# Patient Record
Sex: FEMALE | Race: WHITE | NOT HISPANIC OR LATINO | Employment: FULL TIME | URBAN - METROPOLITAN AREA
[De-identification: names, ages, dates, MRNs, and addresses within clinical notes are randomized per-mention and may not be internally consistent; named-entity substitution may affect disease eponyms.]

---

## 2017-01-05 DIAGNOSIS — G43.719 INTRACTABLE CHRONIC MIGRAINE WITHOUT AURA: Primary | ICD-10-CM

## 2017-01-13 ENCOUNTER — OFFICE VISIT (OUTPATIENT)
Dept: PHYSICAL MEDICINE AND REHAB | Facility: CLINIC | Age: 47
End: 2017-01-13

## 2017-01-13 VITALS
HEIGHT: 71 IN | HEART RATE: 93 BPM | DIASTOLIC BLOOD PRESSURE: 62 MMHG | BODY MASS INDEX: 20.64 KG/M2 | SYSTOLIC BLOOD PRESSURE: 94 MMHG | WEIGHT: 147.4 LBS

## 2017-01-13 DIAGNOSIS — G43.719 INTRACTABLE CHRONIC MIGRAINE WITHOUT AURA AND WITHOUT STATUS MIGRAINOSUS: Primary | ICD-10-CM

## 2017-01-13 ASSESSMENT — PAIN SCALES - GENERAL: PAINLEVEL: NO PAIN (0)

## 2017-01-13 NOTE — Clinical Note
"1/13/2017     RE: Jessica Manriquez  [address]  [facility] [address]     Dear Colleague,    Thank you for referring your patient, Jessica Manriquez, to the [facility] PHYSICAL MEDICINE AND REHABILITATION at [facility]. Please see a copy of my visit note below.    BOTULINUM TOXIN PROCEDURE - HEADACHE - NOTE    Chief Complaint   Patient presents with     RECHECK     [facility] RETURN MIGRAINE BOTOX       BP 94/62 mmHg  Pulse 93  Ht 1.803 m (5' 11\")  Wt 66.86 kg (147 lb 6.4 oz)  BMI 20.57 kg/m2  LMP 12/12/2016       Current outpatient prescriptions:      botulinum toxin type A (BOTOX) 100 UNITS injection, Inject 200 Units into the muscle every 3 months, Disp: 200 Units, Rfl: 4     SUMAtriptan (IMITREX) 50 MG tablet, Take 1 tablet (50 mg) by mouth at onset of headache for migraine May repeat dose in 2 hours.  Do not exceed 200 mg in 24 hours, Disp: 18 tablet, Rfl: 1     SUMAtriptan (IMITREX) 50 MG tablet, Take 1 tablet (50 mg) by mouth at onset of headache for migraine May repeat dose in 2 hours.  Do not exceed 200 mg in 24 hours (Needs follow-up appointment for this medication), Disp: 9 tablet, Rfl: 1     zolpidem (AMBIEN) 5 MG tablet, Take 1 tablet (5 mg) by mouth nightly as needed for sleep, Disp: 90 tablet, Rfl: 1     diazepam (VALIUM) 10 MG tablet, Place 1 tab vaginally for muscle spasms, Disp: 30 tablet, Rfl: 3     fluconazole (DIFLUCAN) 150 MG tablet, Take 1 po as needed for yeast infection, Disp: 4 tablet, Rfl: 0     OnabotulinumtoxinA (BOTOX IJ), Inject 175 Units as directed Lot # /C3 with Expiration Date:  March 2019   NDC #: Botox 100u ([address]-57), Disp: , Rfl:      ibuprofen (ADVIL,MOTRIN) 600 MG tablet, Take 1 tablet (600 mg) by mouth every 6 hours as needed for moderate pain, Disp: 60 tablet, Rfl: 0     ALPRAZolam (XANAX) 0.5 MG tablet, Take 1 tablet (0.5 mg) by mouth every 6 hours as needed for anxiety, Disp: 40 tablet, Rfl: 0     OnabotulinumtoxinA (BOTOX " IJ), Inject 175 Units into the muscle Lot # /C3  Expiration Date: OCT 2018, Disp: , Rfl:      OnabotulinumtoxinA (BOTOX IJ), Inject 175 Units as directed Lot #  C3 with Expiration Date:  08/2018., Disp: , Rfl:      amitriptyline (ELAVIL) 10 MG tablet, Take 1 tablet (10 mg) by mouth At Bedtime Take up to 5 tabs at bedtime prn insomnia, Disp: 150 tablet, Rfl: 3     OnabotulinumtoxinA (BOTOX IJ), , Disp: , Rfl:      traMADol (ULTRAM) 50 MG tablet, Take 1-2 tablets ( mg) by mouth every 6 hours as needed for pain, Disp: 40 tablet, Rfl: 2     penciclovir (DENAVIR) 1 % cream, Apply topically every 2 hours (while awake), Disp: 1.5 g, Rfl: 3     hydrOXYzine (ATARAX) 25 MG tablet, Take 1-2 tablets (25-50 mg) by mouth every 6 hours as needed for itching, Disp: 60 tablet, Rfl: 1     ondansetron (ZOFRAN ODT) 4 MG disintegrating tablet, Take 1-2 tablets (4-8 mg) by mouth every 8 hours as needed for nausea, Disp: 20 tablet, Rfl: 3     SUMAtriptan (IMITREX) 20 MG/ACT nasal spray, Spray 1 spray (20 mg) in nostril as needed for migraine May repeat dose in 2 hours.  Do not exceed 40 mg in 24 hours, Disp: 1 Inhaler, Rfl: 1     aspirin-acetaminophen-caffeine (EXCEDRIN MIGRAINE) 250-250-65 MG per tablet, Take 1 tablet by mouth every 6 hours as needed for pain., Disp: 30 tablet, Rfl: 0     OVER-THE-COUNTER, vitamin D 1000 units daily, Disp: , Rfl:      VITAMIN C 500 MG OR TABS, 1 tablet daily, Disp: , Rfl:      ADVIL 200 MG OR CAPS, None Entered, Disp: , Rfl:      MULTI-VITAMIN OR TABS, 1 DAILY, Disp: , Rfl:      ACIDOPHILUS OR, None Entered, Disp: , Rfl:      Allergies   Allergen Reactions     Darvocet [Acetaminophen] Nausea and Vomiting     Erythromycin GI Disturbance     Flexeril [Cyclobenzaprine Hcl]      Lightheadedness, dizzy      PHYSICAL EXAM:    She currently does not have a headache, and only had 1 so far this month.     HPI:    Patient denies new medical diagnoses, illnesses, hospitalizations, emergency room  visits, and injuries since the previous injection with botulinum neurotoxin.    We reviewed the recommended safety guidelines for  Botox from any vaccine injection, such as the seasonal flu vaccine, by a minimum of 10-14 days with Jessica Manriquez. She acknowledged understanding.    RESPONSE TO PREVIOUS TREATMENT:  Change in headache pattern following last series of injections with 145 units of  Botox on 2016. (The neck area was adjusted last time, which seemed to work well).    No side effects noted.    1.  Headache frequency during this injection cycle:   In November she had 7 headache days. She had 6 in December, and so far in January only had one headache. This is compared to her baseline headache frequency of 16-25 headache days per month.     2.  Headache duration during this injection cycle:  Headache duration ranged from 3-4 hours with medication (most of them), a few lasted 12 hours to a day. Patient reports 2 episodes of multiple day headaches during this injection cycle. This is also an improvement from the past cycle.    3.  Headache intensity during this injection cycle:    A.  5/10  =  Typical pain level.  B.  8/10  =  Worst pain level.  C.  2/10  =  Lowest pain level.  These are improvements over her past injection cycle.    4.  Change in headache medication usage during this injection cycle:  (For Example:  Able to decrease use of oral pain medications.) She is taking a lot less Imitrex and using Excedrine Migraine for most of them.    5.  ER Visits During This Injection Cycle:  none    6.  Functional Performance:  Change in ADL's, social interaction, days lost from work, etc. Patient reports being able to more fully participate in social and family activities and responsibilities as headache symptoms have improved    BOTULINUM NEUROTOXIN INJECTION PROCEDURES:      VERIFICATION OF PATIENT IDENTIFICATION AND PROCEDURE     Initials   Patient Name pag   Patient  pag   Procedure  Verified by: [email or web address]     Prior to the start of the procedure and with procedural staff participation, I verbally confirmed the patient s identity using two indicators, relevant allergies, that the procedure was appropriate and matched the consent or emergent situation, and that the correct equipment/implants were available. Immediately prior to starting the procedure I conducted the Time Out with the procedural staff and re-confirmed the patient s name, procedure, and site/side. (The Joint Commission universal protocol was followed.)  Yes    Sedation (Moderate or Deep): None    Above assessments performed by:  Poonam Shoemaker RN Care Coordinator    Beau Jacobson MD    INDICATIONS FOR PROCEDURES:  Jessica Manriquez is a 45 year old patient with chronic migraine headaches associated with oromandibular components.     Her baseline symptoms have been recalcitrant to oral medications and conservative therapy.  She is here today for reinjection with Botox.    GOAL OF PROCEDURE:  The goal of this procedure is to decrease pain  and enhance functional independence.    TOTAL DOSE ADMINISTERED:  Dose Administered:  145 units  Botox (Botulinum Toxin Type A)       2:1 Dilution     Diluent Used:  Preservative Free Normal Saline  Total Volume of Diluent Used:  2.9 ml  Lot # /C3 with Expiration Date:  08/2019  NDC #: Botox 100u ([address]-11)    Medication guide was offered to patient and was declined.    CONSENT:  The risks, benefits, and treatment options were discussed with Jessica Manriquez and she agreed to proceed.    Written consent was obtained by [facility].     EQUIPMENT USED:  Needle-25mm stimulating/recording  Needle-30 gauge  EMG/NCS Machine    SKIN PREPARATION:  Skin preparation was performed using an alcohol wipe.    GUIDANCE DESCRIPTION:  Electro-myographic guidance was necessary throughout the Oromandibular portion to accurately identify all areas of spastic muscles while avoiding injection of non-spastic muscles,  neighboring nerves and nearby vascular structures.     AREA/MUSCLE INJECTED:      1 & 2. SHOULDER GIRDLE & NECK MUSCLES: 25 units Botox = Total Dose, 2:1 Dilution   Right Cervical Paraspinal (superior cervical) - 10 units of Botox at 2 site/s  Left Cervical Paraspinal (superior cervical) - 10 units of Botox at 2 site/s                 Left levator (at neck) - 5 units of Botox at 1 site      3. HEAD, JAW & SCALP MUSCLES: 120 units Botox = Total Dose, 2:1 Dilution  Left masseter - 5 units of Botox at 1 site    Right Occipitalis - 15 units of Botox at 3 site/s.  Left Occipitalis - 15 units of Botox at 3 site/s.    Right Frontalis - 10 units of Botox at 2 site/s  Left Frontalis - 10 units of Botox at 2 site/s     Right Temporalis - 15 units of Botox at 3 site/s.  Left Temporalis - 30 units of Botox at 6 site/s.    Right  - 5 units of Botox at 1 site/s.  Left  - 5 units of Botox at 1 site/s.    Procerus - 10 units of Botox at 1 site/s.    RESPONSE TO PROCEDURE:  Jessica Manriquez tolerated the procedure well and there were no immediate complications.   She was allowed to recover for an appropriate period of time and was discharged home in stable condition.    FOLLOW UP:  Jessica Manriquez was asked to follow up by phone in 7-14 days with Poonam Shoemaker RN, Care Coordinator, to report her response to this series of injections.  Based on the patient's previous response to this therapy, Jessica Manriquez was rescheduled for the next series of injections in 11 weeks.    PLAN (Medication Changes, Therapy Orders, Work or Disability Issues, etc.): Patient will continue to monitor response to today's injections.    Again, thank you for allowing me to participate in the care of your patient.      Sincerely,  Beau Jacobson MD

## 2017-01-13 NOTE — NURSING NOTE
Chief Complaint   Patient presents with     RECHECK     [facility] RETURN MIGRAINE BOTOX     Rhiannon Bullard MA

## 2017-01-13 NOTE — PROGRESS NOTES
"BOTULINUM TOXIN PROCEDURE - HEADACHE - NOTE    Chief Complaint   Patient presents with     RECHECK     [facility] RETURN MIGRAINE BOTOX       BP 94/62 mmHg  Pulse 93  Ht 1.803 m (5' 11\")  Wt 66.86 kg (147 lb 6.4 oz)  BMI 20.57 kg/m2  LMP 12/12/2016       Current outpatient prescriptions:      botulinum toxin type A (BOTOX) 100 UNITS injection, Inject 200 Units into the muscle every 3 months, Disp: 200 Units, Rfl: 4     SUMAtriptan (IMITREX) 50 MG tablet, Take 1 tablet (50 mg) by mouth at onset of headache for migraine May repeat dose in 2 hours.  Do not exceed 200 mg in 24 hours, Disp: 18 tablet, Rfl: 1     SUMAtriptan (IMITREX) 50 MG tablet, Take 1 tablet (50 mg) by mouth at onset of headache for migraine May repeat dose in 2 hours.  Do not exceed 200 mg in 24 hours (Needs follow-up appointment for this medication), Disp: 9 tablet, Rfl: 1     zolpidem (AMBIEN) 5 MG tablet, Take 1 tablet (5 mg) by mouth nightly as needed for sleep, Disp: 90 tablet, Rfl: 1     diazepam (VALIUM) 10 MG tablet, Place 1 tab vaginally for muscle spasms, Disp: 30 tablet, Rfl: 3     fluconazole (DIFLUCAN) 150 MG tablet, Take 1 po as needed for yeast infection, Disp: 4 tablet, Rfl: 0     OnabotulinumtoxinA (BOTOX IJ), Inject 175 Units as directed Lot # /C3 with Expiration Date:  March 2019   NDC #: Botox 100u ([address]-43), Disp: , Rfl:      ibuprofen (ADVIL,MOTRIN) 600 MG tablet, Take 1 tablet (600 mg) by mouth every 6 hours as needed for moderate pain, Disp: 60 tablet, Rfl: 0     ALPRAZolam (XANAX) 0.5 MG tablet, Take 1 tablet (0.5 mg) by mouth every 6 hours as needed for anxiety, Disp: 40 tablet, Rfl: 0     OnabotulinumtoxinA (BOTOX IJ), Inject 175 Units into the muscle Lot # /C3  Expiration Date: OCT 2018, Disp: , Rfl:      OnabotulinumtoxinA (BOTOX IJ), Inject 175 Units as directed Lot #  C3 with Expiration Date:  08/2018., Disp: , Rfl:      amitriptyline (ELAVIL) 10 MG tablet, Take 1 tablet (10 mg) by mouth At Bedtime " Take up to 5 tabs at bedtime prn insomnia, Disp: 150 tablet, Rfl: 3     OnabotulinumtoxinA (BOTOX IJ), , Disp: , Rfl:      traMADol (ULTRAM) 50 MG tablet, Take 1-2 tablets ( mg) by mouth every 6 hours as needed for pain, Disp: 40 tablet, Rfl: 2     penciclovir (DENAVIR) 1 % cream, Apply topically every 2 hours (while awake), Disp: 1.5 g, Rfl: 3     hydrOXYzine (ATARAX) 25 MG tablet, Take 1-2 tablets (25-50 mg) by mouth every 6 hours as needed for itching, Disp: 60 tablet, Rfl: 1     ondansetron (ZOFRAN ODT) 4 MG disintegrating tablet, Take 1-2 tablets (4-8 mg) by mouth every 8 hours as needed for nausea, Disp: 20 tablet, Rfl: 3     SUMAtriptan (IMITREX) 20 MG/ACT nasal spray, Spray 1 spray (20 mg) in nostril as needed for migraine May repeat dose in 2 hours.  Do not exceed 40 mg in 24 hours, Disp: 1 Inhaler, Rfl: 1     aspirin-acetaminophen-caffeine (EXCEDRIN MIGRAINE) 250-250-65 MG per tablet, Take 1 tablet by mouth every 6 hours as needed for pain., Disp: 30 tablet, Rfl: 0     OVER-THE-COUNTER, vitamin D 1000 units daily, Disp: , Rfl:      VITAMIN C 500 MG OR TABS, 1 tablet daily, Disp: , Rfl:      ADVIL 200 MG OR CAPS, None Entered, Disp: , Rfl:      MULTI-VITAMIN OR TABS, 1 DAILY, Disp: , Rfl:      ACIDOPHILUS OR, None Entered, Disp: , Rfl:      Allergies   Allergen Reactions     Darvocet [Acetaminophen] Nausea and Vomiting     Erythromycin GI Disturbance     Flexeril [Cyclobenzaprine Hcl]      Lightheadedness, dizzy          PHYSICAL EXAM:    She currently does not have a headache, and only had 1 so far this month.     HPI:    Patient denies new medical diagnoses, illnesses, hospitalizations, emergency room visits, and injuries since the previous injection with botulinum neurotoxin.    We reviewed the recommended safety guidelines for  Botox from any vaccine injection, such as the seasonal flu vaccine, by a minimum of 10-14 days with Jessica Manriquez. She acknowledged understanding.    RESPONSE  TO PREVIOUS TREATMENT:  Change in headache pattern following last series of injections with 145 units of  Botox on 2016. (The neck area was adjusted last time, which seemed to work well).    No side effects noted.    1.  Headache frequency during this injection cycle:   In November she had 7 headache days. She had 6 in December, and so far in January only had one headache. This is compared to her baseline headache frequency of 16-25 headache days per month.     2.  Headache duration during this injection cycle:  Headache duration ranged from 3-4 hours with medication (most of them), a few lasted 12 hours to a day. Patient reports 2 episodes of multiple day headaches during this injection cycle. This is also an improvement from the past cycle.    3.  Headache intensity during this injection cycle:    A.  5/10  =  Typical pain level.  B.  8/10  =  Worst pain level.  C.  2/10  =  Lowest pain level.  These are improvements over her past injection cycle.    4.  Change in headache medication usage during this injection cycle:  (For Example:  Able to decrease use of oral pain medications.) She is taking a lot less Imitrex and using Excedrine Migraine for most of them.    5.  ER Visits During This Injection Cycle:  none    6.  Functional Performance:  Change in ADL's, social interaction, days lost from work, etc. Patient reports being able to more fully participate in social and family activities and responsibilities as headache symptoms have improved    BOTULINUM NEUROTOXIN INJECTION PROCEDURES:      VERIFICATION OF PATIENT IDENTIFICATION AND PROCEDURE     Initials   Patient Name pag   Patient  pag   Procedure Verified by: pag     Prior to the start of the procedure and with procedural staff participation, I verbally confirmed the patient s identity using two indicators, relevant allergies, that the procedure was appropriate and matched the consent or emergent situation, and that the correct equipment/implants  were available. Immediately prior to starting the procedure I conducted the Time Out with the procedural staff and re-confirmed the patient s name, procedure, and site/side. (The Joint Commission universal protocol was followed.)  Yes    Sedation (Moderate or Deep): None    Above assessments performed by:  Poonam Shoemaker RN Care Coordinator    Beau Jacobson MD      INDICATIONS FOR PROCEDURES:  Jessica Manriquez is a 45 year old patient with chronic migraine headaches associated with oromandibular components.     Her baseline symptoms have been recalcitrant to oral medications and conservative therapy.  She is here today for reinjection with Botox.    GOAL OF PROCEDURE:  The goal of this procedure is to decrease pain  and enhance functional independence.    TOTAL DOSE ADMINISTERED:  Dose Administered:  145 units  Botox (Botulinum Toxin Type A)       2:1 Dilution     Diluent Used:  Preservative Free Normal Saline  Total Volume of Diluent Used:  2.9 ml  Lot # /C3 with Expiration Date:  08/2019  NDC #: Botox 100u ([address]-02)    Medication guide was offered to patient and was declined.    CONSENT:  The risks, benefits, and treatment options were discussed with Jessica Manriquez and she agreed to proceed.    Written consent was obtained by [facility].     EQUIPMENT USED:  Needle-25mm stimulating/recording  Needle-30 gauge  EMG/NCS Machine    SKIN PREPARATION:  Skin preparation was performed using an alcohol wipe.    GUIDANCE DESCRIPTION:  Electro-myographic guidance was necessary throughout the Oromandibular portion to accurately identify all areas of spastic muscles while avoiding injection of non-spastic muscles, neighboring nerves and nearby vascular structures.     AREA/MUSCLE INJECTED:      1 & 2. SHOULDER GIRDLE & NECK MUSCLES: 25 units Botox = Total Dose, 2:1 Dilution   Right Cervical Paraspinal (superior cervical) - 10 units of Botox at 2 site/s  Left Cervical Paraspinal (superior cervical) - 10 units of Botox at  2 site/s                 Left levator (at neck) - 5 units of Botox at 1 site      3. HEAD, JAW & SCALP MUSCLES: 120 units Botox = Total Dose, 2:1 Dilution  Left masseter - 5 units of Botox at 1 site    Right Occipitalis - 15 units of Botox at 3 site/s.  Left Occipitalis - 15 units of Botox at 3 site/s.    Right Frontalis - 10 units of Botox at 2 site/s  Left Frontalis - 10 units of Botox at 2 site/s     Right Temporalis - 15 units of Botox at 3 site/s.  Left Temporalis - 30 units of Botox at 6 site/s.    Right  - 5 units of Botox at 1 site/s.  Left  - 5 units of Botox at 1 site/s.    Procerus - 10 units of Botox at 1 site/s.    RESPONSE TO PROCEDURE:  Jessica Manriquez tolerated the procedure well and there were no immediate complications.   She was allowed to recover for an appropriate period of time and was discharged home in stable condition.    FOLLOW UP:  Jessica Manriquez was asked to follow up by phone in 7-14 days with Poonam Shoemaker RN, Care Coordinator, to report her response to this series of injections.  Based on the patient's previous response to this therapy, Jessica Manriquez was rescheduled for the next series of injections in 11 weeks.    PLAN (Medication Changes, Therapy Orders, Work or Disability Issues, etc.): Patient will continue to monitor response to today's injections.

## 2017-03-02 ENCOUNTER — OFFICE VISIT (OUTPATIENT)
Dept: FAMILY MEDICINE | Facility: CLINIC | Age: 47
End: 2017-03-02
Payer: COMMERCIAL

## 2017-03-02 VITALS
BODY MASS INDEX: 19.72 KG/M2 | TEMPERATURE: 98.3 F | SYSTOLIC BLOOD PRESSURE: 97 MMHG | WEIGHT: 145.6 LBS | HEIGHT: 72 IN | HEART RATE: 86 BPM | DIASTOLIC BLOOD PRESSURE: 64 MMHG

## 2017-03-02 DIAGNOSIS — H10.31 ACUTE BACTERIAL CONJUNCTIVITIS OF RIGHT EYE: Primary | ICD-10-CM

## 2017-03-02 PROCEDURE — 99213 OFFICE O/P EST LOW 20 MIN: CPT | Performed by: FAMILY MEDICINE

## 2017-03-02 RX ORDER — TOBRAMYCIN 3 MG/ML
1 SOLUTION/ DROPS OPHTHALMIC EVERY 4 HOURS
Qty: 1 BOTTLE | Refills: 0 | Status: SHIPPED | OUTPATIENT
Start: 2017-03-02 | End: 2017-03-09

## 2017-03-02 NOTE — MR AVS SNAPSHOT
After Visit Summary   3/2/2017    Jessica Manriquez    MRN: [id number]           Patient Information     Date Of Birth          1970        Visit Information        Provider Department      3/2/2017 11:20 AM Lou Cerna MD [facility]        Today's Diagnoses     Acute bacterial conjunctivitis of right eye    -  1      Care Instructions          Thank you for choosing [facility]  You may be receiving a survey in the mail from [facility] regarding your visit today.  Please take a few minutes to complete and return the survey to let us know how we are doing.      Our Clinic hours are:  Mondays    7:20 am - 7 pm  Tues -  Fri  7:20 am - 5 pm    Clinic Phone: [phone]    The clinic lab opens at 7:30 am Mon - Fri and appointments are required.    [facility]  Ph. [phone]  Monday-Thursday 8 am - 7pm  Tues/Wed/Fri 8 am - 5:30 pm               Follow-ups after your visit        Your next 10 appointments already scheduled     Mar 31, 2017  9:30 AM CDT   (Arrive by 9:15 AM)   RETURN MIGRAINE BOTOX with Beau Jacobson MD   [facility] Physical Medicine and Rehabilitation ([facility])    [address] [place] [address] [phone]              Who to contact     If you have questions or need follow up information about today's clinic visit or your schedule please contact [facility] directly at [phone].  Normal or non-critical lab and imaging results will be communicated to you by MyChart, letter or phone within 4 business days after the clinic has received the results. If you do not hear from us within 7 days, please contact the clinic through MyChart or phone. If you have a critical or abnormal lab result, we will notify you by phone as soon as possible.  Submit refill requests through [employer] or call your pharmacy and they will forward the refill request to  "us. Please allow 3 business days for your refill to be completed.          Additional Information About Your Visit        Row[employer] Information     The [employer] gives you secure access to your electronic health record. If you see a primary care provider, you can also send messages to your care team and make appointments. If you have questions, please call your primary care clinic.  If you do not have a primary care provider, please call [phone] and they will assist you.        Care EveryWhere ID     This is your Care EveryWhere ID. This could be used by other organizations to access your [place] medical records  [id number]        Your Vitals Were     Pulse Temperature Height Last Period Breastfeeding? BMI (Body Mass Index)    86 98.3  F (36.8  C) (Tympanic) 5' 11.5\" (1.816 m) 02/25/2017 No 20.02 kg/m2       Blood Pressure from Last 3 Encounters:   03/02/17 97/64   01/13/17 94/62   12/15/16 99/65    Weight from Last 3 Encounters:   03/02/17 145 lb 9.6 oz (66 kg)   01/13/17 147 lb 6.4 oz (66.9 kg)   12/15/16 149 lb 6.4 oz (67.8 kg)              Today, you had the following     No orders found for display         Today's Medication Changes          These changes are accurate as of: 3/2/17  2:05 PM.  If you have any questions, ask your nurse or doctor.               Start taking these medicines.        Dose/Directions    tobramycin 0.3 % ophthalmic solution   Commonly known as:  TOBREX   Used for:  Acute bacterial conjunctivitis of right eye   Started by:  Lou Cerna MD        Dose:  1 drop   Place 1 drop into the right eye every 4 hours for 7 days   Quantity:  1 Bottle   Refills:  0            Where to get your medicines      These medications were sent to [facility] [place], [place] - [address]  [address] [facility] [address]     Phone:  [phone]     tobramycin 0.3 % ophthalmic solution                Primary Care Provider Office Phone # Fax # "    Su Loya [phone]       [facility] [address]  [facility] [address]        Thank you!     Thank you for choosing [facility]  for your care. Our goal is always to provide you with excellent care. Hearing back from our patients is one way we can continue to improve our services. Please take a few minutes to complete the written survey that you may receive in the mail after your visit with us. Thank you!             Your Updated Medication List - Protect others around you: Learn how to safely use, store and throw away your medicines at www.disposemymeds.org.          This list is accurate as of: 3/2/17  2:05 PM.  Always use your most recent med list.                   Brand Name Dispense Instructions for use    ACIDOPHILUS PO      None Entered       * ADVIL 200 MG capsule   Generic drug:  ibuprofen      None Entered       * ibuprofen 600 MG tablet    ADVIL/MOTRIN    60 tablet    Take 1 tablet (600 mg) by mouth every 6 hours as needed for moderate pain       ALPRAZolam 0.5 MG tablet    XANAX    40 tablet    Take 1 tablet (0.5 mg) by mouth every 6 hours as needed for anxiety       amitriptyline 10 MG tablet    ELAVIL    150 tablet    Take 1 tablet (10 mg) by mouth At Bedtime Take up to 5 tabs at bedtime prn insomnia       ascorbic acid 500 MG tablet    VITAMIN C     1 tablet daily       aspirin-acetaminophen-caffeine 250-250-65 MG per tablet    EXCEDRIN MIGRAINE    30 tablet    Take 1 tablet by mouth every 6 hours as needed for pain.       * BOTOX IJ          * BOTOX IJ      Inject 175 Units as directed Lot #  [id number] with Expiration Date:  08/2018.       * BOTOX IJ      Inject 175 Units as directed Lot # /C3 with Expiration Date:  March 2019   NDC #: Botox 100u ([address]-84)       * BOTOX IJ      Inject 145 Units as directed Lot # /C3 with Expiration Date:  08/2019 NDC #: Botox 100u ([address]-78)       * BOTOX IJ      Inject 175 Units into the  muscle Lot # /C3  Expiration Date: OCT 2018       * botulinum toxin type A 100 UNITS injection    BOTOX    200 Units    Inject 200 Units into the muscle every 3 months       diazepam 10 MG tablet    VALIUM    30 tablet    Place 1 tab vaginally for muscle spasms       fluconazole 150 MG tablet    DIFLUCAN    4 tablet    Take 1 po as needed for yeast infection       hydrOXYzine 25 MG tablet    ATARAX    60 tablet    Take 1-2 tablets (25-50 mg) by mouth every 6 hours as needed for itching       Multi-vitamin Tabs tablet   Generic drug:  multivitamin, therapeutic with minerals      1 DAILY       ondansetron 4 MG ODT tab    ZOFRAN ODT    20 tablet    Take 1-2 tablets (4-8 mg) by mouth every 8 hours as needed for nausea       OVER-THE-COUNTER      vitamin D 1000 units daily       penciclovir 1 % cream    DENAVIR    1.5 g    Apply topically every 2 hours (while awake)       SUMAtriptan 20 MG/ACT nasal spray    IMITREX    1 Inhaler    Spray 1 spray (20 mg) in nostril as needed for migraine May repeat dose in 2 hours.  Do not exceed 40 mg in 24 hours       * SUMAtriptan 50 MG tablet    IMITREX    9 tablet    Take 1 tablet (50 mg) by mouth at onset of headache for migraine May repeat dose in 2 hours.  Do not exceed 200 mg in 24 hours (Needs follow-up appointment for this medication)       * SUMAtriptan 50 MG tablet    IMITREX    18 tablet    Take 1 tablet (50 mg) by mouth at onset of headache for migraine May repeat dose in 2 hours.  Do not exceed 200 mg in 24 hours       tobramycin 0.3 % ophthalmic solution    TOBREX    1 Bottle    Place 1 drop into the right eye every 4 hours for 7 days       traMADol 50 MG tablet    ULTRAM    40 tablet    Take 1-2 tablets ( mg) by mouth every 6 hours as needed for pain       zolpidem 5 MG tablet    AMBIEN    90 tablet    Take 1 tablet (5 mg) by mouth nightly as needed for sleep       * Notice:  This list has 10 medication(s) that are the same as other medications prescribed for  you. Read the directions carefully, and ask your doctor or other care provider to review them with you.

## 2017-03-02 NOTE — NURSING NOTE
"Chief Complaint   Patient presents with     Eye Problem     right eye watery and mattery.  burning and itching.  sx's started 3/1/17     URI     x 3-4 days       Initial BP 97/64 (BP Location: Right arm, Patient Position: Chair, Cuff Size: Adult Regular)  Pulse 86  Temp 98.3  F (36.8  C) (Tympanic)  Ht 5' 11.5\" (1.816 m)  Wt 145 lb 9.6 oz (66 kg)  LMP 02/25/2017  Breastfeeding? No  BMI 20.02 kg/m2 Estimated body mass index is 20.02 kg/(m^2) as calculated from the following:    Height as of this encounter: 5' 11.5\" (1.816 m).    Weight as of this encounter: 145 lb 9.6 oz (66 kg).  Medication Reconciliation: complete    "

## 2017-03-02 NOTE — PATIENT INSTRUCTIONS
Thank you for choosing [facility].  You may be receiving a survey in the mail from [facility] regarding your visit today.  Please take a few minutes to complete and return the survey to let us know how we are doing.      Our Clinic hours are:  Mondays    7:20 am - 7 pm  Tues -  Fri  7:20 am - 5 pm    Clinic Phone: [phone]    The clinic lab opens at 7:30 am Mon - Fri and appointments are required.    [facility]. [phone]  Monday-Thursday 8 am - 7pm  Tues/Wed/Fri 8 am - 5:30 pm

## 2017-03-02 NOTE — PROGRESS NOTES
SUBJECTIVE:                                                    Jessica Manriquez is a 46 year old female who presents to clinic today for the following health issues:      ENT Symptoms             Symptoms: cc Present Absent Comment   Fever/Chills    Has felt warm   Fatigue   x    Muscle Aches   x    Eye Irritation x x  Watery and mattery   Sneezing   x    Nasal Magan/Drg  x     Sinus Pressure/Pain   x    Loss of smell   x    Dental pain   x    Sore Throat  x  Throat has been rasping   Swollen Glands   x    Ear Pain/Fullness   x    Cough  x     Wheeze   x    Chest Pain  x     Shortness of breath   x    Rash   x    Other  x  Headaches,  Does have a hx of migraines, diarrhea     Symptom duration:  3-4 days   Symptom severity:  -   Treatments tried:  tylenol   Contacts: Roommate was dx'd with strep       ROS: 10 point review of systems negative except as per HPI.    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     Selective IgA immunodeficiency (H)      recurrent respir infections     Temporomandibular joint disorders, unspecified      Seen at [facility], uses splint, PT , acupuncture        ACTIVE MEDICAL PROBLEMS:  Patient Active Problem List   Diagnosis     Migraine headache     External hemorrhoids     Insomnia     Unexplained endometrial cells on cervical Pap smear     S/P LEEP of cervix     Pelvic pain in female        FAMILY HISTORY:  Family History   Problem Relation Age of Onset     Arthritis Mother      Respiratory Mother      asthma     Allergies Mother      CANCER Mother      Blood Disease Mother       leukemia     Breast Cancer Mother      Breast Cancer Maternal Grandmother      age 60's     HEART DISEASE Maternal Grandmother      CEREBROVASCULAR DISEASE Paternal Grandmother      CANCER Paternal Grandfather      ?biliary     Alcohol/Drug Paternal Grandfather      Allergies Sister        SOCIAL HISTORY:  Social History     Social History     Marital status: Single     Spouse name: N/A     Number of children: 0     Years of education: N/A     Occupational History     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 No     Social History Narrative       MEDICATIONS:  Current Outpatient Prescriptions   Medication Sig Dispense Refill     tobramycin (TOBREX) 0.3 % ophthalmic solution Place 1 drop into the right eye every 4 hours for 7 days 1 Bottle 0     OnabotulinumtoxinA (BOTOX IJ) Inject 145 Units as directed Lot # /C3 with Expiration Date:  08/2019  NDC #: Botox 100u ([address]-61)       botulinum toxin type A (BOTOX) 100 UNITS injection Inject 200 Units into the muscle every 3 months 200 Units 4     SUMAtriptan (IMITREX) 50 MG tablet Take 1 tablet (50 mg) by mouth at onset of headache for migraine May repeat dose in 2 hours.  Do not exceed 200 mg in 24 hours (Needs follow-up appointment for this medication) 9 tablet 1     zolpidem (AMBIEN) 5 MG tablet Take 1 tablet (5 mg) by mouth nightly as needed for sleep 90 tablet 1     diazepam (VALIUM) 10 MG tablet Place 1 tab vaginally for muscle spasms 30 tablet 3     OnabotulinumtoxinA (BOTOX IJ) Inject 175 Units as directed Lot # /C3 with Expiration Date:  March 2019    NDC #: Botox 100u ([address]-76)       ibuprofen  (ADVIL,MOTRIN) 600 MG tablet Take 1 tablet (600 mg) by mouth every 6 hours as needed for moderate pain 60 tablet 0     ALPRAZolam (XANAX) 0.5 MG tablet Take 1 tablet (0.5 mg) by mouth every 6 hours as needed for anxiety 40 tablet 0     OnabotulinumtoxinA (BOTOX IJ) Inject 175 Units into the muscle Lot # /C3  Expiration Date: OCT 2018       OnabotulinumtoxinA (BOTOX IJ) Inject 175 Units as directed Lot #  C3 with Expiration Date:  08/2018.       amitriptyline (ELAVIL) 10 MG tablet Take 1 tablet (10 mg) by mouth At Bedtime Take up to 5 tabs at bedtime prn insomnia 150 tablet 3     OnabotulinumtoxinA (BOTOX IJ)        traMADol (ULTRAM) 50 MG tablet Take 1-2 tablets ( mg) by mouth every 6 hours as needed for pain 40 tablet 2     hydrOXYzine (ATARAX) 25 MG tablet Take 1-2 tablets (25-50 mg) by mouth every 6 hours as needed for itching 60 tablet 1     ondansetron (ZOFRAN ODT) 4 MG disintegrating tablet Take 1-2 tablets (4-8 mg) by mouth every 8 hours as needed for nausea 20 tablet 3     SUMAtriptan (IMITREX) 20 MG/ACT nasal spray Spray 1 spray (20 mg) in nostril as needed for migraine May repeat dose in 2 hours.  Do not exceed 40 mg in 24 hours 1 Inhaler 1     aspirin-acetaminophen-caffeine (EXCEDRIN MIGRAINE) 250-250-65 MG per tablet Take 1 tablet by mouth every 6 hours as needed for pain. 30 tablet 0     OVER-THE-COUNTER vitamin D 1000 units daily       VITAMIN C 500 MG OR TABS 1 tablet daily       ADVIL 200 MG OR CAPS None Entered       MULTI-VITAMIN OR TABS 1 DAILY       ACIDOPHILUS OR None Entered       SUMAtriptan (IMITREX) 50 MG tablet Take 1 tablet (50 mg) by mouth at onset of headache for migraine May repeat dose in 2 hours.  Do not exceed 200 mg in 24 hours 18 tablet 1     fluconazole (DIFLUCAN) 150 MG tablet Take 1 po as needed for yeast infection (Patient not taking: Reported on 3/2/2017) 4 tablet 0     penciclovir (DENAVIR) 1 % cream Apply topically every 2 hours (while awake) 1.5 g 3  "      ALLERGIES:     Allergies   Allergen Reactions     Darvocet [Acetaminophen] Nausea and Vomiting     Erythromycin GI Disturbance     Flexeril [Cyclobenzaprine Hcl]      Lightheadedness, dizzy           OBJECTIVE:                                                    VITALS: BP 97/64 (BP Location: Right arm, Patient Position: Chair, Cuff Size: Adult Regular)  Pulse 86  Temp 98.3  F (36.8  C) (Tympanic)  Ht 5' 11.5\" (1.816 m)  Wt 145 lb 9.6 oz (66 kg)  LMP 02/25/2017  Breastfeeding? No  BMI 20.02 kg/m2  GENERAL: Pleasant, well appearing female.  HEENT: PERRL, EOMI, right conjunctival erythema and purulence oropharynx normal , TMs normal , Nares normal .  NECK: supple, no thyromegaly or thyroid masses, No anterior cervical  lymphadenopathy.    ASSESSMENT/PLAN:                                                    1. Acute bacterial conjunctivitis of right eye  - tobramycin (TOBREX) 0.3 % ophthalmic solution; Place 1 drop into the right eye every 4 hours for 7 days  Dispense: 1 Bottle; Refill: 0      Follow-up: If not improving or if worsening     "

## 2017-03-20 ENCOUNTER — TELEPHONE (OUTPATIENT)
Dept: OBGYN | Facility: CLINIC | Age: 47
End: 2017-03-20

## 2017-03-20 DIAGNOSIS — G47.00 PERSISTENT INSOMNIA: ICD-10-CM

## 2017-03-20 RX ORDER — AMITRIPTYLINE HYDROCHLORIDE 10 MG/1
10 TABLET ORAL AT BEDTIME
Qty: 50 TABLET | Refills: 0 | Status: SHIPPED | OUTPATIENT
Start: 2017-03-20 | End: 2017-08-07

## 2017-03-20 NOTE — TELEPHONE ENCOUNTER
Left message on machine to call back regarding note below.   Sharon PROCTOR RN  [facility]ty [facility]

## 2017-03-20 NOTE — TELEPHONE ENCOUNTER
Pharmacy requesting rx for Amitriptyline 10mg refilled.  SIG: Take 1 tab by mouth at bedtime. Take up to 5 tablets at bedtime as needed for insomnia.    Last filled 10-31-16 # 150.  Last annual 12-28-15 [facility].  No future visits scheduled.    Please advise.    Thanks-    -Bee Roman  Clinic Station

## 2017-03-20 NOTE — TELEPHONE ENCOUNTER
Patient notified of need to be seen for further refills of Amitriptyline. Patient verbalized understanding. She stated she will need to call back to schedule as she does not have her calendar with her. Rhiannon Tompkins RN

## 2017-03-31 ENCOUNTER — OFFICE VISIT (OUTPATIENT)
Dept: PHYSICAL MEDICINE AND REHAB | Facility: CLINIC | Age: 47
End: 2017-03-31

## 2017-03-31 VITALS
WEIGHT: 148 LBS | OXYGEN SATURATION: 99 % | DIASTOLIC BLOOD PRESSURE: 63 MMHG | BODY MASS INDEX: 20.72 KG/M2 | HEIGHT: 71 IN | TEMPERATURE: 96.8 F | SYSTOLIC BLOOD PRESSURE: 89 MMHG | RESPIRATION RATE: 16 BRPM | HEART RATE: 92 BPM

## 2017-03-31 DIAGNOSIS — G43.719 INTRACTABLE CHRONIC MIGRAINE WITHOUT AURA AND WITHOUT STATUS MIGRAINOSUS: Primary | ICD-10-CM

## 2017-03-31 RX ORDER — HYDROCODONE BITARTRATE AND ACETAMINOPHEN 5; 325 MG/1; MG/1
1 TABLET ORAL PRN
COMMUNITY
Start: 2017-02-28 | End: 2019-10-10

## 2017-03-31 RX ORDER — YOHIMBE BARK 500 MG
1 CAPSULE ORAL DAILY
COMMUNITY

## 2017-03-31 ASSESSMENT — PAIN SCALES - GENERAL: PAINLEVEL: NO PAIN (0)

## 2017-03-31 NOTE — MR AVS SNAPSHOT
After Visit Summary   3/31/2017    Jessica Manriquez    MRN: [id number]           Patient Information     Date Of Birth          1970        Visit Information        Provider Department      3/31/2017 9:30 AM Beau Jacobson MD [facility] Physical Medicine and Rehabilitation        Today's Diagnoses     Intractable chronic migraine without aura and without status migrainosus    -  1       Follow-ups after your visit        Your next 10 appointments already scheduled     Jun 23, 2017 11:00 AM CDT   (Arrive by 10:45 AM)   RETURN MIGRAINE BOTOX with Beau Jacobson MD   [facility] Physical Medicine and Rehabilitation ([facility])    [address]  [address] [facility] [address] [phone]              Who to contact     Please call your clinic at [phone] to:    Ask questions about your health    Make or cancel appointments    Discuss your medicines    Learn about your test results    Speak to your doctor   If you have compliments or concerns about an experience at your clinic, or if you wish to file a complaint, please contact [facility] Patient Relations at [phone] or email us at [email or web address]         Additional Information About Your Visit        MyChart Information     [employer] gives you secure access to your electronic health record. If you see a primary care provider, you can also send messages to your care team and make appointments. If you have questions, please call your primary care clinic.  If you do not have a primary care provider, please call [phone] and they will assist you.      [employer] is an electronic gateway that provides easy, online access to your medical records. With [employer], you can request a clinic appointment, read your test results, renew a prescription or communicate with your care team.     To access your existing account, please contact your [facility]  "[place] [facility] or call [phone] for assistance.        Care EveryWhere ID     This is your Care EveryWhere ID. This could be used by other organizations to access your [place] medical records  [id number]        Your Vitals Were     Pulse Temperature Respirations Height Last Period Pulse Oximetry    92 96.8  F (36  C) (Oral) 16 1.803 m (5' 11\") 02/25/2017 99%    Breastfeeding? BMI (Body Mass Index)                No 20.64 kg/m2           Blood Pressure from Last 3 Encounters:   03/31/17 (!) 89/63   03/02/17 97/64   01/13/17 94/62    Weight from Last 3 Encounters:   03/31/17 67.1 kg (148 lb)   03/02/17 66 kg (145 lb 9.6 oz)   01/13/17 66.9 kg (147 lb 6.4 oz)              We Performed the Following     H NEEDLE BOTOX     HC CHEMODENERVATE FACIAL,TRIGERM,NERV MIGRAINE     NEEDLE EMG GUIDE W CHEMODENERVATION        Primary Care Provider Office Phone # Fax #    Su Loya [phone]       [facility] [address] [facility] [address]        Thank you!     Thank you for choosing [facility] PHYSICAL MEDICINE AND REHABILITATION  for your care. Our goal is always to provide you with excellent care. Hearing back from our patients is one way we can continue to improve our services. Please take a few minutes to complete the written survey that you may receive in the mail after your visit with us. Thank you!             Your Updated Medication List - Protect others around you: Learn how to safely use, store and throw away your medicines at www.disposemymeds.org.          This list is accurate as of: 3/31/17 10:25 AM.  Always use your most recent med list.                   Brand Name Dispense Instructions for use    Acidophilus 100 MG Caps      Take 1 capsule by mouth daily       ALPRAZolam 0.5 MG tablet    XANAX    40 tablet    Take 1 tablet (0.5 mg) by mouth every 6 hours as needed for anxiety       amitriptyline 10 MG tablet    ELAVIL    50 tablet    Take 1 tablet (10 mg) by " mouth At Bedtime Take up to 5 tabs at bedtime prn insomnia.  Pt needs to be seen for more refills.       ascorbic acid 500 MG tablet    VITAMIN C     1 tablet daily       aspirin-acetaminophen-caffeine 250-250-65 MG per tablet    EXCEDRIN MIGRAINE    30 tablet    Take 1 tablet by mouth every 6 hours as needed for pain.       * BOTOX IJ      Inject 145 Units as directed Lot # /C3 with Expiration Date:  08/2019 NDC #: Botox 100u ([address]-50)       * BOTOX IJ      Inject 145 Units as directed Lot # /C3 with Expiration Date:  10/2019 NDC #: Botox 100u ([address]-09)       diazepam 10 MG tablet    VALIUM    30 tablet    Place 1 tab vaginally for muscle spasms       fluconazole 150 MG tablet    DIFLUCAN    4 tablet    Take 1 po as needed for yeast infection       HYDROcodone-acetaminophen 5-325 MG per tablet    NORCO     Take 1 tablet by mouth as needed       hydrOXYzine 25 MG tablet    ATARAX    60 tablet    Take 1-2 tablets (25-50 mg) by mouth every 6 hours as needed for itching       ibuprofen 600 MG tablet    ADVIL/MOTRIN    60 tablet    Take 1 tablet (600 mg) by mouth every 6 hours as needed for moderate pain       Multi-vitamin Tabs tablet   Generic drug:  multivitamin, therapeutic with minerals      1 DAILY       ondansetron 4 MG ODT tab    ZOFRAN ODT    20 tablet    Take 1-2 tablets (4-8 mg) by mouth every 8 hours as needed for nausea       penciclovir 1 % cream    DENAVIR    1.5 g    Apply topically every 2 hours (while awake)       SUMAtriptan 20 MG/ACT nasal spray    IMITREX    1 Inhaler    Spray 1 spray (20 mg) in nostril as needed for migraine May repeat dose in 2 hours.  Do not exceed 40 mg in 24 hours       SUMAtriptan 50 MG tablet    IMITREX    18 tablet    Take 1 tablet (50 mg) by mouth at onset of headache for migraine May repeat dose in 2 hours.  Do not exceed 200 mg in 24 hours       traMADol 50 MG tablet    ULTRAM    40 tablet    Take 1-2 tablets ( mg) by mouth every 6 hours as  needed for pain       VITAMIN D (CHOLECALCIFEROL) PO      Take 1,000 Units by mouth daily       zolpidem 5 MG tablet    AMBIEN    90 tablet    Take 1 tablet (5 mg) by mouth nightly as needed for sleep       * Notice:  This list has 2 medication(s) that are the same as other medications prescribed for you. Read the directions carefully, and ask your doctor or other care provider to review them with you.

## 2017-03-31 NOTE — LETTER
"3/31/2017       RE: Jessica Manriquez  [address]  [facility] [address]     Dear Colleague,    Thank you for referring your patient, Jessica Manriquez, to the [facility] at [facility]. Please see a copy of my visit note below.    BOTULINUM TOXIN PROCEDURE - HEADACHE - NOTE    Chief Complaint   Patient presents with     RECHECK     UMP- BOTOX-MIGRAINE F/U       BP (!) 89/63 (BP Location: Right arm, Patient Position: Chair, Cuff Size: Adult Large)  Pulse 92  Temp 96.8  F (36  C) (Oral)  Resp 16  Ht 1.803 m (5' 11\")  Wt 67.1 kg (148 lb)  LMP 02/25/2017  SpO2 99%  Breastfeeding? No  BMI 20.64 kg/m2       Current Outpatient Prescriptions:      HYDROcodone-acetaminophen (NORCO) 5-325 MG per tablet, Take 1 tablet by mouth as needed, Disp: , Rfl:      VITAMIN D, CHOLECALCIFEROL, PO, Take 1,000 Units by mouth daily, Disp: , Rfl:      Lactobacillus (ACIDOPHILUS) 100 MG CAPS, Take 1 capsule by mouth daily, Disp: , Rfl:      amitriptyline (ELAVIL) 10 MG tablet, Take 1 tablet (10 mg) by mouth At Bedtime Take up to 5 tabs at bedtime prn insomnia.  Pt needs to be seen for more refills., Disp: 50 tablet, Rfl: 0     OnabotulinumtoxinA (BOTOX IJ), Inject 145 Units as directed Lot # /[id number] with Expiration Date:  08/2019 NDC #: Botox 100u ([address]-39), Disp: , Rfl:      SUMAtriptan (IMITREX) 50 MG tablet, Take 1 tablet (50 mg) by mouth at onset of headache for migraine May repeat dose in 2 hours.  Do not exceed 200 mg in 24 hours, Disp: 18 tablet, Rfl: 1     zolpidem (AMBIEN) 5 MG tablet, Take 1 tablet (5 mg) by mouth nightly as needed for sleep, Disp: 90 tablet, Rfl: 1     diazepam (VALIUM) 10 MG tablet, Place 1 tab vaginally for muscle spasms, Disp: 30 tablet, Rfl: 3     fluconazole (DIFLUCAN) 150 MG tablet, Take 1 po as needed for yeast infection, Disp: 4 tablet, Rfl: 0     ibuprofen (ADVIL,MOTRIN) 600 MG tablet, Take 1 tablet (600 mg) by " mouth every 6 hours as needed for moderate pain, Disp: 60 tablet, Rfl: 0     ALPRAZolam (XANAX) 0.5 MG tablet, Take 1 tablet (0.5 mg) by mouth every 6 hours as needed for anxiety, Disp: 40 tablet, Rfl: 0     traMADol (ULTRAM) 50 MG tablet, Take 1-2 tablets ( mg) by mouth every 6 hours as needed for pain, Disp: 40 tablet, Rfl: 2     penciclovir (DENAVIR) 1 % cream, Apply topically every 2 hours (while awake), Disp: 1.5 g, Rfl: 3     hydrOXYzine (ATARAX) 25 MG tablet, Take 1-2 tablets (25-50 mg) by mouth every 6 hours as needed for itching, Disp: 60 tablet, Rfl: 1     ondansetron (ZOFRAN ODT) 4 MG disintegrating tablet, Take 1-2 tablets (4-8 mg) by mouth every 8 hours as needed for nausea, Disp: 20 tablet, Rfl: 3     SUMAtriptan (IMITREX) 20 MG/ACT nasal spray, Spray 1 spray (20 mg) in nostril as needed for migraine May repeat dose in 2 hours.  Do not exceed 40 mg in 24 hours, Disp: 1 Inhaler, Rfl: 1     aspirin-acetaminophen-caffeine (EXCEDRIN MIGRAINE) 250-250-65 MG per tablet, Take 1 tablet by mouth every 6 hours as needed for pain., Disp: 30 tablet, Rfl: 0     VITAMIN C 500 MG OR TABS, 1 tablet daily, Disp: , Rfl:      MULTI-VITAMIN OR TABS, 1 DAILY, Disp: , Rfl:      Allergies   Allergen Reactions     Darvocet [Acetaminophen] Nausea and Vomiting     Erythromycin GI Disturbance     Flexeril [Cyclobenzaprine Hcl]      Lightheadedness, dizzy          PHYSICAL EXAM:    She currently does not have a headache, and had several earlier this week.     HPI:    Patient reports having strep throat and pink eye recently but is done with the antibiotics and feeling better.    We reviewed the recommended safety guidelines for  Botox from any vaccine injection, such as the seasonal flu vaccine, by a minimum of 10-14 days with Jessica Manriquez. She acknowledged understanding.    RESPONSE TO PREVIOUS TREATMENT:  Change in headache pattern following last series of injections with 145 units of  Botox on 1/13/2017.  (The neck area was adjusted last time, which seemed to work well).    No side effects noted.    1.  Headache frequency during this injection cycle:   In January she had 3 headache days. She had 6 in February, and so far in March she had 8, which 4 of them were in the past week. This is compared to her baseline headache frequency of 16-25 headache days per month.     2.  Headache duration during this injection cycle:  Headache duration ranged from 3-4 hours with medication (most of them), with 2 lasting a whole day. Patient reports 2 episodes of multiple day headaches during this injection cycle. This is the same from the past cycle.    3.  Headache intensity during this injection cycle:    A.  4/10  =  Typical pain level.  B.  8/10  =  Worst pain level.  C.  1/10  =  Lowest pain level.  These are another improvement over her past injection cycle.    4.  Change in headache medication usage during this injection cycle:  (For Example:  Able to decrease use of oral pain medications.) She is taking a lot less Imitrex, and not medicating for most of them.    5.  ER Visits During This Injection Cycle:  none    6.  Functional Performance:  Change in ADL's, social interaction, days lost from work, etc. Patient reports being able to more fully participate in social and family activities and responsibilities as headache symptoms have improved    BOTULINUM NEUROTOXIN INJECTION PROCEDURES:      VERIFICATION OF PATIENT IDENTIFICATION AND PROCEDURE     Initials   Patient Name pag   Patient  pag   Procedure Verified by: [email or web address]     Prior to the start of the procedure and with procedural staff participation, I verbally confirmed the patient s identity using two indicators, relevant allergies, that the procedure was appropriate and matched the consent or emergent situation, and that the correct equipment/implants were available. Immediately prior to starting the procedure I conducted the Time Out with the procedural staff and  re-confirmed the patient s name, procedure, and site/side. (The Joint Commission universal protocol was followed.)  Yes    Sedation (Moderate or Deep): None    Above assessments performed by:  Poonam Shoemaker RN Care Coordinator    Beau Jacobson MD      INDICATIONS FOR PROCEDURES:  Jessica Manriquez is a 45 year old patient with chronic migraine headaches associated with oromandibular components.     Her baseline symptoms have been recalcitrant to oral medications and conservative therapy.  She is here today for reinjection with Botox.    GOAL OF PROCEDURE:  The goal of this procedure is to decrease pain  and enhance functional independence.    TOTAL DOSE ADMINISTERED:  Dose Administered:  145 units  Botox (Botulinum Toxin Type A)       2:1 Dilution (55 units unavoidable waste)    Diluent Used:  Preservative Free Normal Saline  Total Volume of Diluent Used:  2.9 ml  Lot # /C3 with Expiration Date:  10/2019  NDC #: Botox 100u ([address]-73)    Medication guide was offered to patient and was declined.    CONSENT:  The risks, benefits, and treatment options were discussed with Jessica Manriquez and she agreed to proceed.    Written consent was obtained by [facility].     EQUIPMENT USED:  Needle-25mm stimulating/recording  Needle-30 gauge  EMG/NCS Machine    SKIN PREPARATION:  Skin preparation was performed using an alcohol wipe.    GUIDANCE DESCRIPTION:  Electro-myographic guidance was necessary throughout the Oromandibular portion to accurately identify all areas of spastic muscles while avoiding injection of non-spastic muscles, neighboring nerves and nearby vascular structures.     AREA/MUSCLE INJECTED:      1 & 2. SHOULDER GIRDLE & NECK MUSCLES: 25 units Botox = Total Dose, 2:1 Dilution     Right Cervical Paraspinal (superior cervical) - 10 units of Botox at 2 site/s  Left Cervical Paraspinal (superior cervical) - 10 units of Botox at 2 site/s                 Left levator (at neck) - 5 units of Botox at 1 site      3.  HEAD, JAW & SCALP MUSCLES: 120 units Botox = Total Dose, 2:1 Dilution    Left masseter - 5 units of Botox at 1 site    Right Occipitalis - 15 units of Botox at 3 site/s.  Left Occipitalis - 15 units of Botox at 3 site/s.    Right Frontalis - 10 units of Botox at 2 site/s  Left Frontalis - 10 units of Botox at 2 site/s     Right Temporalis - 15 units of Botox at 3 site/s.  Left Temporalis - 30 units of Botox at 6 site/s.    Right  - 5 units of Botox at 1 site/s.  Left  - 5 units of Botox at 1 site/s.    Procerus - 10 units of Botox at 1 site/s.      RESPONSE TO PROCEDURE:  Jessica Manriquez tolerated the procedure well and there were no immediate complications.   She was allowed to recover for an appropriate period of time and was discharged home in stable condition.    FOLLOW UP:  Jessica Manriquez was asked to follow up by phone in 7-14 days with Poonam Shoemaker RN, Care Coordinator, to report her response to this series of injections.  Based on the patient's previous response to this therapy, Jessica Manriquez was rescheduled for the next series of injections in 11 weeks.    PLAN (Medication Changes, Therapy Orders, Work or Disability Issues, etc.): Patient will continue to monitor response to today's injections.

## 2017-03-31 NOTE — PROGRESS NOTES
"BOTULINUM TOXIN PROCEDURE - HEADACHE - NOTE    Chief Complaint   Patient presents with     RECHECK     P- BOTOX-MIGRAINE F/U       BP (!) 89/63 (BP Location: Right arm, Patient Position: Chair, Cuff Size: Adult Large)  Pulse 92  Temp 96.8  F (36  C) (Oral)  Resp 16  Ht 1.803 m (5' 11\")  Wt 67.1 kg (148 lb)  LMP 02/25/2017  SpO2 99%  Breastfeeding? No  BMI 20.64 kg/m2       Current Outpatient Prescriptions:      HYDROcodone-acetaminophen (NORCO) 5-325 MG per tablet, Take 1 tablet by mouth as needed, Disp: , Rfl:      VITAMIN D, CHOLECALCIFEROL, PO, Take 1,000 Units by mouth daily, Disp: , Rfl:      Lactobacillus (ACIDOPHILUS) 100 MG CAPS, Take 1 capsule by mouth daily, Disp: , Rfl:      amitriptyline (ELAVIL) 10 MG tablet, Take 1 tablet (10 mg) by mouth At Bedtime Take up to 5 tabs at bedtime prn insomnia.  Pt needs to be seen for more refills., Disp: 50 tablet, Rfl: 0     OnabotulinumtoxinA (BOTOX IJ), Inject 145 Units as directed Lot # /[id number] with Expiration Date:  08/2019 NDC #: Botox 100u ([address]-42), Disp: , Rfl:      SUMAtriptan (IMITREX) 50 MG tablet, Take 1 tablet (50 mg) by mouth at onset of headache for migraine May repeat dose in 2 hours.  Do not exceed 200 mg in 24 hours, Disp: 18 tablet, Rfl: 1     zolpidem (AMBIEN) 5 MG tablet, Take 1 tablet (5 mg) by mouth nightly as needed for sleep, Disp: 90 tablet, Rfl: 1     diazepam (VALIUM) 10 MG tablet, Place 1 tab vaginally for muscle spasms, Disp: 30 tablet, Rfl: 3     fluconazole (DIFLUCAN) 150 MG tablet, Take 1 po as needed for yeast infection, Disp: 4 tablet, Rfl: 0     ibuprofen (ADVIL,MOTRIN) 600 MG tablet, Take 1 tablet (600 mg) by mouth every 6 hours as needed for moderate pain, Disp: 60 tablet, Rfl: 0     ALPRAZolam (XANAX) 0.5 MG tablet, Take 1 tablet (0.5 mg) by mouth every 6 hours as needed for anxiety, Disp: 40 tablet, Rfl: 0     traMADol (ULTRAM) 50 MG tablet, Take 1-2 tablets ( mg) by mouth every 6 hours as needed for pain, " Disp: 40 tablet, Rfl: 2     penciclovir (DENAVIR) 1 % cream, Apply topically every 2 hours (while awake), Disp: 1.5 g, Rfl: 3     hydrOXYzine (ATARAX) 25 MG tablet, Take 1-2 tablets (25-50 mg) by mouth every 6 hours as needed for itching, Disp: 60 tablet, Rfl: 1     ondansetron (ZOFRAN ODT) 4 MG disintegrating tablet, Take 1-2 tablets (4-8 mg) by mouth every 8 hours as needed for nausea, Disp: 20 tablet, Rfl: 3     SUMAtriptan (IMITREX) 20 MG/ACT nasal spray, Spray 1 spray (20 mg) in nostril as needed for migraine May repeat dose in 2 hours.  Do not exceed 40 mg in 24 hours, Disp: 1 Inhaler, Rfl: 1     aspirin-acetaminophen-caffeine (EXCEDRIN MIGRAINE) 250-250-65 MG per tablet, Take 1 tablet by mouth every 6 hours as needed for pain., Disp: 30 tablet, Rfl: 0     VITAMIN C 500 MG OR TABS, 1 tablet daily, Disp: , Rfl:      MULTI-VITAMIN OR TABS, 1 DAILY, Disp: , Rfl:      Allergies   Allergen Reactions     Darvocet [Acetaminophen] Nausea and Vomiting     Erythromycin GI Disturbance     Flexeril [Cyclobenzaprine Hcl]      Lightheadedness, dizzy          PHYSICAL EXAM:    She currently does not have a headache, and had several earlier this week.     HPI:    Patient reports having strep throat and pink eye recently but is done with the antibiotics and feeling better.    We reviewed the recommended safety guidelines for  Botox from any vaccine injection, such as the seasonal flu vaccine, by a minimum of 10-14 days with Jessica Manriquez. She acknowledged understanding.    RESPONSE TO PREVIOUS TREATMENT:  Change in headache pattern following last series of injections with 145 units of  Botox on 1/13/2017. (The neck area was adjusted last time, which seemed to work well).    No side effects noted.    1.  Headache frequency during this injection cycle:   In January she had 3 headache days. She had 6 in February, and so far in March she had 8, which 4 of them were in the past week. This is compared to her baseline  headache frequency of 16-25 headache days per month.     2.  Headache duration during this injection cycle:  Headache duration ranged from 3-4 hours with medication (most of them), with 2 lasting a whole day. Patient reports 2 episodes of multiple day headaches during this injection cycle. This is the same from the past cycle.    3.  Headache intensity during this injection cycle:    A.  4/10  =  Typical pain level.  B.  8/10  =  Worst pain level.  C.  1/10  =  Lowest pain level.  These are another improvement over her past injection cycle.    4.  Change in headache medication usage during this injection cycle:  (For Example:  Able to decrease use of oral pain medications.) She is taking a lot less Imitrex, and not medicating for most of them.    5.  ER Visits During This Injection Cycle:  none    6.  Functional Performance:  Change in ADL's, social interaction, days lost from work, etc. Patient reports being able to more fully participate in social and family activities and responsibilities as headache symptoms have improved    BOTULINUM NEUROTOXIN INJECTION PROCEDURES:      VERIFICATION OF PATIENT IDENTIFICATION AND PROCEDURE     Initials   Patient Name pag   Patient  pag   Procedure Verified by: pag     Prior to the start of the procedure and with procedural staff participation, I verbally confirmed the patient s identity using two indicators, relevant allergies, that the procedure was appropriate and matched the consent or emergent situation, and that the correct equipment/implants were available. Immediately prior to starting the procedure I conducted the Time Out with the procedural staff and re-confirmed the patient s name, procedure, and site/side. (The Joint Commission universal protocol was followed.)  Yes    Sedation (Moderate or Deep): None    Above assessments performed by:  Poonam Shoemaker RN Care Coordinator    Beau Jacobson MD      INDICATIONS FOR PROCEDURES:  Jessica Manriquez is a 45 year old  patient with chronic migraine headaches associated with oromandibular components.     Her baseline symptoms have been recalcitrant to oral medications and conservative therapy.  She is here today for reinjection with Botox.    GOAL OF PROCEDURE:  The goal of this procedure is to decrease pain  and enhance functional independence.    TOTAL DOSE ADMINISTERED:  Dose Administered:  145 units  Botox (Botulinum Toxin Type A)       2:1 Dilution (55 units unavoidable waste)    Diluent Used:  Preservative Free Normal Saline  Total Volume of Diluent Used:  2.9 ml  Lot # /C3 with Expiration Date:  10/2019  NDC #: Botox 100u ([address]-04)    Medication guide was offered to patient and was declined.    CONSENT:  The risks, benefits, and treatment options were discussed with Jessica Manriquez and she agreed to proceed.    Written consent was obtained by [facility].     EQUIPMENT USED:  Needle-25mm stimulating/recording  Needle-30 gauge  EMG/NCS Machine    SKIN PREPARATION:  Skin preparation was performed using an alcohol wipe.    GUIDANCE DESCRIPTION:  Electro-myographic guidance was necessary throughout the Oromandibular portion to accurately identify all areas of spastic muscles while avoiding injection of non-spastic muscles, neighboring nerves and nearby vascular structures.     AREA/MUSCLE INJECTED:      1 & 2. SHOULDER GIRDLE & NECK MUSCLES: 25 units Botox = Total Dose, 2:1 Dilution     Right Cervical Paraspinal (superior cervical) - 10 units of Botox at 2 site/s  Left Cervical Paraspinal (superior cervical) - 10 units of Botox at 2 site/s                 Left levator (at neck) - 5 units of Botox at 1 site      3. HEAD, JAW & SCALP MUSCLES: 120 units Botox = Total Dose, 2:1 Dilution    Left masseter - 5 units of Botox at 1 site    Right Occipitalis - 15 units of Botox at 3 site/s.  Left Occipitalis - 15 units of Botox at 3 site/s.    Right Frontalis - 10 units of Botox at 2 site/s  Left Frontalis - 10 units of Botox at 2  site/s     Right Temporalis - 15 units of Botox at 3 site/s.  Left Temporalis - 30 units of Botox at 6 site/s.    Right  - 5 units of Botox at 1 site/s.  Left  - 5 units of Botox at 1 site/s.    Procerus - 10 units of Botox at 1 site/s.      RESPONSE TO PROCEDURE:  Jessica Manriquez tolerated the procedure well and there were no immediate complications.   She was allowed to recover for an appropriate period of time and was discharged home in stable condition.    FOLLOW UP:  Jessica Manriquez was asked to follow up by phone in 7-14 days with Poonam Shoemaker RN, Care Coordinator, to report her response to this series of injections.  Based on the patient's previous response to this therapy, Jessica Manriquez was rescheduled for the next series of injections in 11 weeks.    PLAN (Medication Changes, Therapy Orders, Work or Disability Issues, etc.): Patient will continue to monitor response to today's injections.

## 2017-04-13 ENCOUNTER — OFFICE VISIT (OUTPATIENT)
Dept: PODIATRY | Facility: CLINIC | Age: 47
End: 2017-04-13
Payer: COMMERCIAL

## 2017-04-13 ENCOUNTER — RADIANT APPOINTMENT (OUTPATIENT)
Dept: GENERAL RADIOLOGY | Facility: CLINIC | Age: 47
End: 2017-04-13
Attending: PODIATRIST
Payer: COMMERCIAL

## 2017-04-13 VITALS — HEIGHT: 71 IN | BODY MASS INDEX: 20.72 KG/M2 | WEIGHT: 148 LBS

## 2017-04-13 DIAGNOSIS — M79.672 LEFT FOOT PAIN: ICD-10-CM

## 2017-04-13 DIAGNOSIS — M79.672 LEFT FOOT PAIN: Primary | ICD-10-CM

## 2017-04-13 DIAGNOSIS — M72.2 PLANTAR FASCIITIS, LEFT: ICD-10-CM

## 2017-04-13 DIAGNOSIS — M21.622 BUNIONETTE OF LEFT FOOT: ICD-10-CM

## 2017-04-13 PROCEDURE — 99203 OFFICE O/P NEW LOW 30 MIN: CPT | Performed by: PODIATRIST

## 2017-04-13 PROCEDURE — 73630 X-RAY EXAM OF FOOT: CPT | Mod: LT

## 2017-04-13 NOTE — PATIENT INSTRUCTIONS
Initial musculoskeletal treatment recommendation:    1.  Stretch the calf muscles as instructed once an hour.  2.  Ice the injured area in the evening; 20 min on/off.  3.  Take antiinflammatory medication as directed.  4.  Massage the soft tissues around the injured area in the morning to loosen the tissue  5.  Wear supportive foot wear and/or arch supports (rigid not cushion) such as Superfeet inserts which can be found at [facility] or [facility].       If no improvement in symptoms within four to six weeks, return to clinic for reevaluation.

## 2017-04-13 NOTE — PROGRESS NOTES
PATIENT HISTORY:  Jessica Manriquez is a 46 year old female who presents to clinic for a painful left foot .  The patient describes the pain as sharp stabbing.  The patient relates pain is located over the outside of the fifth metatarsophalangeal joint on the left foot as well as the bottom of the heel of the left foot.  The patient relates the pain has been present for the past several weeks to months.  The patient relates pain with ambulation.  The patient has tried different shoes with little relief.         REVIEW OF SYSTEMS:  Constitutional, HEENT, cardiovascular, pulmonary, GI, , musculoskeletal, neuro, skin, endocrine and psych systems are negative, except as otherwise noted.     PAST MEDICAL HISTORY:   Past Medical History:   Diagnosis Date     Cervicalgia     MRI x2 canal stenosis, disk hernieations, degenrative changes Last MRI 2004 at [facility] clinic     Dysplasia of cervix, unspecified 11/01    SALOMÓN 3, treated with LEEP     Endometriosis, site unspecified     Doing ok on BCP's has not had laparoscopy     Other anxiety states     has used Ambien and Xanax prn     Pure hypercholesterolemia      Scapulocostal syndrome 9/23/2009     Selective IgA immunodeficiency (H)     recurrent respir infections     Temporomandibular joint disorders, unspecified     Seen at [facility], uses splint, PT , acupuncture        PAST SURGICAL HISTORY:   Past Surgical History:   Procedure Laterality Date     HC ENLARGE BREAST WITH IMPLANT  2000     HC REVISE BREAST RECONSTRUCTION  5/03     LEEP TX, CERVICAL  11/01    SALOMÓN 3, yearly paps until 2021     LIGATN/STRIP LONG & SHORT SAPHEN  11/04    Bilateral vein stripping     TUBAL LIGATION  10/30/09    filshie clips        MEDICATIONS:   Current Outpatient Prescriptions:      HYDROcodone-acetaminophen (NORCO) 5-325 MG per tablet, Take 1 tablet by mouth as needed, Disp: , Rfl:      VITAMIN D, CHOLECALCIFEROL, PO, Take 1,000 Units by mouth daily, Disp: , Rfl:      Lactobacillus  (ACIDOPHILUS) 100 MG CAPS, Take 1 capsule by mouth daily, Disp: , Rfl:      OnabotulinumtoxinA (BOTOX IJ), Inject 145 Units as directed Lot # /C3 with Expiration Date:  10/2019 NDC #: Botox 100u ([address]-30), Disp: , Rfl:      amitriptyline (ELAVIL) 10 MG tablet, Take 1 tablet (10 mg) by mouth At Bedtime Take up to 5 tabs at bedtime prn insomnia.  Pt needs to be seen for more refills., Disp: 50 tablet, Rfl: 0     OnabotulinumtoxinA (BOTOX IJ), Inject 145 Units as directed Lot # /C3 with Expiration Date:  08/2019 NDC #: Botox 100u ([address]-69), Disp: , Rfl:      SUMAtriptan (IMITREX) 50 MG tablet, Take 1 tablet (50 mg) by mouth at onset of headache for migraine May repeat dose in 2 hours.  Do not exceed 200 mg in 24 hours, Disp: 18 tablet, Rfl: 1     zolpidem (AMBIEN) 5 MG tablet, Take 1 tablet (5 mg) by mouth nightly as needed for sleep, Disp: 90 tablet, Rfl: 1     diazepam (VALIUM) 10 MG tablet, Place 1 tab vaginally for muscle spasms, Disp: 30 tablet, Rfl: 3     fluconazole (DIFLUCAN) 150 MG tablet, Take 1 po as needed for yeast infection, Disp: 4 tablet, Rfl: 0     ibuprofen (ADVIL,MOTRIN) 600 MG tablet, Take 1 tablet (600 mg) by mouth every 6 hours as needed for moderate pain, Disp: 60 tablet, Rfl: 0     ALPRAZolam (XANAX) 0.5 MG tablet, Take 1 tablet (0.5 mg) by mouth every 6 hours as needed for anxiety, Disp: 40 tablet, Rfl: 0     traMADol (ULTRAM) 50 MG tablet, Take 1-2 tablets ( mg) by mouth every 6 hours as needed for pain, Disp: 40 tablet, Rfl: 2     penciclovir (DENAVIR) 1 % cream, Apply topically every 2 hours (while awake), Disp: 1.5 g, Rfl: 3     hydrOXYzine (ATARAX) 25 MG tablet, Take 1-2 tablets (25-50 mg) by mouth every 6 hours as needed for itching, Disp: 60 tablet, Rfl: 1     ondansetron (ZOFRAN ODT) 4 MG disintegrating tablet, Take 1-2 tablets (4-8 mg) by mouth every 8 hours as needed for nausea, Disp: 20 tablet, Rfl: 3     SUMAtriptan (IMITREX) 20 MG/ACT nasal spray, Spray  "1 spray (20 mg) in nostril as needed for migraine May repeat dose in 2 hours.  Do not exceed 40 mg in 24 hours, Disp: 1 Inhaler, Rfl: 1     aspirin-acetaminophen-caffeine (EXCEDRIN MIGRAINE) 250-250-65 MG per tablet, Take 1 tablet by mouth every 6 hours as needed for pain., Disp: 30 tablet, Rfl: 0     VITAMIN C 500 MG OR TABS, 1 tablet daily, Disp: , Rfl:      MULTI-VITAMIN OR TABS, 1 DAILY, Disp: , Rfl:      ALLERGIES:    Allergies   Allergen Reactions     Darvocet [Acetaminophen] Nausea and Vomiting     Erythromycin GI Disturbance     Flexeril [Cyclobenzaprine Hcl]      Lightheadedness, dizzy          SOCIAL HISTORY:   Social History     Social History     Marital status: Single     Spouse name: N/A     Number of children: 0     Years of education: N/A     Occupational History     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 No     Social History Narrative        FAMILY HISTORY:   Family History   Problem Relation Age of Onset     Arthritis Mother      Respiratory Mother      asthma     Allergies Mother      CANCER Mother      Blood Disease Mother      leukemia     Breast Cancer Mother      Breast Cancer Maternal Grandmother      age 60's     HEART DISEASE Maternal Grandmother      CEREBROVASCULAR DISEASE Paternal Grandmother      CANCER Paternal Grandfather      ?biliary     Alcohol/Drug Paternal Grandfather      Allergies Sister         EXAM:Vitals: Ht 1.803 m (5' 11\")  Wt 67.1 kg (148 lb)  BMI 20.64 kg/m2  BMI= Body mass index is 20.64 kg/(m^2).        General appearance: Patient is alert and fully cooperative with history & exam.  No sign of distress is noted during the " visit.     Psychiatric: Affect is pleasant & appropriate.  Patient appears motivated to improve health.     Respiratory: Breathing is regular & unlabored while sitting.     HEENT: Hearing is intact to spoken word.  Speech is clear.  No gross evidence of visual impairment that would impact ambulation.     Dermatologic: Skin is intact to both lower extremities without significant lesions, rash or abrasion.  No paronychia or evidence of soft tissue infection is noted.     Vascular: DP & PT pulses are intact & regular bilaterally.  No significant edema or varicosities noted.  CFT and skin temperature is normal to both lower extremities.     Neurologic: Lower extremity sensation is intact to light touch.  No evidence of weakness or contracture in the lower extremities.  No evidence of neuropathy.     Musculoskeletal: Patient is ambulatory without assistive device or brace.  No gross ankle deformity noted.  No foot or ankle joint effusion is noted.  One notes pain on palpation over the fifth metatarsal head on the left foot. One notes a pronounced fifth metatarsal head on the left foot. No surrounding erythema noted. One notes pain on palpation on the plantar aspect of the left heel and arch at the insertion point of the plantar fascia. No surrounding erythema noted.    Radiographs were evaluated including AP, lateral and medial oblique views of the left foot reveals an increased fourth fifth intermetatarsal angle on the left foot. One also notes an increased first intermetatarsal angle and hallux abductus angle. No cortical erosions or periosteal elevation.  All joint margins appear stable.  There is no apparent fracture or tumor formation noted.  There is no evidence of foreign body.    ASSESSMENT / PLAN:     ICD-10-CM    1. Left foot pain M79.672 XR Foot Left G/E 3 Views   2. Bunionette of left foot M21.622 ORTHOTICS REFERRAL   3. Plantar fasciitis, left M72.2 ORTHOTICS REFERRAL       I have explained to Jessica  about  the conditions.  We discussed the nature of the condition as well as the treatment plan and expected length of recovery.  At this time, the patient was instructed on icing, stretching, tissue massage and support.  The patient was referred to [facility] for custom orthotics that will aid in offloading the tension forces to the soft tissues and prevent further inflammation.   The patient will return in four weeks for reevaluation if the symptoms do not resolve.        Disclaimer: This note consists of symbols derived from keyboarding, dictation and/or voice recognition software. As a result, there may be errors in the script that have gone undetected. Please consider this when interpreting information found in this chart.       CANDY Gardner D.P.M., [email or web address].

## 2017-04-13 NOTE — LETTER
4/13/2017       RE: Jessica Manriquez  [address]  [place] [address]           Dear Colleague,    Thank you for referring your patient, Jessica Manriquez, to the [facility]. Please see a copy of my visit note below.    PATIENT HISTORY:  Jessica Manriquez is a 46 year old female who presents to clinic for a painful left foot .  The patient describes the pain as sharp stabbing.  The patient relates pain is located over the outside of the fifth metatarsophalangeal joint on the left foot as well as the bottom of the heel of the left foot.  The patient relates the pain has been present for the past several weeks to months.  The patient relates pain with ambulation.  The patient has tried different shoes with little relief.         REVIEW OF SYSTEMS:  Constitutional, HEENT, cardiovascular, pulmonary, GI, , musculoskeletal, neuro, skin, endocrine and psych systems are negative, except as otherwise noted.     PAST MEDICAL HISTORY:   Past Medical History:   Diagnosis Date     Cervicalgia     MRI x2 canal stenosis, disk hernieations, degenrative changes Last MRI 2004 at [facility] pain clinic     Dysplasia of cervix, unspecified 11/01    SALOMÓN 3, treated with LEEP     Endometriosis, site unspecified     Doing ok on BCP's has not had laparoscopy     Other anxiety states     has used Ambien and Xanax prn     Pure hypercholesterolemia      Scapulocostal syndrome 9/23/2009     Selective IgA immunodeficiency (H)     recurrent respir infections     Temporomandibular joint disorders, unspecified     Seen at [facility], uses splint, PT , acupuncture        PAST SURGICAL HISTORY:   Past Surgical History:   Procedure Laterality Date     HC ENLARGE BREAST WITH IMPLANT  2000     HC REVISE BREAST RECONSTRUCTION  5/03     LEEP TX, CERVICAL  11/01    SALOMÓN 3, yearly paps until 2021     LIGATN/STRIP LONG & SHORT SAPHEN  11/04    Bilateral vein stripping     TUBAL LIGATION  10/30/09    filshie clips         MEDICATIONS:   Current Outpatient Prescriptions:      HYDROcodone-acetaminophen (NORCO) 5-325 MG per tablet, Take 1 tablet by mouth as needed, Disp: , Rfl:      VITAMIN D, CHOLECALCIFEROL, PO, Take 1,000 Units by mouth daily, Disp: , Rfl:      Lactobacillus (ACIDOPHILUS) 100 MG CAPS, Take 1 capsule by mouth daily, Disp: , Rfl:      OnabotulinumtoxinA (BOTOX IJ), Inject 145 Units as directed Lot # /C3 with Expiration Date:  10/2019 NDC #: Botox 100u ([address]-67), Disp: , Rfl:      amitriptyline (ELAVIL) 10 MG tablet, Take 1 tablet (10 mg) by mouth At Bedtime Take up to 5 tabs at bedtime prn insomnia.  Pt needs to be seen for more refills., Disp: 50 tablet, Rfl: 0     OnabotulinumtoxinA (BOTOX IJ), Inject 145 Units as directed Lot # /C3 with Expiration Date:  08/2019 NDC #: Botox 100u ([address]-42), Disp: , Rfl:      SUMAtriptan (IMITREX) 50 MG tablet, Take 1 tablet (50 mg) by mouth at onset of headache for migraine May repeat dose in 2 hours.  Do not exceed 200 mg in 24 hours, Disp: 18 tablet, Rfl: 1     zolpidem (AMBIEN) 5 MG tablet, Take 1 tablet (5 mg) by mouth nightly as needed for sleep, Disp: 90 tablet, Rfl: 1     diazepam (VALIUM) 10 MG tablet, Place 1 tab vaginally for muscle spasms, Disp: 30 tablet, Rfl: 3     fluconazole (DIFLUCAN) 150 MG tablet, Take 1 po as needed for yeast infection, Disp: 4 tablet, Rfl: 0     ibuprofen (ADVIL,MOTRIN) 600 MG tablet, Take 1 tablet (600 mg) by mouth every 6 hours as needed for moderate pain, Disp: 60 tablet, Rfl: 0     ALPRAZolam (XANAX) 0.5 MG tablet, Take 1 tablet (0.5 mg) by mouth every 6 hours as needed for anxiety, Disp: 40 tablet, Rfl: 0     traMADol (ULTRAM) 50 MG tablet, Take 1-2 tablets ( mg) by mouth every 6 hours as needed for pain, Disp: 40 tablet, Rfl: 2     penciclovir (DENAVIR) 1 % cream, Apply topically every 2 hours (while awake), Disp: 1.5 g, Rfl: 3     hydrOXYzine (ATARAX) 25 MG tablet, Take 1-2 tablets (25-50 mg) by mouth every 6  hours as needed for itching, Disp: 60 tablet, Rfl: 1     ondansetron (ZOFRAN ODT) 4 MG disintegrating tablet, Take 1-2 tablets (4-8 mg) by mouth every 8 hours as needed for nausea, Disp: 20 tablet, Rfl: 3     SUMAtriptan (IMITREX) 20 MG/ACT nasal spray, Spray 1 spray (20 mg) in nostril as needed for migraine May repeat dose in 2 hours.  Do not exceed 40 mg in 24 hours, Disp: 1 Inhaler, Rfl: 1     aspirin-acetaminophen-caffeine (EXCEDRIN MIGRAINE) 250-250-65 MG per tablet, Take 1 tablet by mouth every 6 hours as needed for pain., Disp: 30 tablet, Rfl: 0     VITAMIN C 500 MG OR TABS, 1 tablet daily, Disp: , Rfl:      MULTI-VITAMIN OR TABS, 1 DAILY, Disp: , Rfl:      ALLERGIES:    Allergies   Allergen Reactions     Darvocet [Acetaminophen] Nausea and Vomiting     Erythromycin GI Disturbance     Flexeril [Cyclobenzaprine Hcl]      Lightheadedness, dizzy          SOCIAL HISTORY:   Social History     Social History     Marital status: Single     Spouse name: N/A     Number of children: 0     Years of education: N/A     Occupational History     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 No     Social History Narrative        FAMILY HISTORY:   Family History   Problem Relation Age of Onset     Arthritis Mother      Respiratory Mother      asthma     Allergies Mother      CANCER Mother      Blood Disease Mother      leukemia     Breast Cancer Mother      Breast Cancer Maternal Grandmother      age 60's     HEART DISEASE Maternal Grandmother      CEREBROVASCULAR DISEASE Paternal Grandmother      CANCER Paternal Grandfather      ?biliary     Alcohol/Drug Paternal Grandfather      Allergies  "Sister         EXAM:Vitals: Ht 1.803 m (5' 11\")  Wt 67.1 kg (148 lb)  BMI 20.64 kg/m2  BMI= Body mass index is 20.64 kg/(m^2).        General appearance: Patient is alert and fully cooperative with history & exam.  No sign of distress is noted during the visit.     Psychiatric: Affect is pleasant & appropriate.  Patient appears motivated to improve health.     Respiratory: Breathing is regular & unlabored while sitting.     HEENT: Hearing is intact to spoken word.  Speech is clear.  No gross evidence of visual impairment that would impact ambulation.     Dermatologic: Skin is intact to both lower extremities without significant lesions, rash or abrasion.  No paronychia or evidence of soft tissue infection is noted.     Vascular: DP & PT pulses are intact & regular bilaterally.  No significant edema or varicosities noted.  CFT and skin temperature is normal to both lower extremities.     Neurologic: Lower extremity sensation is intact to light touch.  No evidence of weakness or contracture in the lower extremities.  No evidence of neuropathy.     Musculoskeletal: Patient is ambulatory without assistive device or brace.  No gross ankle deformity noted.  No foot or ankle joint effusion is noted.  One notes pain on palpation over the fifth metatarsal head on the left foot. One notes a pronounced fifth metatarsal head on the left foot. No surrounding erythema noted. One notes pain on palpation on the plantar aspect of the left heel and arch at the insertion point of the plantar fascia. No surrounding erythema noted.    Radiographs were evaluated including AP, lateral and medial oblique views of the left foot reveals an increased fourth fifth intermetatarsal angle on the left foot. One also notes an increased first intermetatarsal angle and hallux abductus angle. No cortical erosions or periosteal elevation.  All joint margins appear stable.  There is no apparent fracture or tumor formation noted.  There is no evidence " of foreign body.    ASSESSMENT / PLAN:     ICD-10-CM    1. Left foot pain M79.672 XR Foot Left G/E 3 Views   2. Bunionette of left foot M21.622 ORTHOTICS REFERRAL   3. Plantar fasciitis, left M72.2 ORTHOTICS REFERRAL       I have explained to Jessica  about the conditions.  We discussed the nature of the condition as well as the treatment plan and expected length of recovery.  At this time, the patient was instructed on icing, stretching, tissue massage and support.  The patient was referred to [facility] for custom orthotics that will aid in offloading the tension forces to the soft tissues and prevent further inflammation.   The patient will return in four weeks for reevaluation if the symptoms do not resolve.        Disclaimer: This note consists of symbols derived from keyboarding, dictation and/or voice recognition software. As a result, there may be errors in the script that have gone undetected. Please consider this when interpreting information found in this chart.       JUNIOR [email or web address]., F.A.C.F.A.S.        Again, thank you for allowing me to participate in the care of your patient.        Sincerely,              Gidlardo Gardner DPM

## 2017-04-13 NOTE — MR AVS SNAPSHOT
After Visit Summary   4/13/2017    Jessica Manriquez    MRN: [id number]           Patient Information     Date Of Birth          1970        Visit Information        Provider Department      4/13/2017 2:40 PM Gildardo Gardner DPM [facility]        Today's Diagnoses     Left foot pain    -  1    Bunionette of left foot        Plantar fasciitis, left          Care Instructions    Initial musculoskeletal treatment recommendation:    1.  Stretch the calf muscles as instructed once an hour.  2.  Ice the injured area in the evening; 20 min on/off.  3.  Take antiinflammatory medication as directed.  4.  Massage the soft tissues around the injured area in the morning to loosen the tissue  5.  Wear supportive foot wear and/or arch supports (rigid not cushion) such as Superfeet inserts which can be found at [facility] or [facility].       If no improvement in symptoms within four to six weeks, return to clinic for reevaluation.          Follow-ups after your visit        Additional Services     ORTHOTICS REFERRAL       Please be aware that coverage of these services is subject to the terms and limitations of your health insurance plan.  Call member services at your health plan with any benefit or coverage questions.      Please bring the following to your appointment:    >>   Any x-rays, CTs or MRIs which have been performed.  Contact the facility where they were done to arrange for  prior to your scheduled appointment.  Any new CT, MRI or other procedures ordered by your specialist must be performed at a [facility] facility or coordinated by your clinic's referral office.    >>   List of current medications   >>   This referral request   >>   Any documents/labs given to you for this referral    ==This Referral PRINTS in the [facility] (ORTHOTICS & PROSTHETICS) Central scheduling office ==     The [facility] Central Scheduling staff will contact  patient to arrange appointments. Central Scheduling Phone #:  DUNIA Arredondo  [phone]     Orthotics: Foot Orthotics                  Follow-up notes from your care team     Return in about 4 weeks (around 5/11/2017), or if symptoms worsen or fail to improve.      Your next 10 appointments already scheduled     Jun 23, 2017 11:00 AM CDT   (Arrive by 10:45 AM)   RETURN MIGRAINE BOTOX with Beau Jacobson MD   [facility] Physical Medicine and Rehabilitation ([facility])    [address] [place] [address]  3rd Floor  [facility] [address] [phone]              Who to contact     If you have questions or need follow up information about today's clinic visit or your schedule please contact [facility] directly at [phone].  Normal or non-critical lab and imaging results will be communicated to you by MyChart, letter or phone within 4 business days after the clinic has received the results. If you do not hear from us within 7 days, please contact the clinic through [employer] or phone. If you have a critical or abnormal lab result, we will notify you by phone as soon as possible.  Submit refill requests through [employer] or call your pharmacy and they will forward the refill request to us. Please allow 3 business days for your refill to be completed.          Additional Information About Your Visit        [employer] Information     [employer] gives you secure access to your electronic health record. If you see a primary care provider, you can also send messages to your care team and make appointments. If you have questions, please call your primary care clinic.  If you do not have a primary care provider, please call [phone] and they will assist you.        Care EveryWhere ID     This is your Care EveryWhere ID. This could be used by other organizations to access your [place] medical records  [id number]        Your Vitals Were     Height BMI (Body Mass  "Index)                1.803 m (5' 11\") 20.64 kg/m2           Blood Pressure from Last 3 Encounters:   03/31/17 (!) 89/63   03/02/17 97/64   01/13/17 94/62    Weight from Last 3 Encounters:   04/13/17 67.1 kg (148 lb)   03/31/17 67.1 kg (148 lb)   03/02/17 66 kg (145 lb 9.6 oz)              We Performed the Following     ORTHOTICS REFERRAL        Primary Care Provider Office Phone # Fax #    Su Loya [phone]       [facility] [address] [address] [facility] [address]        Thank you!     Thank you for choosing [facility]  for your care. Our goal is always to provide you with excellent care. Hearing back from our patients is one way we can continue to improve our services. Please take a few minutes to complete the written survey that you may receive in the mail after your visit with us. Thank you!             Your Updated Medication List - Protect others around you: Learn how to safely use, store and throw away your medicines at www.disposemymeds.org.          This list is accurate as of: 4/13/17  3:05 PM.  Always use your most recent med list.                   Brand Name Dispense Instructions for use    Acidophilus 100 MG Caps      Take 1 capsule by mouth daily       ALPRAZolam 0.5 MG tablet    XANAX    40 tablet    Take 1 tablet (0.5 mg) by mouth every 6 hours as needed for anxiety       amitriptyline 10 MG tablet    ELAVIL    50 tablet    Take 1 tablet (10 mg) by mouth At Bedtime Take up to 5 tabs at bedtime prn insomnia.  Pt needs to be seen for more refills.       ascorbic acid 500 MG tablet    VITAMIN C     1 tablet daily       aspirin-acetaminophen-caffeine 250-250-65 MG per tablet    EXCEDRIN MIGRAINE    30 tablet    Take 1 tablet by mouth every 6 hours as needed for pain.       * BOTOX IJ      Inject 145 Units as directed Lot # /[id number] with Expiration Date:  08/2019 NDC #: Botox 100u ([address]-35)       * BOTOX IJ      Inject 145 Units as " directed Lot # /C3 with Expiration Date:  10/2019 NDC #: Botox 100u ([address]-86)       diazepam 10 MG tablet    VALIUM    30 tablet    Place 1 tab vaginally for muscle spasms       fluconazole 150 MG tablet    DIFLUCAN    4 tablet    Take 1 po as needed for yeast infection       HYDROcodone-acetaminophen 5-325 MG per tablet    NORCO     Take 1 tablet by mouth as needed       hydrOXYzine 25 MG tablet    ATARAX    60 tablet    Take 1-2 tablets (25-50 mg) by mouth every 6 hours as needed for itching       ibuprofen 600 MG tablet    ADVIL/MOTRIN    60 tablet    Take 1 tablet (600 mg) by mouth every 6 hours as needed for moderate pain       Multi-vitamin Tabs tablet   Generic drug:  multivitamin, therapeutic with minerals      1 DAILY       ondansetron 4 MG ODT tab    ZOFRAN ODT    20 tablet    Take 1-2 tablets (4-8 mg) by mouth every 8 hours as needed for nausea       penciclovir 1 % cream    DENAVIR    1.5 g    Apply topically every 2 hours (while awake)       SUMAtriptan 20 MG/ACT nasal spray    IMITREX    1 Inhaler    Spray 1 spray (20 mg) in nostril as needed for migraine May repeat dose in 2 hours.  Do not exceed 40 mg in 24 hours       SUMAtriptan 50 MG tablet    IMITREX    18 tablet    Take 1 tablet (50 mg) by mouth at onset of headache for migraine May repeat dose in 2 hours.  Do not exceed 200 mg in 24 hours       traMADol 50 MG tablet    ULTRAM    40 tablet    Take 1-2 tablets ( mg) by mouth every 6 hours as needed for pain       VITAMIN D (CHOLECALCIFEROL) PO      Take 1,000 Units by mouth daily       zolpidem 5 MG tablet    AMBIEN    90 tablet    Take 1 tablet (5 mg) by mouth nightly as needed for sleep       * Notice:  This list has 2 medication(s) that are the same as other medications prescribed for you. Read the directions carefully, and ask your doctor or other care provider to review them with you.

## 2017-04-27 DIAGNOSIS — H10.31 ACUTE BACTERIAL CONJUNCTIVITIS OF RIGHT EYE: Primary | ICD-10-CM

## 2017-04-27 RX ORDER — TOBRAMYCIN 3 MG/ML
1 SOLUTION/ DROPS OPHTHALMIC EVERY 4 HOURS
Qty: 1 BOTTLE | Refills: 0 | Status: SHIPPED | OUTPATIENT
Start: 2017-04-27 | End: 2017-05-04
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
## 2017-05-11 RX ORDER — HYDROXYZINE HYDROCHLORIDE 25 MG/1
25-50 TABLET, FILM COATED ORAL EVERY 6 HOURS PRN
Qty: 60 TABLET | Refills: 1 | Status: SHIPPED | OUTPATIENT
Start: 2017-05-11 | End: 2017-11-03

## 2017-05-22 ENCOUNTER — MYC MEDICAL ADVICE (OUTPATIENT)
Dept: FAMILY MEDICINE | Facility: CLINIC | Age: 47
End: 2017-05-22

## 2017-05-22 NOTE — TELEPHONE ENCOUNTER
Pt return call to clinic   Pt was given providers message below   Pt verbalized understanding and had no further questions at this time.   Encounter closed.   Bee AHUMADA RN

## 2017-05-22 NOTE — TELEPHONE ENCOUNTER
LM for pt to please return call to clinic   Sent message via [employer] to please contact clinic also.   Bee AHUMADA RN

## 2017-06-23 ENCOUNTER — OFFICE VISIT (OUTPATIENT)
Dept: PHYSICAL MEDICINE AND REHAB | Facility: CLINIC | Age: 47
End: 2017-06-23

## 2017-06-23 VITALS
WEIGHT: 142.7 LBS | DIASTOLIC BLOOD PRESSURE: 63 MMHG | HEART RATE: 90 BPM | BODY MASS INDEX: 19.98 KG/M2 | SYSTOLIC BLOOD PRESSURE: 95 MMHG | HEIGHT: 71 IN

## 2017-06-23 DIAGNOSIS — G43.719 INTRACTABLE CHRONIC MIGRAINE WITHOUT AURA AND WITHOUT STATUS MIGRAINOSUS: Primary | ICD-10-CM

## 2017-06-23 ASSESSMENT — PAIN SCALES - GENERAL: PAINLEVEL: NO PAIN (0)

## 2017-06-23 NOTE — NURSING NOTE
Chief Complaint   Patient presents with     RECHECK     [facility] RETURN - BOTOX     Rhiannon Bullard MA

## 2017-06-23 NOTE — MR AVS SNAPSHOT
After Visit Summary   6/23/2017    Jessica Manriquez    MRN: [id number]           Patient Information     Date Of Birth          1970        Visit Information        Provider Department      6/23/2017 11:00 AM Beau Jacobson MD [facility] Physical Medicine and Rehabilitation         Follow-ups after your visit        Your next 10 appointments already scheduled     Sep 11, 2017  2:20 PM CDT   (Arrive by 2:05 PM)   Return Botox with Ethan Carmichael MD   [facility] Physical Medicine and Rehabilitation ([facility])    [address] [place] [address] [phone]              Who to contact     Please call your clinic at [phone] to:    Ask questions about your health    Make or cancel appointments    Discuss your medicines    Learn about your test results    Speak to your doctor   If you have compliments or concerns about an experience at your clinic, or if you wish to file a complaint, please contact [facility] Patient Relations at [phone] or email us at [email or web address]         Additional Information About Your Visit        MyChart Information     [employer] gives you secure access to your electronic health record. If you see a primary care provider, you can also send messages to your care team and make appointments. If you have questions, please call your primary care clinic.  If you do not have a primary care provider, please call [phone] and they will assist you.      [employer] is an electronic gateway that provides easy, online access to your medical records. With [employer], you can request a clinic appointment, read your test results, renew a prescription or communicate with your care team.     To access your existing account, please contact your [facility] or call [phone] for assistance.        Care EveryWhere ID     This is your Care EveryWhere  "ID. This could be used by other organizations to access your [place] medical records  [id number]        Your Vitals Were     Pulse Height BMI (Body Mass Index)             90 1.803 m (5' 11\") 19.9 kg/m2          Blood Pressure from Last 3 Encounters:   06/23/17 95/63   03/31/17 (!) 89/63   03/02/17 97/64    Weight from Last 3 Encounters:   06/23/17 64.7 kg (142 lb 11.2 oz)   04/13/17 67.1 kg (148 lb)   03/31/17 67.1 kg (148 lb)              Today, you had the following     No orders found for display       Primary Care Provider Office Phone # Fax #    Su Loya [phone]       [facility] [address]  [facility] [address]        Equal Access to Services     [facility]: Hadii jose rice hadasho Sobetsy, waaxda luqadaha, qaybta kaalmada adeemeliyada, odessa warren . So [facility] [phone].    ATENCIÓN: Si habla español, tiene a coronado disposición servicios gratuitos de asistencia lingüística. Marco Antonio al [phone].    We comply with applicable federal civil rights laws and [place] laws. We do not discriminate on the basis of race, color, national origin, age, disability sex, sexual orientation or gender identity.            Thank you!     Thank you for choosing [facility]  for your care. Our goal is always to provide you with excellent care. Hearing back from our patients is one way we can continue to improve our services. Please take a few minutes to complete the written survey that you may receive in the mail after your visit with us. Thank you!             Your Updated Medication List - Protect others around you: Learn how to safely use, store and throw away your medicines at www.disposemymeds.org.          This list is accurate as of: 6/23/17 11:33 AM.  Always use your most recent med list.                   Brand Name Dispense Instructions for use Diagnosis    Acidophilus 100 MG Caps      Take 1 capsule by mouth daily "        ALPRAZolam 0.5 MG tablet    XANAX    40 tablet    Take 1 tablet (0.5 mg) by mouth every 6 hours as needed for anxiety    Anxiety state       amitriptyline 10 MG tablet    ELAVIL    50 tablet    Take 1 tablet (10 mg) by mouth At Bedtime Take up to 5 tabs at bedtime prn insomnia.  Pt needs to be seen for more refills.    Persistent insomnia       amoxicillin-clavulanate 875-125 MG per tablet    AUGMENTIN    20 tablet    Take 1 tablet by mouth 2 times daily    Cat bite, initial encounter       ascorbic acid 500 MG tablet    VITAMIN C     1 tablet daily        aspirin-acetaminophen-caffeine 250-250-65 MG per tablet    EXCEDRIN MIGRAINE    30 tablet    Take 1 tablet by mouth every 6 hours as needed for pain.    Migraine headache       * BOTOX IJ      Inject 145 Units as directed Lot # /C3 with Expiration Date:  08/2019 NDC #: Botox 100u ([address]-88)        * BOTOX IJ      Inject 145 Units as directed Lot # /C3 with Expiration Date:  10/2019 NDC #: Botox 100u ([address]-55)        diazepam 10 MG tablet    VALIUM    30 tablet    Place 1 tab vaginally for muscle spasms    Pelvic pain in female       fluconazole 150 MG tablet    DIFLUCAN    4 tablet    Take 1 po as needed for yeast infection    Yeast infection of the vagina       HYDROcodone-acetaminophen 5-325 MG per tablet    NORCO     Take 1 tablet by mouth as needed        * hydrOXYzine 25 MG tablet    ATARAX    60 tablet    Take 1-2 tablets (25-50 mg) by mouth every 6 hours as needed for itching    Itching       * hydrOXYzine 25 MG tablet    ATARAX    60 tablet    Take 1-2 tablets (25-50 mg) by mouth every 6 hours as needed for itching    Cat bite, initial encounter       ibuprofen 600 MG tablet    ADVIL/MOTRIN    60 tablet    Take 1 tablet (600 mg) by mouth every 6 hours as needed for moderate pain    Viral URI with cough, Throat pain       Multi-vitamin Tabs tablet   Generic drug:  multivitamin, therapeutic with minerals      1 DAILY         ondansetron 4 MG ODT tab    ZOFRAN ODT    20 tablet    Take 1-2 tablets (4-8 mg) by mouth every 8 hours as needed for nausea    Other migraine without status migrainosus, not intractable       penciclovir 1 % cream    DENAVIR    1.5 g    Apply topically every 2 hours (while awake)    HSV-1 (herpes simplex virus 1) infection       SUMAtriptan 20 MG/ACT nasal spray    IMITREX    1 Inhaler    Spray 1 spray (20 mg) in nostril as needed for migraine May repeat dose in 2 hours.  Do not exceed 40 mg in 24 hours    Migraine headache       SUMAtriptan 50 MG tablet    IMITREX    18 tablet    Take 1 tablet (50 mg) by mouth at onset of headache for migraine May repeat dose in 2 hours.  Do not exceed 200 mg in 24 hours    Migraine without status migrainosus, not intractable, unspecified migraine type       traMADol 50 MG tablet    ULTRAM    40 tablet    Take 1-2 tablets ( mg) by mouth every 6 hours as needed for pain    Intractable menstrual migraine without status migrainosus       VITAMIN D (CHOLECALCIFEROL) PO      Take 1,000 Units by mouth daily        zolpidem 5 MG tablet    AMBIEN    90 tablet    Take 1 tablet (5 mg) by mouth nightly as needed for sleep    Persistent insomnia       * Notice:  This list has 4 medication(s) that are the same as other medications prescribed for you. Read the directions carefully, and ask your doctor or other care provider to review them with you.

## 2017-06-23 NOTE — PROGRESS NOTES
"BOTULINUM TOXIN PROCEDURE - HEADACHE - NOTE    Chief Complaint   Patient presents with     Headache     [facility] RETURN - BOTOX - Migraine Headache       BP 95/63 (BP Location: Left arm, Patient Position: Sitting, Cuff Size: Adult Regular)  Pulse 90  Ht 1.803 m (5' 11\")  Wt 64.7 kg (142 lb 11.2 oz)  BMI 19.9 kg/m2       Current Outpatient Prescriptions:      amoxicillin-clavulanate (AUGMENTIN) 875-125 MG per tablet, Take 1 tablet by mouth 2 times daily, Disp: 20 tablet, Rfl: 0     hydrOXYzine (ATARAX) 25 MG tablet, Take 1-2 tablets (25-50 mg) by mouth every 6 hours as needed for itching, Disp: 60 tablet, Rfl: 1     HYDROcodone-acetaminophen (NORCO) 5-325 MG per tablet, Take 1 tablet by mouth as needed, Disp: , Rfl:      VITAMIN D, CHOLECALCIFEROL, PO, Take 1,000 Units by mouth daily, Disp: , Rfl:      Lactobacillus (ACIDOPHILUS) 100 MG CAPS, Take 1 capsule by mouth daily, Disp: , Rfl:      OnabotulinumtoxinA (BOTOX IJ), Inject 145 Units as directed Lot # /C3 with Expiration Date:  10/2019 NDC #: Botox 100u ([address]-86), Disp: , Rfl:      amitriptyline (ELAVIL) 10 MG tablet, Take 1 tablet (10 mg) by mouth At Bedtime Take up to 5 tabs at bedtime prn insomnia.  Pt needs to be seen for more refills., Disp: 50 tablet, Rfl: 0     OnabotulinumtoxinA (BOTOX IJ), Inject 145 Units as directed Lot # /C3 with Expiration Date:  08/2019 NDC #: Botox 100u ([address]-01), Disp: , Rfl:      SUMAtriptan (IMITREX) 50 MG tablet, Take 1 tablet (50 mg) by mouth at onset of headache for migraine May repeat dose in 2 hours.  Do not exceed 200 mg in 24 hours, Disp: 18 tablet, Rfl: 1     zolpidem (AMBIEN) 5 MG tablet, Take 1 tablet (5 mg) by mouth nightly as needed for sleep, Disp: 90 tablet, Rfl: 1     diazepam (VALIUM) 10 MG tablet, Place 1 tab vaginally for muscle spasms, Disp: 30 tablet, Rfl: 3     fluconazole (DIFLUCAN) 150 MG tablet, Take 1 po as needed for yeast infection, Disp: 4 tablet, Rfl: 0     ibuprofen " (ADVIL,MOTRIN) 600 MG tablet, Take 1 tablet (600 mg) by mouth every 6 hours as needed for moderate pain, Disp: 60 tablet, Rfl: 0     ALPRAZolam (XANAX) 0.5 MG tablet, Take 1 tablet (0.5 mg) by mouth every 6 hours as needed for anxiety, Disp: 40 tablet, Rfl: 0     traMADol (ULTRAM) 50 MG tablet, Take 1-2 tablets ( mg) by mouth every 6 hours as needed for pain, Disp: 40 tablet, Rfl: 2     penciclovir (DENAVIR) 1 % cream, Apply topically every 2 hours (while awake), Disp: 1.5 g, Rfl: 3     hydrOXYzine (ATARAX) 25 MG tablet, Take 1-2 tablets (25-50 mg) by mouth every 6 hours as needed for itching, Disp: 60 tablet, Rfl: 1     ondansetron (ZOFRAN ODT) 4 MG disintegrating tablet, Take 1-2 tablets (4-8 mg) by mouth every 8 hours as needed for nausea, Disp: 20 tablet, Rfl: 3     SUMAtriptan (IMITREX) 20 MG/ACT nasal spray, Spray 1 spray (20 mg) in nostril as needed for migraine May repeat dose in 2 hours.  Do not exceed 40 mg in 24 hours, Disp: 1 Inhaler, Rfl: 1     aspirin-acetaminophen-caffeine (EXCEDRIN MIGRAINE) 250-250-65 MG per tablet, Take 1 tablet by mouth every 6 hours as needed for pain., Disp: 30 tablet, Rfl: 0     VITAMIN C 500 MG OR TABS, 1 tablet daily, Disp: , Rfl:      MULTI-VITAMIN OR TABS, 1 DAILY, Disp: , Rfl:      Allergies   Allergen Reactions     Darvocet [Acetaminophen] Nausea and Vomiting     Erythromycin GI Disturbance     Flexeril [Cyclobenzaprine Hcl]      Lightheadedness, dizzy          PHYSICAL EXAM:    Denies headache today.  Last Migraine Headache 6/20, rated 6/10, took Imitrex and Excedrin Migraine and eventually took Vicodin.    HPI:    Patient denies new medical diagnoses, illnesses, hospitalizations, emergency room visits, and injuries since the previous injection with botulinum neurotoxin.    We reviewed the recommended safety guidelines for  Botox from any vaccine injection, such as the seasonal flu vaccine, by a minimum of 10-14 days with Jessica Manriquez. She acknowledged  understanding.    RESPONSE TO PREVIOUS TREATMENT:  Change in headache pattern following last series of injections with 145 units of  Botox on 3/31/2017.    No problems reported    1.  Headache frequency during this injection cycle:  5-6 headache days per month.  This is compared to her baseline headache frequency of 24 headache days per month.     2.  Headache duration during this injection cycle:  Headache duration ranged from 3 hours to 48 hours.     3.  Headache intensity during this injection cycle:    A.  4/10  =  Typical pain level.  B.  8/10  =  Worst pain level.  C.  0/10  =  Lowest pain level.    4.  Change in headache medication usage during this injection cycle:  (For Example:  Able to decrease use of oral pain medications.) No change in use of medication.  Tries to use least amount of Imitrex as possible.      5.  ER Visits During This Injection Cycle:  0    6.  Functional Performance:  Change in ADL's, social interaction, days lost from work, etc. Has had to go home early due to headaches and has cancelled some planned activities.    BOTULINUM NEUROTOXIN INJECTION PROCEDURES:      VERIFICATION OF PATIENT IDENTIFICATION AND PROCEDURE     Initials   Patient Name pag   Patient  pag   Procedure Verified by: pag     Prior to the start of the procedure and with procedural staff participation, I verbally confirmed the patient s identity using two indicators, relevant allergies, that the procedure was appropriate and matched the consent or emergent situation, and that the correct equipment/implants were available. Immediately prior to starting the procedure I conducted the Time Out with the procedural staff and re-confirmed the patient s name, procedure, and site/side. (The Joint Commission universal protocol was followed.)  Yes    Sedation (Moderate or Deep): None    Above assessments performed by:  Poonam Shoemaker RN Care Coordinator    Beau Jacobson MD      INDICATIONS FOR PROCEDURES:  Jessica Manriquez  is a 46 year old patient with chronic migraine headaches associated with cervicogenic  components.     Her baseline symptoms have been recalcitrant to oral medications and conservative therapy.  She is here today for reinjection with Botox.    GOAL OF PROCEDURE:  The goal of this procedure is to decrease pain  and enhance functional independence.    TOTAL DOSE ADMINISTERED:  Dose Administered:  145 units  Botox (Botulinum Toxin Type A)       2:1 Dilution (55 units unavoidable waste)    Diluent Used:  Preservative Free Normal Saline  Total Volume of Diluent Used:  2.9 ml  Lot # /C3 with Expiration Date:  01/2020  NDC #: Botox 100u ([address]-74)    Medication guide was offered to patient and was declined.    CONSENT:  The risks, benefits, and treatment options were discussed with Jessica Manriquez and she agreed to proceed.    Written consent was obtained by [facility].     EQUIPMENT USED:  Needle-25mm stimulating/recording  Needle-30 gauge  EMG/NCS Machine    SKIN PREPARATION:  Skin preparation was performed using an alcohol wipe.    GUIDANCE DESCRIPTION:  Electro-myographic guidance was necessary throughout the posterior neck and jaw portion to accurately identify all areas of spastic muscles while avoiding injection of non-spastic muscles, neighboring nerves and nearby vascular structures.     AREA/MUSCLE INJECTED:      1 & 2. SHOULDER GIRDLE & NECK MUSCLES: 25 units Botox = Total Dose, 2:1 Dilution      Right Cervical Paraspinal (superior cervical) - 10 units of Botox at 2 site/s  Left Cervical Paraspinal (superior cervical) - 10 units of Botox at 2 site/s                  Left levator (at neck) - 5 units of Botox at 1 site        3. HEAD, JAW & SCALP MUSCLES: 120 units Botox = Total Dose, 2:1 Dilution     Left masseter - 5 units of Botox at 1 site     Right Occipitalis - 15 units of Botox at 3 site/s.  Left Occipitalis - 15 units of Botox at 3 site/s.     Right Frontalis - 10 units of Botox at 2 site/s  Left  Frontalis - 10 units of Botox at 2 site/s      Right Temporalis - 15 units of Botox at 3 site/s.  Left Temporalis - 30 units of Botox at 6 site/s.     Right  - 5 units of Botox at 1 site/s.  Left  - 5 units of Botox at 1 site/s.     Procerus - 10 units of Botox at 1 site/s.      RESPONSE TO PROCEDURE:  Jessica Manriquez tolerated the procedure well and there were no immediate complications.   She was allowed to recover for an appropriate period of time and was discharged home in stable condition.    FOLLOW UP:  Jessica Manriquez was asked to follow up by phone in 7-14 days with Poonam Shoemaker RN, Care Coordinator, to report her response to this series of injections.  Based on the patient's previous response to this therapy, Jessica Manriquez was rescheduled for the next series of injections in 12 weeks.    PLAN (Medication Changes, Therapy Orders, Work or Disability Issues, etc.): Patient will continue to monitor response to today's injections.

## 2017-06-23 NOTE — LETTER
"6/23/2017       RE: Jessica Manriquez  [address]  [place] [address]     Dear Colleague,    Thank you for referring your patient, Jessica Manriquez, to the [facility] PHYSICAL MEDICINE AND REHABILITATION at [facility]. Please see a copy of my visit note below.    BOTULINUM TOXIN PROCEDURE - HEADACHE - NOTE    Chief Complaint   Patient presents with     Headache     [facility] RETURN - BOTOX - Migraine Headache       BP 95/63 (BP Location: Left arm, Patient Position: Sitting, Cuff Size: Adult Regular)  Pulse 90  Ht 1.803 m (5' 11\")  Wt 64.7 kg (142 lb 11.2 oz)  BMI 19.9 kg/m2       Current Outpatient Prescriptions:      amoxicillin-clavulanate (AUGMENTIN) 875-125 MG per tablet, Take 1 tablet by mouth 2 times daily, Disp: 20 tablet, Rfl: 0     hydrOXYzine (ATARAX) 25 MG tablet, Take 1-2 tablets (25-50 mg) by mouth every 6 hours as needed for itching, Disp: 60 tablet, Rfl: 1     HYDROcodone-acetaminophen (NORCO) 5-325 MG per tablet, Take 1 tablet by mouth as needed, Disp: , Rfl:      VITAMIN D, CHOLECALCIFEROL, PO, Take 1,000 Units by mouth daily, Disp: , Rfl:      Lactobacillus (ACIDOPHILUS) 100 MG CAPS, Take 1 capsule by mouth daily, Disp: , Rfl:      OnabotulinumtoxinA (BOTOX IJ), Inject 145 Units as directed Lot # /C3 with Expiration Date:  10/2019 NDC #: Botox 100u ([address]-20), Disp: , Rfl:      amitriptyline (ELAVIL) 10 MG tablet, Take 1 tablet (10 mg) by mouth At Bedtime Take up to 5 tabs at bedtime prn insomnia.  Pt needs to be seen for more refills., Disp: 50 tablet, Rfl: 0     OnabotulinumtoxinA (BOTOX IJ), Inject 145 Units as directed Lot # /C3 with Expiration Date:  08/2019 NDC #: Botox 100u ([address]-41), Disp: , Rfl:      SUMAtriptan (IMITREX) 50 MG tablet, Take 1 tablet (50 mg) by mouth at onset of headache for migraine May repeat dose in 2 hours.  Do not exceed 200 mg in 24 hours, Disp: 18 tablet, Rfl: 1     zolpidem (AMBIEN) 5 MG tablet, Take 1 " tablet (5 mg) by mouth nightly as needed for sleep, Disp: 90 tablet, Rfl: 1     diazepam (VALIUM) 10 MG tablet, Place 1 tab vaginally for muscle spasms, Disp: 30 tablet, Rfl: 3     fluconazole (DIFLUCAN) 150 MG tablet, Take 1 po as needed for yeast infection, Disp: 4 tablet, Rfl: 0     ibuprofen (ADVIL,MOTRIN) 600 MG tablet, Take 1 tablet (600 mg) by mouth every 6 hours as needed for moderate pain, Disp: 60 tablet, Rfl: 0     ALPRAZolam (XANAX) 0.5 MG tablet, Take 1 tablet (0.5 mg) by mouth every 6 hours as needed for anxiety, Disp: 40 tablet, Rfl: 0     traMADol (ULTRAM) 50 MG tablet, Take 1-2 tablets ( mg) by mouth every 6 hours as needed for pain, Disp: 40 tablet, Rfl: 2     penciclovir (DENAVIR) 1 % cream, Apply topically every 2 hours (while awake), Disp: 1.5 g, Rfl: 3     hydrOXYzine (ATARAX) 25 MG tablet, Take 1-2 tablets (25-50 mg) by mouth every 6 hours as needed for itching, Disp: 60 tablet, Rfl: 1     ondansetron (ZOFRAN ODT) 4 MG disintegrating tablet, Take 1-2 tablets (4-8 mg) by mouth every 8 hours as needed for nausea, Disp: 20 tablet, Rfl: 3     SUMAtriptan (IMITREX) 20 MG/ACT nasal spray, Spray 1 spray (20 mg) in nostril as needed for migraine May repeat dose in 2 hours.  Do not exceed 40 mg in 24 hours, Disp: 1 Inhaler, Rfl: 1     aspirin-acetaminophen-caffeine (EXCEDRIN MIGRAINE) 250-250-65 MG per tablet, Take 1 tablet by mouth every 6 hours as needed for pain., Disp: 30 tablet, Rfl: 0     VITAMIN C 500 MG OR TABS, 1 tablet daily, Disp: , Rfl:      MULTI-VITAMIN OR TABS, 1 DAILY, Disp: , Rfl:      Allergies   Allergen Reactions     Darvocet [Acetaminophen] Nausea and Vomiting     Erythromycin GI Disturbance     Flexeril [Cyclobenzaprine Hcl]      Lightheadedness, dizzy          PHYSICAL EXAM:    Denies headache today.  Last Migraine Headache 6/20, rated 6/10, took Imitrex and Excedrin Migraine and eventually took Vicodin.    HPI:    Patient denies new medical diagnoses, illnesses,  hospitalizations, emergency room visits, and injuries since the previous injection with botulinum neurotoxin.    We reviewed the recommended safety guidelines for  Botox from any vaccine injection, such as the seasonal flu vaccine, by a minimum of 10-14 days with Jessica Manriquez. She acknowledged understanding.    RESPONSE TO PREVIOUS TREATMENT:  Change in headache pattern following last series of injections with 145 units of  Botox on 3/31/2017.    No problems reported    1.  Headache frequency during this injection cycle:  5-6 headache days per month.  This is compared to her baseline headache frequency of 24 headache days per month.     2.  Headache duration during this injection cycle:  Headache duration ranged from 3 hours to 48 hours.     3.  Headache intensity during this injection cycle:    A.  4/10  =  Typical pain level.  B.  8/10  =  Worst pain level.  C.  0/10  =  Lowest pain level.    4.  Change in headache medication usage during this injection cycle:  (For Example:  Able to decrease use of oral pain medications.) No change in use of medication.  Tries to use least amount of Imitrex as possible.      5.  ER Visits During This Injection Cycle:  0    6.  Functional Performance:  Change in ADL's, social interaction, days lost from work, etc. Has had to go home early due to headaches and has cancelled some planned activities.    BOTULINUM NEUROTOXIN INJECTION PROCEDURES:      VERIFICATION OF PATIENT IDENTIFICATION AND PROCEDURE     Initials   Patient Name pag   Patient  pag   Procedure Verified by: pag     Prior to the start of the procedure and with procedural staff participation, I verbally confirmed the patient s identity using two indicators, relevant allergies, that the procedure was appropriate and matched the consent or emergent situation, and that the correct equipment/implants were available. Immediately prior to starting the procedure I conducted the Time Out with the procedural  staff and re-confirmed the patient s name, procedure, and site/side. (The Joint Commission universal protocol was followed.)  Yes    Sedation (Moderate or Deep): None    Above assessments performed by:  Poonam Shoemaker RN Care Coordinator    Beau Jacobson MD      INDICATIONS FOR PROCEDURES:  Jessica Manriquez is a 46 year old patient with chronic migraine headaches associated with cervicogenic  components.     Her baseline symptoms have been recalcitrant to oral medications and conservative therapy.  She is here today for reinjection with Botox.    GOAL OF PROCEDURE:  The goal of this procedure is to decrease pain  and enhance functional independence.    TOTAL DOSE ADMINISTERED:  Dose Administered:  145 units  Botox (Botulinum Toxin Type A)       2:1 Dilution (55 units unavoidable waste)    Diluent Used:  Preservative Free Normal Saline  Total Volume of Diluent Used:  2.9 ml  Lot # /C3 with Expiration Date:  01/2020  NDC #: Botox 100u ([address]-02)    Medication guide was offered to patient and was declined.    CONSENT:  The risks, benefits, and treatment options were discussed with Jessica Manriquez and she agreed to proceed.    Written consent was obtained by [facility].     EQUIPMENT USED:  Needle-25mm stimulating/recording  Needle-30 gauge  EMG/NCS Machine    SKIN PREPARATION:  Skin preparation was performed using an alcohol wipe.    GUIDANCE DESCRIPTION:  Electro-myographic guidance was necessary throughout the posterior neck and jaw portion to accurately identify all areas of spastic muscles while avoiding injection of non-spastic muscles, neighboring nerves and nearby vascular structures.     AREA/MUSCLE INJECTED:      1 & 2. SHOULDER GIRDLE & NECK MUSCLES: 25 units Botox = Total Dose, 2:1 Dilution      Right Cervical Paraspinal (superior cervical) - 10 units of Botox at 2 site/s  Left Cervical Paraspinal (superior cervical) - 10 units of Botox at 2 site/s                  Left levator (at neck) - 5 units of  Botox at 1 site        3. HEAD, JAW & SCALP MUSCLES: 120 units Botox = Total Dose, 2:1 Dilution     Left masseter - 5 units of Botox at 1 site     Right Occipitalis - 15 units of Botox at 3 site/s.  Left Occipitalis - 15 units of Botox at 3 site/s.     Right Frontalis - 10 units of Botox at 2 site/s  Left Frontalis - 10 units of Botox at 2 site/s      Right Temporalis - 15 units of Botox at 3 site/s.  Left Temporalis - 30 units of Botox at 6 site/s.     Right  - 5 units of Botox at 1 site/s.  Left  - 5 units of Botox at 1 site/s.     Procerus - 10 units of Botox at 1 site/s.      RESPONSE TO PROCEDURE:  Jessica Manriquez tolerated the procedure well and there were no immediate complications.   She was allowed to recover for an appropriate period of time and was discharged home in stable condition.    FOLLOW UP:  Jessica Manriquez was asked to follow up by phone in 7-14 days with Poonam Shoemaker RN, Care Coordinator, to report her response to this series of injections.  Based on the patient's previous response to this therapy, Jessica Manriquez was rescheduled for the next series of injections in 12 weeks.    PLAN (Medication Changes, Therapy Orders, Work or Disability Issues, etc.): Patient will continue to monitor response to today's injections.    Again, thank you for allowing me to participate in the care of your patient.      Beau Jacobson MD

## 2017-08-07 ENCOUNTER — MYC MEDICAL ADVICE (OUTPATIENT)
Dept: OBGYN | Facility: CLINIC | Age: 47
End: 2017-08-07

## 2017-08-07 DIAGNOSIS — G47.00 PERSISTENT INSOMNIA: ICD-10-CM

## 2017-08-07 RX ORDER — AMITRIPTYLINE HYDROCHLORIDE 10 MG/1
10 TABLET ORAL AT BEDTIME
Qty: 100 TABLET | Refills: 3 | Status: SHIPPED | OUTPATIENT
Start: 2017-08-07 | End: 2018-11-01

## 2017-09-11 ENCOUNTER — OFFICE VISIT (OUTPATIENT)
Dept: PHYSICAL MEDICINE AND REHAB | Facility: CLINIC | Age: 47
End: 2017-09-11

## 2017-09-11 VITALS — SYSTOLIC BLOOD PRESSURE: 96 MMHG | HEIGHT: 71 IN | DIASTOLIC BLOOD PRESSURE: 61 MMHG | HEART RATE: 79 BPM

## 2017-09-11 DIAGNOSIS — G43.719 INTRACTABLE CHRONIC MIGRAINE WITHOUT AURA AND WITHOUT STATUS MIGRAINOSUS: Primary | ICD-10-CM

## 2017-09-11 ASSESSMENT — PAIN SCALES - GENERAL: PAINLEVEL: NO PAIN (0)

## 2017-09-11 NOTE — MR AVS SNAPSHOT
After Visit Summary   9/11/2017    Jessica Manriquez    MRN: [id number]           Patient Information     Date Of Birth          1970        Visit Information        Provider Department      9/11/2017 2:20 PM Ethan Carmichael MD [facility] Physical Medicine and Rehabilitation        Today's Diagnoses     Intractable chronic migraine without aura and without status migrainosus    -  1       Follow-ups after your visit        Your next 10 appointments already scheduled     Oct 06, 2017  1:30 PM CDT   PHYSICAL with Alyssia Gonsalves MD   [facility] ([facility])    [address] [address]   [phone]              Who to contact     Please call your clinic at [phone] to:    Ask questions about your health    Make or cancel appointments    Discuss your medicines    Learn about your test results    Speak to your doctor   If you have compliments or concerns about an experience at your clinic, or if you wish to file a complaint, please contact [facility] Patient Relations at [phone] or email us at [email or web address]         Additional Information About Your Visit        MyChart Information     [employer] gives you secure access to your electronic health record. If you see a primary care provider, you can also send messages to your care team and make appointments. If you have questions, please call your primary care clinic.  If you do not have a primary care provider, please call [phone] and they will assist you.      [employer]it is an electronic gateway that provides easy, online access to your medical records. With [employer], you can request a clinic appointment, read your test results, renew a prescription or communicate with your care team.     To access your existing account, please contact your [facility] or call [phone] for assistance.        Care EveryWhere  "ID     This is your Care EveryWhere ID. This could be used by other organizations to access your [facility] medical records  [id number]        Your Vitals Were     Pulse Height                79 1.803 m (5' 11\")           Blood Pressure from Last 3 Encounters:   09/11/17 96/61   06/23/17 95/63   03/31/17 (!) 89/63    Weight from Last 3 Encounters:   06/23/17 64.7 kg (142 lb 11.2 oz)   04/13/17 67.1 kg (148 lb)   03/31/17 67.1 kg (148 lb)              We Performed the Following     HC CHEMODENERVATE FACIAL,TRIGERM,NERV MIGRAINE     Needle EMG Guide w/Chemodenervation ([id number])        Primary Care Provider Office Phone # Fax #    Su Loya [phone] [address] [facility] [address]        Equal Access to Services     [facility]: Hadii aad ku hadasho Soomaali, waaxda luqadaha, qaybta kaalmada adeegyada, waxay tishain hayaan artem warren . So [facility] [phone].    ATENCIÓN: Si habla español, tiene a coronado disposición servicios gratuitos de asistencia lingüística. Marco Antonio al [phone].    We comply with applicable federal civil rights laws and [place] laws. We do not discriminate on the basis of race, color, national origin, age, disability sex, sexual orientation or gender identity.            Thank you!     Thank you for choosing [facility] PHYSICAL MEDICINE AND REHABILITATION  for your care. Our goal is always to provide you with excellent care. Hearing back from our patients is one way we can continue to improve our services. Please take a few minutes to complete the written survey that you may receive in the mail after your visit with us. Thank you!             Your Updated Medication List - Protect others around you: Learn how to safely use, store and throw away your medicines at www.disposemymeds.org.          This list is accurate as of: 9/11/17  4:37 PM.  Always use your most recent med list.                   Brand Name Dispense Instructions for use Diagnosis    " Acidophilus 100 MG Caps      Take 1 capsule by mouth daily        ALPRAZolam 0.5 MG tablet    XANAX    40 tablet    Take 1 tablet (0.5 mg) by mouth every 6 hours as needed for anxiety    Anxiety state       amitriptyline 10 MG tablet    ELAVIL    100 tablet    Take 1 tablet (10 mg) by mouth At Bedtime Take up to 5 tabs at bedtime prn insomnia.    Persistent insomnia       amoxicillin-clavulanate 875-125 MG per tablet    AUGMENTIN    20 tablet    Take 1 tablet by mouth 2 times daily    Cat bite, initial encounter       ascorbic acid 500 MG tablet    VITAMIN C     1 tablet daily        aspirin-acetaminophen-caffeine 250-250-65 MG per tablet    EXCEDRIN MIGRAINE    30 tablet    Take 1 tablet by mouth every 6 hours as needed for pain.    Migraine headache       * BOTOX IJ      Inject 145 Units as directed Lot # /C3 with Expiration Date:  08/2019 NDC #: Botox 100u ([address]-76)        * BOTOX IJ      Inject 145 Units as directed Lot # /C3 with Expiration Date:  10/2019 NDC #: Botox 100u ([address]-18)        * BOTOX IJ      Inject 145 Units as directed Lot # /C3 with Expiration Date:  01/2020 NDC #: Botox 100u ([address]-09)        * BOTOX IJ      Inject 150 Units into the muscle once /C3        diazepam 10 MG tablet    VALIUM    30 tablet    Place 1 tab vaginally for muscle spasms    Pelvic pain in female       fluconazole 150 MG tablet    DIFLUCAN    4 tablet    Take 1 po as needed for yeast infection    Yeast infection of the vagina       HYDROcodone-acetaminophen 5-325 MG per tablet    NORCO     Take 1 tablet by mouth as needed        * hydrOXYzine 25 MG tablet    ATARAX    60 tablet    Take 1-2 tablets (25-50 mg) by mouth every 6 hours as needed for itching    Itching       * hydrOXYzine 25 MG tablet    ATARAX    60 tablet    Take 1-2 tablets (25-50 mg) by mouth every 6 hours as needed for itching    Cat bite, initial encounter       ibuprofen 600 MG tablet    ADVIL/MOTRIN    60 tablet     Take 1 tablet (600 mg) by mouth every 6 hours as needed for moderate pain    Viral URI with cough, Throat pain       Multi-vitamin Tabs tablet   Generic drug:  multivitamin, therapeutic with minerals      1 DAILY        ondansetron 4 MG ODT tab    ZOFRAN ODT    20 tablet    Take 1-2 tablets (4-8 mg) by mouth every 8 hours as needed for nausea    Other migraine without status migrainosus, not intractable       penciclovir 1 % cream    DENAVIR    1.5 g    Apply topically every 2 hours (while awake)    HSV-1 (herpes simplex virus 1) infection       SUMAtriptan 20 MG/ACT nasal spray    IMITREX    1 Inhaler    Spray 1 spray (20 mg) in nostril as needed for migraine May repeat dose in 2 hours.  Do not exceed 40 mg in 24 hours    Migraine headache       SUMAtriptan 50 MG tablet    IMITREX    18 tablet    Take 1 tablet (50 mg) by mouth at onset of headache for migraine May repeat dose in 2 hours.  Do not exceed 200 mg in 24 hours    Migraine without status migrainosus, not intractable, unspecified migraine type       traMADol 50 MG tablet    ULTRAM    40 tablet    Take 1-2 tablets ( mg) by mouth every 6 hours as needed for pain    Intractable menstrual migraine without status migrainosus       VITAMIN D (CHOLECALCIFEROL) PO      Take 1,000 Units by mouth daily        zolpidem 5 MG tablet    AMBIEN    90 tablet    Take 1 tablet (5 mg) by mouth nightly as needed for sleep    Persistent insomnia       * Notice:  This list has 6 medication(s) that are the same as other medications prescribed for you. Read the directions carefully, and ask your doctor or other care provider to review them with you.

## 2017-09-11 NOTE — LETTER
"9/11/2017       RE: Jessica Manriquez  [address]  [place] [address]     Dear Colleague,    Thank you for referring your patient, Jessica Manriquez, to the [facility] PHYSICAL MEDICINE AND REHABILITATION at [facility]. Please see a copy of my visit note below.    BOTULINUM TOXIN PROCEDURE - HEADACHE - NOTE    Chief Complaint   Patient presents with     RECHECK     Chronic migraines       BP 96/61  Pulse 79  Ht 1.803 m (5' 11\")       Current Outpatient Prescriptions:      amitriptyline (ELAVIL) 10 MG tablet, Take 1 tablet (10 mg) by mouth At Bedtime Take up to 5 tabs at bedtime prn insomnia., Disp: 100 tablet, Rfl: 3     OnabotulinumtoxinA (BOTOX IJ), Inject 145 Units as directed Lot # /C3 with Expiration Date:  01/2020 NDC #: Botox 100u ([address]-29), Disp: , Rfl:      amoxicillin-clavulanate (AUGMENTIN) 875-125 MG per tablet, Take 1 tablet by mouth 2 times daily, Disp: 20 tablet, Rfl: 0     hydrOXYzine (ATARAX) 25 MG tablet, Take 1-2 tablets (25-50 mg) by mouth every 6 hours as needed for itching, Disp: 60 tablet, Rfl: 1     HYDROcodone-acetaminophen (NORCO) 5-325 MG per tablet, Take 1 tablet by mouth as needed, Disp: , Rfl:      VITAMIN D, CHOLECALCIFEROL, PO, Take 1,000 Units by mouth daily, Disp: , Rfl:      Lactobacillus (ACIDOPHILUS) 100 MG CAPS, Take 1 capsule by mouth daily, Disp: , Rfl:      OnabotulinumtoxinA (BOTOX IJ), Inject 145 Units as directed Lot # /C3 with Expiration Date:  10/2019 NDC #: Botox 100u ([address]-82), Disp: , Rfl:      OnabotulinumtoxinA (BOTOX IJ), Inject 145 Units as directed Lot # /C3 with Expiration Date:  08/2019 NDC #: Botox 100u ([address]-39), Disp: , Rfl:      SUMAtriptan (IMITREX) 50 MG tablet, Take 1 tablet (50 mg) by mouth at onset of headache for migraine May repeat dose in 2 hours.  Do not exceed 200 mg in 24 hours, Disp: 18 tablet, Rfl: 1     zolpidem (AMBIEN) 5 MG tablet, Take 1 tablet (5 mg) by mouth " nightly as needed for sleep, Disp: 90 tablet, Rfl: 1     diazepam (VALIUM) 10 MG tablet, Place 1 tab vaginally for muscle spasms, Disp: 30 tablet, Rfl: 3     fluconazole (DIFLUCAN) 150 MG tablet, Take 1 po as needed for yeast infection, Disp: 4 tablet, Rfl: 0     ibuprofen (ADVIL,MOTRIN) 600 MG tablet, Take 1 tablet (600 mg) by mouth every 6 hours as needed for moderate pain, Disp: 60 tablet, Rfl: 0     ALPRAZolam (XANAX) 0.5 MG tablet, Take 1 tablet (0.5 mg) by mouth every 6 hours as needed for anxiety, Disp: 40 tablet, Rfl: 0     traMADol (ULTRAM) 50 MG tablet, Take 1-2 tablets ( mg) by mouth every 6 hours as needed for pain, Disp: 40 tablet, Rfl: 2     penciclovir (DENAVIR) 1 % cream, Apply topically every 2 hours (while awake), Disp: 1.5 g, Rfl: 3     hydrOXYzine (ATARAX) 25 MG tablet, Take 1-2 tablets (25-50 mg) by mouth every 6 hours as needed for itching, Disp: 60 tablet, Rfl: 1     ondansetron (ZOFRAN ODT) 4 MG disintegrating tablet, Take 1-2 tablets (4-8 mg) by mouth every 8 hours as needed for nausea, Disp: 20 tablet, Rfl: 3     SUMAtriptan (IMITREX) 20 MG/ACT nasal spray, Spray 1 spray (20 mg) in nostril as needed for migraine May repeat dose in 2 hours.  Do not exceed 40 mg in 24 hours, Disp: 1 Inhaler, Rfl: 1     aspirin-acetaminophen-caffeine (EXCEDRIN MIGRAINE) 250-250-65 MG per tablet, Take 1 tablet by mouth every 6 hours as needed for pain., Disp: 30 tablet, Rfl: 0     VITAMIN C 500 MG OR TABS, 1 tablet daily, Disp: , Rfl:      MULTI-VITAMIN OR TABS, 1 DAILY, Disp: , Rfl:      Allergies   Allergen Reactions     Darvocet [Acetaminophen] Nausea and Vomiting     Erythromycin GI Disturbance     Flexeril [Cyclobenzaprine Hcl]      Lightheadedness, dizzy          PHYSICAL EXAM:    Denies headache today.      HPI:    Patient denies new medical diagnoses, illnesses, hospitalizations, emergency room visits, and injuries since the previous injection with botulinum neurotoxin.    We reviewed the  recommended safety guidelines for  Botox from any vaccine injection, such as the seasonal flu vaccine, by a minimum of 10-14 days with Jessica Manriquez. She acknowledged understanding.    RESPONSE TO PREVIOUS TREATMENT:  Change in headache pattern following last series of injections with 145 units of  Botox on 2017.    No problems reported    1.  Headache frequency during this injection cycle:  2-6 headache days per month.  This is compared to her baseline headache frequency of 24 headache days per month.     2.  Headache duration during this injection cycle:  Headache duration ranged from 3 hours to 48 hours.     3.  Headache intensity during this injection cycle:    A.  4/10  =  Typical pain level.  B.  7/10  =  Worst pain level.  C.  0/10  =  Lowest pain level.    4.  Change in headache medication usage during this injection cycle:  (For Example:  Able to decrease use of oral pain medications.) No change in use of medication.  Tries to use least amount of Imitrex as possible.      5.  ER Visits During This Injection Cycle:  0    6.  Functional Performance:  Change in ADL's, social interaction, days lost from work, etc. Has had to go home early due to headaches and has cancelled some planned activities.    BOTULINUM NEUROTOXIN INJECTION PROCEDURES:      VERIFICATION OF PATIENT IDENTIFICATION AND PROCEDURE     Initials   Patient Name ses   Patient  ses   Procedure Verified by: ses     Prior to the start of the procedure and with procedural staff participation, I verbally confirmed the patient s identity using two indicators, relevant allergies, that the procedure was appropriate and matched the consent or emergent situation, and that the correct equipment/implants were available. Immediately prior to starting the procedure I conducted the Time Out with the procedural staff and re-confirmed the patient s name, procedure, and site/side. (The Joint Commission universal protocol was followed.)   Yes    Sedation (Moderate or Deep): None    Above assessments performed by:  Aretha Carmichael      INDICATIONS FOR PROCEDURES:  Jsesica Manriquez is a 46 year old patient with chronic migraine headaches associated with cervicogenic components.     Her baseline symptoms have been recalcitrant to oral medications and conservative therapy.  She is here today for reinjection with Botox.    GOAL OF PROCEDURE:  The goal of this procedure is to decrease pain  and enhance functional independence.    TOTAL DOSE ADMINISTERED:  Dose Administered:  150 units  Botox (Botulinum Toxin Type A)     2:1 Dilution     Diluent Used:  Preservative Free Normal Saline  Total Volume of Diluent Used:  3 ml  Lot # /C3 with Expiration Date:  01/2020  NDC #: Botox 100u ([address]-19)    Medication guide was offered to patient and was declined.    CONSENT:  The risks, benefits, and treatment options were discussed with Jessica Manriquez and she agreed to proceed.    Written consent was obtained by [facility].     EQUIPMENT USED:  Needle-25mm stimulating/recording  Needle-30 gauge  EMG/NCS Machine    SKIN PREPARATION:  Skin preparation was performed using an alcohol wipe.    GUIDANCE DESCRIPTION:  Electro-myographic guidance was necessary throughout the posterior neck and jaw portion to accurately identify all areas of spastic muscles while avoiding injection of non-spastic muscles, neighboring nerves and nearby vascular structures.     AREA/MUSCLE INJECTED:      1 & 2. SHOULDER GIRDLE & NECK MUSCLES: 25 units Botox = Total Dose, 2:1 Dilution     Right Cervical Paraspinal (superior cervical) - 10 units of Botox at 2 site/s  Left Cervical Paraspinal (superior cervical) - 10 units of Botox at 2 site/s                  Left levator (at neck) - 5 units of Botox at 1 site     3. HEAD, JAW & SCALP MUSCLES: 125 units Botox = Total Dose, 2:1 Dilution   Left masseter - 5 units of Botox at 1 site     Right Occipitalis - 15 units of Botox  at 3 site/s.  Left Occipitalis - 15 units of Botox at 3 site/s.     Right Frontalis - 10 units of Botox at 2 site/s  Left Frontalis - 10 units of Botox at 2 site/s      Right Temporalis - 20 units of Botox at 3 site/s.  Left Temporalis - 30 units of Botox at 6 site/s.     Right  - 5 units of Botox at 1 site/s.  Left  - 5 units of Botox at 1 site/s.     Procerus - 10 units of Botox at 1 site/s.      RESPONSE TO PROCEDURE:  Jessica Manriquez tolerated the procedure well and there were no immediate complications.   She was allowed to recover for an appropriate period of time and was discharged home in stable condition.    FOLLOW UP:  Jessica Manriquez was asked to follow up by phone in 7-14 days with Poonam Shoemaker RN, Care Coordinator, to report her response to this series of injections.  Based on the patient's previous response to this therapy, Jessica Manriquez was rescheduled for the next series of injections in 12 weeks.    PLAN (Medication Changes, Therapy Orders, Work or Disability Issues, etc.): Patient will continue to monitor response to today's injections.    Again, thank you for allowing me to participate in the care of your patient.      Sincerely,    Ethan Carmichael MD

## 2017-10-04 ENCOUNTER — MYC MEDICAL ADVICE (OUTPATIENT)
Dept: OBGYN | Facility: CLINIC | Age: 47
End: 2017-10-04

## 2017-10-04 DIAGNOSIS — B00.9 HSV (HERPES SIMPLEX VIRUS) INFECTION: Primary | ICD-10-CM

## 2017-10-04 RX ORDER — VALACYCLOVIR HYDROCHLORIDE 500 MG/1
500 TABLET, FILM COATED ORAL DAILY
Qty: 90 TABLET | Refills: 3 | Status: SHIPPED | OUTPATIENT
Start: 2017-10-04 | End: 2018-10-12

## 2017-10-10 ENCOUNTER — ALLIED HEALTH/NURSE VISIT (OUTPATIENT)
Dept: FAMILY MEDICINE | Facility: CLINIC | Age: 47
End: 2017-10-10
Payer: COMMERCIAL

## 2017-10-10 DIAGNOSIS — Z23 NEED FOR PROPHYLACTIC VACCINATION AND INOCULATION AGAINST INFLUENZA: Primary | ICD-10-CM

## 2017-10-10 PROCEDURE — 90686 IIV4 VACC NO PRSV 0.5 ML IM: CPT

## 2017-10-10 PROCEDURE — 90471 IMMUNIZATION ADMIN: CPT

## 2017-10-10 PROCEDURE — 99207 ZZC NO CHARGE NURSE ONLY: CPT

## 2017-10-10 NOTE — MR AVS SNAPSHOT
After Visit Summary   10/10/2017    Jessica Manriquez    MRN: [id number]           Patient Information     Date Of Birth          1970        Visit Information        Provider Department      10/10/2017 10:25 AM [facility] FLU SHOT [facility]        Today's Diagnoses     Need for prophylactic vaccination and inoculation against influenza    -  1       Follow-ups after your visit        Your next 10 appointments already scheduled     Nov 03, 2017  1:00 PM CDT   PHYSICAL with Alyssia Gonsalves MD   [facility]    [address] [id number] [phone]            Dec 05, 2017 11:00 AM CST   (Arrive by 10:45 AM)   Return Botox with Ethan Carmichael MD   [facility] ([facility])    [address] [place] [id number] [phone]              Who to contact     If you have questions or need follow up information about today's clinic visit or your schedule please contact [facility] directly at [phone].  Normal or non-critical lab and imaging results will be communicated to you by MyChart, letter or phone within 4 business days after the clinic has received the results. If you do not hear from us within 7 days, please contact the clinic through [employer] or phone. If you have a critical or abnormal lab result, we will notify you by phone as soon as possible.  Submit refill requests through [employer] or call your pharmacy and they will forward the refill request to us. Please allow 3 business days for your refill to be completed.          Additional Information About Your Visit        MyChart Information     [employer] gives you secure access to your electronic health record. If you see a primary care provider, you can also send messages to your care team and make appointments. If you have questions, please call your primary  care clinic.  If you do not have a primary care provider, please call [phone] and they will assist you.        Care EveryWhere ID     This is your Care EveryWhere ID. This could be used by other organizations to access your [facility] medical records  [id number]         Blood Pressure from Last 3 Encounters:   09/11/17 96/61   06/23/17 95/63   03/31/17 (!) 89/63    Weight from Last 3 Encounters:   06/23/17 142 lb 11.2 oz (64.7 kg)   04/13/17 148 lb (67.1 kg)   03/31/17 148 lb (67.1 kg)              We Performed the Following     FLU VAC, SPLIT VIRUS IM > 3 YO (QUADRIVALENT) [53539]     Vaccine Administration, Initial [80008]        Primary Care Provider Office Phone # Fax #    Su Loya [phone] [address] [address]        Equal Access to Services     ISRAEL GUERRERO : Hadii aad ku hadasho Soomaali, waaxda luqadaha, qaybta kaalmada adeegyada, waxay idiin hayyefrin artem warren . So [facility] [phone].    ATENCIÓN: Si habla español, tiene a coronado disposición servicios gratuitos de asistencia lingüística. Llame al [phone].    We comply with applicable federal civil rights laws and [place] laws. We do not discriminate on the basis of race, color, national origin, age, disability, sex, sexual orientation, or gender identity.            Thank you!     Thank you for choosing [facility]  for your care. Our goal is always to provide you with excellent care. Hearing back from our patients is one way we can continue to improve our services. Please take a few minutes to complete the written survey that you may receive in the mail after your visit with us. Thank you!             Your Updated Medication List - Protect others around you: Learn how to safely use, store and throw away your medicines at www.disposemymeds.org.          This list is accurate as of: 10/10/17 10:44 AM.  Always use your most recent med list.                   Brand Name Dispense  Instructions for use Diagnosis    Acidophilus 100 MG Caps      Take 1 capsule by mouth daily        ALPRAZolam 0.5 MG tablet    XANAX    40 tablet    Take 1 tablet (0.5 mg) by mouth every 6 hours as needed for anxiety    Anxiety state       amitriptyline 10 MG tablet    ELAVIL    100 tablet    Take 1 tablet (10 mg) by mouth At Bedtime Take up to 5 tabs at bedtime prn insomnia.    Persistent insomnia       amoxicillin-clavulanate 875-125 MG per tablet    AUGMENTIN    20 tablet    Take 1 tablet by mouth 2 times daily    Cat bite, initial encounter       ascorbic acid 500 MG tablet    VITAMIN C     1 tablet daily        aspirin-acetaminophen-caffeine 250-250-65 MG per tablet    EXCEDRIN MIGRAINE    30 tablet    Take 1 tablet by mouth every 6 hours as needed for pain.    Migraine headache       * BOTOX IJ      Inject 145 Units as directed Lot # /C3 with Expiration Date:  08/2019 NDC #: Botox 100u ([address]-53)        * BOTOX IJ      Inject 145 Units as directed Lot # /C3 with Expiration Date:  10/2019 NDC #: Botox 100u ([address]-75)        * BOTOX IJ      Inject 145 Units as directed Lot # /C3 with Expiration Date:  01/2020 NDC #: Botox 100u ([address]-09)        * BOTOX IJ      Inject 150 Units into the muscle once /C3        diazepam 10 MG tablet    VALIUM    30 tablet    Place 1 tab vaginally for muscle spasms    Pelvic pain in female       fluconazole 150 MG tablet    DIFLUCAN    4 tablet    Take 1 po as needed for yeast infection    Yeast infection of the vagina       HYDROcodone-acetaminophen 5-325 MG per tablet    NORCO     Take 1 tablet by mouth as needed        * hydrOXYzine 25 MG tablet    ATARAX    60 tablet    Take 1-2 tablets (25-50 mg) by mouth every 6 hours as needed for itching    Itching       * hydrOXYzine 25 MG tablet    ATARAX    60 tablet    Take 1-2 tablets (25-50 mg) by mouth every 6 hours as needed for itching    Cat bite, initial encounter       ibuprofen 600 MG tablet     ADVIL/MOTRIN    60 tablet    Take 1 tablet (600 mg) by mouth every 6 hours as needed for moderate pain    Viral URI with cough, Throat pain       Multi-vitamin Tabs tablet   Generic drug:  multivitamin, therapeutic with minerals      1 DAILY        ondansetron 4 MG ODT tab    ZOFRAN ODT    20 tablet    Take 1-2 tablets (4-8 mg) by mouth every 8 hours as needed for nausea    Other migraine without status migrainosus, not intractable       penciclovir 1 % cream    DENAVIR    1.5 g    Apply topically every 2 hours (while awake)    HSV-1 (herpes simplex virus 1) infection       SUMAtriptan 20 MG/ACT nasal spray    IMITREX    1 Inhaler    Spray 1 spray (20 mg) in nostril as needed for migraine May repeat dose in 2 hours.  Do not exceed 40 mg in 24 hours    Migraine headache       SUMAtriptan 50 MG tablet    IMITREX    18 tablet    Take 1 tablet (50 mg) by mouth at onset of headache for migraine May repeat dose in 2 hours.  Do not exceed 200 mg in 24 hours    Migraine without status migrainosus, not intractable, unspecified migraine type       traMADol 50 MG tablet    ULTRAM    40 tablet    Take 1-2 tablets ( mg) by mouth every 6 hours as needed for pain    Intractable menstrual migraine without status migrainosus       valACYclovir 500 MG tablet    VALTREX    90 tablet    Take 1 tablet (500 mg) by mouth daily    HSV (herpes simplex virus) infection       VITAMIN D (CHOLECALCIFEROL) PO      Take 1,000 Units by mouth daily        zolpidem 5 MG tablet    AMBIEN    90 tablet    Take 1 tablet (5 mg) by mouth nightly as needed for sleep    Persistent insomnia       * Notice:  This list has 6 medication(s) that are the same as other medications prescribed for you. Read the directions carefully, and ask your doctor or other care provider to review them with you.

## 2017-10-10 NOTE — PROGRESS NOTES
Injectable Influenza Immunization Documentation    1.  Is the person to be vaccinated sick today?   No    2. Does the person to be vaccinated have an allergy to a component   of the vaccine?   No    3. Has the person to be vaccinated ever had a serious reaction   to influenza vaccine in the past?   No    4. Has the person to be vaccinated ever had Guillain-Barré syndrome?   No    Form completed by Kim Gordillo CMA  Per orders of Dr. Reno, injection of flu shot given by Kim Gordillo. Patient instructed to remain in clinic for 15 minutes afterwards, and to report any adverse reaction to me immediately.

## 2017-11-03 ENCOUNTER — OFFICE VISIT (OUTPATIENT)
Dept: OBGYN | Facility: CLINIC | Age: 47
End: 2017-11-03
Payer: COMMERCIAL

## 2017-11-03 VITALS
DIASTOLIC BLOOD PRESSURE: 62 MMHG | HEART RATE: 84 BPM | HEIGHT: 71 IN | SYSTOLIC BLOOD PRESSURE: 104 MMHG | WEIGHT: 141 LBS | BODY MASS INDEX: 19.74 KG/M2

## 2017-11-03 DIAGNOSIS — G47.00 PERSISTENT INSOMNIA: ICD-10-CM

## 2017-11-03 DIAGNOSIS — Z11.3 SCREEN FOR STD (SEXUALLY TRANSMITTED DISEASE): ICD-10-CM

## 2017-11-03 DIAGNOSIS — R10.2 PELVIC PAIN IN FEMALE: ICD-10-CM

## 2017-11-03 DIAGNOSIS — G43.839 INTRACTABLE MENSTRUAL MIGRAINE WITHOUT STATUS MIGRAINOSUS: ICD-10-CM

## 2017-11-03 DIAGNOSIS — F41.1 ANXIETY STATE: ICD-10-CM

## 2017-11-03 DIAGNOSIS — T78.40XS ALLERGIC STATE, SEQUELA: ICD-10-CM

## 2017-11-03 DIAGNOSIS — Z01.419 ENCOUNTER FOR GYNECOLOGICAL EXAMINATION WITHOUT ABNORMAL FINDING: Primary | ICD-10-CM

## 2017-11-03 PROCEDURE — 87389 HIV-1 AG W/HIV-1&-2 AB AG IA: CPT | Performed by: OBSTETRICS & GYNECOLOGY

## 2017-11-03 PROCEDURE — 36415 COLL VENOUS BLD VENIPUNCTURE: CPT | Performed by: OBSTETRICS & GYNECOLOGY

## 2017-11-03 PROCEDURE — 86803 HEPATITIS C AB TEST: CPT | Performed by: OBSTETRICS & GYNECOLOGY

## 2017-11-03 PROCEDURE — 99396 PREV VISIT EST AGE 40-64: CPT | Performed by: OBSTETRICS & GYNECOLOGY

## 2017-11-03 RX ORDER — ALPRAZOLAM 0.5 MG
0.5 TABLET ORAL EVERY 6 HOURS PRN
Qty: 40 TABLET | Refills: 0 | Status: SHIPPED | OUTPATIENT
Start: 2017-11-03 | End: 2018-12-31

## 2017-11-03 RX ORDER — HYDROXYZINE HYDROCHLORIDE 25 MG/1
25-50 TABLET, FILM COATED ORAL EVERY 6 HOURS PRN
Qty: 60 TABLET | Refills: 1 | Status: SHIPPED | OUTPATIENT
Start: 2017-11-03 | End: 2019-05-09

## 2017-11-03 RX ORDER — TRAMADOL HYDROCHLORIDE 50 MG/1
50-100 TABLET ORAL EVERY 6 HOURS PRN
Qty: 40 TABLET | Refills: 2 | Status: SHIPPED | OUTPATIENT
Start: 2017-11-03 | End: 2018-08-20

## 2017-11-03 RX ORDER — ZOLPIDEM TARTRATE 5 MG/1
5 TABLET ORAL
Qty: 90 TABLET | Refills: 1 | Status: SHIPPED | OUTPATIENT
Start: 2017-11-03 | End: 2018-06-11

## 2017-11-03 RX ORDER — DIAZEPAM 10 MG
TABLET ORAL
Qty: 30 TABLET | Refills: 3 | Status: SHIPPED | OUTPATIENT
Start: 2017-11-03 | End: 2018-12-31

## 2017-11-03 NOTE — PROGRESS NOTES
SUBJECTIVE:   CC: Jessica Manriquez is an 46 year old woman who presents for preventive health visit.     Healthy Habits:    Do you get at least three servings of calcium containing foods daily (dairy, green leafy vegetables, etc.)? yes    Amount of exercise or daily activities, outside of work: 1 day(s) per week    Problems taking medications regularly No    Medication side effects: No    Have you had an eye exam in the past two years? yes    Do you see a dentist twice per year? yes    Do you have sleep apnea, excessive snoring or daytime drowsiness?no            Today's PHQ-2 Score:   PHQ-2 ( 1999 Pfizer) 11/3/2017 3/2/2017   Q1: Little interest or pleasure in doing things 0 0   Q2: Feeling down, depressed or hopeless 0 0   PHQ-2 Score 0 0         Abuse: Current or Past(Physical, Sexual or Emotional)- No  Do you feel safe in your environment - Yes  Social History   Substance Use Topics     Smoking status: Never Smoker     Smokeless tobacco: Never Used     Alcohol use No     The patient does not drink >3 drinks per day nor >7 drinks per week.    Reviewed orders with patient.  Reviewed health maintenance and updated orders accordingly - Yes  Labs reviewed in EPIC  BP Readings from Last 3 Encounters:   11/03/17 104/62   09/11/17 96/61   06/23/17 95/63    Wt Readings from Last 3 Encounters:   11/03/17 141 lb (64 kg)   06/23/17 142 lb 11.2 oz (64.7 kg)   04/13/17 148 lb (67.1 kg)                  Patient Active Problem List   Diagnosis     Migraine headache     External hemorrhoids     Insomnia     Unexplained endometrial cells on cervical Pap smear     S/P LEEP of cervix     Pelvic pain in female     Past Surgical History:   Procedure Laterality Date     HC ENLARGE BREAST WITH IMPLANT  2000     HC REVISE BREAST RECONSTRUCTION  5/03     LEEP TX, CERVICAL  11/01    SALOMÓN 3, yearly paps until 2021     LIGATN/STRIP LONG & SHORT SAPHEN  11/04    Bilateral vein stripping     TUBAL LIGATION  10/30/09    bernardashie clips        Social History   Substance Use Topics     Smoking status: Never Smoker     Smokeless tobacco: Never Used     Alcohol use No     Family History   Problem Relation Age of Onset     Arthritis Mother      Respiratory Mother      asthma     Allergies Mother      CANCER Mother      Blood Disease Mother      leukemia     Breast Cancer Mother      Breast Cancer Maternal Grandmother      age 60's     HEART DISEASE Maternal Grandmother      CEREBROVASCULAR DISEASE Paternal Grandmother      CANCER Paternal Grandfather      ?biliary     Alcohol/Drug Paternal Grandfather      Allergies Sister                Patient under age 50, mutual decision reflected in health maintenance.        Pertinent mammograms are reviewed under the imaging tab.  History of abnormal Pap smear: YES - updated in Problem List and Health Maintenance accordingly    Patient Active Problem List    Diagnosis Date Noted     Pelvic pain in female 08/03/2015     Priority: Medium     S/P LEEP of cervix 03/05/2014     Priority: Medium     11/2001 LEEP    1/29/14: Pap - NIL, Neg HPV. Plan cotesting in 1 year. If neg then cotest in 3 yrs and then routine screening.   2/2/15:Pap--NIL, Neg HPV. Plan Pap + HPV cotesting in 3 years. Due 2/2018         Unexplained endometrial cells on cervical Pap smear 02/07/2014     Priority: Medium     Cervix stenotic due to nulliparous and h/o LEEP--sono ordered  Consider HSC D & C with Misoprostol preprime    If normal sono, then Pap 1 year       Insomnia 09/14/2010     Priority: Medium     External hemorrhoids 05/28/2010     Priority: Medium     Migraine headache 09/23/2009     Priority: Medium     (Problem list name updated by automated process. Provider to review and confirm.)           Reviewed and updated as needed this visit by clinical staffTobacco  Allergies  Meds  Med Hx  Surg Hx  Fam Hx  Soc Hx        Reviewed and updated as needed this visit by Provider              ROS:  C: NEGATIVE for fever, chills, change in  "weight  I: NEGATIVE for worrisome rashes, moles or lesions  E: NEGATIVE for vision changes or irritation  ENT: NEGATIVE for ear, mouth and throat problems  R: NEGATIVE for significant cough or SOB  B: NEGATIVE for masses, tenderness or discharge  CV: NEGATIVE for chest pain, palpitations or peripheral edema  GI: NEGATIVE for nausea, abdominal pain, heartburn, or change in bowel habits  : NEGATIVE for unusual urinary or vaginal symptoms. Periods are regular.  M: NEGATIVE for significant arthralgias or myalgia  N: NEGATIVE for weakness, dizziness or paresthesias  E: NEGATIVE for temperature intolerance, skin/hair changes  P: NEGATIVE for changes in mood or affect    OBJECTIVE:   /62 (BP Location: Right arm, Patient Position: Chair, Cuff Size: Adult Small)  Pulse 84  Ht 5' 11\" (1.803 m)  Wt 141 lb (64 kg)  LMP 10/21/2017  BMI 19.67 kg/m2  EXAM:  GENERAL: healthy, alert and no distress  EYES: Eyes grossly normal to inspection, PERRL and conjunctivae and sclerae normal  HENT: ear canals and TM's normal, nose and mouth without ulcers or lesions  NECK: no adenopathy, no asymmetry, masses, or scars and thyroid normal to palpation  RESP: lungs clear to auscultation - no rales, rhonchi or wheezes  BREAST: normal without masses, tenderness or nipple discharge and no palpable axillary masses or adenopathy  CV: regular rate and rhythm, normal S1 S2, no S3 or S4, no murmur, click or rub, no peripheral edema and peripheral pulses strong  ABDOMEN: soft, nontender, no hepatosplenomegaly, no masses and bowel sounds normal   (female): normal female external genitalia, normal urethral meatus, vaginal mucosa pink, moist, well rugated, and normal cervix/adnexa/uterus without masses or discharge  MS: no gross musculoskeletal defects noted, no edema  SKIN: no suspicious lesions or rashes  NEURO: Normal strength and tone, mentation intact and speech normal  PSYCH: mentation appears normal, affect " "normal/bright    ASSESSMENT/PLAN:       ICD-10-CM    1. Encounter for gynecological examination without abnormal finding Z01.419    2. Allergic state, sequela T78.40XS hydrOXYzine (ATARAX) 25 MG tablet   3. Persistent insomnia G47.00 zolpidem (AMBIEN) 5 MG tablet   4. Pelvic pain in female R10.2 diazepam (VALIUM) 10 MG tablet   5. Anxiety state F41.1 ALPRAZolam (XANAX) 0.5 MG tablet   6. Intractable menstrual migraine without status migrainosus G43.839 traMADol (ULTRAM) 50 MG tablet   7. Screen for STD (sexually transmitted disease) Z11.3 HIV Antigen Antibody Combo     HCL HEPATITIS B SCREENING IN NON-PREGNANT INDIVIDUAL       COUNSELING:   Reviewed preventive health counseling, as reflected in patient instructions  Special attention given to:        Regular exercise       Healthy diet/nutrition       Vision screening       Safe sex practices/STD prevention       HIV screeninx in teen years, 1x in adult years, and at intervals if high risk         reports that she has never smoked. She has never used smokeless tobacco.    Estimated body mass index is 19.67 kg/(m^2) as calculated from the following:    Height as of this encounter: 5' 11\" (1.803 m).    Weight as of this encounter: 141 lb (64 kg).         Counseling Resources:  ATP IV Guidelines  Pooled Cohorts Equation Calculator  Breast Cancer Risk Calculator  FRAX Risk Assessment  ICSI Preventive Guidelines  Dietary Guidelines for Americans,   USDA's MyPlate  ASA Prophylaxis  Lung CA Screening    Alyssia Gonsalves MD  [facility]  "

## 2017-11-03 NOTE — NURSING NOTE
"Initial /62 (BP Location: Right arm, Patient Position: Chair, Cuff Size: Adult Small)  Pulse 84  Ht 5' 11\" (1.803 m)  Wt 141 lb (64 kg)  LMP 10/21/2017  BMI 19.67 kg/m2 Estimated body mass index is 19.67 kg/(m^2) as calculated from the following:    Height as of this encounter: 5' 11\" (1.803 m).    Weight as of this encounter: 141 lb (64 kg). .      "

## 2017-11-03 NOTE — MR AVS SNAPSHOT
After Visit Summary   11/3/2017    Jessica Manriquez    MRN: [id number]           Patient Information     Date Of Birth          1970        Visit Information        Provider Department      11/3/2017 1:00 PM Alyssia Gonsalves MD [facility]        Today's Diagnoses     Encounter for gynecological examination without abnormal finding    -  1    Allergic state, sequela        Persistent insomnia        Pelvic pain in female        Anxiety state        Intractable menstrual migraine without status migrainosus        Screen for STD (sexually transmitted disease)          Care Instructions      Preventive Health Recommendations  Female Ages 40 to 49    Yearly exam:     See your health care provider every year in order to  1. Review health changes.   2. Discuss preventive care.    3. Review your medicines if your doctor prescribed any.      Get a Pap test every three years (unless you have an abnormal result and your provider advises testing more often).      If you get Pap tests with HPV test, you only need to test every 5 years, unless you have an abnormal result. You do not need a Pap test if your uterus was removed (hysterectomy) and you have not had cancer.      You should be tested each year for STDs (sexually transmitted diseases), if you're at risk.       Ask your doctor if you should have a mammogram.      Have a colonoscopy (test for colon cancer) if someone in your family has had colon cancer or polyps before age 50.       Have a cholesterol test every 5 years.       Have a diabetes test (fasting glucose) after age 45. If you are at risk for diabetes, you should have this test every 3 years.    Shots: Get a flu shot each year. Get a tetanus shot every 10 years.     Nutrition:     Eat at least 5 servings of fruits and vegetables each day.    Eat whole-grain bread, whole-wheat pasta and brown rice instead of white grains and rice.    Talk to your provider about Calcium and  Vitamin D.     Lifestyle    Exercise at least 150 minutes a week (an average of 30 minutes a day, 5 days a week). This will help you control your weight and prevent disease.    Limit alcohol to one drink per day.    No smoking.     Wear sunscreen to prevent skin cancer.    See your dentist every six months for an exam and cleaning.          Follow-ups after your visit        Your next 10 appointments already scheduled     Dec 05, 2017 11:00 AM CST   (Arrive by 10:45 AM)   Return Botox with Ethan Carmichael MD   [facility] Medicine and Rehabilitation ([facility])    [address] [place] [address]  [address] [facility] [address] [phone]              Who to contact     If you have questions or need follow up information about today's clinic visit or your schedule please contact [facility] directly at [phone].  Normal or non-critical lab and imaging results will be communicated to you by MyChart, letter or phone within 4 business days after the clinic has received the results. If you do not hear from us within 7 days, please contact the clinic through [facility] or phone. If you have a critical or abnormal lab result, we will notify you by phone as soon as possible.  Submit refill requests through [employer] or call your pharmacy and they will forward the refill request to us. Please allow 3 business days for your refill to be completed.          Additional Information About Your Visit        MyChart Information     [employer] gives you secure access to your electronic health record. If you see a primary care provider, you can also send messages to your care team and make appointments. If you have questions, please call your primary care clinic.  If you do not have a primary care provider, please call [phone] and they will assist you.        Care EveryWhere ID     This is your Care EveryWhere ID. This could be used by other organizations to access your [place]  "medical records  [id number]        Your Vitals Were     Pulse Height Last Period BMI (Body Mass Index)          84 5' 11\" (1.803 m) 10/21/2017 19.67 kg/m2         Blood Pressure from Last 3 Encounters:   11/03/17 104/62   09/11/17 96/61   06/23/17 95/63    Weight from Last 3 Encounters:   11/03/17 141 lb (64 kg)   06/23/17 142 lb 11.2 oz (64.7 kg)   04/13/17 148 lb (67.1 kg)              We Performed the Following     HCL HEPATITIS B SCREENING IN NON-PREGNANT INDIVIDUAL     HIV Antigen Antibody Combo          Today's Medication Changes          These changes are accurate as of: 11/3/17  2:00 PM.  If you have any questions, ask your nurse or doctor.               Stop taking these medicines if you haven't already. Please contact your care team if you have questions.     amoxicillin-clavulanate 875-125 MG per tablet   Commonly known as:  AUGMENTIN   Stopped by:  Alyssia Gonsalves MD                Where to get your medicines      These medications were sent to [facility] [address] [place] [address] [address]   [address] [facility] [address]     Phone:  [phone]     hydrOXYzine 25 MG tablet         Some of these will need a paper prescription and others can be bought over the counter.  Ask your nurse if you have questions.     Bring a paper prescription for each of these medications     ALPRAZolam 0.5 MG tablet    diazepam 10 MG tablet    traMADol 50 MG tablet    zolpidem 5 MG tablet                Primary Care Provider Office Phone # Fax #    Su Loya [phone] [address] [facility] [address]        Equal Access to Services     [facility]: Hadii jose Hannah, wacathleenda luqadaha, qaybta kaalmaodessa monroe. So [facility] [phone].    ATENCIÓN: Si habla español, tiene a coronado disposición servicios gratuitos de asistencia lingüística. Llame al [phone].    We comply with applicable federal civil " rights laws and [place] laws. We do not discriminate on the basis of race, color, national origin, age, disability, sex, sexual orientation, or gender identity.            Thank you!     Thank you for choosing [facility]  for your care. Our goal is always to provide you with excellent care. Hearing back from our patients is one way we can continue to improve our services. Please take a few minutes to complete the written survey that you may receive in the mail after your visit with us. Thank you!             Your Updated Medication List - Protect others around you: Learn how to safely use, store and throw away your medicines at www.disposemymeds.org.          This list is accurate as of: 11/3/17  2:00 PM.  Always use your most recent med list.                   Brand Name Dispense Instructions for use Diagnosis    Acidophilus 100 MG Caps      Take 1 capsule by mouth daily        ALPRAZolam 0.5 MG tablet    XANAX    40 tablet    Take 1 tablet (0.5 mg) by mouth every 6 hours as needed for anxiety    Anxiety state       amitriptyline 10 MG tablet    ELAVIL    100 tablet    Take 1 tablet (10 mg) by mouth At Bedtime Take up to 5 tabs at bedtime prn insomnia.    Persistent insomnia       ascorbic acid 500 MG tablet    VITAMIN C     1 tablet daily        aspirin-acetaminophen-caffeine 250-250-65 MG per tablet    EXCEDRIN MIGRAINE    30 tablet    Take 1 tablet by mouth every 6 hours as needed for pain.    Migraine headache       * BOTOX IJ      Inject 145 Units as directed Lot # /C3 with Expiration Date:  08/2019 NDC #: Botox 100u ([address]-24)        * BOTOX IJ      Inject 145 Units as directed Lot # /C3 with Expiration Date:  10/2019 NDC #: Botox 100u ([address]-36)        * BOTOX IJ      Inject 145 Units as directed Lot # /C3 with Expiration Date:  01/2020 NDC #: Botox 100u ([address]-20)        * BOTOX IJ      Inject 150 Units into the muscle once /C3        diazepam 10 MG tablet     VALIUM    30 tablet    Place 1 tab vaginally for muscle spasms    Pelvic pain in female       fluconazole 150 MG tablet    DIFLUCAN    4 tablet    Take 1 po as needed for yeast infection    Yeast infection of the vagina       HYDROcodone-acetaminophen 5-325 MG per tablet    NORCO     Take 1 tablet by mouth as needed        * hydrOXYzine 25 MG tablet    ATARAX    60 tablet    Take 1-2 tablets (25-50 mg) by mouth every 6 hours as needed for itching    Itching       * hydrOXYzine 25 MG tablet    ATARAX    60 tablet    Take 1-2 tablets (25-50 mg) by mouth every 6 hours as needed for itching    Allergic state, sequela       ibuprofen 600 MG tablet    ADVIL/MOTRIN    60 tablet    Take 1 tablet (600 mg) by mouth every 6 hours as needed for moderate pain    Viral URI with cough, Throat pain       Multi-vitamin Tabs tablet   Generic drug:  multivitamin, therapeutic with minerals      1 DAILY        ondansetron 4 MG ODT tab    ZOFRAN ODT    20 tablet    Take 1-2 tablets (4-8 mg) by mouth every 8 hours as needed for nausea    Other migraine without status migrainosus, not intractable       penciclovir 1 % cream    DENAVIR    1.5 g    Apply topically every 2 hours (while awake)    HSV-1 (herpes simplex virus 1) infection       SUMAtriptan 20 MG/ACT nasal spray    IMITREX    1 Inhaler    Spray 1 spray (20 mg) in nostril as needed for migraine May repeat dose in 2 hours.  Do not exceed 40 mg in 24 hours    Migraine headache       SUMAtriptan 50 MG tablet    IMITREX    18 tablet    Take 1 tablet (50 mg) by mouth at onset of headache for migraine May repeat dose in 2 hours.  Do not exceed 200 mg in 24 hours    Migraine without status migrainosus, not intractable, unspecified migraine type       traMADol 50 MG tablet    ULTRAM    40 tablet    Take 1-2 tablets ( mg) by mouth every 6 hours as needed for pain    Intractable menstrual migraine without status migrainosus       valACYclovir 500 MG tablet    VALTREX    90 tablet     Take 1 tablet (500 mg) by mouth daily    HSV (herpes simplex virus) infection       VITAMIN D (CHOLECALCIFEROL) PO      Take 1,000 Units by mouth daily        zolpidem 5 MG tablet    AMBIEN    90 tablet    Take 1 tablet (5 mg) by mouth nightly as needed for sleep    Persistent insomnia       * Notice:  This list has 6 medication(s) that are the same as other medications prescribed for you. Read the directions carefully, and ask your doctor or other care provider to review them with you.

## 2017-11-06 LAB
HCV AB SERPL QL IA: NONREACTIVE
HIV 1+2 AB+HIV1 P24 AG SERPL QL IA: NONREACTIVE

## 2017-11-29 DIAGNOSIS — R05.9 COUGH: Primary | ICD-10-CM

## 2017-11-29 RX ORDER — CODEINE PHOSPHATE AND GUAIFENESIN 10; 100 MG/5ML; MG/5ML
1 SOLUTION ORAL EVERY 4 HOURS PRN
Qty: 240 ML | Refills: 1 | Status: SHIPPED | OUTPATIENT
Start: 2017-11-29 | End: 2018-03-12

## 2017-12-12 ENCOUNTER — OFFICE VISIT (OUTPATIENT)
Dept: PHYSICAL MEDICINE AND REHAB | Facility: CLINIC | Age: 47
End: 2017-12-12

## 2017-12-12 VITALS — HEIGHT: 71 IN | DIASTOLIC BLOOD PRESSURE: 64 MMHG | SYSTOLIC BLOOD PRESSURE: 106 MMHG | HEART RATE: 94 BPM

## 2017-12-12 DIAGNOSIS — G43.719 INTRACTABLE CHRONIC MIGRAINE WITHOUT AURA AND WITHOUT STATUS MIGRAINOSUS: Primary | ICD-10-CM

## 2017-12-12 ASSESSMENT — PAIN SCALES - GENERAL: PAINLEVEL: NO PAIN (0)

## 2017-12-12 NOTE — MR AVS SNAPSHOT
After Visit Summary   12/12/2017    Jessica Manriquez    MRN: [id number]           Patient Information     Date Of Birth          1970        Visit Information        Provider Department      12/12/2017 2:20 PM Ethan Carmichael MD [facility] Physical Medicine and Rehabilitation         Follow-ups after your visit        Follow-up notes from your care team     Return in about 12 weeks (around 3/6/2018) for Chronic Migraine.      Your next 10 appointments already scheduled     Mar 06, 2018  3:40 PM CST   (Arrive by 3:25 PM)   Return Botox with Ethan Carmichael MD   [facility] Physical Medicine and Rehabilitation ([facility])    [address]  [address] [facility] [address] [phone]              Who to contact     Please call your clinic at [phone] to:    Ask questions about your health    Make or cancel appointments    Discuss your medicines    Learn about your test results    Speak to your doctor   If you have compliments or concerns about an experience at your clinic, or if you wish to file a complaint, please contact [facility] Patient Relations at [phone] or email us at [email or web address]         Additional Information About Your Visit        MyChart Information     [employer] gives you secure access to your electronic health record. If you see a primary care provider, you can also send messages to your care team and make appointments. If you have questions, please call your primary care clinic.  If you do not have a primary care provider, please call [phone] and they will assist you.      [employer]ium is an electronic gateway that provides easy, online access to your medical records. With [employer], you can request a clinic appointment, read your test results, renew a prescription or communicate with your care team.     To access your existing account, please contact your [facility] or call [phone] for assistance.        Care EveryWhere ID     This is your Care EveryWhere ID. This could be used by other organizations to access your [place] medical records  [id number]        Your Vitals Were     Pulse Height                94 1.803 m (5' 11\")           Blood Pressure from Last 3 Encounters:   12/12/17 106/64   11/03/17 104/62   09/11/17 96/61    Weight from Last 3 Encounters:   11/03/17 64 kg (141 lb)   06/23/17 64.7 kg (142 lb 11.2 oz)   04/13/17 67.1 kg (148 lb)              Today, you had the following     No orders found for display       Primary Care Provider Office Phone # Fax #    Su Loya [phone] [address] [address] [facility] [address]        Equal Access to Services     [facility]: Hadii aad ku hadasho Soomaali, waaxda luqadaha, qaybta kaalmada adeegyada, waxay idiin hayaan adeemeli khandre warren . So [facility] [phone].    ATENCIÓN: Si habla español, tiene a coronado disposición servicios gratuitos de asistencia lingüística. Marleename al [phone].    We comply with applicable federal civil rights laws and [place] laws. We do not discriminate on the basis of race, color, national origin, age, disability, sex, sexual orientation, or gender identity.            Thank you!     Thank you for choosing [facility]  for your care. Our goal is always to provide you with excellent care. Hearing back from our patients is one way we can continue to improve our services. Please take a few minutes to complete the written survey that you may receive in the mail after your visit with us. Thank you!             Your Updated Medication List - Protect others around you: Learn how to safely use, store and throw away your medicines at www.disposemymeds.org.          This list is accurate as of: 12/12/17  2:46 PM.  Always use your most recent med list.                   Brand Name Dispense Instructions for use " Diagnosis    Acidophilus 100 MG Caps      Take 1 capsule by mouth daily        ALPRAZolam 0.5 MG tablet    XANAX    40 tablet    Take 1 tablet (0.5 mg) by mouth every 6 hours as needed for anxiety    Anxiety state       amitriptyline 10 MG tablet    ELAVIL    100 tablet    Take 1 tablet (10 mg) by mouth At Bedtime Take up to 5 tabs at bedtime prn insomnia.    Persistent insomnia       ascorbic acid 500 MG tablet    VITAMIN C     1 tablet daily        aspirin-acetaminophen-caffeine 250-250-65 MG per tablet    EXCEDRIN MIGRAINE    30 tablet    Take 1 tablet by mouth every 6 hours as needed for pain.    Migraine headache       * BOTOX IJ      Inject 145 Units as directed Lot # /C3 with Expiration Date:  08/2019 NDC #: Botox 100u ([address]-80)        * BOTOX IJ      Inject 145 Units as directed Lot # /C3 with Expiration Date:  10/2019 NDC #: Botox 100u ([address]-94)        * BOTOX IJ      Inject 145 Units as directed Lot # /C3 with Expiration Date:  01/2020 NDC #: Botox 100u ([address]-30)        * BOTOX IJ      Inject 150 Units into the muscle once /C3        diazepam 10 MG tablet    VALIUM    30 tablet    Place 1 tab vaginally for muscle spasms    Pelvic pain in female       fluconazole 150 MG tablet    DIFLUCAN    4 tablet    Take 1 po as needed for yeast infection    Yeast infection of the vagina       guaiFENesin-codeine 100-10 MG/5ML Soln solution    ROBITUSSIN AC    240 mL    Take 5 mLs by mouth every 4 hours as needed for cough    Cough       HYDROcodone-acetaminophen 5-325 MG per tablet    NORCO     Take 1 tablet by mouth as needed        * hydrOXYzine 25 MG tablet    ATARAX    60 tablet    Take 1-2 tablets (25-50 mg) by mouth every 6 hours as needed for itching    Itching       * hydrOXYzine 25 MG tablet    ATARAX    60 tablet    Take 1-2 tablets (25-50 mg) by mouth every 6 hours as needed for itching    Allergic state, sequela       ibuprofen 600 MG tablet    ADVIL/MOTRIN    60 tablet     Take 1 tablet (600 mg) by mouth every 6 hours as needed for moderate pain    Viral URI with cough, Throat pain       Multi-vitamin Tabs tablet   Generic drug:  multivitamin, therapeutic with minerals      1 DAILY        ondansetron 4 MG ODT tab    ZOFRAN ODT    20 tablet    Take 1-2 tablets (4-8 mg) by mouth every 8 hours as needed for nausea    Other migraine without status migrainosus, not intractable       penciclovir 1 % cream    DENAVIR    1.5 g    Apply topically every 2 hours (while awake)    HSV-1 (herpes simplex virus 1) infection       SUMAtriptan 20 MG/ACT nasal spray    IMITREX    1 Inhaler    Spray 1 spray (20 mg) in nostril as needed for migraine May repeat dose in 2 hours.  Do not exceed 40 mg in 24 hours    Migraine headache       SUMAtriptan 50 MG tablet    IMITREX    18 tablet    Take 1 tablet (50 mg) by mouth at onset of headache for migraine May repeat dose in 2 hours.  Do not exceed 200 mg in 24 hours    Migraine without status migrainosus, not intractable, unspecified migraine type       traMADol 50 MG tablet    ULTRAM    40 tablet    Take 1-2 tablets ( mg) by mouth every 6 hours as needed for pain    Intractable menstrual migraine without status migrainosus       valACYclovir 500 MG tablet    VALTREX    90 tablet    Take 1 tablet (500 mg) by mouth daily    HSV (herpes simplex virus) infection       VITAMIN D (CHOLECALCIFEROL) PO      Take 1,000 Units by mouth daily        zolpidem 5 MG tablet    AMBIEN    90 tablet    Take 1 tablet (5 mg) by mouth nightly as needed for sleep    Persistent insomnia       * Notice:  This list has 6 medication(s) that are the same as other medications prescribed for you. Read the directions carefully, and ask your doctor or other care provider to review them with you.

## 2017-12-12 NOTE — PROGRESS NOTES
"BOTULINUM TOXIN PROCEDURE - HEADACHE - NOTE    Chief Complaint   Patient presents with     RECHECK     Chronic Migraines        /64  Pulse 94  Ht 1.803 m (5' 11\")       Current Outpatient Prescriptions:      guaiFENesin-codeine (ROBITUSSIN AC) 100-10 MG/5ML SOLN solution, Take 5 mLs by mouth every 4 hours as needed for cough, Disp: 240 mL, Rfl: 1     hydrOXYzine (ATARAX) 25 MG tablet, Take 1-2 tablets (25-50 mg) by mouth every 6 hours as needed for itching, Disp: 60 tablet, Rfl: 1     zolpidem (AMBIEN) 5 MG tablet, Take 1 tablet (5 mg) by mouth nightly as needed for sleep, Disp: 90 tablet, Rfl: 1     diazepam (VALIUM) 10 MG tablet, Place 1 tab vaginally for muscle spasms, Disp: 30 tablet, Rfl: 3     ALPRAZolam (XANAX) 0.5 MG tablet, Take 1 tablet (0.5 mg) by mouth every 6 hours as needed for anxiety, Disp: 40 tablet, Rfl: 0     traMADol (ULTRAM) 50 MG tablet, Take 1-2 tablets ( mg) by mouth every 6 hours as needed for pain, Disp: 40 tablet, Rfl: 2     valACYclovir (VALTREX) 500 MG tablet, Take 1 tablet (500 mg) by mouth daily, Disp: 90 tablet, Rfl: 3     OnabotulinumtoxinA (BOTOX IJ), Inject 150 Units into the muscle once /C3, Disp: , Rfl:      amitriptyline (ELAVIL) 10 MG tablet, Take 1 tablet (10 mg) by mouth At Bedtime Take up to 5 tabs at bedtime prn insomnia., Disp: 100 tablet, Rfl: 3     OnabotulinumtoxinA (BOTOX IJ), Inject 145 Units as directed Lot # /C3 with Expiration Date:  01/2020 NDC #: Botox 100u ([address]-47), Disp: , Rfl:      HYDROcodone-acetaminophen (NORCO) 5-325 MG per tablet, Take 1 tablet by mouth as needed, Disp: , Rfl:      VITAMIN D, CHOLECALCIFEROL, PO, Take 1,000 Units by mouth daily, Disp: , Rfl:      Lactobacillus (ACIDOPHILUS) 100 MG CAPS, Take 1 capsule by mouth daily, Disp: , Rfl:      OnabotulinumtoxinA (BOTOX IJ), Inject 145 Units as directed Lot # /C3 with Expiration Date:  10/2019 NDC #: Botox 100u ([address]-53), Disp: , Rfl:      " OnabotulinumtoxinA (BOTOX IJ), Inject 145 Units as directed Lot # /C3 with Expiration Date:  08/2019 NDC #: Botox 100u ([address]-83), Disp: , Rfl:      SUMAtriptan (IMITREX) 50 MG tablet, Take 1 tablet (50 mg) by mouth at onset of headache for migraine May repeat dose in 2 hours.  Do not exceed 200 mg in 24 hours, Disp: 18 tablet, Rfl: 1     fluconazole (DIFLUCAN) 150 MG tablet, Take 1 po as needed for yeast infection, Disp: 4 tablet, Rfl: 0     ibuprofen (ADVIL,MOTRIN) 600 MG tablet, Take 1 tablet (600 mg) by mouth every 6 hours as needed for moderate pain, Disp: 60 tablet, Rfl: 0     penciclovir (DENAVIR) 1 % cream, Apply topically every 2 hours (while awake), Disp: 1.5 g, Rfl: 3     hydrOXYzine (ATARAX) 25 MG tablet, Take 1-2 tablets (25-50 mg) by mouth every 6 hours as needed for itching, Disp: 60 tablet, Rfl: 1     ondansetron (ZOFRAN ODT) 4 MG disintegrating tablet, Take 1-2 tablets (4-8 mg) by mouth every 8 hours as needed for nausea, Disp: 20 tablet, Rfl: 3     SUMAtriptan (IMITREX) 20 MG/ACT nasal spray, Spray 1 spray (20 mg) in nostril as needed for migraine May repeat dose in 2 hours.  Do not exceed 40 mg in 24 hours, Disp: 1 Inhaler, Rfl: 1     aspirin-acetaminophen-caffeine (EXCEDRIN MIGRAINE) 250-250-65 MG per tablet, Take 1 tablet by mouth every 6 hours as needed for pain., Disp: 30 tablet, Rfl: 0     VITAMIN C 500 MG OR TABS, 1 tablet daily, Disp: , Rfl:      MULTI-VITAMIN OR TABS, 1 DAILY, Disp: , Rfl:      Allergies   Allergen Reactions     Darvocet [Acetaminophen] Nausea and Vomiting     Erythromycin GI Disturbance     Flexeril [Cyclobenzaprine Hcl]      Lightheadedness, dizzy          PHYSICAL EXAM:    Denies headache at today's visit.      HPI:    Patient denies new medical diagnoses, illnesses, hospitalizations, emergency room visits, and injuries since the previous injection with botulinum neurotoxin.    We reviewed the recommended safety guidelines for  Botox from any vaccine  injection, such as the seasonal flu vaccine, by a minimum of 10-14 days with Jessica aMnriquez. She acknowledged understanding.    RESPONSE TO PREVIOUS TREATMENT:  Change in headache pattern following last series of injections with 150 units of  Botox on 09/11/2017.    No problems reported    1.  Headache frequency during this injection cycle:  6 headache days per month.  This is compared to her baseline headache frequency of 24 headache days per month.     2.  Headache duration during this injection cycle:  Headache duration ranged from 3 hours to 48 hours.  Longer duration migraines are related to hormonal changes and prolonged periods under fluorescent lights.    3.  Headache intensity during this injection cycle:    A.  3/10  =  Typical pain level.  This is an improvement from her report of 4/10 at her previous visit.  B.  8/10  =  Worst pain level.  C.  0/10  =  Lowest pain level.  Patient experienced numerous headache free days during this injection cycle.    4.  Change in headache medication usage during this injection cycle:  (For Example:  Able to decrease use of oral pain medications.) No change in use of medication.  Tries to use the least amount of Imitrex as possible.     Details:     1.  Utilized 50 mg Imitrex 16X during this 12 week injection cycle.   2.  Used Excedrin Migraine 4X during this 12 week injection cycle.   3.  Used Vicodin 6X during this 12 week injection cycle due to more severe migraine symptoms.    5.  ER Visits During This Injection Cycle:  0     6.  Functional Performance:  Change in ADL's, social interaction, days lost from work, etc.     Patient reports being able to more fully participate in social and family activities and responsibilities as headache symptoms have improved.  She denies any days lost from school due to migraine   headache during this 12 week injection cycle.    BOTULINUM NEUROTOXIN INJECTION PROCEDURES:      VERIFICATION OF PATIENT IDENTIFICATION AND PROCEDURE      Initials   Patient Name tlb   Patient  tlb   Procedure Verified by: tlb     Prior to the start of the procedure and with procedural staff participation, I verbally confirmed the patient s identity using two indicators, relevant allergies, that the procedure was appropriate and matched the consent or emergent situation, and that the correct equipment/implants were available. Immediately prior to starting the procedure I conducted the Time Out with the procedural staff and re-confirmed the patient s name, procedure, and site/side. (The Joint Commission universal protocol was followed.)  Yes    Sedation (Moderate or Deep): None    Above assessments performed by:  Alie Rivera, PT  - Care Coordinator  Ethan Carmichael      INDICATIONS FOR PROCEDURES:  Jessica Manriquez is a 47 year old patient with chronic migraine headaches associated with cervicogenic components.     Her baseline symptoms have been recalcitrant to oral medications and conservative therapy.  She is here today for reinjection with Botox.    GOAL OF PROCEDURE:  The goal of this procedure is to decrease pain  and enhance functional independence.    TOTAL DOSE ADMINISTERED:  Dose Administered:  150 units  Botox (Botulinum Toxin Type A)     2:1 Dilution     Diluent Used:  Preservative Free Normal Saline  Total Volume of Diluent Used:  3 ml  Lot # /C3 with Expiration Date:  2020  NDC #: Botox 100u ([address]-23)    Medication guide was offered to patient and was declined.    CONSENT:  The risks, benefits, and treatment options were discussed with Jessica Manriquez and she agreed to proceed.    Written consent was obtained by TLB.     EQUIPMENT USED:  Needle-25mm stimulating/recording  Needle-30 gauge  EMG/NCS Machine    SKIN PREPARATION:  Skin preparation was performed using an alcohol wipe.    GUIDANCE DESCRIPTION:  Electro-myographic guidance was necessary throughout the posterior neck and jaw portion to accurately identify all areas of spastic  muscles while avoiding injection of non-spastic muscles, neighboring nerves and nearby vascular structures.     AREA/MUSCLE INJECTED:      1 & 2. SHOULDER GIRDLE & NECK MUSCLES: 25 units Botox = Total Dose, 2:1 Dilution   Right Cervical Paraspinal (superior cervical) - 10 units of Botox at 2 site/s  Left Cervical Paraspinal (superior cervical) - 10 units of Botox at 2 site/s                  Left levator (at neck) - 5 units of Botox at 1 site     3. HEAD, JAW & SCALP MUSCLES: 125 units Botox = Total Dose, 2:1 Dilution   Left masseter - 5 units of Botox at 1 site     Right Occipitalis - 15 units of Botox at 3 site/s.  Left Occipitalis - 15 units of Botox at 3 site/s.     Right Frontalis - 10 units of Botox at 2 site/s  Left Frontalis - 10 units of Botox at 2 site/s      Right Temporalis - 20 units of Botox at 3 site/s.  Left Temporalis - 30 units of Botox at 6 site/s.     Right  - 5 units of Botox at 1 site/s.  Left  - 5 units of Botox at 1 site/s.     Procerus - 10 units of Botox at 1 site/s.    RESPONSE TO PROCEDURE:  eJssica Manriquez tolerated the procedure well and there were no immediate complications.   She was allowed to recover for an appropriate period of time and was discharged home in stable condition.    FOLLOW UP:  Jessica Manriquez was asked to follow up by phone in 7-14 days with Poonam Shoemaker RN, Care Coordinator, to report her response to this series of injections.  Based on the patient's previous response to this therapy, Jessica Manriquez was rescheduled for the next series of injections in 12 weeks.    PLAN (Medication Changes, Therapy Orders, Work or Disability Issues, etc.): Patient will continue to monitor response to today's injections.

## 2017-12-12 NOTE — LETTER
"12/12/2017       RE: Jessica Manriquez  [address]  [place] [address]     Dear Colleague,    Thank you for referring your patient, Jessica Manriquez, to the [facility] PHYSICAL MEDICINE AND REHABILITATION at [facility]. Please see a copy of my visit note below.    BOTULINUM TOXIN PROCEDURE - HEADACHE - NOTE    Chief Complaint   Patient presents with     RECHECK     Chronic Migraines        /64  Pulse 94  Ht 1.803 m (5' 11\")       Current Outpatient Prescriptions:      guaiFENesin-codeine (ROBITUSSIN AC) 100-10 MG/5ML SOLN solution, Take 5 mLs by mouth every 4 hours as needed for cough, Disp: 240 mL, Rfl: 1     hydrOXYzine (ATARAX) 25 MG tablet, Take 1-2 tablets (25-50 mg) by mouth every 6 hours as needed for itching, Disp: 60 tablet, Rfl: 1     zolpidem (AMBIEN) 5 MG tablet, Take 1 tablet (5 mg) by mouth nightly as needed for sleep, Disp: 90 tablet, Rfl: 1     diazepam (VALIUM) 10 MG tablet, Place 1 tab vaginally for muscle spasms, Disp: 30 tablet, Rfl: 3     ALPRAZolam (XANAX) 0.5 MG tablet, Take 1 tablet (0.5 mg) by mouth every 6 hours as needed for anxiety, Disp: 40 tablet, Rfl: 0     traMADol (ULTRAM) 50 MG tablet, Take 1-2 tablets ( mg) by mouth every 6 hours as needed for pain, Disp: 40 tablet, Rfl: 2     valACYclovir (VALTREX) 500 MG tablet, Take 1 tablet (500 mg) by mouth daily, Disp: 90 tablet, Rfl: 3     OnabotulinumtoxinA (BOTOX IJ), Inject 150 Units into the muscle once /C3, Disp: , Rfl:      amitriptyline (ELAVIL) 10 MG tablet, Take 1 tablet (10 mg) by mouth At Bedtime Take up to 5 tabs at bedtime prn insomnia., Disp: 100 tablet, Rfl: 3     OnabotulinumtoxinA (BOTOX IJ), Inject 145 Units as directed Lot # /C3 with Expiration Date:  01/2020 NDC #: Botox 100u ([address]-31), Disp: , Rfl:      HYDROcodone-acetaminophen (NORCO) 5-325 MG per tablet, Take 1 tablet by mouth as needed, Disp: , Rfl:      VITAMIN D, CHOLECALCIFEROL, PO, Take " 1,000 Units by mouth daily, Disp: , Rfl:      Lactobacillus (ACIDOPHILUS) 100 MG CAPS, Take 1 capsule by mouth daily, Disp: , Rfl:      OnabotulinumtoxinA (BOTOX IJ), Inject 145 Units as directed Lot # /C3 with Expiration Date:  10/2019 NDC #: Botox 100u ([address]-42), Disp: , Rfl:      OnabotulinumtoxinA (BOTOX IJ), Inject 145 Units as directed Lot # /C3 with Expiration Date:  08/2019 NDC #: Botox 100u ([address]-22), Disp: , Rfl:      SUMAtriptan (IMITREX) 50 MG tablet, Take 1 tablet (50 mg) by mouth at onset of headache for migraine May repeat dose in 2 hours.  Do not exceed 200 mg in 24 hours, Disp: 18 tablet, Rfl: 1     fluconazole (DIFLUCAN) 150 MG tablet, Take 1 po as needed for yeast infection, Disp: 4 tablet, Rfl: 0     ibuprofen (ADVIL,MOTRIN) 600 MG tablet, Take 1 tablet (600 mg) by mouth every 6 hours as needed for moderate pain, Disp: 60 tablet, Rfl: 0     penciclovir (DENAVIR) 1 % cream, Apply topically every 2 hours (while awake), Disp: 1.5 g, Rfl: 3     hydrOXYzine (ATARAX) 25 MG tablet, Take 1-2 tablets (25-50 mg) by mouth every 6 hours as needed for itching, Disp: 60 tablet, Rfl: 1     ondansetron (ZOFRAN ODT) 4 MG disintegrating tablet, Take 1-2 tablets (4-8 mg) by mouth every 8 hours as needed for nausea, Disp: 20 tablet, Rfl: 3     SUMAtriptan (IMITREX) 20 MG/ACT nasal spray, Spray 1 spray (20 mg) in nostril as needed for migraine May repeat dose in 2 hours.  Do not exceed 40 mg in 24 hours, Disp: 1 Inhaler, Rfl: 1     aspirin-acetaminophen-caffeine (EXCEDRIN MIGRAINE) 250-250-65 MG per tablet, Take 1 tablet by mouth every 6 hours as needed for pain., Disp: 30 tablet, Rfl: 0     VITAMIN C 500 MG OR TABS, 1 tablet daily, Disp: , Rfl:      MULTI-VITAMIN OR TABS, 1 DAILY, Disp: , Rfl:      Allergies   Allergen Reactions     Darvocet [Acetaminophen] Nausea and Vomiting     Erythromycin GI Disturbance     Flexeril [Cyclobenzaprine Hcl]      Lightheadedness, dizzy          PHYSICAL  EXAM:    Denies headache at today's visit.      HPI:    Patient denies new medical diagnoses, illnesses, hospitalizations, emergency room visits, and injuries since the previous injection with botulinum neurotoxin.    We reviewed the recommended safety guidelines for  Botox from any vaccine injection, such as the seasonal flu vaccine, by a minimum of 10-14 days with Jessica Manriquez. She acknowledged understanding.    RESPONSE TO PREVIOUS TREATMENT:  Change in headache pattern following last series of injections with 150 units of  Botox on 09/11/2017.    No problems reported    1.  Headache frequency during this injection cycle:  6 headache days per month.  This is compared to her baseline headache frequency of 24 headache days per month.     2.  Headache duration during this injection cycle:  Headache duration ranged from 3 hours to 48 hours.  Longer duration migraines are related to hormonal changes and prolonged periods under fluorescent lights.    3.  Headache intensity during this injection cycle:    A.  3/10  =  Typical pain level.  This is an improvement from her report of 4/10 at her previous visit.  B.  8/10  =  Worst pain level.  C.  0/10  =  Lowest pain level.  Patient experienced numerous headache free days during this injection cycle.    4.  Change in headache medication usage during this injection cycle:  (For Example:  Able to decrease use of oral pain medications.) No change in use of medication.  Tries to use the least amount of Imitrex as possible.     Details:     1.  Utilized 50 mg Imitrex 16X during this 12 week injection cycle.   2.  Used Excedrin Migraine 4X during this 12 week injection cycle.   3.  Used Vicodin 6X during this 12 week injection cycle due to more severe migraine symptoms.    5.  ER Visits During This Injection Cycle:  0     6.  Functional Performance:  Change in ADL's, social interaction, days lost from work, etc.     Patient reports being able to more fully  participate in social and family activities and responsibilities as headache symptoms have improved.  She denies any days lost from school due to migraine   headache during this 12 week injection cycle.    BOTULINUM NEUROTOXIN INJECTION PROCEDURES:      VERIFICATION OF PATIENT IDENTIFICATION AND PROCEDURE     Initials   Patient Name tlb   Patient  tlb   Procedure Verified by: tlb     Prior to the start of the procedure and with procedural staff participation, I verbally confirmed the patient s identity using two indicators, relevant allergies, that the procedure was appropriate and matched the consent or emergent situation, and that the correct equipment/implants were available. Immediately prior to starting the procedure I conducted the Time Out with the procedural staff and re-confirmed the patient s name, procedure, and site/side. (The Joint Commission universal protocol was followed.)  Yes    Sedation (Moderate or Deep): None    Above assessments performed by:  Alie Rivera, PT  - Care Coordinator  Ethan Carmichael      INDICATIONS FOR PROCEDURES:  Jessica Manriquez is a 47 year old patient with chronic migraine headaches associated with cervicogenic components.     Her baseline symptoms have been recalcitrant to oral medications and conservative therapy.  She is here today for reinjection with Botox.    GOAL OF PROCEDURE:  The goal of this procedure is to decrease pain  and enhance functional independence.    TOTAL DOSE ADMINISTERED:  Dose Administered:  150 units  Botox (Botulinum Toxin Type A)     2:1 Dilution     Diluent Used:  Preservative Free Normal Saline  Total Volume of Diluent Used:  3 ml  Lot # /C3 with Expiration Date:  2020  NDC #: Botox 100u ([address]-17)    Medication guide was offered to patient and was declined.    CONSENT:  The risks, benefits, and treatment options were discussed with Jessica Manriquez and she agreed to proceed.    Written consent was obtained by TLB.      EQUIPMENT USED:  Needle-25mm stimulating/recording  Needle-30 gauge  EMG/NCS Machine    SKIN PREPARATION:  Skin preparation was performed using an alcohol wipe.    GUIDANCE DESCRIPTION:  Electro-myographic guidance was necessary throughout the posterior neck and jaw portion to accurately identify all areas of spastic muscles while avoiding injection of non-spastic muscles, neighboring nerves and nearby vascular structures.     AREA/MUSCLE INJECTED:      1 & 2. SHOULDER GIRDLE & NECK MUSCLES: 25 units Botox = Total Dose, 2:1 Dilution   Right Cervical Paraspinal (superior cervical) - 10 units of Botox at 2 site/s  Left Cervical Paraspinal (superior cervical) - 10 units of Botox at 2 site/s                  Left levator (at neck) - 5 units of Botox at 1 site     3. HEAD, JAW & SCALP MUSCLES: 125 units Botox = Total Dose, 2:1 Dilution   Left masseter - 5 units of Botox at 1 site     Right Occipitalis - 15 units of Botox at 3 site/s.  Left Occipitalis - 15 units of Botox at 3 site/s.     Right Frontalis - 10 units of Botox at 2 site/s  Left Frontalis - 10 units of Botox at 2 site/s      Right Temporalis - 20 units of Botox at 3 site/s.  Left Temporalis - 30 units of Botox at 6 site/s.     Right  - 5 units of Botox at 1 site/s.  Left  - 5 units of Botox at 1 site/s.     Procerus - 10 units of Botox at 1 site/s.    RESPONSE TO PROCEDURE:  Jessica Manriquez tolerated the procedure well and there were no immediate complications.   She was allowed to recover for an appropriate period of time and was discharged home in stable condition.    FOLLOW UP:  Jessica Manriquez was asked to follow up by phone in 7-14 days with Poonam Shoemaker RN, Care Coordinator, to report her response to this series of injections.  Based on the patient's previous response to this therapy, Jessica Manriquez was rescheduled for the next series of injections in 12 weeks.    PLAN (Medication Changes, Therapy Orders, Work or Disability  Issues, etc.): Patient will continue to monitor response to today's injections.    Again, thank you for allowing me to participate in the care of your patient.      Sincerely,    Ethan Carmichael MD

## 2018-01-09 ENCOUNTER — TRANSFERRED RECORDS (OUTPATIENT)
Dept: HEALTH INFORMATION MANAGEMENT | Facility: CLINIC | Age: 48
End: 2018-01-09

## 2018-01-11 ENCOUNTER — TRANSFERRED RECORDS (OUTPATIENT)
Dept: HEALTH INFORMATION MANAGEMENT | Facility: CLINIC | Age: 48
End: 2018-01-11

## 2018-01-24 ENCOUNTER — TELEPHONE (OUTPATIENT)
Dept: OBGYN | Facility: CLINIC | Age: 48
End: 2018-01-24

## 2018-03-12 ENCOUNTER — OFFICE VISIT (OUTPATIENT)
Dept: PHYSICAL MEDICINE AND REHAB | Facility: CLINIC | Age: 48
End: 2018-03-12
Payer: COMMERCIAL

## 2018-03-12 VITALS
BODY MASS INDEX: 19.75 KG/M2 | HEART RATE: 76 BPM | SYSTOLIC BLOOD PRESSURE: 99 MMHG | WEIGHT: 141.1 LBS | DIASTOLIC BLOOD PRESSURE: 69 MMHG | HEIGHT: 71 IN

## 2018-03-12 DIAGNOSIS — G43.719 INTRACTABLE CHRONIC MIGRAINE WITHOUT AURA AND WITHOUT STATUS MIGRAINOSUS: Primary | ICD-10-CM

## 2018-03-12 ASSESSMENT — PAIN SCALES - GENERAL: PAINLEVEL: NO PAIN (0)

## 2018-03-12 NOTE — MR AVS SNAPSHOT
"              After Visit Summary   3/12/2018    Jessica Manriquez    MRN: [id number]           Patient Information     Date Of Birth          1970        Visit Information        Provider Department      3/12/2018 3:40 PM Ethan Carmichael MD [facility] Medicine [facility] Rehabilitation        Today's Diagnoses     Intractable chronic migraine without aura and without status migrainosus    -  1       Follow-ups after your visit        Who to contact     Please call your clinic at [phone] to:    Ask questions about your health    Make or cancel appointments    Discuss your medicines    Learn about your test results    Speak to your doctor            Additional Information About Your Visit        MyChart Information     [employer] gives you secure access to your electronic health record. If you see a primary care provider, you can also send messages to your care team and make appointments. If you have questions, please call your primary care clinic.  If you do not have a primary care provider, please call [phone] and they will assist you.      [employer] is an electronic gateway that provides easy, online access to your medical records. With [employer], you can request a clinic appointment, read your test results, renew a prescription or communicate with your care team.     To access your existing account, please contact your [facility] or call [phone] for assistance.        Care EveryWhere ID     This is your Care EveryWhere ID. This could be used by other organizations to access your [facility] medical records  [id number]        Your Vitals Were     Pulse Height BMI (Body Mass Index)             76 1.803 m (5' 11\") 19.68 kg/m2          Blood Pressure from Last 3 Encounters:   03/12/18 99/69   12/12/17 106/64   11/03/17 104/62    Weight from Last 3 Encounters:   03/12/18 64 kg (141 lb 1.6 oz)   11/03/17 64 kg (141 lb)   06/23/17 64.7 kg (142 lb 11.2 oz)              We " Performed the Following     HC CHEMODENERVATE FACIAL,TRIGERM,NERV MIGRAINE     Needle EMG Guide w/Chemodenervation ([id number])        Primary Care Provider Office Phone # Fax #    Su Loya [phone] [id number]  [facility] [address]        Equal Access to Services     [facility] : Hadii aad ku hadasho Soomaali, waaxda luqadaha, qaybta kaalmada adeegyada, waxay idiin hayaan adeemeli andre lacb . So [facility] [phone].    ATENCIÓN: Si habla español, tiene a coronado disposición servicios gratuitos de asistencia lingüística. Ll[facility] [phone].    We comply with applicable federal civil rights laws and [place] laws. We do not discriminate on the basis of race, color, national origin, age, disability, sex, sexual orientation, or gender identity.            Thank you!     Thank you for choosing [facility] PHYSICAL MEDICINE AND REHABILITATION  for your care. Our goal is always to provide you with excellent care. Hearing back from our patients is one way we can continue to improve our services. Please take a few minutes to complete the written survey that you may receive in the mail after your visit with us. Thank you!             Your Updated Medication List - Protect others around you: Learn how to safely use, store and throw away your medicines at www.disposemymeds.org.          This list is accurate as of 3/12/18  4:24 PM.  Always use your most recent med list.                   Brand Name Dispense Instructions for use Diagnosis    Acidophilus 100 MG Caps      Take 1 capsule by mouth daily        ALPRAZolam 0.5 MG tablet    XANAX    40 tablet    Take 1 tablet (0.5 mg) by mouth every 6 hours as needed for anxiety    Anxiety state       amitriptyline 10 MG tablet    ELAVIL    100 tablet    Take 1 tablet (10 mg) by mouth At Bedtime Take up to 5 tabs at bedtime prn insomnia.    Persistent insomnia       ascorbic acid 500 MG tablet    VITAMIN C     1 tablet daily         aspirin-acetaminophen-caffeine 250-250-65 MG per tablet    EXCEDRIN MIGRAINE    30 tablet    Take 1 tablet by mouth every 6 hours as needed for pain.    Migraine headache       * BOTOX IJ      Inject 145 Units as directed Lot # /C3 with Expiration Date:  08/2019 NDC #: Botox 100u ([address]-58)        * BOTOX IJ      Inject 145 Units as directed Lot # /C3 with Expiration Date:  10/2019 NDC #: Botox 100u ([address]-61)        * BOTOX IJ      Inject 145 Units as directed Lot # /C3 with Expiration Date:  01/2020 NDC #: Botox 100u ([address]-87)        * BOTOX IJ      Inject 150 Units into the muscle once /C3        * BOTOX IJ      Inject 150 Units as directed once Lot# /C3, Exp 06/2020        * BOTOX IJ      Inject 150 Units into the muscle Lot # /C3  Exp: 10/2020        diazepam 10 MG tablet    VALIUM    30 tablet    Place 1 tab vaginally for muscle spasms    Pelvic pain in female       fluconazole 150 MG tablet    DIFLUCAN    4 tablet    Take 1 po as needed for yeast infection    Yeast infection of the vagina       HYDROcodone-acetaminophen 5-325 MG per tablet    NORCO     Take 1 tablet by mouth as needed        hydrOXYzine 25 MG tablet    ATARAX    60 tablet    Take 1-2 tablets (25-50 mg) by mouth every 6 hours as needed for itching    Allergic state, sequela       ibuprofen 600 MG tablet    ADVIL/MOTRIN    60 tablet    Take 1 tablet (600 mg) by mouth every 6 hours as needed for moderate pain    Viral URI with cough, Throat pain       Multi-vitamin Tabs tablet   Generic drug:  multivitamin, therapeutic with minerals      1 DAILY        ondansetron 4 MG ODT tab    ZOFRAN ODT    20 tablet    Take 1-2 tablets (4-8 mg) by mouth every 8 hours as needed for nausea    Other migraine without status migrainosus, not intractable       penciclovir 1 % cream    DENAVIR    1.5 g    Apply topically every 2 hours (while awake)    HSV-1 (herpes simplex virus 1) infection       SUMAtriptan 20 MG/ACT  nasal spray    IMITREX    1 Inhaler    Spray 1 spray (20 mg) in nostril as needed for migraine May repeat dose in 2 hours.  Do not exceed 40 mg in 24 hours    Migraine headache       SUMAtriptan 50 MG tablet    IMITREX    18 tablet    Take 1 tablet (50 mg) by mouth at onset of headache for migraine May repeat dose in 2 hours.  Do not exceed 200 mg in 24 hours    Migraine without status migrainosus, not intractable, unspecified migraine type       traMADol 50 MG tablet    ULTRAM    40 tablet    Take 1-2 tablets ( mg) by mouth every 6 hours as needed for pain    Intractable menstrual migraine without status migrainosus       valACYclovir 500 MG tablet    VALTREX    90 tablet    Take 1 tablet (500 mg) by mouth daily    HSV (herpes simplex virus) infection       VITAMIN D (CHOLECALCIFEROL) PO      Take 1,000 Units by mouth daily        zolpidem 5 MG tablet    AMBIEN    90 tablet    Take 1 tablet (5 mg) by mouth nightly as needed for sleep    Persistent insomnia       * Notice:  This list has 6 medication(s) that are the same as other medications prescribed for you. Read the directions carefully, and ask your doctor or other care provider to review them with you.

## 2018-03-12 NOTE — LETTER
"3/12/2018       RE: Jessica Manriquez  [address]  [facility] [address]     Dear Colleague,    Thank you for referring your patient, Jessica Manriquez, to the [facility] PHYSICAL MEDICINE AND REHABILITATION at [facility]. Please see a copy of my visit note below.    BOTULINUM TOXIN PROCEDURE - HEADACHE - NOTE    Chief Complaint   Patient presents with     Headache     [facility]- Follow up Botox Migraine Headache       BP 99/69 (BP Location: Left arm, Patient Position: Chair, Cuff Size: Adult Regular)  Pulse 76  Ht 1.803 m (5' 11\")  Wt 64 kg (141 lb 1.6 oz)  BMI 19.68 kg/m2       Current Outpatient Prescriptions:      OnabotulinumtoxinA (BOTOX IJ), Inject 150 Units as directed once Lot# /C3, Exp 06/2020, Disp: , Rfl:      hydrOXYzine (ATARAX) 25 MG tablet, Take 1-2 tablets (25-50 mg) by mouth every 6 hours as needed for itching, Disp: 60 tablet, Rfl: 1     zolpidem (AMBIEN) 5 MG tablet, Take 1 tablet (5 mg) by mouth nightly as needed for sleep, Disp: 90 tablet, Rfl: 1     diazepam (VALIUM) 10 MG tablet, Place 1 tab vaginally for muscle spasms, Disp: 30 tablet, Rfl: 3     ALPRAZolam (XANAX) 0.5 MG tablet, Take 1 tablet (0.5 mg) by mouth every 6 hours as needed for anxiety, Disp: 40 tablet, Rfl: 0     traMADol (ULTRAM) 50 MG tablet, Take 1-2 tablets ( mg) by mouth every 6 hours as needed for pain, Disp: 40 tablet, Rfl: 2     valACYclovir (VALTREX) 500 MG tablet, Take 1 tablet (500 mg) by mouth daily, Disp: 90 tablet, Rfl: 3     OnabotulinumtoxinA (BOTOX IJ), Inject 150 Units into the muscle once /C3, Disp: , Rfl:      amitriptyline (ELAVIL) 10 MG tablet, Take 1 tablet (10 mg) by mouth At Bedtime Take up to 5 tabs at bedtime prn insomnia., Disp: 100 tablet, Rfl: 3     OnabotulinumtoxinA (BOTOX IJ), Inject 145 Units as directed Lot # /C3 with Expiration Date:  01/2020 NDC #: Botox 100u ([address]-19), Disp: , Rfl:      HYDROcodone-acetaminophen (NORCO) " 5-325 MG per tablet, Take 1 tablet by mouth as needed, Disp: , Rfl:      VITAMIN D, CHOLECALCIFEROL, PO, Take 1,000 Units by mouth daily, Disp: , Rfl:      Lactobacillus (ACIDOPHILUS) 100 MG CAPS, Take 1 capsule by mouth daily, Disp: , Rfl:      OnabotulinumtoxinA (BOTOX IJ), Inject 145 Units as directed Lot # /C3 with Expiration Date:  10/2019 NDC #: Botox 100u ([address]-25), Disp: , Rfl:      OnabotulinumtoxinA (BOTOX IJ), Inject 145 Units as directed Lot # /C3 with Expiration Date:  08/2019 NDC #: Botox 100u ([address]-47), Disp: , Rfl:      SUMAtriptan (IMITREX) 50 MG tablet, Take 1 tablet (50 mg) by mouth at onset of headache for migraine May repeat dose in 2 hours.  Do not exceed 200 mg in 24 hours, Disp: 18 tablet, Rfl: 1     ibuprofen (ADVIL,MOTRIN) 600 MG tablet, Take 1 tablet (600 mg) by mouth every 6 hours as needed for moderate pain, Disp: 60 tablet, Rfl: 0     penciclovir (DENAVIR) 1 % cream, Apply topically every 2 hours (while awake), Disp: 1.5 g, Rfl: 3     ondansetron (ZOFRAN ODT) 4 MG disintegrating tablet, Take 1-2 tablets (4-8 mg) by mouth every 8 hours as needed for nausea, Disp: 20 tablet, Rfl: 3     SUMAtriptan (IMITREX) 20 MG/ACT nasal spray, Spray 1 spray (20 mg) in nostril as needed for migraine May repeat dose in 2 hours.  Do not exceed 40 mg in 24 hours, Disp: 1 Inhaler, Rfl: 1     aspirin-acetaminophen-caffeine (EXCEDRIN MIGRAINE) 250-250-65 MG per tablet, Take 1 tablet by mouth every 6 hours as needed for pain., Disp: 30 tablet, Rfl: 0     VITAMIN C 500 MG OR TABS, 1 tablet daily, Disp: , Rfl:      MULTI-VITAMIN OR TABS, 1 DAILY, Disp: , Rfl:      fluconazole (DIFLUCAN) 150 MG tablet, Take 1 po as needed for yeast infection (Patient not taking: Reported on 3/12/2018), Disp: 4 tablet, Rfl: 0     Allergies   Allergen Reactions     Darvocet [Acetaminophen] Nausea and Vomiting     Erythromycin GI Disturbance     Flexeril [Cyclobenzaprine Hcl]      Lightheadedness, dizzy           PHYSICAL EXAM:  Pt reports Migraine Headache yesterday rates 5/10.  Recently had 6 Migraine Headaches in a span of 10 days.  Medicated on only 2 of those days.       HPI:    Patient reports the following new medical problems since last visit: URI from November through December.  Resolved with abx.    We reviewed the recommended safety guidelines for  Botox from any vaccine injection, such as the seasonal flu vaccine, by a minimum of 10-14 days with Jessica Manriquez. She acknowledged understanding.    RESPONSE TO PREVIOUS TREATMENT:  Change in headache pattern following last series of injections with 150 units of  Botox on 2017.    Post-procedural headache: Rated as 'Mild' severity.  Duration: 2 days resolved the next day.    1.  Headache frequency during this injection cycle:  5 headache days per month.  This is compared to her baseline headache frequency of 24 headache days per month.     2.  Headache duration during this injection cycle:  Headache duration ranged from 3 hours to 24 hours.     3.  Headache intensity during this injection cycle:    A.  5/10  =  Typical pain level.  B.  8/10  =  Worst pain level.  C.  0/10  =  Lowest pain level.    4.  Change in headache medication usage during this injection cycle:  (For Example:  Able to decrease use of oral pain medications.) No change.  Imitrex usually takes care at onset.  At times just lying down will help.    5.  ER Visits During This Injection Cycle:  0    6.  Functional Performance:  Change in ADL's, social interaction, days lost from work, etc. Patient reports 2 days lost from class due to headache during this injection cycle      BOTULINUM NEUROTOXIN INJECTION PROCEDURES:      VERIFICATION OF PATIENT IDENTIFICATION AND PROCEDURE     Initials   Patient Name lmd   Patient  lmd   Procedure Verified by: emi     Prior to the start of the procedure and with procedural staff participation, I verbally confirmed the patient s identity using  two indicators, relevant allergies, that the procedure was appropriate and matched the consent or emergent situation, and that the correct equipment/implants were available. Immediately prior to starting the procedure I conducted the Time Out with the procedural staff and re-confirmed the patient s name, procedure, and site/side. (The Joint Commission universal protocol was followed.)  Yes    Sedation (Moderate or Deep): None    Above assessments performed by:  Norma Mccloud RN Care Coordinator    Ethan Carmichael MD      INDICATIONS FOR PROCEDURES:  Jessica Manriquez is a 47 year old patient with chronic migraine headaches associated with cervicogenic components.      Her baseline symptoms have been recalcitrant to oral medications and conservative therapy.  She is here today for reinjection with Botox.     GOAL OF PROCEDURE:  The goal of this procedure is to decrease pain  and enhance functional independence.    TOTAL DOSE ADMINISTERED:  Dose Administered:  150 units  Botox (Botulinum Toxin Type A)       2:1 Dilution   Diluent Used:  Preservative Free Normal Saline  Total Volume of Diluent Used:  3.0 ml  Lot # /C3 with Expiration Date:  10/2020  NDC #: Botox 100u ([address]-60)    Medication guide was offered to patient and was declined.    CONSENT:  The risks, benefits, and treatment options were discussed with Jessica Manriquez and she agreed to proceed.    Written consent was obtained by lmd.     EQUIPMENT USED:  Needle-25mm stimulating/recording  Needle-30 gauge  EMG/NCS Machine     SKIN PREPARATION:  Skin preparation was performed using an alcohol wipe.     GUIDANCE DESCRIPTION:  Electro-myographic guidance was necessary throughout the posterior neck and jaw portion to accurately identify all areas of spastic muscles while avoiding injection of non-spastic muscles, neighboring nerves and nearby vascular structures.      AREA/MUSCLE INJECTED:  150 Units Botox   1 & 2. SHOULDER GIRDLE & NECK MUSCLES: 25  units Botox = Total Dose, 2:1 Dilution   Right Cervical Paraspinal (superior cervical) - 10 units of Botox at 2 site/s  Left Cervical Paraspinal (superior cervical) - 10 units of Botox at 2 site/s                   Left levator (at neck) - 5 units of Botox at 1 site      3. HEAD, JAW & SCALP MUSCLES: 125 units Botox = Total Dose, 2:1 Dilution   Left masseter - 5 units of Botox at 1 site      Right Occipitalis - 15 units of Botox at 3 site/s.  Left Occipitalis - 15 units of Botox at 3 site/s.      Right Frontalis - 10 units of Botox at 2 site/s  Left Frontalis - 10 units of Botox at 2 site/s       Right Temporalis - 20 units of Botox at 3 site/s.  Left Temporalis - 30 units of Botox at 6 site/s.      Right  - 5 units of Botox at 1 site/s.  Left  - 5 units of Botox at 1 site/s.      Procerus - 10 units of Botox at 1 site/s.    RESPONSE TO PROCEDURE:  Jessica Manriquez tolerated the procedure well and there were no immediate complications.   She was allowed to recover for an appropriate period of time and was discharged home in stable condition.    FOLLOW UP:  Jessica Manriquez was asked to follow up by phone in 7-14 days with Alie Rivera PT, Care Coordinator or Norma Jara RN, Care Coordinator, to report her response to this series of injections.  Based on the patient's previous response to this therapy, Jessica Manriquez will reschedule for the next series of injections with [facility].  [facility] clinics are out of network with Pt's insurance so she will follow up with [facility] in 12 weeks.     PLAN (Medication Changes, Therapy Orders, Work or Disability Issues, etc.): Patient will continue to monitor response to today's injections.     There were 25 units of Botox as unavoidable waste for this patient.    Again, thank you for allowing me to participate in the care of your patient.      Sincerely,    Ethan Carmichael MD

## 2018-03-12 NOTE — PROGRESS NOTES
"BOTULINUM TOXIN PROCEDURE - HEADACHE - NOTE    Chief Complaint   Patient presents with     Headache     [facility]- Follow up Botox Migraine Headache       BP 99/69 (BP Location: Left arm, Patient Position: Chair, Cuff Size: Adult Regular)  Pulse 76  Ht 1.803 m (5' 11\")  Wt 64 kg (141 lb 1.6 oz)  BMI 19.68 kg/m2       Current Outpatient Prescriptions:      OnabotulinumtoxinA (BOTOX IJ), Inject 150 Units as directed once Lot# /C3, Exp 06/2020, Disp: , Rfl:      hydrOXYzine (ATARAX) 25 MG tablet, Take 1-2 tablets (25-50 mg) by mouth every 6 hours as needed for itching, Disp: 60 tablet, Rfl: 1     zolpidem (AMBIEN) 5 MG tablet, Take 1 tablet (5 mg) by mouth nightly as needed for sleep, Disp: 90 tablet, Rfl: 1     diazepam (VALIUM) 10 MG tablet, Place 1 tab vaginally for muscle spasms, Disp: 30 tablet, Rfl: 3     ALPRAZolam (XANAX) 0.5 MG tablet, Take 1 tablet (0.5 mg) by mouth every 6 hours as needed for anxiety, Disp: 40 tablet, Rfl: 0     traMADol (ULTRAM) 50 MG tablet, Take 1-2 tablets ( mg) by mouth every 6 hours as needed for pain, Disp: 40 tablet, Rfl: 2     valACYclovir (VALTREX) 500 MG tablet, Take 1 tablet (500 mg) by mouth daily, Disp: 90 tablet, Rfl: 3     OnabotulinumtoxinA (BOTOX IJ), Inject 150 Units into the muscle once /C3, Disp: , Rfl:      amitriptyline (ELAVIL) 10 MG tablet, Take 1 tablet (10 mg) by mouth At Bedtime Take up to 5 tabs at bedtime prn insomnia., Disp: 100 tablet, Rfl: 3     OnabotulinumtoxinA (BOTOX IJ), Inject 145 Units as directed Lot # /C3 with Expiration Date:  01/2020 NDC #: Botox 100u ([address]-77), Disp: , Rfl:      HYDROcodone-acetaminophen (NORCO) 5-325 MG per tablet, Take 1 tablet by mouth as needed, Disp: , Rfl:      VITAMIN D, CHOLECALCIFEROL, PO, Take 1,000 Units by mouth daily, Disp: , Rfl:      Lactobacillus (ACIDOPHILUS) 100 MG CAPS, Take 1 capsule by mouth daily, Disp: , Rfl:      OnabotulinumtoxinA (BOTOX IJ), Inject 145 Units as directed Lot # " /C3 with Expiration Date:  10/2019 NDC #: Botox 100u ([address]-73), Disp: , Rfl:      OnabotulinumtoxinA (BOTOX IJ), Inject 145 Units as directed Lot # /C3 with Expiration Date:  08/2019 NDC #: Botox 100u ([address]-82), Disp: , Rfl:      SUMAtriptan (IMITREX) 50 MG tablet, Take 1 tablet (50 mg) by mouth at onset of headache for migraine May repeat dose in 2 hours.  Do not exceed 200 mg in 24 hours, Disp: 18 tablet, Rfl: 1     ibuprofen (ADVIL,MOTRIN) 600 MG tablet, Take 1 tablet (600 mg) by mouth every 6 hours as needed for moderate pain, Disp: 60 tablet, Rfl: 0     penciclovir (DENAVIR) 1 % cream, Apply topically every 2 hours (while awake), Disp: 1.5 g, Rfl: 3     ondansetron (ZOFRAN ODT) 4 MG disintegrating tablet, Take 1-2 tablets (4-8 mg) by mouth every 8 hours as needed for nausea, Disp: 20 tablet, Rfl: 3     SUMAtriptan (IMITREX) 20 MG/ACT nasal spray, Spray 1 spray (20 mg) in nostril as needed for migraine May repeat dose in 2 hours.  Do not exceed 40 mg in 24 hours, Disp: 1 Inhaler, Rfl: 1     aspirin-acetaminophen-caffeine (EXCEDRIN MIGRAINE) 250-250-65 MG per tablet, Take 1 tablet by mouth every 6 hours as needed for pain., Disp: 30 tablet, Rfl: 0     VITAMIN C 500 MG OR TABS, 1 tablet daily, Disp: , Rfl:      MULTI-VITAMIN OR TABS, 1 DAILY, Disp: , Rfl:      fluconazole (DIFLUCAN) 150 MG tablet, Take 1 po as needed for yeast infection (Patient not taking: Reported on 3/12/2018), Disp: 4 tablet, Rfl: 0     Allergies   Allergen Reactions     Darvocet [Acetaminophen] Nausea and Vomiting     Erythromycin GI Disturbance     Flexeril [Cyclobenzaprine Hcl]      Lightheadedness, dizzy          PHYSICAL EXAM:  Pt reports Migraine Headache yesterday rates 5/10.  Recently had 6 Migraine Headaches in a span of 10 days.  Medicated on only 2 of those days.       HPI:    Patient reports the following new medical problems since last visit: URI from November through December.  Resolved with abx.    We  reviewed the recommended safety guidelines for  Botox from any vaccine injection, such as the seasonal flu vaccine, by a minimum of 10-14 days with Jessica Manriquez. She acknowledged understanding.    RESPONSE TO PREVIOUS TREATMENT:  Change in headache pattern following last series of injections with 150 units of  Botox on 2017.    Post-procedural headache: Rated as 'Mild' severity.  Duration: 2 days resolved the next day.    1.  Headache frequency during this injection cycle:  5 headache days per month.  This is compared to her baseline headache frequency of 24 headache days per month.     2.  Headache duration during this injection cycle:  Headache duration ranged from 3 hours to 24 hours.     3.  Headache intensity during this injection cycle:    A.  5/10  =  Typical pain level.  B.  8/10  =  Worst pain level.  C.  0/10  =  Lowest pain level.    4.  Change in headache medication usage during this injection cycle:  (For Example:  Able to decrease use of oral pain medications.) No change.  Imitrex usually takes care at onset.  At times just lying down will help.    5.  ER Visits During This Injection Cycle:  0    6.  Functional Performance:  Change in ADL's, social interaction, days lost from work, etc. Patient reports 2 days lost from class due to headache during this injection cycle      BOTULINUM NEUROTOXIN INJECTION PROCEDURES:      VERIFICATION OF PATIENT IDENTIFICATION AND PROCEDURE     Initials   Patient Name lmd   Patient  lmd   Procedure Verified by: emi     Prior to the start of the procedure and with procedural staff participation, I verbally confirmed the patient s identity using two indicators, relevant allergies, that the procedure was appropriate and matched the consent or emergent situation, and that the correct equipment/implants were available. Immediately prior to starting the procedure I conducted the Time Out with the procedural staff and re-confirmed the patient s name,  procedure, and site/side. (The Joint Commission universal protocol was followed.)  Yes    Sedation (Moderate or Deep): None    Above assessments performed by:  Norma Mccloud RN Care Coordinator    Ethan Carmichael MD      INDICATIONS FOR PROCEDURES:  Jessica Manriquez is a 47 year old patient with chronic migraine headaches associated with cervicogenic components.      Her baseline symptoms have been recalcitrant to oral medications and conservative therapy.  She is here today for reinjection with Botox.     GOAL OF PROCEDURE:  The goal of this procedure is to decrease pain  and enhance functional independence.    TOTAL DOSE ADMINISTERED:  Dose Administered:  150 units  Botox (Botulinum Toxin Type A)       2:1 Dilution   Diluent Used:  Preservative Free Normal Saline  Total Volume of Diluent Used:  3.0 ml  Lot # /C3 with Expiration Date:  10/2020  NDC #: Botox 100u ([address]-46)    Medication guide was offered to patient and was declined.    CONSENT:  The risks, benefits, and treatment options were discussed with Jessica Manriquez and she agreed to proceed.    Written consent was obtained by lmd.     EQUIPMENT USED:  Needle-25mm stimulating/recording  Needle-30 gauge  EMG/NCS Machine     SKIN PREPARATION:  Skin preparation was performed using an alcohol wipe.     GUIDANCE DESCRIPTION:  Electro-myographic guidance was necessary throughout the posterior neck and jaw portion to accurately identify all areas of spastic muscles while avoiding injection of non-spastic muscles, neighboring nerves and nearby vascular structures.      AREA/MUSCLE INJECTED:  150 Units Botox   1 & 2. SHOULDER GIRDLE & NECK MUSCLES: 25 units Botox = Total Dose, 2:1 Dilution   Right Cervical Paraspinal (superior cervical) - 10 units of Botox at 2 site/s  Left Cervical Paraspinal (superior cervical) - 10 units of Botox at 2 site/s                   Left levator (at neck) - 5 units of Botox at 1 site      3. HEAD, JAW & SCALP MUSCLES: 125  units Botox = Total Dose, 2:1 Dilution   Left masseter - 5 units of Botox at 1 site      Right Occipitalis - 15 units of Botox at 3 site/s.  Left Occipitalis - 15 units of Botox at 3 site/s.      Right Frontalis - 10 units of Botox at 2 site/s  Left Frontalis - 10 units of Botox at 2 site/s       Right Temporalis - 20 units of Botox at 3 site/s.  Left Temporalis - 30 units of Botox at 6 site/s.      Right  - 5 units of Botox at 1 site/s.  Left  - 5 units of Botox at 1 site/s.      Procerus - 10 units of Botox at 1 site/s.    RESPONSE TO PROCEDURE:  Jessica Manriquez tolerated the procedure well and there were no immediate complications.   She was allowed to recover for an appropriate period of time and was discharged home in stable condition.    FOLLOW UP:  Jessica Manriquez was asked to follow up by phone in 7-14 days with Alie Rivera PT, Care Coordinator or Norma Jara RN, Care Coordinator, to report her response to this series of injections.  Based on the patient's previous response to this therapy, Jessica Manriquez will reschedule for the next series of injections with [facility].  [facility] clinics are out of network with Pt's insurance so she will follow up with [facility] in 12 weeks.     PLAN (Medication Changes, Therapy Orders, Work or Disability Issues, etc.): Patient will continue to monitor response to today's injections.     There were 25 units of Botox as unavoidable waste for this patient.

## 2018-03-30 ENCOUNTER — TRANSFERRED RECORDS (OUTPATIENT)
Dept: HEALTH INFORMATION MANAGEMENT | Facility: CLINIC | Age: 48
End: 2018-03-30

## 2018-04-07 ENCOUNTER — HEALTH MAINTENANCE LETTER (OUTPATIENT)
Age: 48
End: 2018-04-07

## 2018-04-09 ENCOUNTER — APPOINTMENT (OUTPATIENT)
Dept: ULTRASOUND IMAGING | Facility: CLINIC | Age: 48
End: 2018-04-09
Attending: FAMILY MEDICINE
Payer: COMMERCIAL

## 2018-04-09 ENCOUNTER — HOSPITAL ENCOUNTER (EMERGENCY)
Facility: CLINIC | Age: 48
Discharge: HOME | End: 2018-04-09
Attending: FAMILY MEDICINE | Admitting: FAMILY MEDICINE
Payer: COMMERCIAL

## 2018-04-09 VITALS
DIASTOLIC BLOOD PRESSURE: 72 MMHG | OXYGEN SATURATION: 97 % | TEMPERATURE: 98.1 F | RESPIRATION RATE: 20 BRPM | SYSTOLIC BLOOD PRESSURE: 109 MMHG

## 2018-04-09 DIAGNOSIS — R10.13 ABDOMINAL PAIN, EPIGASTRIC: ICD-10-CM

## 2018-04-09 LAB
ALBUMIN SERPL-MCNC: 3.8 G/DL (ref 3.4–5)
ALP SERPL-CCNC: 53 U/L (ref 40–150)
ALT SERPL W P-5'-P-CCNC: 14 U/L (ref 0–50)
ANION GAP SERPL CALCULATED.3IONS-SCNC: 8 MMOL/L (ref 3–14)
AST SERPL W P-5'-P-CCNC: 19 U/L (ref 0–45)
BASOPHILS # BLD AUTO: 0 10E9/L (ref 0–0.2)
BASOPHILS NFR BLD AUTO: 0.7 %
BILIRUB SERPL-MCNC: 0.2 MG/DL (ref 0.2–1.3)
BUN SERPL-MCNC: 14 MG/DL (ref 7–30)
CALCIUM SERPL-MCNC: 8.4 MG/DL (ref 8.5–10.1)
CHLORIDE SERPL-SCNC: 105 MMOL/L (ref 94–109)
CO2 SERPL-SCNC: 24 MMOL/L (ref 20–32)
CREAT SERPL-MCNC: 0.74 MG/DL (ref 0.52–1.04)
DIFFERENTIAL METHOD BLD: NORMAL
EOSINOPHIL # BLD AUTO: 0.2 10E9/L (ref 0–0.7)
EOSINOPHIL NFR BLD AUTO: 3.1 %
ERYTHROCYTE [DISTWIDTH] IN BLOOD BY AUTOMATED COUNT: 12.6 % (ref 10–15)
GFR SERPL CREATININE-BSD FRML MDRD: 84 ML/MIN/1.7M2
GLUCOSE SERPL-MCNC: 128 MG/DL (ref 70–99)
HCG SERPL QL: NEGATIVE
HCT VFR BLD AUTO: 36.7 % (ref 35–47)
HGB BLD-MCNC: 12.2 G/DL (ref 11.7–15.7)
IMM GRANULOCYTES # BLD: 0 10E9/L (ref 0–0.4)
IMM GRANULOCYTES NFR BLD: 0.2 %
LIPASE SERPL-CCNC: 243 U/L (ref 73–393)
LYMPHOCYTES # BLD AUTO: 2.2 10E9/L (ref 0.8–5.3)
LYMPHOCYTES NFR BLD AUTO: 38.3 %
MCH RBC QN AUTO: 31 PG (ref 26.5–33)
MCHC RBC AUTO-ENTMCNC: 33.2 G/DL (ref 31.5–36.5)
MCV RBC AUTO: 93 FL (ref 78–100)
MONOCYTES # BLD AUTO: 0.5 10E9/L (ref 0–1.3)
MONOCYTES NFR BLD AUTO: 7.8 %
NEUTROPHILS # BLD AUTO: 2.9 10E9/L (ref 1.6–8.3)
NEUTROPHILS NFR BLD AUTO: 49.9 %
PLATELET # BLD AUTO: 258 10E9/L (ref 150–450)
POTASSIUM SERPL-SCNC: 3.7 MMOL/L (ref 3.4–5.3)
PROT SERPL-MCNC: 7 G/DL (ref 6.8–8.8)
RBC # BLD AUTO: 3.93 10E12/L (ref 3.8–5.2)
SODIUM SERPL-SCNC: 137 MMOL/L (ref 133–144)
TROPONIN I SERPL-MCNC: <0.015 UG/L (ref 0–0.04)
WBC # BLD AUTO: 5.8 10E9/L (ref 4–11)

## 2018-04-09 PROCEDURE — 99285 EMERGENCY DEPT VISIT HI MDM: CPT | Mod: 25 | Performed by: FAMILY MEDICINE

## 2018-04-09 PROCEDURE — 85025 COMPLETE CBC W/AUTO DIFF WBC: CPT | Performed by: FAMILY MEDICINE

## 2018-04-09 PROCEDURE — 84484 ASSAY OF TROPONIN QUANT: CPT | Performed by: FAMILY MEDICINE

## 2018-04-09 PROCEDURE — 25000128 H RX IP 250 OP 636: Performed by: FAMILY MEDICINE

## 2018-04-09 PROCEDURE — 25000132 ZZH RX MED GY IP 250 OP 250 PS 637: Performed by: FAMILY MEDICINE

## 2018-04-09 PROCEDURE — 93010 ELECTROCARDIOGRAM REPORT: CPT | Mod: Z6 | Performed by: FAMILY MEDICINE

## 2018-04-09 PROCEDURE — 83690 ASSAY OF LIPASE: CPT | Performed by: FAMILY MEDICINE

## 2018-04-09 PROCEDURE — 80053 COMPREHEN METABOLIC PANEL: CPT | Performed by: FAMILY MEDICINE

## 2018-04-09 PROCEDURE — 93005 ELECTROCARDIOGRAM TRACING: CPT | Performed by: FAMILY MEDICINE

## 2018-04-09 PROCEDURE — 96360 HYDRATION IV INFUSION INIT: CPT | Performed by: FAMILY MEDICINE

## 2018-04-09 PROCEDURE — 76705 ECHO EXAM OF ABDOMEN: CPT

## 2018-04-09 PROCEDURE — 84703 CHORIONIC GONADOTROPIN ASSAY: CPT | Performed by: FAMILY MEDICINE

## 2018-04-09 RX ORDER — SODIUM CHLORIDE 9 MG/ML
1000 INJECTION, SOLUTION INTRAVENOUS CONTINUOUS
Status: DISCONTINUED | OUTPATIENT
Start: 2018-04-09 | End: 2018-04-09 | Stop reason: HOSPADM

## 2018-04-09 RX ORDER — OXYCODONE HYDROCHLORIDE 5 MG/1
10 TABLET ORAL EVERY 4 HOURS PRN
Status: DISCONTINUED | OUTPATIENT
Start: 2018-04-09 | End: 2018-04-09 | Stop reason: HOSPADM

## 2018-04-09 RX ORDER — HYDROXYZINE HYDROCHLORIDE 25 MG/1
25-50 TABLET, FILM COATED ORAL ONCE
Status: COMPLETED | OUTPATIENT
Start: 2018-04-09 | End: 2018-04-09

## 2018-04-09 RX ADMIN — SODIUM CHLORIDE 1000 ML: 9 INJECTION, SOLUTION INTRAVENOUS at 20:06

## 2018-04-09 RX ADMIN — OXYCODONE HYDROCHLORIDE 10 MG: 5 TABLET ORAL at 20:06

## 2018-04-09 RX ADMIN — HYDROXYZINE HYDROCHLORIDE 25 MG: 25 TABLET ORAL at 20:21

## 2018-04-09 NOTE — ED AVS SNAPSHOT
[facility]    [address] [address]    Phone:  [phone]    Fax:  [phone]                                       Jessica Manriquez   MRN: [id number]    Department:  [facility]   Date of Visit:  4/9/2018           After Visit Summary Signature Page     I have received my discharge instructions, and my questions have been answered. I have discussed any challenges I see with this plan with the nurse or doctor.    ..........................................................................................................................................  Patient/Patient Representative Signature      ..........................................................................................................................................  Patient Representative Print Name and Relationship to Patient    ..................................................               ................................................  Date                                            Time    ..........................................................................................................................................  Reviewed by Signature/Title    ...................................................              ..............................................  Date                                                            Time

## 2018-04-09 NOTE — ED AVS SNAPSHOT
[facility] Emergency Department    [address] [facility] [address]    Phone:  [phone]    Fax:  [phone]                                       Jessica Manriquez   MRN: [id number]    Department:  [facility] Emergency Department   Date of Visit:  4/9/2018           Patient Information     Date Of Birth          1970        Your diagnoses for this visit were:     Abdominal pain, epigastric Take zantac 150 twice daily for 7 days. start prilosec and continue for at least 2 weeks. stay hydrated with >64 oz fluid poer day. and avoid caffeine.  avoid ibuprofen.  return for fever, bloody stool.       You were seen by Velasquez Crespo MD.      Follow-up Information     Follow up with Su Loya MD In 1 week.    Specialty:  Family Practice    Contact information:    [address]  [facility] [id number] [phone]          Follow up with [facility] Emergency Department.    Specialty:  EMERGENCY MEDICINE    Why:  As needed, If symptoms worsen    Contact information:    [address] [place] [id number] [phone]    Additional information:    The medical center is located at   [address] [place] [address] (between [address]   [address] in [place], [place] north   of [place]).        Discharge Instructions         ICD-10-CM    1. Abdominal pain, epigastric R10.13     Take zantac 150 twice daily for 7 days. start prilosec and continue for at least 2 weeks. stay hydrated with >64 oz fluid poer day. and avoid caffeine.  avoid ibuprofen.  return for fever, bloody stool.         Epigastric Pain (Uncertain Cause)     Epigastric pain can be a sign of disease in the upper abdomen. Common causes include:    Acid reflux (stomach acid flowing up into the esophagus)    Gastritis (irritation of the stomach lining)    Peptic Ulcer Disease    Inflammation of the pancreas    Gallstone    Infection in the gallbladder  Pain may be dull or burning. It may spread upward to the chest or  to the back. There may be other symptoms such as belching, bloating, cramps or hunger pains. There may be weight loss or poor appetite, nausea or vomiting.  Since the diagnosis of your pain is not certain yet, further tests may sometimes be needed. Sometimes the doctor will treat you for the most likely condition to see if there is improvement before doing further tests.  Home care  Medicines    Antacids help neutralize the normal acids in your stomach. Examples are Maalox, Mylanta, Rolaids, and Tums. If you don t like the liquid, you can also try a chewable one. You may find one works better than another for you. Overuse can cause diarrhea or constipation.    Acid blockers (H2 blockers) decrease acid production. Examples are cimetidine (Tagamet), famotidine (Pepcid) and ranitidine (Zantac).    Acid inhibitors (PPIs) decrease acid production in a different way than the blockers. You may find they work better, but can take a little longer to take effect.  Examples are omeprazole (Prilosec), lansoprazole (Prevacid), pantoprazole (Protonix), rabeprazole (Aciphex), and esomeprazole (Nexium).    Take an antacid 30-60 minutes after eating and at bedtime, but not at the same time as an acid blocker.    Try not to take NSAIDs. Aspirin may also cause problems, but if taking it for your heart or other medical reasons, talk to your doctor before stopping it; you do not want to cause a worse problem, like a heart attack or stroke.  Diet    If certain foods seem to cause your spasm, try to avoid them.     Eat slowly and chew food well before swallowing. Symptoms of gastritis can be worsened by certain foods. Limit or avoid fatty, fried, and spicy foods, as well as coffee, chocolate, mint, and foods with high acid content such as tomatoes and citrus fruit and juices (orange, grapefruit, lemon).    Avoid alcohol, caffeine, and tobacco, which can delay healing and worsen your problem.    Try eating smaller meals with snacks in  between  Follow-up care  Follow up with your healthcare provider or as advised.  When to seek medical advice  Call your healthcare provider right away if any of the following occur:    Stomach pain worsens or moves to the right lower part of the abdomen    Chest pain appears, or if it worsens or spreads to the chest, back, neck, shoulder, or arm    Frequent vomiting (can t keep down liquids)    Blood in the stool or vomit (red or black color)    Feeling weak or dizzy, fainting, or having trouble breathing    Fever of 100.4 F (38 C) or higher, or as directed by your healthcare provider    Abdominal swelling  Date Last Reviewed: 9/25/2015 2000-2017 [employer]. [address] [place] [address], [place] [address]. All rights reserved. This information is not intended as a substitute for professional medical care. Always follow your healthcare professional's instructions.          24 Hour Appointment Hotline       To make an appointment at any [facility], call [phone] ([phone]). If you don't have a family doctor or clinic, we will help you find one. [place] clinics are conveniently located to serve the needs of you and your family.             Review of your medicines      Our records show that you are taking the medicines listed below. If these are incorrect, please call your family doctor or clinic.        Dose / Directions Last dose taken    Acidophilus 100 MG Caps   Dose:  1 capsule        Take 1 capsule by mouth daily   Refills:  0        ALPRAZolam 0.5 MG tablet   Commonly known as:  XANAX   Dose:  0.5 mg   Quantity:  40 tablet        Take 1 tablet (0.5 mg) by mouth every 6 hours as needed for anxiety   Refills:  0        amitriptyline 10 MG tablet   Commonly known as:  ELAVIL   Dose:  10 mg   Quantity:  100 tablet        Take 1 tablet (10 mg) by mouth At Bedtime Take up to 5 tabs at bedtime prn insomnia.   Refills:  3        ascorbic acid 500 MG tablet   Commonly known as:  VITAMIN C         1 tablet daily   Refills:  0        aspirin-acetaminophen-caffeine 250-250-65 MG per tablet   Commonly known as:  EXCEDRIN MIGRAINE   Dose:  1 tablet   Quantity:  30 tablet        Take 1 tablet by mouth every 6 hours as needed for pain.   Refills:  0        * BOTOX IJ   Dose:  145 Units        Inject 145 Units as directed Lot # /C3 with Expiration Date:  08/2019 NDC #: Botox 100u ([address]-26)   Refills:  0        * BOTOX IJ   Dose:  145 Units        Inject 145 Units as directed Lot # /C3 with Expiration Date:  10/2019 NDC #: Botox 100u ([address]-58)   Refills:  0        * BOTOX IJ   Dose:  145 Units        Inject 145 Units as directed Lot # /C3 with Expiration Date:  01/2020 NDC #: Botox 100u ([address]-71)   Refills:  0        * BOTOX IJ   Dose:  150 Units        Inject 150 Units into the muscle once /C3   Refills:  0        * BOTOX IJ   Dose:  150 Units        Inject 150 Units as directed once Lot# /C3, Exp 06/2020   Refills:  0        * BOTOX IJ   Dose:  150 Units        Inject 150 Units into the muscle Lot # /C3  Exp: 10/2020   Refills:  0        diazepam 10 MG tablet   Commonly known as:  VALIUM   Quantity:  30 tablet        Place 1 tab vaginally for muscle spasms   Refills:  3        fluconazole 150 MG tablet   Commonly known as:  DIFLUCAN   Quantity:  4 tablet        Take 1 po as needed for yeast infection   Refills:  0        HYDROcodone-acetaminophen 5-325 MG per tablet   Commonly known as:  NORCO   Dose:  1 tablet        Take 1 tablet by mouth as needed   Refills:  0        hydrOXYzine 25 MG tablet   Commonly known as:  ATARAX   Dose:  25-50 mg   Quantity:  60 tablet        Take 1-2 tablets (25-50 mg) by mouth every 6 hours as needed for itching   Refills:  1        ibuprofen 600 MG tablet   Commonly known as:  ADVIL/MOTRIN   Dose:  600 mg   Quantity:  60 tablet        Take 1 tablet (600 mg) by mouth every 6 hours as needed for moderate pain   Refills:  0         IMODIUM A-D PO   Dose:  1 tablet        Take 1 tablet by mouth once as needed   Refills:  0        Multi-vitamin Tabs tablet   Generic drug:  multivitamin, therapeutic with minerals        1 tablet by mouth DAILY   Refills:  0        ondansetron 4 MG ODT tab   Commonly known as:  ZOFRAN ODT   Dose:  4-8 mg   Quantity:  20 tablet        Take 1-2 tablets (4-8 mg) by mouth every 8 hours as needed for nausea   Refills:  3        penciclovir 1 % cream   Commonly known as:  DENAVIR   Quantity:  1.5 g        Apply topically every 2 hours (while awake)   Refills:  3        SUMAtriptan 20 MG/ACT nasal spray   Commonly known as:  IMITREX   Dose:  1 spray   Quantity:  1 Inhaler        Spray 1 spray (20 mg) in nostril as needed for migraine May repeat dose in 2 hours.  Do not exceed 40 mg in 24 hours   Refills:  1        SUMAtriptan 50 MG tablet   Commonly known as:  IMITREX   Dose:  50 mg   Quantity:  18 tablet        Take 1 tablet (50 mg) by mouth at onset of headache for migraine May repeat dose in 2 hours.  Do not exceed 200 mg in 24 hours   Refills:  1        traMADol 50 MG tablet   Commonly known as:  ULTRAM   Dose:   mg   Quantity:  40 tablet        Take 1-2 tablets ( mg) by mouth every 6 hours as needed for pain   Refills:  2        valACYclovir 500 MG tablet   Commonly known as:  VALTREX   Dose:  500 mg   Quantity:  90 tablet        Take 1 tablet (500 mg) by mouth daily   Refills:  3        VITAMIN D (CHOLECALCIFEROL) PO   Dose:  1000 Units        Take 1,000 Units by mouth daily   Refills:  0        zolpidem 5 MG tablet   Commonly known as:  AMBIEN   Dose:  5 mg   Quantity:  90 tablet        Take 1 tablet (5 mg) by mouth nightly as needed for sleep   Refills:  1        * Notice:  This list has 6 medication(s) that are the same as other medications prescribed for you. Read the directions carefully, and ask your doctor or other care provider to review them with you.            Procedures and tests performed  during your visit     Abdomen US, limited (RUQ only)    CBC with platelets, differential    Comprehensive metabolic panel    EKG 12-lead, tracing only    HCG qualitative pregnancy (blood)    Lipase    Troponin I    UA with Microscopic      Orders Needing Specimen Collection     None      Pending Results     No orders found from 4/7/2018 to 4/10/2018.            Pending Culture Results     No orders found from 4/7/2018 to 4/10/2018.            Pending Results Instructions     If you had any lab results that were not finalized at the time of your Discharge, you can call the ED Lab Result RN at [phone]. You will be contacted by this team for any positive Lab results or changes in treatment. The nurses are available 7 days a week from 10A to 6:30P.  You can leave a message 24 hours per day and they will return your call.        Test Results From Your Hospital Stay        4/9/2018  7:39 PM      Component Results     Component Value Ref Range & Units Status    WBC 5.8 4.0 - 11.0 10e9/L Final    RBC Count 3.93 3.8 - 5.2 10e12/L Final    Hemoglobin 12.2 11.7 - 15.7 g/dL Final    Hematocrit 36.7 35.0 - 47.0 % Final    MCV 93 78 - 100 fl Final    MCH 31.0 26.5 - 33.0 pg Final    MCHC 33.2 31.5 - 36.5 g/dL Final    RDW 12.6 10.0 - 15.0 % Final    Platelet Count 258 150 - 450 10e9/L Final    Diff Method Automated Method  Final    % Neutrophils 49.9 % Final    % Lymphocytes 38.3 % Final    % Monocytes 7.8 % Final    % Eosinophils 3.1 % Final    % Basophils 0.7 % Final    % Immature Granulocytes 0.2 % Final    Absolute Neutrophil 2.9 1.6 - 8.3 10e9/L Final    Absolute Lymphocytes 2.2 0.8 - 5.3 10e9/L Final    Absolute Monocytes 0.5 0.0 - 1.3 10e9/L Final    Absolute Eosinophils 0.2 0.0 - 0.7 10e9/L Final    Absolute Basophils 0.0 0.0 - 0.2 10e9/L Final    Abs Immature Granulocytes 0.0 0 - 0.4 10e9/L Final         4/9/2018  8:00 PM      Component Results     Component Value Ref Range & Units Status    Sodium 137 133 - 144  mmol/L Final    Potassium 3.7 3.4 - 5.3 mmol/L Final    Chloride 105 94 - 109 mmol/L Final    Carbon Dioxide 24 20 - 32 mmol/L Final    Anion Gap 8 3 - 14 mmol/L Final    Glucose 128 (H) 70 - 99 mg/dL Final    Urea Nitrogen 14 7 - 30 mg/dL Final    Creatinine 0.74 0.52 - 1.04 mg/dL Final    GFR Estimate 84 >60 mL/min/1.7m2 Final    Non  GFR Calc    GFR Estimate If Black >90 >60 mL/min/1.7m2 Final    African American GFR Calc    Calcium 8.4 (L) 8.5 - 10.1 mg/dL Final    Bilirubin Total 0.2 0.2 - 1.3 mg/dL Final    Albumin 3.8 3.4 - 5.0 g/dL Final    Protein Total 7.0 6.8 - 8.8 g/dL Final    Alkaline Phosphatase 53 40 - 150 U/L Final    ALT 14 0 - 50 U/L Final    AST 19 0 - 45 U/L Final         4/9/2018  8:00 PM      Component Results     Component Value Ref Range & Units Status    Troponin I ES <0.015 0.000 - 0.045 ug/L Final    The 99th percentile for upper reference range is 0.045 ug/L.  Troponin values   in the range of 0.045 - 0.120 ug/L may be associated with risks of adverse   clinical events.           4/9/2018  8:12 PM      Component Results     Component Value Ref Range & Units Status    Lipase 243 73 - 393 U/L Final         4/9/2018  9:09 PM      Narrative     RIGHT UPPER QUADRANT ULTRASOUND 4/9/2018 8:53 PM    HISTORY:  ruq abnd pain radiating to RT shoulder and upper back, ruq  abnd pain radiating to RT shoulder and upper back;     COMPARISON: None.    FINDINGS:    Gallbladder: The gallbladder is slightly contracted. No stones are  identified. The wall was not thickened. The patient is not focally  tender.    Bile ducts:   CHD is normal diameter.  No intrahepatic biliary  dilatation.    Liver:   Normal.     Pancreas:   Normal.     Right kidney:   Normal.         Impression     IMPRESSION:  Contracted gallbladder. No gallstones are identified.    CARSON AG MD         4/9/2018  8:37 PM      Component Results     Component Value Ref Range & Units Status    HCG Qualitative Serum Negative  NEG^Negative Final    This test is for screening purposes.  Results should be interpreted along with   the clinical picture.  Confirmation testing is available if warranted by   ordering [id number], HCG Quantitative Pregnancy.                  Thank you for choosing [facility]       Thank you for choosing [facility] for your care. Our goal is always to provide you with excellent care. Hearing back from our patients is one way we can continue to improve our services. Please take a few minutes to complete the written survey that you may receive in the mail after you visit with us. Thank you!        [employer] Information     [facility] gives you secure access to your electronic health record. If you see a primary care provider, you can also send messages to your care team and make appointments. If you have questions, please call your primary care clinic.  If you do not have a primary care provider, please call [phone] and they will assist you.        Care EveryWhere ID     This is your Care EveryWhere ID. This could be used by other organizations to access your [facility] medical records  [id number]        Equal Access to Services     ISRAEL GUERRERO : Bethany Hannah, sarah landeros, rafael higuera, odessa warren . So [facility] [phone].    ATENCIÓN: Si habla español, tiene a coronado disposición servicios gratuitos de asistencia lingüística. Llame al [phone].    We comply with applicable federal civil rights laws and [place] laws. We do not discriminate on the basis of race, color, national origin, age, disability, sex, sexual orientation, or gender identity.            After Visit Summary       This is your record. Keep this with you and show to your community pharmacist(s) and doctor(s) at your next visit.

## 2018-04-10 ASSESSMENT — ENCOUNTER SYMPTOMS
VOMITING: 1
DIAPHORESIS: 1
SHORTNESS OF BREATH: 0
DIARRHEA: 0
CHILLS: 1
SORE THROAT: 0
PALPITATIONS: 0
CONSTIPATION: 0
BLOOD IN STOOL: 0
ABDOMINAL PAIN: 1
DYSURIA: 0
WHEEZING: 0
COUGH: 0
SINUS PRESSURE: 0
NAUSEA: 1
FEVER: 0
FREQUENCY: 0
HEADACHES: 0

## 2018-04-10 NOTE — ED PROVIDER NOTES
History     Chief Complaint   Patient presents with     Abdominal Pain     epigastric, up in to shoulders and in to back, sweats, since 2AM woke from sleep, nothing OTC has helped today     HPI  Jessica Manriquez is a 47 year old female who with 2 am epigastric abd pain radiating into shoulders and into the back,  pressure sensation, RT shoulder, intrascapular.  still has gall bladder  persistent symptoms since then despite antacids  sweats and chills.    worse any abd pressure  felt distended.    vegetarian  some fatty food last minute    No PUD history, no bloody stools.   worse tonight after fried rice and bread and butter.    Problem List:    Patient Active Problem List    Diagnosis Date Noted     Pelvic pain in female 08/03/2015     Priority: Medium     S/P LEEP of cervix 03/05/2014     Priority: Medium     11/2001 LEEP    1/29/14: Pap - NIL, Neg HPV. Plan cotesting in 1 year. If neg then cotest in 3 yrs and then routine screening.   2/2/15:Pap--NIL, Neg HPV. Plan Pap + HPV cotesting in 3 years. Due 2/2018         Unexplained endometrial cells on cervical Pap smear 02/07/2014     Priority: Medium     Cervix stenotic due to nulliparous and h/o LEEP--sono ordered  Consider HSC D & C with Misoprostol preprime    If normal sono, then Pap 1 year       Insomnia 09/14/2010     Priority: Medium     External hemorrhoids 05/28/2010     Priority: Medium     Migraine headache 09/23/2009     Priority: Medium     (Problem list name updated by automated process. 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     Selective IgA immunodeficiency (H)      Temporomandibular joint disorders, unspecified        Past Surgical History:    Past Surgical History:   Procedure Laterality Date      HC ENLARGE BREAST WITH IMPLANT  2000     HC REVISE BREAST RECONSTRUCTION  5/03     LEEP TX, CERVICAL  11/01    SALOMÓN 3, yearly paps until 2021     LIGATN/STRIP LONG & SHORT SAPHEN  11/04    Bilateral vein stripping     TUBAL LIGATION  10/30/09    evangelina linton       Family History:    Family History   Problem Relation Age of Onset     Arthritis Mother      Respiratory Mother      asthma     Allergies Mother      CANCER Mother      Blood Disease Mother      leukemia     Breast Cancer Mother      Breast Cancer Maternal Grandmother      age 60's     HEART DISEASE Maternal Grandmother      CEREBROVASCULAR DISEASE Paternal Grandmother      CANCER Paternal Grandfather      ?biliary     Alcohol/Drug Paternal Grandfather      Allergies Sister        Social History:  Marital Status:  Single [1]  Social History   Substance Use Topics     Smoking status: Never Smoker     Smokeless tobacco: Never Used     Alcohol use No        Medications:      Loperamide HCl (IMODIUM A-D PO)   OnabotulinumtoxinA (BOTOX IJ)   hydrOXYzine (ATARAX) 25 MG tablet   zolpidem (AMBIEN) 5 MG tablet   diazepam (VALIUM) 10 MG tablet   traMADol (ULTRAM) 50 MG tablet   valACYclovir (VALTREX) 500 MG tablet   amitriptyline (ELAVIL) 10 MG tablet   HYDROcodone-acetaminophen (NORCO) 5-325 MG per tablet   VITAMIN D, CHOLECALCIFEROL, PO   Lactobacillus (ACIDOPHILUS) 100 MG CAPS   SUMAtriptan (IMITREX) 50 MG tablet   ibuprofen (ADVIL,MOTRIN) 600 MG tablet   aspirin-acetaminophen-caffeine (EXCEDRIN MIGRAINE) 250-250-65 MG per tablet   VITAMIN C 500 MG OR TABS   MULTI-VITAMIN OR TABS   OnabotulinumtoxinA (BOTOX IJ)   ALPRAZolam (XANAX) 0.5 MG tablet   OnabotulinumtoxinA (BOTOX IJ)   OnabotulinumtoxinA (BOTOX IJ)   OnabotulinumtoxinA (BOTOX IJ)   OnabotulinumtoxinA (BOTOX IJ)   fluconazole (DIFLUCAN) 150 MG tablet   penciclovir (DENAVIR) 1 % cream   ondansetron (ZOFRAN ODT) 4 MG disintegrating tablet   SUMAtriptan (IMITREX) 20 MG/ACT nasal spray         Review of  Systems   Constitutional: Positive for chills and diaphoresis. Negative for fever.   HENT: Negative for ear pain, sinus pressure and sore throat.    Eyes: Negative for visual disturbance.   Respiratory: Negative for cough, shortness of breath and wheezing.    Cardiovascular: Negative for chest pain and palpitations.   Gastrointestinal: Positive for abdominal pain, nausea and vomiting. Negative for blood in stool, constipation and diarrhea.   Genitourinary: Negative for dysuria, frequency and urgency.   Skin: Negative for rash.   Neurological: Negative for headaches.   All other systems reviewed and are negative.      Physical Exam   BP: 111/70  Heart Rate: 85  Temp: 98.1  F (36.7  C)  Resp: 20  SpO2: 99 %      Physical Exam   Constitutional: She appears distressed.   HENT:   Mouth/Throat: Oropharynx is clear and moist.   Eyes: Conjunctivae are normal.   Neck: Neck supple.   Cardiovascular: Normal rate and regular rhythm.  Exam reveals no gallop and no friction rub.    No murmur heard.  Pulmonary/Chest: Effort normal. No respiratory distress. She has no wheezes. She has no rales.   Abdominal: Soft. Bowel sounds are normal. She exhibits no distension. There is no hepatosplenomegaly. There is tenderness in the right upper quadrant and epigastric area. There is no rebound, no guarding and no CVA tenderness.   Musculoskeletal: She exhibits no edema.   Neurological: She is alert. She exhibits normal muscle tone.   Skin: No rash noted. She is not diaphoretic.       ED Course     ED Course     Procedures                  EKG Interpretation:      Interpreted by Velasquez Crespo MD  EKG done at 1923 hrs. demonstrates a sinus rhythm at 88 bpm with a normal axis and no ST change.  No T-wave changes.  Normal R progression and no Q waves.  Normal intervals.  Normal conduction.  No ectopy.  Impression sinus rhythm 80 bpm.     No significant change from 2009.    Critical Care time:  none               Results for orders placed or  performed during the hospital encounter of 04/09/18 (from the past 24 hour(s))   CBC with platelets, differential   Result Value Ref Range    WBC 5.8 4.0 - 11.0 10e9/L    RBC Count 3.93 3.8 - 5.2 10e12/L    Hemoglobin 12.2 11.7 - 15.7 g/dL    Hematocrit 36.7 35.0 - 47.0 %    MCV 93 78 - 100 fl    MCH 31.0 26.5 - 33.0 pg    MCHC 33.2 31.5 - 36.5 g/dL    RDW 12.6 10.0 - 15.0 %    Platelet Count 258 150 - 450 10e9/L    Diff Method Automated Method     % Neutrophils 49.9 %    % Lymphocytes 38.3 %    % Monocytes 7.8 %    % Eosinophils 3.1 %    % Basophils 0.7 %    % Immature Granulocytes 0.2 %    Absolute Neutrophil 2.9 1.6 - 8.3 10e9/L    Absolute Lymphocytes 2.2 0.8 - 5.3 10e9/L    Absolute Monocytes 0.5 0.0 - 1.3 10e9/L    Absolute Eosinophils 0.2 0.0 - 0.7 10e9/L    Absolute Basophils 0.0 0.0 - 0.2 10e9/L    Abs Immature Granulocytes 0.0 0 - 0.4 10e9/L   Comprehensive metabolic panel   Result Value Ref Range    Sodium 137 133 - 144 mmol/L    Potassium 3.7 3.4 - 5.3 mmol/L    Chloride 105 94 - 109 mmol/L    Carbon Dioxide 24 20 - 32 mmol/L    Anion Gap 8 3 - 14 mmol/L    Glucose 128 (H) 70 - 99 mg/dL    Urea Nitrogen 14 7 - 30 mg/dL    Creatinine 0.74 0.52 - 1.04 mg/dL    GFR Estimate 84 >60 mL/min/1.7m2    GFR Estimate If Black >90 >60 mL/min/1.7m2    Calcium 8.4 (L) 8.5 - 10.1 mg/dL    Bilirubin Total 0.2 0.2 - 1.3 mg/dL    Albumin 3.8 3.4 - 5.0 g/dL    Protein Total 7.0 6.8 - 8.8 g/dL    Alkaline Phosphatase 53 40 - 150 U/L    ALT 14 0 - 50 U/L    AST 19 0 - 45 U/L   Troponin I   Result Value Ref Range    Troponin I ES <0.015 0.000 - 0.045 ug/L   Lipase   Result Value Ref Range    Lipase 243 73 - 393 U/L   HCG qualitative pregnancy (blood)   Result Value Ref Range    HCG Qualitative Serum Negative NEG^Negative   Abdomen US, limited (RUQ only)    Narrative    RIGHT UPPER QUADRANT ULTRASOUND 4/9/2018 8:53 PM    HISTORY:  ruq abnd pain radiating to RT shoulder and upper back, ruq  abnd pain radiating to RT shoulder  and upper back;     COMPARISON: None.    FINDINGS:    Gallbladder: The gallbladder is slightly contracted. No stones are  identified. The wall was not thickened. The patient is not focally  tender.    Bile ducts:   CHD is normal diameter.  No intrahepatic biliary  dilatation.    Liver:   Normal.     Pancreas:   Normal.     Right kidney:   Normal.       Impression    IMPRESSION:  Contracted gallbladder. No gallstones are identified.    CARSON AG MD       Medications - No data to display    Assessments & Plan (with Medical Decision Making)     MDM: Jessica Manriquez is a 47 year old female who presented with upper quadrant abdominal pain primarily epigastric radiating into right upper quadrant and radiating to the shoulder.  Associated with periodic nausea and one episode of vomiting.  Not febrile,   But did have chills and sweats last evening.  No relief with antacids at home.  Appeared to be worse after certain meals that did include some fatty food.  No blood in the stool or black tarry stools and the vomiting was nonbilious.  Her abdominal exam is relatively benign but I can reproduce some of her symptoms with palpation in the epigastrium.  The liver function tests lipase and ultrasound are without significant findings.  We discussed empiric management with f precautions are given forollow-up.  Dietary management and also for return.    I have reviewed the nursing notes.    I have reviewed the findings, diagnosis, plan and need for follow up with the patient.       New Prescriptions    No medications on file       Final diagnoses:   Abdominal pain, epigastric - Take zantac 150 twice daily for 7 days. start prilosec and continue for at least 2 weeks. stay hydrated with >64 oz fluid poer day. and avoid caffeine.  avoid ibuprofen.  return for fever, bloody stool.       4/9/2018   [facility] EMERGENCY DEPARTMENT     Velasquez Crespo MD  04/10/18 1144       Velasquez Crespo MD  04/13/18 2021

## 2018-04-10 NOTE — DISCHARGE INSTRUCTIONS
ICD-10-CM    1. Abdominal pain, epigastric R10.13     Take zantac 150 twice daily for 7 days. start prilosec and continue for at least 2 weeks. stay hydrated with >64 oz fluid poer day. and avoid caffeine.  avoid ibuprofen.  return for fever, bloody stool.         Epigastric Pain (Uncertain Cause)     Epigastric pain can be a sign of disease in the upper abdomen. Common causes include:    Acid reflux (stomach acid flowing up into the esophagus)    Gastritis (irritation of the stomach lining)    Peptic Ulcer Disease    Inflammation of the pancreas    Gallstone    Infection in the gallbladder  Pain may be dull or burning. It may spread upward to the chest or to the back. There may be other symptoms such as belching, bloating, cramps or hunger pains. There may be weight loss or poor appetite, nausea or vomiting.  Since the diagnosis of your pain is not certain yet, further tests may sometimes be needed. Sometimes the doctor will treat you for the most likely condition to see if there is improvement before doing further tests.  Home care  Medicines    Antacids help neutralize the normal acids in your stomach. Examples are Maalox, Mylanta, Rolaids, and Tums. If you don t like the liquid, you can also try a chewable one. You may find one works better than another for you. Overuse can cause diarrhea or constipation.    Acid blockers (H2 blockers) decrease acid production. Examples are cimetidine (Tagamet), famotidine (Pepcid) and ranitidine (Zantac).    Acid inhibitors (PPIs) decrease acid production in a different way than the blockers. You may find they work better, but can take a little longer to take effect.  Examples are omeprazole (Prilosec), lansoprazole (Prevacid), pantoprazole (Protonix), rabeprazole (Aciphex), and esomeprazole (Nexium).    Take an antacid 30-60 minutes after eating and at bedtime, but not at the same time as an acid blocker.    Try not to take NSAIDs. Aspirin may also cause problems, but if  taking it for your heart or other medical reasons, talk to your doctor before stopping it; you do not want to cause a worse problem, like a heart attack or stroke.  Diet    If certain foods seem to cause your spasm, try to avoid them.     Eat slowly and chew food well before swallowing. Symptoms of gastritis can be worsened by certain foods. Limit or avoid fatty, fried, and spicy foods, as well as coffee, chocolate, mint, and foods with high acid content such as tomatoes and citrus fruit and juices (orange, grapefruit, lemon).    Avoid alcohol, caffeine, and tobacco, which can delay healing and worsen your problem.    Try eating smaller meals with snacks in between  Follow-up care  Follow up with your healthcare provider or as advised.  When to seek medical advice  Call your healthcare provider right away if any of the following occur:    Stomach pain worsens or moves to the right lower part of the abdomen    Chest pain appears, or if it worsens or spreads to the chest, back, neck, shoulder, or arm    Frequent vomiting (can t keep down liquids)    Blood in the stool or vomit (red or black color)    Feeling weak or dizzy, fainting, or having trouble breathing    Fever of 100.4 F (38 C) or higher, or as directed by your healthcare provider    Abdominal swelling  Date Last Reviewed: 9/25/2015 2000-2017 [employer]. [address] [place] [address], [place] [address]. All rights reserved. This information is not intended as a substitute for professional medical care. Always follow your healthcare professional's instructions.

## 2018-05-24 ENCOUNTER — TELEPHONE (OUTPATIENT)
Dept: OBGYN | Facility: CLINIC | Age: 48
End: 2018-05-24

## 2018-05-24 DIAGNOSIS — Z12.31 VISIT FOR SCREENING MAMMOGRAM: Primary | ICD-10-CM

## 2018-05-24 NOTE — TELEPHONE ENCOUNTER
Patient called back. Answered needed questions. Order placed.   Has appointment set     Christy CARRERA RN   [facility] Clin[facility]

## 2018-05-24 NOTE — TELEPHONE ENCOUNTER
Pt had mammogram on 01/09 - The radiologist recommended an MRI for breast screening due to high risk of breast cancer; requesting this order now    Please fax order to:  [phone]    If any questions, please call [phone], option 2    Denise Behrendt  [facility] CSS

## 2018-05-24 NOTE — TELEPHONE ENCOUNTER
Call to patient to complete questionnaire for MRI screening questions.  Unable to reach.  Left message for patient to return call to clinic.  MRI questions in que in order section.    Will send to Dr. Gonsalves for order signing.    Suzie Ellis   [facility]  RN

## 2018-06-11 DIAGNOSIS — G47.00 PERSISTENT INSOMNIA: ICD-10-CM

## 2018-06-11 NOTE — TELEPHONE ENCOUNTER
zolpidem (AMBIEN) 5 MG tablet    Date Last Filled: 05/02/2018  QTY: 90    [facility]

## 2018-06-12 RX ORDER — ZOLPIDEM TARTRATE 5 MG/1
5 TABLET ORAL
Qty: 90 TABLET | Refills: 1 | Status: SHIPPED | OUTPATIENT
Start: 2018-06-12 | End: 2019-02-06

## 2018-06-18 DIAGNOSIS — G47.00 PERSISTENT INSOMNIA: ICD-10-CM

## 2018-06-18 NOTE — TELEPHONE ENCOUNTER
Duplicate request.  Confirmed with pharmacy.  Will close encounter.    Suzie Ellis   [facility]  RN

## 2018-06-22 ENCOUNTER — OFFICE VISIT (OUTPATIENT)
Dept: FAMILY MEDICINE | Facility: CLINIC | Age: 48
End: 2018-06-22
Payer: COMMERCIAL

## 2018-06-22 VITALS
BODY MASS INDEX: 20.26 KG/M2 | SYSTOLIC BLOOD PRESSURE: 103 MMHG | HEIGHT: 71 IN | WEIGHT: 144.7 LBS | HEART RATE: 99 BPM | TEMPERATURE: 98 F | DIASTOLIC BLOOD PRESSURE: 72 MMHG
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
## 2018-06-22 PROCEDURE — 99213 OFFICE O/P EST LOW 20 MIN: CPT | Performed by: NURSE PRACTITIONER

## 2018-06-22 RX ORDER — DOXYCYCLINE 100 MG/1
200 CAPSULE ORAL ONCE
Qty: 2 CAPSULE | Refills: 0 | Status: SHIPPED | OUTPATIENT
Start: 2018-06-22 | End: 2018-06-22

## 2018-06-22 NOTE — MR AVS SNAPSHOT
After Visit Summary   6/22/2018    Jessica Manriquez    MRN: [id number]           Patient Information     Date Of Birth          1970        Visit Information        Provider Department      6/22/2018 10:20 AM Darya Holm APRN CNP [facility]        Today's Diagnoses     Tick bite, initial encounter    -  1      Care Instructions    Doxy 200 mg as single dose   Monitor for bulls eye rash symptoms, if this happen will need treatment with 2 weeks of Doxycycline                        Follow-ups after your visit        Who to contact     If you have questions or need follow up information about today's clinic visit or your schedule please contact [facility] directly at [phone].  Normal or non-critical lab and imaging results will be communicated to you by [employer], letter or phone within 4 business days after the clinic has received the results. If you do not hear from us within 7 days, please contact the clinic through [employer] or phone. If you have a critical or abnormal lab result, we will notify you by phone as soon as possible.  Submit refill requests through [employer] or call your pharmacy and they will forward the refill request to us. Please allow 3 business days for your refill to be completed.          Additional Information About Your Visit        MyChart Information     [employer] gives you secure access to your electronic health record. If you see a primary care provider, you can also send messages to your care team and make appointments. If you have questions, please call your primary care clinic.  If you do not have a primary care provider, please call [phone] and they will assist you.        Care EveryWhere ID     This is your Care EveryWhere ID. This could be used by other organizations to access your [place] medical records  [id number]        Your Vitals Were     Pulse Temperature Height BMI (Body Mass Index)          99 98  F (36.7  C)  "(Tympanic) 5' 11\" (1.803 m) 20.18 kg/m2         Blood Pressure from Last 3 Encounters:   06/22/18 103/72   04/09/18 109/72   03/12/18 99/69    Weight from Last 3 Encounters:   06/22/18 144 lb 11.2 oz (65.6 kg)   03/12/18 141 lb 1.6 oz (64 kg)   11/03/17 141 lb (64 kg)              Today, you had the following     No orders found for display         Today's Medication Changes          These changes are accurate as of 6/22/18 10:41 AM.  If you have any questions, ask your nurse or doctor.               Start taking these medicines.        Dose/Directions    doxycycline 100 MG capsule   Commonly known as:  VIBRAMYCIN   Used for:  Tick bite, initial encounter   Started by:  Darya Holm APRN CNP        Dose:  200 mg   Take 2 capsules (200 mg) by mouth once for 1 dose   Quantity:  2 capsule   Refills:  0            Where to get your medicines      These medications were sent to [facility] [address] [place] [address] [address]   [address] [facility] [address]     Phone:  [phone]     doxycycline 100 MG capsule                Primary Care Provider Office Phone # Fax #    Su Loya [phone] [address] [facility] [address]        Equal Access to Services     ISRAEL GUERRERO AH: Hadii jose ku hadasho Soomaali, waaxda luqadaha, qaybta kaalmada adeegyada, odessa damian. So [facility] [phone].    ATENCIÓN: Si habla español, tiene a coronado disposición servicios gratuitos de asistencia lingüística. Marco Antonio al [phone].    We comply with applicable federal civil rights laws and [place] laws. We do not discriminate on the basis of race, color, national origin, age, disability, sex, sexual orientation, or gender identity.            Thank you!     Thank you for choosing [facility]  for your care. Our goal is always to provide you with excellent care. Hearing back from our patients is one way we can continue to improve our " services. Please take a few minutes to complete the written survey that you may receive in the mail after your visit with us. Thank you!             Your Updated Medication List - Protect others around you: Learn how to safely use, store and throw away your medicines at www.disposemymeds.org.          This list is accurate as of 6/22/18 10:41 AM.  Always use your most recent med list.                   Brand Name Dispense Instructions for use Diagnosis    Acidophilus 100 MG Caps      Take 1 capsule by mouth daily        ALPRAZolam 0.5 MG tablet    XANAX    40 tablet    Take 1 tablet (0.5 mg) by mouth every 6 hours as needed for anxiety    Anxiety state       amitriptyline 10 MG tablet    ELAVIL    100 tablet    Take 1 tablet (10 mg) by mouth At Bedtime Take up to 5 tabs at bedtime prn insomnia.    Persistent insomnia       ascorbic acid 500 MG tablet    VITAMIN C     1 tablet daily        aspirin-acetaminophen-caffeine 250-250-65 MG per tablet    EXCEDRIN MIGRAINE    30 tablet    Take 1 tablet by mouth every 6 hours as needed for pain.    Migraine headache       * BOTOX IJ      Inject 145 Units as directed Lot # /C3 with Expiration Date:  08/2019 NDC #: Botox 100u ([address]-56)        * BOTOX IJ      Inject 145 Units as directed Lot # /C3 with Expiration Date:  10/2019 NDC #: Botox 100u ([address]-84)        * BOTOX IJ      Inject 145 Units as directed Lot # /C3 with Expiration Date:  01/2020 NDC #: Botox 100u ([address]-49)        * BOTOX IJ      Inject 150 Units into the muscle once /C3        * BOTOX IJ      Inject 150 Units as directed once Lot# /C3, Exp 06/2020        * BOTOX IJ      Inject 150 Units into the muscle Lot # /C3  Exp: 10/2020        diazepam 10 MG tablet    VALIUM    30 tablet    Place 1 tab vaginally for muscle spasms    Pelvic pain in female       doxycycline 100 MG capsule    VIBRAMYCIN    2 capsule    Take 2 capsules (200 mg) by mouth once for 1 dose    Tick  bite, initial encounter       fluconazole 150 MG tablet    DIFLUCAN    4 tablet    Take 1 po as needed for yeast infection    Yeast infection of the vagina       HYDROcodone-acetaminophen 5-325 MG per tablet    NORCO     Take 1 tablet by mouth as needed        hydrOXYzine 25 MG tablet    ATARAX    60 tablet    Take 1-2 tablets (25-50 mg) by mouth every 6 hours as needed for itching    Allergic state, sequela       ibuprofen 600 MG tablet    ADVIL/MOTRIN    60 tablet    Take 1 tablet (600 mg) by mouth every 6 hours as needed for moderate pain    Viral URI with cough, Throat pain       IMODIUM A-D PO      Take 1 tablet by mouth once as needed        Multi-vitamin Tabs tablet   Generic drug:  multivitamin, therapeutic with minerals      1 tablet by mouth DAILY        ondansetron 4 MG ODT tab    ZOFRAN ODT    20 tablet    Take 1-2 tablets (4-8 mg) by mouth every 8 hours as needed for nausea    Other migraine without status migrainosus, not intractable       penciclovir 1 % cream    DENAVIR    1.5 g    Apply topically every 2 hours (while awake)    HSV-1 (herpes simplex virus 1) infection       SUMAtriptan 20 MG/ACT nasal spray    IMITREX    1 Inhaler    Spray 1 spray (20 mg) in nostril as needed for migraine May repeat dose in 2 hours.  Do not exceed 40 mg in 24 hours    Migraine headache       SUMAtriptan 50 MG tablet    IMITREX    18 tablet    Take 1 tablet (50 mg) by mouth at onset of headache for migraine May repeat dose in 2 hours.  Do not exceed 200 mg in 24 hours    Migraine without status migrainosus, not intractable, unspecified migraine type       traMADol 50 MG tablet    ULTRAM    40 tablet    Take 1-2 tablets ( mg) by mouth every 6 hours as needed for pain    Intractable menstrual migraine without status migrainosus       valACYclovir 500 MG tablet    VALTREX    90 tablet    Take 1 tablet (500 mg) by mouth daily    HSV (herpes simplex virus) infection       VITAMIN D (CHOLECALCIFEROL) PO      Take  1,000 Units by mouth daily        zolpidem 5 MG tablet    AMBIEN    90 tablet    Take 1 tablet (5 mg) by mouth nightly as needed for sleep    Persistent insomnia       * Notice:  This list has 6 medication(s) that are the same as other medications prescribed for you. Read the directions carefully, and ask your doctor or other care provider to review them with you.

## 2018-06-22 NOTE — PATIENT INSTRUCTIONS
Doxy 200 mg as single dose   Monitor for bulls eye rash symptoms, if this happen will need treatment with 2 weeks of Doxycycline

## 2018-06-22 NOTE — PROGRESS NOTES
"  SUBJECTIVE:   Jessica Manriquez is a 47 year old female who presents to clinic today for the following health issues: insect bite, possible tick bite, noted on Tuesday, did not see any tick, not sure if it was attached and for how long.   Chief Complaint   Patient presents with     Insect Bites     Got bit by something on Tuesday on the left side of her neck, small bump that is red, [place]      Problem list and histories reviewed & adjusted, as indicated.  Additional history: as documented    Labs reviewed in EPIC    Reviewed and updated as needed this visit by clinical staff  Tobacco  Allergies  Med Hx  Surg Hx  Fam Hx  Soc Hx      Reviewed and updated as needed this visit by Provider         ROS:  Constitutional, HEENT, cardiovascular, pulmonary, gi and gu systems are negative, except as otherwise noted.    OBJECTIVE:     /72  Pulse 99  Temp 98  F (36.7  C) (Tympanic)  Ht 5' 11\" (1.803 m)  Wt 144 lb 11.2 oz (65.6 kg)  BMI 20.18 kg/m2  Body mass index is 20.18 kg/(m^2).  GENERAL: healthy, alert and no distress  SKIN: small purple spot on the right side posterior neck, no inflammation, no surrounding erythema and edema     Diagnostic Test Results:  none     ASSESSMENT/PLAN:     1. Tick bite, initial encounter  -likely tick bite, unsure how long it was attached, but was within 72 hrs, recommended prophylactic treatment   -avoid sun exposure for about 48 hrs after take Doxycycline.   - doxycycline (VIBRAMYCIN) 100 MG capsule; Take 2 capsules (200 mg) by mouth once for 1 dose  Dispense: 2 capsule; Refill: 0    See Patient Instructions    [facility]  "

## 2018-07-24 DIAGNOSIS — B37.31 YEAST INFECTION OF THE VAGINA: ICD-10-CM

## 2018-07-24 NOTE — TELEPHONE ENCOUNTER
Refill request received for Diflucan.   Patient last requested refill 10/4/16. LOV 11/3/17.   Diflucan prescription discussed at visit 12/28/15    Per [facility] guidelines, unable to refill without providers approval.   Routed to provider for consideration.     Christy CARRERA RN   Specialty Clinics

## 2018-07-25 RX ORDER — FLUCONAZOLE 150 MG/1
TABLET ORAL
Qty: 4 TABLET | Refills: 0 | Status: SHIPPED | OUTPATIENT
Start: 2018-07-25 | End: 2018-12-31

## 2018-08-20 DIAGNOSIS — G43.839 INTRACTABLE MENSTRUAL MIGRAINE WITHOUT STATUS MIGRAINOSUS: ICD-10-CM

## 2018-08-20 NOTE — TELEPHONE ENCOUNTER
Last office visit 11/3/17  Patient needs new prescription for refills.  Patient using for associated diagnosis -      Intractable menstrual migraine without status migrainosus     Please review and advise.  Thank you.    Suzie Ellis   Ob/Gyn Clinic  RN

## 2018-08-22 RX ORDER — TRAMADOL HYDROCHLORIDE 50 MG/1
50-100 TABLET ORAL EVERY 6 HOURS PRN
Qty: 40 TABLET | Refills: 2 | Status: SHIPPED | OUTPATIENT
Start: 2018-08-22 | End: 2018-12-31

## 2018-08-22 NOTE — TELEPHONE ENCOUNTER
Hand signed rx for Ultram faxed to [facility].    -Bee Roman  Clinic Station

## 2018-09-06 ENCOUNTER — OFFICE VISIT (OUTPATIENT)
Dept: PHYSICAL MEDICINE AND REHAB | Facility: CLINIC | Age: 48
End: 2018-09-06
Payer: COMMERCIAL

## 2018-09-06 VITALS
SYSTOLIC BLOOD PRESSURE: 101 MMHG | TEMPERATURE: 97.8 F | OXYGEN SATURATION: 100 % | HEART RATE: 85 BPM | DIASTOLIC BLOOD PRESSURE: 68 MMHG

## 2018-09-06 DIAGNOSIS — G43.719 INTRACTABLE CHRONIC MIGRAINE WITHOUT AURA AND WITHOUT STATUS MIGRAINOSUS: Primary | ICD-10-CM

## 2018-09-06 NOTE — LETTER
9/6/2018       RE: Jessica Manriquez  [address]  [place] [address]     Dear Colleague,    Thank you for referring your patient, Jessica Manriquez, to the [facility] PHYSICAL MEDICINE AND REHABILITATION at [facility]. Please see a copy of my visit note below.    BOTULINUM TOXIN PROCEDURE - HEADACHE - NOTE    Chief Complaint   Patient presents with     New Patient     UMP NEW BOTOX       /68 (BP Location: Left arm, Patient Position: Chair, Cuff Size: Adult Regular)  Pulse 85  Temp 97.8  F (36.6  C) (Oral)  SpO2 100%  Breastfeeding? No       Current Outpatient Prescriptions:      amitriptyline (ELAVIL) 10 MG tablet, Take 1 tablet (10 mg) by mouth At Bedtime Take up to 5 tabs at bedtime prn insomnia., Disp: 100 tablet, Rfl: 3     aspirin-acetaminophen-caffeine (EXCEDRIN MIGRAINE) 250-250-65 MG per tablet, Take 1 tablet by mouth every 6 hours as needed for pain., Disp: 30 tablet, Rfl: 0     fluconazole (DIFLUCAN) 150 MG tablet, Take 1 po as needed for yeast infection, Disp: 4 tablet, Rfl: 0     HYDROcodone-acetaminophen (NORCO) 5-325 MG per tablet, Take 1 tablet by mouth as needed, Disp: , Rfl:      Lactobacillus (ACIDOPHILUS) 100 MG CAPS, Take 1 capsule by mouth daily, Disp: , Rfl:      Loperamide HCl (IMODIUM A-D PO), Take 1 tablet by mouth once as needed, Disp: , Rfl:      MULTI-VITAMIN OR TABS, 1 tablet by mouth DAILY, Disp: , Rfl:      OnabotulinumtoxinA (BOTOX IJ), Inject 150 Units into the muscle Lot # /C3  Exp: 10/2020, Disp: , Rfl:      OnabotulinumtoxinA (BOTOX IJ), Inject 150 Units as directed once Lot# /C3, Exp 06/2020, Disp: , Rfl:      OnabotulinumtoxinA (BOTOX IJ), Inject 150 Units into the muscle once /C3, Disp: , Rfl:      OnabotulinumtoxinA (BOTOX IJ), Inject 145 Units as directed Lot # /C3 with Expiration Date:  01/2020 NDC #: Botox 100u ([address]-21), Disp: , Rfl:      OnabotulinumtoxinA (BOTOX IJ), Inject 145 Units as  directed Lot # /C3 with Expiration Date:  10/2019 NDC #: Botox 100u ([address]-17), Disp: , Rfl:      OnabotulinumtoxinA (BOTOX IJ), Inject 145 Units as directed Lot # /C3 with Expiration Date:  08/2019 NDC #: Botox 100u ([address]-43), Disp: , Rfl:      traMADol (ULTRAM) 50 MG tablet, Take 1-2 tablets ( mg) by mouth every 6 hours as needed for pain, Disp: 40 tablet, Rfl: 2     VITAMIN C 500 MG OR TABS, 1 tablet daily, Disp: , Rfl:      VITAMIN D, CHOLECALCIFEROL, PO, Take 1,000 Units by mouth daily, Disp: , Rfl:      zolpidem (AMBIEN) 5 MG tablet, Take 1 tablet (5 mg) by mouth nightly as needed for sleep, Disp: 90 tablet, Rfl: 1     ALPRAZolam (XANAX) 0.5 MG tablet, Take 1 tablet (0.5 mg) by mouth every 6 hours as needed for anxiety (Patient not taking: Reported on 6/22/2018), Disp: 40 tablet, Rfl: 0     diazepam (VALIUM) 10 MG tablet, Place 1 tab vaginally for muscle spasms (Patient not taking: Reported on 6/22/2018), Disp: 30 tablet, Rfl: 3     hydrOXYzine (ATARAX) 25 MG tablet, Take 1-2 tablets (25-50 mg) by mouth every 6 hours as needed for itching (Patient not taking: Reported on 6/22/2018), Disp: 60 tablet, Rfl: 1     ibuprofen (ADVIL,MOTRIN) 600 MG tablet, Take 1 tablet (600 mg) by mouth every 6 hours as needed for moderate pain (Patient not taking: Reported on 6/22/2018), Disp: 60 tablet, Rfl: 0     ondansetron (ZOFRAN ODT) 4 MG disintegrating tablet, Take 1-2 tablets (4-8 mg) by mouth every 8 hours as needed for nausea (Patient not taking: Reported on 6/22/2018), Disp: 20 tablet, Rfl: 3     penciclovir (DENAVIR) 1 % cream, Apply topically every 2 hours (while awake) (Patient not taking: Reported on 6/22/2018), Disp: 1.5 g, Rfl: 3     SUMAtriptan (IMITREX) 20 MG/ACT nasal spray, Spray 1 spray (20 mg) in nostril as needed for migraine May repeat dose in 2 hours.  Do not exceed 40 mg in 24 hours (Patient not taking: Reported on 6/22/2018), Disp: 1 Inhaler, Rfl: 1     SUMAtriptan (IMITREX) 50  MG tablet, Take 1 tablet (50 mg) by mouth at onset of headache for migraine May repeat dose in 2 hours.  Do not exceed 200 mg in 24 hours (Patient not taking: Reported on 6/22/2018), Disp: 18 tablet, Rfl: 1     valACYclovir (VALTREX) 500 MG tablet, Take 1 tablet (500 mg) by mouth daily (Patient not taking: Reported on 6/22/2018), Disp: 90 tablet, Rfl: 3     Allergies   Allergen Reactions     Darvocet [Acetaminophen] Nausea and Vomiting     Erythromycin GI Disturbance     Flexeril [Cyclobenzaprine Hcl]      Lightheadedness, dizzy          PHYSICAL EXAM:    Denies headache today.  Last Migraine Headache 6/20, rated 6/10, took Imitrex and Excedrin Migraine and eventually took Vicodin.    HPI:    Patient denies new medical diagnoses, illnesses, hospitalizations, emergency room visits, and injuries since the previous injection with botulinum neurotoxin.    We reviewed the recommended safety guidelines for  Botox from any vaccine injection, such as the seasonal flu vaccine, by a minimum of 10-14 days with Jessica Manriquez. She acknowledged understanding.    Describes the headaches start globally or in the back of the neck. If possible can deviate to the left.   Light is least problematic, noises and smells are triggering factors.       RESPONSE TO PREVIOUS TREATMENT:  Change in headache pattern following last series of injections with 140 units of  Botox on 6/13/2018 in [place] [facility]   She was originally a patient of Dr Jacobson and moved to Dr Carmichael and due to insurance had to move to the Neurology clinic in [place].     No problems reported    1.  Headache frequency during this injection cycle:  she had 12 headaches in the month of August. -6 headache  This is compared to her baseline headache frequency of 24 headache days per month.     2.  Headache duration during this injection cycle:  Headache duration ranged from 5  hours to 48 hours.     3.  Headache intensity during this injection  cycle:    A.  4/10  =  Typical pain level.  B.  8/10  =  Worst pain level.  C.  0/10  =  Lowest pain level.    4.  Change in headache medication usage during this injection cycle:  (For Example:  Able to decrease use of oral pain medications.) No change in use of medication.  Tries to use least amount of Imitrex as possible.  Tramadol and Vicodin are other options.     5.  ER Visits During This Injection Cycle:  0    6.  Functional Performance:  Change in ADL's, social interaction, days lost from work, etc. In the last month, she has had to cancel her activities due to the headaches.     BOTULINUM NEUROTOXIN INJECTION PROCEDURES:      VERIFICATION OF PATIENT IDENTIFICATION AND PROCEDURE     Initials   Patient Name pag   Patient  pag   Procedure Verified by: pag     Prior to the start of the procedure and with procedural staff participation, I verbally confirmed the patient s identity using two indicators, relevant allergies, that the procedure was appropriate and matched the consent or emergent situation, and that the correct equipment/implants were available. Immediately prior to starting the procedure I conducted the Time Out with the procedural staff and re-confirmed the patient s name, procedure, and site/side. (The Joint Commission universal protocol was followed.)  Yes    Sedation (Moderate or Deep): None    Above assessments performed by:  Poonam Shoemaker RN Care Coordinator  Flori Trujillo MD, A       INDICATIONS FOR PROCEDURES:  Jessica Manriquez is a 46 year old patient with chronic migraine headaches associated with cervicogenic  components.     Her baseline symptoms have been recalcitrant to oral medications and conservative therapy.  She is here today for reinjection with Botox.    GOAL OF PROCEDURE:  The goal of this procedure is to decrease pain  and enhance functional independence.    TOTAL DOSE ADMINISTERED:  Total dose 200 units   Dose Administered:  175 units  Botox (Botulinum Toxin Type A)        2:1 Dilution (55 units unavoidable waste)  Unavoidable waste 25 units   Diluent Used:  Preservative Free Normal Saline  Total Volume of Diluent Used:  2.9 ml  Lot # /C3 with Expiration Date:  03/2021  NDC #: Botox 100u ([address]-76)    Medication guide was offered to patient and was declined.    CONSENT:  The risks, benefits, and treatment options were discussed with Jessica Manriquez and she agreed to proceed.    Written consent was obtained by [facility].     EQUIPMENT USED:  Needle-25mm stimulating/recording  Needle-30 gauge  EMG/NCS Machine    SKIN PREPARATION:  Skin preparation was performed using an alcohol wipe.    GUIDANCE DESCRIPTION:  Electro-myographic guidance was necessary throughout the posterior neck and jaw portion to accurately identify all areas of spastic muscles while avoiding injection of non-spastic muscles, neighboring nerves and nearby vascular structures.     AREA/MUSCLE INJECTED:      1 & 2. SHOULDER GIRDLE & NECK MUSCLES: 55 units Botox = Total Dose, 2:1 Dilution        Right Upper Trapezius 15 units of Botox at 3 sites    Left Upper Trapezius 15 units of Botox at 3 sites     Right Cervical Paraspinal (superior cervical) - 10 units of Botox at 2 site/s  Left Cervical Paraspinal (superior cervical) - 10 units of Botox at 2 site/s                  Left levator (at neck) - 5 units of Botox at 1 site        3. HEAD, JAW & SCALP MUSCLES: 120 units Botox = Total Dose, 2:1 Dilution     Left masseter - 5 units of Botox at 1 site     Right Occipitalis - 15 units of Botox at 3 site/s.  Left Occipitalis - 15 units of Botox at 3 site/s.     Right Frontalis - 10 units of Botox at 2 site/s  Left Frontalis - 10 units of Botox at 2 site/s      Right Temporalis - 15 units of Botox at 3 site/s.  Left Temporalis - 30 units of Botox at 6 site/s.     Right  - 5 units of Botox at 1 site/s.  Left  - 5 units of Botox at 1 site/s.     Procerus - 10 units of Botox at 1 site/s.      RESPONSE TO  PROCEDURE:  Jessica Manriquez tolerated the procedure well and there were no immediate complications.   She was allowed to recover for an appropriate period of time and was discharged home in stable condition.    FOLLOW UP:  Jessica Manriquez was asked to follow up by phone in 7-14 days with Poonam Shoemaker RN, Care Coordinator, to report her response to this series of injections.  Based on the patient's previous response to this therapy, Jessica Manriquez was rescheduled for the next series of injections in 12 weeks.    PLAN (Medication Changes, Therapy Orders, Work or Disability Issues, etc.): Patient will continue to monitor response to today's injections.    Again, thank you for allowing me to participate in the care of your patient.      Sincerely,    Flori Trujillo MD

## 2018-09-06 NOTE — MR AVS SNAPSHOT
After Visit Summary   9/6/2018    Jessica Manriquez    MRN: [id number]           Patient Information     Date Of Birth          1970        Visit Information        Provider Department      9/6/2018 12:20 PM Flori Trujillo MD [facility] Physical Medicine and Rehabilitation        Today's Diagnoses     Intractable chronic migraine without aura and without status migrainosus    -  1       Follow-ups after your visit        Follow-up notes from your care team     Return in about 12 weeks (around 11/29/2018).      Your next 10 appointments already scheduled     Dec 06, 2018 11:00 AM CST   (Arrive by 10:45 AM)   RETURN MIGRAINE BOTOX with Flori Trujillo MD   [facility] Physical Medicine and Rehabilitation ([facility])    [address] [address] [phone]              Who to contact     Please call your clinic at [phone] to:    Ask questions about your health    Make or cancel appointments    Discuss your medicines    Learn about your test results    Speak to your doctor            Additional Information About Your Visit        MyChart Information     [employer] gives you secure access to your electronic health record. If you see a primary care provider, you can also send messages to your care team and make appointments. If you have questions, please call your primary care clinic.  If you do not have a primary care provider, please call [phone] and they will assist you.      [employer] is an electronic gateway that provides easy, online access to your medical records. With [employer], you can request a clinic appointment, read your test results, renew a prescription or communicate with your care team.     To access your existing account, please contact your [facility] or call [phone] for assistance.        Care EveryWhere ID     This is your Care EveryWhere ID. This could be used by other  organizations to access your [facility] medical records  [id number]        Your Vitals Were     Pulse Temperature Pulse Oximetry Breastfeeding?          85 97.8  F (36.6  C) (Oral) 100% No         Blood Pressure from Last 3 Encounters:   09/06/18 101/68   06/22/18 103/72   04/09/18 109/72    Weight from Last 3 Encounters:   06/22/18 144 lb 11.2 oz (65.6 kg)   03/12/18 141 lb 1.6 oz (64 kg)   11/03/17 141 lb (64 kg)              We Performed the Following     HC CHEMODENERVATE FACIAL,TRIGERM,NERV MIGRAINE     NEEDLE EMG GUIDE W CHEMODENERVATION        Primary Care Provider Office Phone # Fax #    Su Loya [phone] [address] [facility] [address]        Equal Access to Services     [facility] : Hadii jose rice hadasho Soomaali, waaxda luqadaha, qaybta kaalmada adeemeliyaashley, odessa warren . So [facility] [phone].    ATENCIÓN: Si habla español, tiene a coronado disposición servicios gratuitos de asistencia lingüística. [facility] [phone].    We comply with applicable federal civil rights laws and [place] laws. We do not discriminate on the basis of race, color, national origin, age, disability, sex, sexual orientation, or gender identity.            Thank you!     Thank you for choosing [facility] PHYSICAL MEDICINE AND REHABILITATION  for your care. Our goal is always to provide you with excellent care. Hearing back from our patients is one way we can continue to improve our services. Please take a few minutes to complete the written survey that you may receive in the mail after your visit with us. Thank you!             Your Updated Medication List - Protect others around you: Learn how to safely use, store and throw away your medicines at www.disposemymeds.org.          This list is accurate as of 9/6/18  1:07 PM.  Always use your most recent med list.                   Brand Name Dispense Instructions for use Diagnosis    Acidophilus 100 MG Caps      Take 1  capsule by mouth daily        ALPRAZolam 0.5 MG tablet    XANAX    40 tablet    Take 1 tablet (0.5 mg) by mouth every 6 hours as needed for anxiety    Anxiety state       amitriptyline 10 MG tablet    ELAVIL    100 tablet    Take 1 tablet (10 mg) by mouth At Bedtime Take up to 5 tabs at bedtime prn insomnia.    Persistent insomnia       ascorbic acid 500 MG tablet    VITAMIN C     1 tablet daily        aspirin-acetaminophen-caffeine 250-250-65 MG per tablet    EXCEDRIN MIGRAINE    30 tablet    Take 1 tablet by mouth every 6 hours as needed for pain.    Migraine headache       * BOTOX IJ      Inject 145 Units as directed Lot # /C3 with Expiration Date:  08/2019 NDC #: Botox 100u ([address]-56)        * BOTOX IJ      Inject 145 Units as directed Lot # /C3 with Expiration Date:  10/2019 NDC #: Botox 100u ([address]-12)        * BOTOX IJ      Inject 145 Units as directed Lot # /C3 with Expiration Date:  01/2020 NDC #: Botox 100u ([address]-24)        * BOTOX IJ      Inject 200 Units as directed Total dose 200 units  Dose Administered:  175 units  Botox (Botulinum Toxin Type A)       2:1 Dilution (55 units unavoidable waste) Unavoidable waste 25 units  Diluent Used:  Preservative Free Normal Saline Total Volume of Diluent Used:  2.9 ml Lot # /C3 with Expiration Date:  03/2021 NDC #: Botox 100u ([address]-96)        * BOTOX IJ      Inject 150 Units into the muscle once /C3        * BOTOX IJ      Inject 150 Units as directed once Lot# /C3, Exp 06/2020        * BOTOX IJ      Inject 150 Units into the muscle Lot # /C3  Exp: 10/2020        diazepam 10 MG tablet    VALIUM    30 tablet    Place 1 tab vaginally for muscle spasms    Pelvic pain in female       fluconazole 150 MG tablet    DIFLUCAN    4 tablet    Take 1 po as needed for yeast infection    Yeast infection of the vagina       HYDROcodone-acetaminophen 5-325 MG per tablet    NORCO     Take 1 tablet by mouth as needed         hydrOXYzine 25 MG tablet    ATARAX    60 tablet    Take 1-2 tablets (25-50 mg) by mouth every 6 hours as needed for itching    Allergic state, sequela       ibuprofen 600 MG tablet    ADVIL/MOTRIN    60 tablet    Take 1 tablet (600 mg) by mouth every 6 hours as needed for moderate pain    Viral URI with cough, Throat pain       IMODIUM A-D PO      Take 1 tablet by mouth once as needed        Multi-vitamin Tabs tablet   Generic drug:  multivitamin, therapeutic with minerals      1 tablet by mouth DAILY        ondansetron 4 MG ODT tab    ZOFRAN ODT    20 tablet    Take 1-2 tablets (4-8 mg) by mouth every 8 hours as needed for nausea    Other migraine without status migrainosus, not intractable       penciclovir 1 % cream    DENAVIR    1.5 g    Apply topically every 2 hours (while awake)    HSV-1 (herpes simplex virus 1) infection       SUMAtriptan 20 MG/ACT nasal spray    IMITREX    1 Inhaler    Spray 1 spray (20 mg) in nostril as needed for migraine May repeat dose in 2 hours.  Do not exceed 40 mg in 24 hours    Migraine headache       SUMAtriptan 50 MG tablet    IMITREX    18 tablet    Take 1 tablet (50 mg) by mouth at onset of headache for migraine May repeat dose in 2 hours.  Do not exceed 200 mg in 24 hours    Migraine without status migrainosus, not intractable, unspecified migraine type       traMADol 50 MG tablet    ULTRAM    40 tablet    Take 1-2 tablets ( mg) by mouth every 6 hours as needed for pain    Intractable menstrual migraine without status migrainosus       valACYclovir 500 MG tablet    VALTREX    90 tablet    Take 1 tablet (500 mg) by mouth daily    HSV (herpes simplex virus) infection       VITAMIN D (CHOLECALCIFEROL) PO      Take 1,000 Units by mouth daily        zolpidem 5 MG tablet    AMBIEN    90 tablet    Take 1 tablet (5 mg) by mouth nightly as needed for sleep    Persistent insomnia       * Notice:  This list has 7 medication(s) that are the same as other medications prescribed for  you. Read the directions carefully, and ask your doctor or other care provider to review them with you.

## 2018-10-12 DIAGNOSIS — B00.9 HSV (HERPES SIMPLEX VIRUS) INFECTION: ICD-10-CM

## 2018-10-12 RX ORDER — VALACYCLOVIR HYDROCHLORIDE 500 MG/1
500 TABLET, FILM COATED ORAL DAILY
Qty: 90 TABLET | Refills: 0 | Status: SHIPPED | OUTPATIENT
Start: 2018-10-12 | End: 2020-01-02

## 2018-10-12 NOTE — TELEPHONE ENCOUNTER
11/3/17 last office visit with Dr. Gonsalves   Future appointment currently not scheduled.  Snapshot reviewed.    Refill request from [facility] in [place] received for Valtrex 500 mg 1 tablet po every day. Medication reconciliation shows patient is not taking and reported as stopped on 6/22/18.    Patient calling back now, states she is taking Valtrex daily. Patient aware of annual visit being due in November, will call for an appointment when she gets home.    Rosi refill given for 90 days to allow patient to schedule appointment and be seen.    Suzie Ellis   Ob/Gyn Clinic  RN

## 2018-11-01 ENCOUNTER — MYC MEDICAL ADVICE (OUTPATIENT)
Dept: OBGYN | Facility: CLINIC | Age: 48
End: 2018-11-01

## 2018-11-01 DIAGNOSIS — G47.00 PERSISTENT INSOMNIA: ICD-10-CM

## 2018-11-01 RX ORDER — AMITRIPTYLINE HYDROCHLORIDE 10 MG/1
10 TABLET ORAL AT BEDTIME
Qty: 100 TABLET | Refills: 3 | Status: SHIPPED | OUTPATIENT
Start: 2018-11-01 | End: 2020-01-02

## 2018-11-01 NOTE — TELEPHONE ENCOUNTER
Prescription refill request received by fax from [facility] in [place]. Dr. Gonsalves addressed through patient [employer] message. Refills generated.    Suzie Ellis   Ob/Gyn Clinic  RN

## 2018-11-21 ENCOUNTER — TRANSFERRED RECORDS (OUTPATIENT)
Dept: HEALTH INFORMATION MANAGEMENT | Facility: CLINIC | Age: 48
End: 2018-11-21

## 2018-11-28 ENCOUNTER — TELEPHONE (OUTPATIENT)
Dept: OBGYN | Facility: CLINIC | Age: 48
End: 2018-11-28

## 2018-12-06 ENCOUNTER — OFFICE VISIT (OUTPATIENT)
Dept: PHYSICAL MEDICINE AND REHAB | Facility: CLINIC | Age: 48
End: 2018-12-06
Payer: COMMERCIAL

## 2018-12-06 VITALS
HEIGHT: 71 IN | BODY MASS INDEX: 20.18 KG/M2 | DIASTOLIC BLOOD PRESSURE: 74 MMHG | SYSTOLIC BLOOD PRESSURE: 111 MMHG | RESPIRATION RATE: 18 BRPM | HEART RATE: 98 BPM | TEMPERATURE: 97.9 F | OXYGEN SATURATION: 99 %

## 2018-12-06 DIAGNOSIS — G43.719 INTRACTABLE CHRONIC MIGRAINE WITHOUT AURA AND WITHOUT STATUS MIGRAINOSUS: Primary | ICD-10-CM

## 2018-12-06 ASSESSMENT — PAIN SCALES - GENERAL: PAINLEVEL: NO PAIN (0)

## 2018-12-06 NOTE — LETTER
"12/6/2018       RE: Jessica Manriquez  [address]  [facility] [address]     Dear Colleague,    Thank you for referring your patient, Jessica Manriquez, to the [facility] PHYSICAL MEDICINE AND REHABILITATION at [facility]. Please see a copy of my visit note below.    BOTULINUM TOXIN PROCEDURE - HEADACHE - NOTE    Chief Complaint   Patient presents with     RECHECK     ump return patient visit for follow up/botox related to chronic migraines        /74  Pulse 98  Temp 97.9  F (36.6  C) (Oral)  Resp 18  Ht 5' 11\" (1.803 m)  SpO2 99%  Breastfeeding? No  BMI 20.18 kg/m2       Current Outpatient Prescriptions:      amitriptyline (ELAVIL) 10 MG tablet, Take 1 tablet (10 mg) by mouth At Bedtime Take up to 5 tabs at bedtime prn insomnia., Disp: 100 tablet, Rfl: 3     aspirin-acetaminophen-caffeine (EXCEDRIN MIGRAINE) 250-250-65 MG per tablet, Take 1 tablet by mouth every 6 hours as needed for pain., Disp: 30 tablet, Rfl: 0     fluconazole (DIFLUCAN) 150 MG tablet, Take 1 po as needed for yeast infection, Disp: 4 tablet, Rfl: 0     HYDROcodone-acetaminophen (NORCO) 5-325 MG per tablet, Take 1 tablet by mouth as needed, Disp: , Rfl:      Lactobacillus (ACIDOPHILUS) 100 MG CAPS, Take 1 capsule by mouth daily, Disp: , Rfl:      Loperamide HCl (IMODIUM A-D PO), Take 1 tablet by mouth once as needed, Disp: , Rfl:      MULTI-VITAMIN OR TABS, 1 tablet by mouth DAILY, Disp: , Rfl:      OnabotulinumtoxinA (BOTOX IJ), Inject 200 Units as directed Total dose 200 units  Dose Administered:  175 units  Botox (Botulinum Toxin Type A)       2:1 Dilution (55 units unavoidable waste) Unavoidable waste 25 units  Diluent Used:  Preservative Free Normal Saline Total Volume of Diluent Used:  2.9 ml Lot # /C3 with Expiration Date:  03/2021 NDC #: Botox 100u ([address]-04), Disp: , Rfl:      OnabotulinumtoxinA (BOTOX IJ), Inject 150 Units into the muscle Lot # /C3  Exp: 10/2020, " Disp: , Rfl:      OnabotulinumtoxinA (BOTOX IJ), Inject 150 Units as directed once Lot# /C3, Exp 06/2020, Disp: , Rfl:      OnabotulinumtoxinA (BOTOX IJ), Inject 150 Units into the muscle once /C3, Disp: , Rfl:      OnabotulinumtoxinA (BOTOX IJ), Inject 145 Units as directed Lot # /C3 with Expiration Date:  01/2020 NDC #: Botox 100u ([address]-52), Disp: , Rfl:      OnabotulinumtoxinA (BOTOX IJ), Inject 145 Units as directed Lot # /C3 with Expiration Date:  10/2019 NDC #: Botox 100u ([address]-98), Disp: , Rfl:      OnabotulinumtoxinA (BOTOX IJ), Inject 145 Units as directed Lot # /C3 with Expiration Date:  08/2019 NDC #: Botox 100u ([address]-81), Disp: , Rfl:      traMADol (ULTRAM) 50 MG tablet, Take 1-2 tablets ( mg) by mouth every 6 hours as needed for pain, Disp: 40 tablet, Rfl: 2     valACYclovir (VALTREX) 500 MG tablet, Take 1 tablet (500 mg) by mouth daily, Disp: 90 tablet, Rfl: 0     VITAMIN C 500 MG OR TABS, 1 tablet daily, Disp: , Rfl:      VITAMIN D, CHOLECALCIFEROL, PO, Take 1,000 Units by mouth daily, Disp: , Rfl:      zolpidem (AMBIEN) 5 MG tablet, Take 1 tablet (5 mg) by mouth nightly as needed for sleep, Disp: 90 tablet, Rfl: 1     ALPRAZolam (XANAX) 0.5 MG tablet, Take 1 tablet (0.5 mg) by mouth every 6 hours as needed for anxiety (Patient not taking: Reported on 6/22/2018), Disp: 40 tablet, Rfl: 0     diazepam (VALIUM) 10 MG tablet, Place 1 tab vaginally for muscle spasms (Patient not taking: Reported on 6/22/2018), Disp: 30 tablet, Rfl: 3     hydrOXYzine (ATARAX) 25 MG tablet, Take 1-2 tablets (25-50 mg) by mouth every 6 hours as needed for itching (Patient not taking: Reported on 6/22/2018), Disp: 60 tablet, Rfl: 1     ibuprofen (ADVIL,MOTRIN) 600 MG tablet, Take 1 tablet (600 mg) by mouth every 6 hours as needed for moderate pain (Patient not taking: Reported on 6/22/2018), Disp: 60 tablet, Rfl: 0     ondansetron (ZOFRAN ODT) 4 MG disintegrating tablet, Take  1-2 tablets (4-8 mg) by mouth every 8 hours as needed for nausea (Patient not taking: Reported on 6/22/2018), Disp: 20 tablet, Rfl: 3     penciclovir (DENAVIR) 1 % cream, Apply topically every 2 hours (while awake) (Patient not taking: Reported on 6/22/2018), Disp: 1.5 g, Rfl: 3     SUMAtriptan (IMITREX) 20 MG/ACT nasal spray, Spray 1 spray (20 mg) in nostril as needed for migraine May repeat dose in 2 hours.  Do not exceed 40 mg in 24 hours (Patient not taking: Reported on 6/22/2018), Disp: 1 Inhaler, Rfl: 1     SUMAtriptan (IMITREX) 50 MG tablet, Take 1 tablet (50 mg) by mouth at onset of headache for migraine May repeat dose in 2 hours.  Do not exceed 200 mg in 24 hours (Patient not taking: Reported on 6/22/2018), Disp: 18 tablet, Rfl: 1     Allergies   Allergen Reactions     Darvocet [Acetaminophen] Nausea and Vomiting     Erythromycin GI Disturbance     Flexeril [Cyclobenzaprine Hcl]      Lightheadedness, dizzy          PHYSICAL EXAM:    Denies headache today.      HPI:    Patient denies new medical diagnoses, illnesses, hospitalizations, emergency room visits, and injuries since the previous injection with botulinum neurotoxin.    We reviewed the recommended safety guidelines for  Botox from any vaccine injection, such as the seasonal flu vaccine, by a minimum of 10-14 days with Jessica Manriquez. She acknowledged understanding.    Describes the headaches start globally or in the back of the neck. If possible can deviate to the left.   Light is least problematic, noises and smells are triggering factors.       RESPONSE TO PREVIOUS TREATMENT:  Change in headache pattern following last series of injections with 140 units of  Botox on 9/06/2018 in [place] [facility]   She was originally a patient of Dr Jacobson and moved to Dr Carmichael and due to insurance had to move to the Neurology clinic in [place].     No problems reported    1.  Headache frequency during this injection cycle:  she had  12 headaches in the last cycle. The first month, she had 3 headaches, and 6 in the middle month, and 3 in the last month.    This is compared to her baseline headache frequency of 24 headache days per month.     2.  Headache duration during this injection cycle:  Headache duration ranged from 5  hours to 48 hours.     3.  Headache intensity during this injection cycle:    A.  2/10  =  Typical pain level.  B.  8/10  =  Worst pain level.  C.  0/10  =  Lowest pain level.    4.  Change in headache medication usage during this injection cycle:  (For Example:  Able to decrease use of oral pain medications.) No change in use of medication.  Tries to use least amount of Imitrex as possible.  Tramadol and Vicodin are other options.     5.  ER Visits During This Injection Cycle:  0      6.  Functional Performance:  Change in ADL's, social interaction, days lost from work, etc. In the last month, she has had to cancel her activities due to the headaches.     BOTULINUM NEUROTOXIN INJECTION PROCEDURES:      VERIFICATION OF PATIENT IDENTIFICATION AND PROCEDURE     Initials   Patient Name fi   Patient  fi   Procedure Verified by: leilani     Prior to the start of the procedure and with procedural staff participation, I verbally confirmed the patient s identity using two indicators, relevant allergies, that the procedure was appropriate and matched the consent or emergent situation, and that the correct equipment/implants were available. Immediately prior to starting the procedure I conducted the Time Out with the procedural staff and re-confirmed the patient s name, procedure, and site/side. (The Joint Commission universal protocol was followed.)  Yes    Sedation (Moderate or Deep): None    Above assessments performed by:  Poonam Shoemaker RN Care Coordinator  Flori Trujillo MD, A       INDICATIONS FOR PROCEDURES:  Jessica Manriquez is a 48 year old patient with chronic migraine headaches associated with cervicogenic  components.      Her baseline symptoms have been recalcitrant to oral medications and conservative therapy.  She is here today for reinjection with Botox.    GOAL OF PROCEDURE:  The goal of this procedure is to decrease pain  and enhance functional independence.    TOTAL DOSE ADMINISTERED:  Total dose 200 units   Dose Administered:  175 units  Botox (Botulinum Toxin Type A)       2:1 Dilution (55 units unavoidable waste)  Unavoidable waste 25 units   Diluent Used:  Preservative Free Normal Saline  Total Volume of Diluent Used:  4 ml  Lot # /C3 with Expiration Date: 06/2021  NDC #: Botox 100u ([address]-50)    Medication guide was offered to patient and was declined.    CONSENT:  The risks, benefits, and treatment options were discussed with Jessica Manriquez and she agreed to proceed.    Written consent was obtained by [facility].     EQUIPMENT USED:  Needle-25mm stimulating/recording  Needle-30 gauge  EMG/NCS Machine    SKIN PREPARATION:  Skin preparation was performed using an alcohol wipe.    GUIDANCE DESCRIPTION:  Electro-myographic guidance was necessary throughout the posterior neck and jaw portion to accurately identify all areas of spastic muscles while avoiding injection of non-spastic muscles, neighboring nerves and nearby vascular structures.     AREA/MUSCLE INJECTED:      1 & 2. SHOULDER GIRDLE & NECK MUSCLES: 55 units Botox = Total Dose, 2:1 Dilution        Right Upper Trapezius 15 units of Botox at 3 sites    Left Upper Trapezius 15 units of Botox at 3 sites     Right Cervical Paraspinal (superior cervical) - 10 units of Botox at 2 site/s  Left Cervical Paraspinal (superior cervical) - 10 units of Botox at 2 site/s                  Left levator (at neck) - 5 units of Botox at 1 site        3. HEAD, JAW & SCALP MUSCLES: 120 units Botox = Total Dose, 2:1 Dilution     Left masseter - 5 units of Botox at 1 site     Right Occipitalis - 15 units of Botox at 3 site/s.  Left Occipitalis - 15 units of Botox at 3  site/s.     Right Frontalis - 10 units of Botox at 2 site/s  Left Frontalis - 10 units of Botox at 2 site/s      Right Temporalis - 15 units of Botox at 3 site/s.  Left Temporalis - 30 units of Botox at 6 site/s.     Right  - 5 units of Botox at 1 site/s.  Left  - 5 units of Botox at 1 site/s.     Procerus - 10 units of Botox at 1 site/s.      RESPONSE TO PROCEDURE:  Jessica Manriquez tolerated the procedure well and there were no immediate complications.   She was allowed to recover for an appropriate period of time and was discharged home in stable condition.    FOLLOW UP:  Jessica Manriquez was asked to follow up by phone in 7-14 days with Poonam Shoemaker RN, Care Coordinator, to report her response to this series of injections.  Based on the patient's previous response to this therapy, Jessica Manriquez was rescheduled for the next series of injections in 12 weeks.    PLAN (Medication Changes, Therapy Orders, Work or Disability Issues, etc.): Patient will continue to monitor response to today's injections.       Again, thank you for allowing me to participate in the care of your patient.      Sincerely,    Flori Trujillo MD

## 2018-12-06 NOTE — NURSING NOTE
Chief Complaint   Patient presents with     RECHECK     ump return patient visit for follow up/botox related to chronic migraines        Aspen Chao MA

## 2018-12-06 NOTE — MR AVS SNAPSHOT
After Visit Summary   12/6/2018    Jessica Manriquez    MRN: [id number]           Patient Information     Date Of Birth          1970        Visit Information        Provider Department      12/6/2018 12:20 PM Flori Trujillo MD [facility] Physical Medicine and Rehabilitation        Today's Diagnoses     Intractable chronic migraine without aura and without status migrainosus    -  1       Follow-ups after your visit        Follow-up notes from your care team     Return in about 12 weeks (around 2/28/2019).      Your next 10 appointments already scheduled     Dec 31, 2018  1:00 PM CST   PHYSICAL with Alyssia Gonsalves MD   [facility] ([facility])    [address] [address] [phone]            Mar 07, 2019 11:00 AM CST   (Arrive by 10:45 AM)   RETURN MIGRAINE BOTOX with Flori Trujillo MD   [facility] Physical Medicine and Rehabilitation ([facility])    [address] [address] [phone]              Who to contact     Please call your clinic at [phone] to:    Ask questions about your health    Make or cancel appointments    Discuss your medicines    Learn about your test results    Speak to your doctor            Additional Information About Your Visit        [employer] Information     [employer] gives you secure access to your electronic health record. If you see a primary care provider, you can also send messages to your care team and make appointments. If you have questions, please call your primary care clinic.  If you do not have a primary care provider, please call [phone] and they will assist you.      [employer] is an electronic gateway that provides easy, online access to your medical records. With [employer], you can request a clinic appointment, read your test results, renew a prescription or communicate with your care team.     To access your  "existing account, please contact your [facility] or call [phone] for assistance.        Care EveryWhere ID     This is your Care EveryWhere ID. This could be used by other organizations to access your [place] medical records  [id number]        Your Vitals Were     Pulse Temperature Respirations Height Pulse Oximetry Breastfeeding?    98 97.9  F (36.6  C) (Oral) 18 5' 11\" (1.803 m) 99% No    BMI (Body Mass Index)                   20.18 kg/m2            Blood Pressure from Last 3 Encounters:   12/06/18 111/74   09/06/18 101/68   06/22/18 103/72    Weight from Last 3 Encounters:   06/22/18 144 lb 11.2 oz (65.6 kg)   03/12/18 141 lb 1.6 oz (64 kg)   11/03/17 141 lb (64 kg)              We Performed the Following     HC CHEMODENERVATE FACIAL,TRIGERM,NERV MIGRAINE     NEEDLE EMG GUIDE W CHEMODENERVATION        Primary Care Provider Office Phone # Fax #    Su Loya [phone] [address] [address]        Equal Access to Services     [facility]: Hadii aad ku hadasho Soomaali, waaxda luqadaha, qaybta kaalmada adeegyada, odessa carroll hayjames warren . So [facility] [phone].    ATENCIÓN: Si habla español, tiene a coronado disposición servicios gratuitos de asistencia lingüística. [facility] [phone].    We comply with applicable federal civil rights laws and [place] laws. We do not discriminate on the basis of race, color, national origin, age, disability, sex, sexual orientation, or gender identity.            Thank you!     Thank you for choosing [facility] PHYSICAL MEDICINE AND REHABILITATION  for your care. Our goal is always to provide you with excellent care. Hearing back from our patients is one way we can continue to improve our services. Please take a few minutes to complete the written survey that you may receive in the mail after your visit with us. Thank you!             Your Updated Medication List - Protect others " around you: Learn how to safely use, store and throw away your medicines at www.disposemymeds.org.          This list is accurate as of 12/6/18 12:34 PM.  Always use your most recent med list.                   Brand Name Dispense Instructions for use Diagnosis    Acidophilus 100 MG Caps      Take 1 capsule by mouth daily        ALPRAZolam 0.5 MG tablet    XANAX    40 tablet    Take 1 tablet (0.5 mg) by mouth every 6 hours as needed for anxiety    Anxiety state       amitriptyline 10 MG tablet    ELAVIL    100 tablet    Take 1 tablet (10 mg) by mouth At Bedtime Take up to 5 tabs at bedtime prn insomnia.    Persistent insomnia       aspirin-acetaminophen-caffeine 250-250-65 MG tablet    EXCEDRIN MIGRAINE    30 tablet    Take 1 tablet by mouth every 6 hours as needed for pain.    Migraine headache       * BOTOX IJ      Inject 145 Units as directed Lot # /C3 with Expiration Date:  08/2019 NDC #: Botox 100u ([address]-51)        * BOTOX IJ      Inject 145 Units as directed Lot # /C3 with Expiration Date:  10/2019 NDC #: Botox 100u ([address]-90)        * BOTOX IJ      Inject 145 Units as directed Lot # /C3 with Expiration Date:  01/2020 NDC #: Botox 100u ([address]-12)        * BOTOX IJ      Inject 200 Units as directed Total dose 200 units  Dose Administered:  175 units  Botox (Botulinum Toxin Type A)       2:1 Dilution (55 units unavoidable waste) Unavoidable waste 25 units  Diluent Used:  Preservative Free Normal Saline Total Volume of Diluent Used:  2.9 ml Lot # /C3 with Expiration Date:  03/2021 NDC #: Botox 100u ([address]-53)        * BOTOX IJ      Inject 150 Units into the muscle once /C3        * BOTOX IJ      Inject 150 Units as directed once Lot# /C3, Exp 06/2020        * BOTOX IJ      Inject 150 Units into the muscle Lot # /C3  Exp: 10/2020        diazepam 10 MG tablet    VALIUM    30 tablet    Place 1 tab vaginally for muscle spasms    Pelvic pain in female        fluconazole 150 MG tablet    DIFLUCAN    4 tablet    Take 1 po as needed for yeast infection    Yeast infection of the vagina       HYDROcodone-acetaminophen 5-325 MG tablet    NORCO     Take 1 tablet by mouth as needed        hydrOXYzine 25 MG tablet    ATARAX    60 tablet    Take 1-2 tablets (25-50 mg) by mouth every 6 hours as needed for itching    Allergic state, sequela       ibuprofen 600 MG tablet    ADVIL/MOTRIN    60 tablet    Take 1 tablet (600 mg) by mouth every 6 hours as needed for moderate pain    Viral URI with cough, Throat pain       IMODIUM A-D PO      Take 1 tablet by mouth once as needed        Multi-vitamin tablet   Generic drug:  multivitamin w/minerals      1 tablet by mouth DAILY        ondansetron 4 MG ODT tab    ZOFRAN ODT    20 tablet    Take 1-2 tablets (4-8 mg) by mouth every 8 hours as needed for nausea    Other migraine without status migrainosus, not intractable       penciclovir 1 % external cream    DENAVIR    1.5 g    Apply topically every 2 hours (while awake)    HSV-1 (herpes simplex virus 1) infection       SUMAtriptan 20 MG/ACT nasal spray    IMITREX    1 Inhaler    Spray 1 spray (20 mg) in nostril as needed for migraine May repeat dose in 2 hours.  Do not exceed 40 mg in 24 hours    Migraine headache       SUMAtriptan 50 MG tablet    IMITREX    18 tablet    Take 1 tablet (50 mg) by mouth at onset of headache for migraine May repeat dose in 2 hours.  Do not exceed 200 mg in 24 hours    Migraine without status migrainosus, not intractable, unspecified migraine type       traMADol 50 MG tablet    ULTRAM    40 tablet    Take 1-2 tablets ( mg) by mouth every 6 hours as needed for pain    Intractable menstrual migraine without status migrainosus       valACYclovir 500 MG tablet    VALTREX    90 tablet    Take 1 tablet (500 mg) by mouth daily    HSV (herpes simplex virus) infection       vitamin C 500 MG tablet    ASCORBIC ACID     1 tablet daily        VITAMIN D  (CHOLECALCIFEROL) PO      Take 1,000 Units by mouth daily        zolpidem 5 MG tablet    AMBIEN    90 tablet    Take 1 tablet (5 mg) by mouth nightly as needed for sleep    Persistent insomnia       * Notice:  This list has 7 medication(s) that are the same as other medications prescribed for you. Read the directions carefully, and ask your doctor or other care provider to review them with you.

## 2018-12-06 NOTE — PROGRESS NOTES
"BOTULINUM TOXIN PROCEDURE - HEADACHE - NOTE    Chief Complaint   Patient presents with     RECHECK     ump return patient visit for follow up/botox related to chronic migraines        /74  Pulse 98  Temp 97.9  F (36.6  C) (Oral)  Resp 18  Ht 5' 11\" (1.803 m)  SpO2 99%  Breastfeeding? No  BMI 20.18 kg/m2       Current Outpatient Prescriptions:      amitriptyline (ELAVIL) 10 MG tablet, Take 1 tablet (10 mg) by mouth At Bedtime Take up to 5 tabs at bedtime prn insomnia., Disp: 100 tablet, Rfl: 3     aspirin-acetaminophen-caffeine (EXCEDRIN MIGRAINE) 250-250-65 MG per tablet, Take 1 tablet by mouth every 6 hours as needed for pain., Disp: 30 tablet, Rfl: 0     fluconazole (DIFLUCAN) 150 MG tablet, Take 1 po as needed for yeast infection, Disp: 4 tablet, Rfl: 0     HYDROcodone-acetaminophen (NORCO) 5-325 MG per tablet, Take 1 tablet by mouth as needed, Disp: , Rfl:      Lactobacillus (ACIDOPHILUS) 100 MG CAPS, Take 1 capsule by mouth daily, Disp: , Rfl:      Loperamide HCl (IMODIUM A-D PO), Take 1 tablet by mouth once as needed, Disp: , Rfl:      MULTI-VITAMIN OR TABS, 1 tablet by mouth DAILY, Disp: , Rfl:      OnabotulinumtoxinA (BOTOX IJ), Inject 200 Units as directed Total dose 200 units  Dose Administered:  175 units  Botox (Botulinum Toxin Type A)       2:1 Dilution (55 units unavoidable waste) Unavoidable waste 25 units  Diluent Used:  Preservative Free Normal Saline Total Volume of Diluent Used:  2.9 ml Lot # /C3 with Expiration Date:  03/2021 NDC #: Botox 100u ([address]-64), Disp: , Rfl:      OnabotulinumtoxinA (BOTOX IJ), Inject 150 Units into the muscle Lot # /C3  Exp: 10/2020, Disp: , Rfl:      OnabotulinumtoxinA (BOTOX IJ), Inject 150 Units as directed once Lot# /C3, Exp 06/2020, Disp: , Rfl:      OnabotulinumtoxinA (BOTOX IJ), Inject 150 Units into the muscle once /C3, Disp: , Rfl:      OnabotulinumtoxinA (BOTOX IJ), Inject 145 Units as directed Lot # /C3 with " Expiration Date:  01/2020 NDC #: Botox 100u ([address]-34), Disp: , Rfl:      OnabotulinumtoxinA (BOTOX IJ), Inject 145 Units as directed Lot # /C3 with Expiration Date:  10/2019 NDC #: Botox 100u ([address]-92), Disp: , Rfl:      OnabotulinumtoxinA (BOTOX IJ), Inject 145 Units as directed Lot # /C3 with Expiration Date:  08/2019 NDC #: Botox 100u ([address]-60), Disp: , Rfl:      traMADol (ULTRAM) 50 MG tablet, Take 1-2 tablets ( mg) by mouth every 6 hours as needed for pain, Disp: 40 tablet, Rfl: 2     valACYclovir (VALTREX) 500 MG tablet, Take 1 tablet (500 mg) by mouth daily, Disp: 90 tablet, Rfl: 0     VITAMIN C 500 MG OR TABS, 1 tablet daily, Disp: , Rfl:      VITAMIN D, CHOLECALCIFEROL, PO, Take 1,000 Units by mouth daily, Disp: , Rfl:      zolpidem (AMBIEN) 5 MG tablet, Take 1 tablet (5 mg) by mouth nightly as needed for sleep, Disp: 90 tablet, Rfl: 1     ALPRAZolam (XANAX) 0.5 MG tablet, Take 1 tablet (0.5 mg) by mouth every 6 hours as needed for anxiety (Patient not taking: Reported on 6/22/2018), Disp: 40 tablet, Rfl: 0     diazepam (VALIUM) 10 MG tablet, Place 1 tab vaginally for muscle spasms (Patient not taking: Reported on 6/22/2018), Disp: 30 tablet, Rfl: 3     hydrOXYzine (ATARAX) 25 MG tablet, Take 1-2 tablets (25-50 mg) by mouth every 6 hours as needed for itching (Patient not taking: Reported on 6/22/2018), Disp: 60 tablet, Rfl: 1     ibuprofen (ADVIL,MOTRIN) 600 MG tablet, Take 1 tablet (600 mg) by mouth every 6 hours as needed for moderate pain (Patient not taking: Reported on 6/22/2018), Disp: 60 tablet, Rfl: 0     ondansetron (ZOFRAN ODT) 4 MG disintegrating tablet, Take 1-2 tablets (4-8 mg) by mouth every 8 hours as needed for nausea (Patient not taking: Reported on 6/22/2018), Disp: 20 tablet, Rfl: 3     penciclovir (DENAVIR) 1 % cream, Apply topically every 2 hours (while awake) (Patient not taking: Reported on 6/22/2018), Disp: 1.5 g, Rfl: 3     SUMAtriptan (IMITREX) 20  MG/ACT nasal spray, Spray 1 spray (20 mg) in nostril as needed for migraine May repeat dose in 2 hours.  Do not exceed 40 mg in 24 hours (Patient not taking: Reported on 6/22/2018), Disp: 1 Inhaler, Rfl: 1     SUMAtriptan (IMITREX) 50 MG tablet, Take 1 tablet (50 mg) by mouth at onset of headache for migraine May repeat dose in 2 hours.  Do not exceed 200 mg in 24 hours (Patient not taking: Reported on 6/22/2018), Disp: 18 tablet, Rfl: 1     Allergies   Allergen Reactions     Darvocet [Acetaminophen] Nausea and Vomiting     Erythromycin GI Disturbance     Flexeril [Cyclobenzaprine Hcl]      Lightheadedness, dizzy          PHYSICAL EXAM:    Denies headache today.      HPI:    Patient denies new medical diagnoses, illnesses, hospitalizations, emergency room visits, and injuries since the previous injection with botulinum neurotoxin.    We reviewed the recommended safety guidelines for  Botox from any vaccine injection, such as the seasonal flu vaccine, by a minimum of 10-14 days with Jessica Manriquez. She acknowledged understanding.    Describes the headaches start globally or in the back of the neck. If possible can deviate to the left.   Light is least problematic, noises and smells are triggering factors.       RESPONSE TO PREVIOUS TREATMENT:  Change in headache pattern following last series of injections with 140 units of  Botox on 9/06/2018 in [place] [facility]   She was originally a patient of Dr Jacobson and moved to Dr Carmichael and due to insurance had to move to the Neurology clinic in [place].     No problems reported    1.  Headache frequency during this injection cycle:  she had 12 headaches in the last cycle. The first month, she had 3 headaches, and 6 in the middle month, and 3 in the last month.    This is compared to her baseline headache frequency of 24 headache days per month.     2.  Headache duration during this injection cycle:  Headache duration ranged from 5  hours to 48  hours.     3.  Headache intensity during this injection cycle:    A.  2/10  =  Typical pain level.  B.  8/10  =  Worst pain level.  C.  0/10  =  Lowest pain level.    4.  Change in headache medication usage during this injection cycle:  (For Example:  Able to decrease use of oral pain medications.) No change in use of medication.  Tries to use least amount of Imitrex as possible.  Tramadol and Vicodin are other options.     5.  ER Visits During This Injection Cycle:  0      6.  Functional Performance:  Change in ADL's, social interaction, days lost from work, etc. In the last month, she has had to cancel her activities due to the headaches.     BOTULINUM NEUROTOXIN INJECTION PROCEDURES:      VERIFICATION OF PATIENT IDENTIFICATION AND PROCEDURE     Initials   Patient Name fi   Patient  fi   Procedure Verified by: leilani     Prior to the start of the procedure and with procedural staff participation, I verbally confirmed the patient s identity using two indicators, relevant allergies, that the procedure was appropriate and matched the consent or emergent situation, and that the correct equipment/implants were available. Immediately prior to starting the procedure I conducted the Time Out with the procedural staff and re-confirmed the patient s name, procedure, and site/side. (The Joint Commission universal protocol was followed.)  Yes    Sedation (Moderate or Deep): None    Above assessments performed by:  Poonam Shoemaker RN Care Coordinator  Flori Trujillo MD, A       INDICATIONS FOR PROCEDURES:  Jessica Manriquez is a 48 year old patient with chronic migraine headaches associated with cervicogenic  components.     Her baseline symptoms have been recalcitrant to oral medications and conservative therapy.  She is here today for reinjection with Botox.    GOAL OF PROCEDURE:  The goal of this procedure is to decrease pain  and enhance functional independence.    TOTAL DOSE ADMINISTERED:  Total dose 200 units   Dose  Administered:  175 units  Botox (Botulinum Toxin Type A)       2:1 Dilution (55 units unavoidable waste)  Unavoidable waste 25 units   Diluent Used:  Preservative Free Normal Saline  Total Volume of Diluent Used:  4 ml  Lot # /C3 with Expiration Date: 06/2021  NDC #: Botox 100u ([address]-12)    Medication guide was offered to patient and was declined.    CONSENT:  The risks, benefits, and treatment options were discussed with Jessica Manriquez and she agreed to proceed.    Written consent was obtained by PAG.     EQUIPMENT USED:  Needle-25mm stimulating/recording  Needle-30 gauge  EMG/NCS Machine    SKIN PREPARATION:  Skin preparation was performed using an alcohol wipe.    GUIDANCE DESCRIPTION:  Electro-myographic guidance was necessary throughout the posterior neck and jaw portion to accurately identify all areas of spastic muscles while avoiding injection of non-spastic muscles, neighboring nerves and nearby vascular structures.     AREA/MUSCLE INJECTED:      1 & 2. SHOULDER GIRDLE & NECK MUSCLES: 55 units Botox = Total Dose, 2:1 Dilution        Right Upper Trapezius 15 units of Botox at 3 sites    Left Upper Trapezius 15 units of Botox at 3 sites     Right Cervical Paraspinal (superior cervical) - 10 units of Botox at 2 site/s  Left Cervical Paraspinal (superior cervical) - 10 units of Botox at 2 site/s                  Left levator (at neck) - 5 units of Botox at 1 site        3. HEAD, JAW & SCALP MUSCLES: 120 units Botox = Total Dose, 2:1 Dilution     Left masseter - 5 units of Botox at 1 site     Right Occipitalis - 15 units of Botox at 3 site/s.  Left Occipitalis - 15 units of Botox at 3 site/s.     Right Frontalis - 10 units of Botox at 2 site/s  Left Frontalis - 10 units of Botox at 2 site/s      Right Temporalis - 15 units of Botox at 3 site/s.  Left Temporalis - 30 units of Botox at 6 site/s.     Right  - 5 units of Botox at 1 site/s.  Left  - 5 units of Botox at 1  site/s.     Procerus - 10 units of Botox at 1 site/s.      RESPONSE TO PROCEDURE:  Jessica Manriquez tolerated the procedure well and there were no immediate complications.   She was allowed to recover for an appropriate period of time and was discharged home in stable condition.    FOLLOW UP:  Jessica Manriquez was asked to follow up by phone in 7-14 days with Poonam Shoemaker RN, Care Coordinator, to report her response to this series of injections.  Based on the patient's previous response to this therapy, Jessica Manriquez was rescheduled for the next series of injections in 12 weeks.    PLAN (Medication Changes, Therapy Orders, Work or Disability Issues, etc.): Patient will continue to monitor response to today's injections.

## 2018-12-21 ENCOUNTER — ALLIED HEALTH/NURSE VISIT (OUTPATIENT)
Dept: FAMILY MEDICINE | Facility: CLINIC | Age: 48
End: 2018-12-21
Payer: COMMERCIAL

## 2018-12-21 DIAGNOSIS — Z23 NEED FOR PROPHYLACTIC VACCINATION AND INOCULATION AGAINST INFLUENZA: Primary | ICD-10-CM

## 2018-12-21 PROCEDURE — 90471 IMMUNIZATION ADMIN: CPT

## 2018-12-21 PROCEDURE — 99207 ZZC NO CHARGE NURSE ONLY: CPT

## 2018-12-21 PROCEDURE — 90686 IIV4 VACC NO PRSV 0.5 ML IM: CPT

## 2018-12-21 NOTE — PROGRESS NOTES

## 2018-12-31 ENCOUNTER — OFFICE VISIT (OUTPATIENT)
Dept: OBGYN | Facility: CLINIC | Age: 48
End: 2018-12-31
Payer: COMMERCIAL

## 2018-12-31 VITALS
SYSTOLIC BLOOD PRESSURE: 93 MMHG | RESPIRATION RATE: 18 BRPM | DIASTOLIC BLOOD PRESSURE: 65 MMHG | BODY MASS INDEX: 20.44 KG/M2 | WEIGHT: 146 LBS | HEART RATE: 92 BPM | HEIGHT: 71 IN | TEMPERATURE: 97.1 F

## 2018-12-31 DIAGNOSIS — Z12.4 SCREENING FOR MALIGNANT NEOPLASM OF CERVIX: ICD-10-CM

## 2018-12-31 DIAGNOSIS — G43.839 INTRACTABLE MENSTRUAL MIGRAINE WITHOUT STATUS MIGRAINOSUS: ICD-10-CM

## 2018-12-31 DIAGNOSIS — Z01.419 ENCOUNTER FOR GYNECOLOGICAL EXAMINATION WITHOUT ABNORMAL FINDING: Primary | ICD-10-CM

## 2018-12-31 DIAGNOSIS — F41.1 ANXIETY STATE: ICD-10-CM

## 2018-12-31 DIAGNOSIS — R10.2 PELVIC PAIN IN FEMALE: ICD-10-CM

## 2018-12-31 PROCEDURE — 99396 PREV VISIT EST AGE 40-64: CPT | Performed by: OBSTETRICS & GYNECOLOGY

## 2018-12-31 PROCEDURE — G0124 SCREEN C/V THIN LAYER BY MD: HCPCS | Performed by: OBSTETRICS & GYNECOLOGY

## 2018-12-31 PROCEDURE — 87624 HPV HI-RISK TYP POOLED RSLT: CPT | Performed by: OBSTETRICS & GYNECOLOGY

## 2018-12-31 PROCEDURE — G0145 SCR C/V CYTO,THINLAYER,RESCR: HCPCS | Performed by: OBSTETRICS & GYNECOLOGY

## 2018-12-31 RX ORDER — TRAMADOL HYDROCHLORIDE 50 MG/1
50-100 TABLET ORAL EVERY 6 HOURS PRN
Qty: 40 TABLET | Refills: 2 | Status: SHIPPED | OUTPATIENT
Start: 2018-12-31 | End: 2020-01-02

## 2018-12-31 RX ORDER — ALPRAZOLAM 0.5 MG
0.5 TABLET ORAL EVERY 6 HOURS PRN
Qty: 40 TABLET | Refills: 0 | Status: SHIPPED | OUTPATIENT
Start: 2018-12-31

## 2018-12-31 RX ORDER — DIAZEPAM 10 MG
TABLET ORAL
Qty: 30 TABLET | Refills: 3 | Status: SHIPPED | OUTPATIENT
Start: 2018-12-31 | End: 2020-01-02

## 2018-12-31 ASSESSMENT — MIFFLIN-ST. JEOR: SCORE: 1383.38

## 2018-12-31 NOTE — PROGRESS NOTES
SUBJECTIVE:   CC: Jessica Manriquez is an 48 year old woman who presents for preventive health visit.  Meds reviewed and refills authorized    Healthy Habits:    Do you get at least three servings of calcium containing foods daily (dairy, green leafy vegetables, etc.)? yes    Amount of exercise or daily activities, outside of work: 1 day(s) per week    Problems taking medications regularly No    Medication side effects: No    Have you had an eye exam in the past two years? yes    Do you see a dentist twice per year? yes    Do you have sleep apnea, excessive snoring or daytime drowsiness?no          Today's PHQ-2 Score:   PHQ-2 ( 1999 [employer]) 12/31/2018 12/30/2018   Q1: Little interest or pleasure in doing things 0 0   Q2: Feeling down, depressed or hopeless 0 0   PHQ-2 Score 0 0   Q1: Little interest or pleasure in doing things - Not at all   Q2: Feeling down, depressed or hopeless - Not at all   PHQ-2 Score - 0       Abuse: Current or Past(Physical, Sexual or Emotional)- No  Do you feel safe in your environment? Yes    Social History     Tobacco Use     Smoking status: Never Smoker     Smokeless tobacco: Never Used   Substance Use Topics     Alcohol use: No     If you drink alcohol do you typically have >3 drinks per day or >7 drinks per week? No                     Reviewed orders with patient.  Reviewed health maintenance and updated orders accordingly - Yes  Labs reviewed in EPIC  BP Readings from Last 3 Encounters:   12/31/18 93/65   12/06/18 111/74   09/06/18 101/68    Wt Readings from Last 3 Encounters:   12/31/18 66.2 kg (146 lb)   06/22/18 65.6 kg (144 lb 11.2 oz)   03/12/18 64 kg (141 lb 1.6 oz)                  Patient Active Problem List   Diagnosis     Migraine headache     Insomnia     Unexplained endometrial cells on cervical Pap smear     Pelvic pain in female     Past Surgical History:   Procedure Laterality Date     HC ENLARGE BREAST WITH IMPLANT  2000     HC REVISE BREAST RECONSTRUCTION  5/03      LEEP TX, CERVICAL  11/01    SALOMÓN 3, yearly paps until 2021     LIGATN/STRIP LONG & SHORT SAPHEN  11/04    Bilateral vein stripping     TUBAL LIGATION  10/30/09    filshie clips       Social History     Tobacco Use     Smoking status: Never Smoker     Smokeless tobacco: Never Used   Substance Use Topics     Alcohol use: No     Family History   Problem Relation Age of Onset     Arthritis Mother      Respiratory Mother         asthma     Allergies Mother      Cancer Mother      Blood Disease Mother         leukemia     Breast Cancer Mother      Breast Cancer Maternal Grandmother         age 60's     Heart Disease Maternal Grandmother      Cerebrovascular Disease Paternal Grandmother      Cancer Paternal Grandfather         ?biliary     Alcohol/Drug Paternal Grandfather      Allergies Sister            Mammogram Screening: Patient under age 50, mutual decision reflected in health maintenance.      Pertinent mammograms are reviewed under the imaging tab.  History of abnormal Pap smear: NO - age 30- 65 PAP every 3 years recommended  PAP / HPV 2/2/2015 1/29/2014 1/28/2013   PAP NIL OTHER-NIL, See Result NIL     Reviewed and updated as needed this visit by clinical staff  Tobacco  Allergies  Meds  Med Hx  Surg Hx  Fam Hx  Soc Hx        Reviewed and updated as needed this visit by Provider            ROS:  CONSTITUTIONAL: NEGATIVE for fever, chills, change in weight  INTEGUMENTARU/SKIN: NEGATIVE for worrisome rashes, moles or lesions  EYES: NEGATIVE for vision changes or irritation  ENT: NEGATIVE for ear, mouth and throat problems  RESP: NEGATIVE for significant cough or SOB  BREAST: NEGATIVE for masses, tenderness or discharge  CV: NEGATIVE for chest pain, palpitations or peripheral edema  GI: NEGATIVE for nausea, abdominal pain, heartburn, or change in bowel habits  : NEGATIVE for unusual urinary or vaginal symptoms. Periods are regular.  MUSCULOSKELETAL: NEGATIVE for significant arthralgias or myalgia  NEURO:  "NEGATIVE for weakness, dizziness or paresthesias  PSYCHIATRIC: NEGATIVE for changes in mood or affect    OBJECTIVE:   BP 93/65 (BP Location: Right arm, Patient Position: Chair, Cuff Size: Adult Small)   Pulse 92   Temp 97.1  F (36.2  C) (Tympanic)   Resp 18   Ht 1.803 m (5' 11\")   Wt 66.2 kg (146 lb)   LMP 12/25/2018   BMI 20.36 kg/m    EXAM:  GENERAL: healthy, alert and no distress  EYES: Eyes grossly normal to inspection, PERRL and conjunctivae and sclerae normal  HENT: ear canals and TM's normal, nose and mouth without ulcers or lesions  NECK: no adenopathy, no asymmetry, masses, or scars and thyroid normal to palpation  RESP: lungs clear to auscultation - no rales, rhonchi or wheezes  BREAST: normal without masses, tenderness or nipple discharge and no palpable axillary masses or adenopathy  BREAST: implant bilaterally; generalized nodularity in upper outer quadrants bilaterally  CV: regular rate and rhythm, normal S1 S2, no S3 or S4, no murmur, click or rub, no peripheral edema and peripheral pulses strong  ABDOMEN: soft, nontender, no hepatosplenomegaly, no masses and bowel sounds normal   (female): normal female external genitalia, normal urethral meatus, vaginal mucosa pink, moist, well rugated, and normal cervix/adnexa/uterus without masses or discharge  MS: no gross musculoskeletal defects noted, no edema  SKIN: no suspicious lesions or rashes  NEURO: Normal strength and tone, mentation intact and speech normal  PSYCH: mentation appears normal, affect normal/bright    Diagnostic Test Results:  none     ASSESSMENT/PLAN:       ICD-10-CM    1. Encounter for gynecological examination without abnormal finding Z01.419    2. Screening for malignant neoplasm of cervix Z12.4 Pap imaged thin layer screen with HPV - recommended age 30 - 65 years (select HPV order below)     HPV High Risk Types DNA Cervical   3. Intractable menstrual migraine without status migrainosus G43.839 traMADol (ULTRAM) 50 MG tablet " "  4. Pelvic pain in female R10.2 diazepam (VALIUM) 10 MG tablet   5. Anxiety state F41.1 ALPRAZolam (XANAX) 0.5 MG tablet       COUNSELING:   Reviewed preventive health counseling, as reflected in patient instructions  Special attention given to:        breast cancer screening--getting MRI until menopause       Regular exercise       Healthy diet/nutrition       Vision screening    BP Readings from Last 1 Encounters:   12/31/18 93/65     Estimated body mass index is 20.36 kg/m  as calculated from the following:    Height as of this encounter: 1.803 m (5' 11\").    Weight as of this encounter: 66.2 kg (146 lb).           reports that  has never smoked. she has never used smokeless tobacco.      Counseling Resources:  ATP IV Guidelines  Pooled Cohorts Equation Calculator  Breast Cancer Risk Calculator  FRAX Risk Assessment  ICSI Preventive Guidelines  Dietary Guidelines for Americans, 2010  USDA's MyPlate  ASA Prophylaxis  Lung CA Screening    Alyssia Gonsalves MD  [facility]  "

## 2019-02-04 DIAGNOSIS — G47.00 PERSISTENT INSOMNIA: ICD-10-CM

## 2019-02-04 NOTE — TELEPHONE ENCOUNTER
Rx refill request for Ambien last issued on 06-12-18 for a qty of 90 with 1 refill.    Ambien not listed on RN rx refill protocol.    Kristine Rodriguez  [facility] RN

## 2019-02-06 RX ORDER — ZOLPIDEM TARTRATE 5 MG/1
5 TABLET ORAL
Qty: 90 TABLET | Refills: 1 | Status: SHIPPED | OUTPATIENT
Start: 2019-02-06 | End: 2020-01-02

## 2019-02-07 NOTE — TELEPHONE ENCOUNTER
Emmanuel faxed on 2/6/19  Confirmed as received by pharmacist at [facility].  Ready for patient to .    Suzie Ellis   [facility]  [facility]

## 2019-03-07 ENCOUNTER — OFFICE VISIT (OUTPATIENT)
Dept: PHYSICAL MEDICINE AND REHAB | Facility: CLINIC | Age: 49
End: 2019-03-07
Payer: COMMERCIAL

## 2019-03-07 VITALS
OXYGEN SATURATION: 99 % | HEART RATE: 91 BPM | DIASTOLIC BLOOD PRESSURE: 70 MMHG | WEIGHT: 151.2 LBS | TEMPERATURE: 97.5 F | SYSTOLIC BLOOD PRESSURE: 101 MMHG | BODY MASS INDEX: 21.09 KG/M2

## 2019-03-07 DIAGNOSIS — G43.719 INTRACTABLE CHRONIC MIGRAINE WITHOUT AURA AND WITHOUT STATUS MIGRAINOSUS: Primary | ICD-10-CM

## 2019-03-07 ASSESSMENT — PAIN SCALES - GENERAL: PAINLEVEL: NO PAIN (0)

## 2019-03-07 NOTE — LETTER
3/7/2019       RE: Jessica Manriquez  [address]  [facility] [address]     Dear Colleague,    Thank you for referring your patient, Jessica Manriquez, to the [facility] PHYSICAL MEDICINE AND REHABILITATION at [facility]. Please see a copy of my visit note below.    BOTULINUM TOXIN PROCEDURE - HEADACHE - NOTE    Chief Complaint   Patient presents with     Headache     [facility] RETURN - BOTOX, CHRONIC MIGRAINES       /70 (BP Location: Left arm, Patient Position: Sitting, Cuff Size: Adult Regular)   Pulse 91   Temp 97.5  F (36.4  C) (Oral)   Wt 68.6 kg (151 lb 3.2 oz)   SpO2 99%   BMI 21.09 kg/m          Current Outpatient Medications:      ALPRAZolam (XANAX) 0.5 MG tablet, Take 1 tablet (0.5 mg) by mouth every 6 hours as needed for anxiety, Disp: 40 tablet, Rfl: 0     amitriptyline (ELAVIL) 10 MG tablet, Take 1 tablet (10 mg) by mouth At Bedtime Take up to 5 tabs at bedtime prn insomnia., Disp: 100 tablet, Rfl: 3     aspirin-acetaminophen-caffeine (EXCEDRIN MIGRAINE) 250-250-65 MG per tablet, Take 1 tablet by mouth every 6 hours as needed for pain., Disp: 30 tablet, Rfl: 0     diazepam (VALIUM) 10 MG tablet, Place 1 tab vaginally for muscle spasms, Disp: 30 tablet, Rfl: 3     HYDROcodone-acetaminophen (NORCO) 5-325 MG per tablet, Take 1 tablet by mouth as needed, Disp: , Rfl:      hydrOXYzine (ATARAX) 25 MG tablet, Take 1-2 tablets (25-50 mg) by mouth every 6 hours as needed for itching, Disp: 60 tablet, Rfl: 1     Lactobacillus (ACIDOPHILUS) 100 MG CAPS, Take 1 capsule by mouth daily, Disp: , Rfl:      Loperamide HCl (IMODIUM A-D PO), Take 1 tablet by mouth once as needed, Disp: , Rfl:      MULTI-VITAMIN OR TABS, 1 tablet by mouth DAILY, Disp: , Rfl:      OnabotulinumtoxinA (BOTOX IJ), Inject 200 Units as directed Total dose 200 units  Dose Administered:  175 units  Botox (Botulinum Toxin Type A)       2:1 Dilution (55 units unavoidable waste) Unavoidable waste 25  units  Diluent Used:  Preservative Free Normal Saline Total Volume of Diluent Used:  2.9 ml Lot # /C3 with Expiration Date:  03/2021 NDC #: Botox 100u ([address]-72), Disp: , Rfl:      ondansetron (ZOFRAN ODT) 4 MG disintegrating tablet, Take 1-2 tablets (4-8 mg) by mouth every 8 hours as needed for nausea, Disp: 20 tablet, Rfl: 3     penciclovir (DENAVIR) 1 % cream, Apply topically every 2 hours (while awake), Disp: 1.5 g, Rfl: 3     SUMAtriptan (IMITREX) 50 MG tablet, Take 1 tablet (50 mg) by mouth at onset of headache for migraine May repeat dose in 2 hours.  Do not exceed 200 mg in 24 hours, Disp: 18 tablet, Rfl: 1     traMADol (ULTRAM) 50 MG tablet, Take 1-2 tablets ( mg) by mouth every 6 hours as needed for pain, Disp: 40 tablet, Rfl: 2     valACYclovir (VALTREX) 500 MG tablet, Take 1 tablet (500 mg) by mouth daily, Disp: 90 tablet, Rfl: 0     VITAMIN C 500 MG OR TABS, 1 tablet daily, Disp: , Rfl:      VITAMIN D, CHOLECALCIFEROL, PO, Take 1,000 Units by mouth daily, Disp: , Rfl:      zolpidem (AMBIEN) 5 MG tablet, Take 1 tablet (5 mg) by mouth nightly as needed for sleep, Disp: 90 tablet, Rfl: 1     ibuprofen (ADVIL,MOTRIN) 600 MG tablet, Take 1 tablet (600 mg) by mouth every 6 hours as needed for moderate pain (Patient not taking: Reported on 3/7/2019), Disp: 60 tablet, Rfl: 0     Allergies   Allergen Reactions     Darvocet [Acetaminophen] Nausea and Vomiting     Erythromycin GI Disturbance     Flexeril [Cyclobenzaprine Hcl]      Lightheadedness, dizzy          PHYSICAL EXAM:  Denies headache today.  Last migraine headache yesterday, rates 2/10, treated with Imitrex with relief.    HPI:    Patient reports the following new medical problems since last visit: UTI resolved with abx.  Cold resolved.     We reviewed the recommended safety guidelines for  Botox from any vaccine injection, such as the seasonal flu vaccine, by a minimum of 10-14 days with Jessica Manriquez. She acknowledged  understanding.    RESPONSE TO PREVIOUS TREATMENT:  Change in headache pattern following last series of injections with 175 units of  Botox on 2018.    No problems reported    1.  Headache frequency during this injection cycle:  5 headache days per month.  7 in December, 6 in January, and 2 in February.     2.  Headache duration during this injection cycle:  Headache duration ranged from 3 hours to 3 days.     3.  Headache intensity during this injection cycle:    A.  5/10  =  Typical pain level.  B.  9/10  =  Worst pain level.  C.  0/10  =  Lowest pain level.    4.  Change in headache medication usage during this injection cycle:  (For Example:  Able to decrease use of oral pain medications.) No change in use of medication but does initiate use earlier in headache cycle.    5.  ER Visits During This Injection Cycle:  0    6.  Functional Performance:  Change in ADL's, social interaction, days lost from work, etc. Patient reports left work early two days. days lost from work due to headache during this injection cycle      BOTULINUM NEUROTOXIN INJECTION PROCEDURES:      VERIFICATION OF PATIENT IDENTIFICATION AND PROCEDURE     Initials   Patient Name lmd   Patient  lmd   Procedure Verified by: emi     Prior to the start of the procedure and with procedural staff participation, I verbally confirmed the patient s identity using two indicators, relevant allergies, that the procedure was appropriate and matched the consent or emergent situation, and that the correct equipment/implants were available. Immediately prior to starting the procedure I conducted the Time Out with the procedural staff and re-confirmed the patient s name, procedure, and site/side. (The Joint Commission universal protocol was followed.)  Yes    Sedation (Moderate or Deep): None    Above assessments performed by:  Norma Mccloud RN Care Coordinator    Flori Trujillo MD      INDICATIONS FOR PROCEDURES:  Jessica Manriquez is a 48 year old  patient with chronic migraine headaches associated with cervicogenic  components.     Her baseline symptoms have been recalcitrant to oral medications and conservative therapy.  She is here today for reinjection with Botox.    GOAL OF PROCEDURE:  The goal of this procedure is to decrease pain .    TOTAL DOSE ADMINISTERED:  Total dose 200 units   Dose Administered:  180 units  Botox (Botulinum Toxin Type A)       2:1 Dilution   Unavoidable waste 20 units    Diluent Used:  Preservative Free Normal Saline  Total Volume of Diluent Used:  4 ml  Lot #  [id number] with Expiration Date: 8/2021  NDC #: Botox 100u ([address]-73)    Medication guide was offered to patient and was declined.    CONSENT:  The risks, benefits, and treatment options were discussed with Jessica Manriquez and she agreed to proceed.    Written consent was obtained by .     EQUIPMENT USED:  Needle-25mm stimulating/recording  EMG/NCS Machine    SKIN PREPARATION:  Skin preparation was performed using an alcohol wipe.    GUIDANCE DESCRIPTION:  Electro-myographic guidance was necessary throughout the procedure to accurately identify all areas of spastic muscles while avoiding injection of non-spastic muscles, neighboring nerves and nearby vascular structures.     AREA/MUSCLE INJECTED:    1 & 2. SHOULDER GIRDLE & NECK MUSCLES: 60 units Botox = Total Dose, 2:1 Dilution                               Right Upper Trapezius 15 units of Botox at 3 sites               Left Upper Trapezius 15 units of Botox at 3 sites        Right Cervical Paraspinal (superior cervical) - 10 units of Botox at 2 site/s  Left Cervical Paraspinal (superior cervical) - 10 units of Botox at 2 site/s                    Left levator (at neck) - 5 units of Botox at 1 site    Right Lev at the neck  5 unit of Botox at 1 site        3. HEAD, JAW & SCALP MUSCLES: 120 units Botox = Total Dose, 2:1 Dilution     Left masseter - 5 units of Botox at 1 site       Right Occipitalis - 15 units of Botox  at 3 site/s.  Left Occipitalis - 15 units of Botox at 3 site/s.       Right Frontalis - 10 units of Botox at 2 site/s  Left Frontalis - 10 units of Botox at 2 site/s        Right Temporalis - 15 units of Botox at 3 site/s.  Left Temporalis - 30 units of Botox at 6 site/s.       Right  - 5 units of Botox at 1 site/s.  Left  - 5 units of Botox at 1 site/s.     Procerus - 10 units of Botox at 1 site/s.         RESPONSE TO PROCEDURE:  Jessica Manriquez tolerated the procedure well and there were no immediate complications.   She was allowed to recover for an appropriate period of time and was discharged home in stable condition.    FOLLOW UP:  Jessica Manriquez was asked to follow up by phone in 7-14 days with Poonam Shoemaker RN, Care Coordinator, to report her response to this series of injections.  Based on the patient's previous response to this therapy, Jessica Manriquez was rescheduled for the next series of injections in 12 weeks.    PLAN (Medication Changes, Therapy Orders, Work or Disability Issues, etc.): Patient will continue to monitor response to today's injections.   Right levator added today based on the pain pattern.           Again, thank you for allowing me to participate in the care of your patient.      Sincerely,    Flori Trujillo MD

## 2019-03-07 NOTE — PROGRESS NOTES
BOTULINUM TOXIN PROCEDURE - HEADACHE - NOTE    Chief Complaint   Patient presents with     Headache     [facility] RETURN - BOTOX, CHRONIC MIGRAINES       /70 (BP Location: Left arm, Patient Position: Sitting, Cuff Size: Adult Regular)   Pulse 91   Temp 97.5  F (36.4  C) (Oral)   Wt 68.6 kg (151 lb 3.2 oz)   SpO2 99%   BMI 21.09 kg/m         Current Outpatient Medications:      ALPRAZolam (XANAX) 0.5 MG tablet, Take 1 tablet (0.5 mg) by mouth every 6 hours as needed for anxiety, Disp: 40 tablet, Rfl: 0     amitriptyline (ELAVIL) 10 MG tablet, Take 1 tablet (10 mg) by mouth At Bedtime Take up to 5 tabs at bedtime prn insomnia., Disp: 100 tablet, Rfl: 3     aspirin-acetaminophen-caffeine (EXCEDRIN MIGRAINE) 250-250-65 MG per tablet, Take 1 tablet by mouth every 6 hours as needed for pain., Disp: 30 tablet, Rfl: 0     diazepam (VALIUM) 10 MG tablet, Place 1 tab vaginally for muscle spasms, Disp: 30 tablet, Rfl: 3     HYDROcodone-acetaminophen (NORCO) 5-325 MG per tablet, Take 1 tablet by mouth as needed, Disp: , Rfl:      hydrOXYzine (ATARAX) 25 MG tablet, Take 1-2 tablets (25-50 mg) by mouth every 6 hours as needed for itching, Disp: 60 tablet, Rfl: 1     Lactobacillus (ACIDOPHILUS) 100 MG CAPS, Take 1 capsule by mouth daily, Disp: , Rfl:      Loperamide HCl (IMODIUM A-D PO), Take 1 tablet by mouth once as needed, Disp: , Rfl:      MULTI-VITAMIN OR TABS, 1 tablet by mouth DAILY, Disp: , Rfl:      OnabotulinumtoxinA (BOTOX IJ), Inject 200 Units as directed Total dose 200 units  Dose Administered:  175 units  Botox (Botulinum Toxin Type A)       2:1 Dilution (55 units unavoidable waste) Unavoidable waste 25 units  Diluent Used:  Preservative Free Normal Saline Total Volume of Diluent Used:  2.9 ml Lot # /C3 with Expiration Date:  03/2021 NDC #: Botox 100u ([address]-45), Disp: , Rfl:      ondansetron (ZOFRAN ODT) 4 MG disintegrating tablet, Take 1-2 tablets (4-8 mg) by mouth every 8 hours as needed for nausea,  Disp: 20 tablet, Rfl: 3     penciclovir (DENAVIR) 1 % cream, Apply topically every 2 hours (while awake), Disp: 1.5 g, Rfl: 3     SUMAtriptan (IMITREX) 50 MG tablet, Take 1 tablet (50 mg) by mouth at onset of headache for migraine May repeat dose in 2 hours.  Do not exceed 200 mg in 24 hours, Disp: 18 tablet, Rfl: 1     traMADol (ULTRAM) 50 MG tablet, Take 1-2 tablets ( mg) by mouth every 6 hours as needed for pain, Disp: 40 tablet, Rfl: 2     valACYclovir (VALTREX) 500 MG tablet, Take 1 tablet (500 mg) by mouth daily, Disp: 90 tablet, Rfl: 0     VITAMIN C 500 MG OR TABS, 1 tablet daily, Disp: , Rfl:      VITAMIN D, CHOLECALCIFEROL, PO, Take 1,000 Units by mouth daily, Disp: , Rfl:      zolpidem (AMBIEN) 5 MG tablet, Take 1 tablet (5 mg) by mouth nightly as needed for sleep, Disp: 90 tablet, Rfl: 1     ibuprofen (ADVIL,MOTRIN) 600 MG tablet, Take 1 tablet (600 mg) by mouth every 6 hours as needed for moderate pain (Patient not taking: Reported on 3/7/2019), Disp: 60 tablet, Rfl: 0     Allergies   Allergen Reactions     Darvocet [Acetaminophen] Nausea and Vomiting     Erythromycin GI Disturbance     Flexeril [Cyclobenzaprine Hcl]      Lightheadedness, dizzy          PHYSICAL EXAM:  Denies headache today.  Last migraine headache yesterday, rates 2/10, treated with Imitrex with relief.    HPI:    Patient reports the following new medical problems since last visit: UTI resolved with abx.  Cold resolved.     We reviewed the recommended safety guidelines for  Botox from any vaccine injection, such as the seasonal flu vaccine, by a minimum of 10-14 days with Jessica Manriquez. She acknowledged understanding.    RESPONSE TO PREVIOUS TREATMENT:  Change in headache pattern following last series of injections with 175 units of  Botox on 12/6/2018.    No problems reported    1.  Headache frequency during this injection cycle:  5 headache days per month.  7 in December, 6 in January, and 2 in February.     2.   Headache duration during this injection cycle:  Headache duration ranged from 3 hours to 3 days.     3.  Headache intensity during this injection cycle:    A.  5/10  =  Typical pain level.  B.  9/10  =  Worst pain level.  C.  0/10  =  Lowest pain level.    4.  Change in headache medication usage during this injection cycle:  (For Example:  Able to decrease use of oral pain medications.) No change in use of medication but does initiate use earlier in headache cycle.    5.  ER Visits During This Injection Cycle:  0    6.  Functional Performance:  Change in ADL's, social interaction, days lost from work, etc. Patient reports left work early two days. days lost from work due to headache during this injection cycle      BOTULINUM NEUROTOXIN INJECTION PROCEDURES:      VERIFICATION OF PATIENT IDENTIFICATION AND PROCEDURE     Initials   Patient Name lmd   Patient  lmd   Procedure Verified by: emi     Prior to the start of the procedure and with procedural staff participation, I verbally confirmed the patient s identity using two indicators, relevant allergies, that the procedure was appropriate and matched the consent or emergent situation, and that the correct equipment/implants were available. Immediately prior to starting the procedure I conducted the Time Out with the procedural staff and re-confirmed the patient s name, procedure, and site/side. (The Joint Commission universal protocol was followed.)  Yes    Sedation (Moderate or Deep): None    Above assessments performed by:  Norma Mccloud RN Care Coordinator    Flori Trujillo MD      INDICATIONS FOR PROCEDURES:  Jessica Manriquez is a 48 year old patient with chronic migraine headaches associated with cervicogenic  components.     Her baseline symptoms have been recalcitrant to oral medications and conservative therapy.  She is here today for reinjection with Botox.    GOAL OF PROCEDURE:  The goal of this procedure is to decrease pain .    TOTAL DOSE  ADMINISTERED:  Total dose 200 units   Dose Administered:  180 units  Botox (Botulinum Toxin Type A)       2:1 Dilution   Unavoidable waste 20 units    Diluent Used:  Preservative Free Normal Saline  Total Volume of Diluent Used:  4 ml  Lot #  C3 with Expiration Date: 8/2021  NDC #: Botox 100u ([address]-74)    Medication guide was offered to patient and was declined.    CONSENT:  The risks, benefits, and treatment options were discussed with Jessica Manriquez and she agreed to proceed.    Written consent was obtained by fi.     EQUIPMENT USED:  Needle-25mm stimulating/recording  EMG/NCS Machine    SKIN PREPARATION:  Skin preparation was performed using an alcohol wipe.    GUIDANCE DESCRIPTION:  Electro-myographic guidance was necessary throughout the procedure to accurately identify all areas of spastic muscles while avoiding injection of non-spastic muscles, neighboring nerves and nearby vascular structures.     AREA/MUSCLE INJECTED:    1 & 2. SHOULDER GIRDLE & NECK MUSCLES: 60 units Botox = Total Dose, 2:1 Dilution                               Right Upper Trapezius 15 units of Botox at 3 sites               Left Upper Trapezius 15 units of Botox at 3 sites        Right Cervical Paraspinal (superior cervical) - 10 units of Botox at 2 site/s  Left Cervical Paraspinal (superior cervical) - 10 units of Botox at 2 site/s                    Left levator (at neck) - 5 units of Botox at 1 site    Right Lev at the neck  5 unit of Botox at 1 site        3. HEAD, JAW & SCALP MUSCLES: 120 units Botox = Total Dose, 2:1 Dilution     Left masseter - 5 units of Botox at 1 site       Right Occipitalis - 15 units of Botox at 3 site/s.  Left Occipitalis - 15 units of Botox at 3 site/s.       Right Frontalis - 10 units of Botox at 2 site/s  Left Frontalis - 10 units of Botox at 2 site/s        Right Temporalis - 15 units of Botox at 3 site/s.  Left Temporalis - 30 units of Botox at 6 site/s.       Right  - 5 units of  Botox at 1 site/s.  Left  - 5 units of Botox at 1 site/s.     Procerus - 10 units of Botox at 1 site/s.         RESPONSE TO PROCEDURE:  Jessica Manriquez tolerated the procedure well and there were no immediate complications.   She was allowed to recover for an appropriate period of time and was discharged home in stable condition.    FOLLOW UP:  Jessica Manriquez was asked to follow up by phone in 7-14 days with Poonam Shoemaker RN, Care Coordinator, to report her response to this series of injections.  Based on the patient's previous response to this therapy, Jessica Manriquez was rescheduled for the next series of injections in 12 weeks.    PLAN (Medication Changes, Therapy Orders, Work or Disability Issues, etc.): Patient will continue to monitor response to today's injections.   Right levator added today based on the pain pattern.

## 2019-03-07 NOTE — NURSING NOTE
Chief Complaint   Patient presents with     Botox     [facility] RETURN - BOTOX, CHRONIC MIGRAINES       Thomas Ty, EMT

## 2019-05-06 DIAGNOSIS — T78.40XS ALLERGIC STATE, SEQUELA: ICD-10-CM

## 2019-05-06 RX ORDER — HYDROXYZINE HYDROCHLORIDE 25 MG/1
25-50 TABLET, FILM COATED ORAL EVERY 6 HOURS PRN
Qty: 60 TABLET | Refills: 1 | Status: CANCELLED | OUTPATIENT
Start: 2019-05-06

## 2019-05-06 NOTE — TELEPHONE ENCOUNTER
Reason for Call:  Medication or medication refill:    Do you use a [facility]?  Name of the pharmacy and phone number for the current request:  [facility] [phone]    Name of the medication requested: Atarax    Other request:   LAST REFILL: 09/08/2018  LOV: ??    Can we leave a detailed message on this number? Not Applicable    Phone number patient can be reached at: Home number on file [phone] (home)    Best Time: NA    Call taken on 5/6/2019 at 8:15 AM by Denise Behrendt

## 2019-05-09 ENCOUNTER — MYC REFILL (OUTPATIENT)
Dept: OBGYN | Facility: CLINIC | Age: 49
End: 2019-05-09

## 2019-05-09 DIAGNOSIS — T78.40XS ALLERGIC STATE, SEQUELA: ICD-10-CM

## 2019-05-10 RX ORDER — HYDROXYZINE HYDROCHLORIDE 25 MG/1
25-50 TABLET, FILM COATED ORAL EVERY 6 HOURS PRN
Qty: 60 TABLET | Refills: 1 | Status: SHIPPED | OUTPATIENT
Start: 2019-05-10 | End: 2020-01-02

## 2019-05-10 NOTE — TELEPHONE ENCOUNTER
Rx refill request for Atarax, Atarax last issued on 17 for a qty of 60 with 1 refill.  Date of last ov was on 18.    Rx has , to provider to issue new rx.    Kristine Rodriguez  [facility] RN

## 2019-06-06 ENCOUNTER — OFFICE VISIT (OUTPATIENT)
Dept: PHYSICAL MEDICINE AND REHAB | Facility: CLINIC | Age: 49
End: 2019-06-06
Payer: COMMERCIAL

## 2019-06-06 VITALS
BODY MASS INDEX: 20.58 KG/M2 | SYSTOLIC BLOOD PRESSURE: 103 MMHG | RESPIRATION RATE: 16 BRPM | HEART RATE: 93 BPM | DIASTOLIC BLOOD PRESSURE: 67 MMHG | WEIGHT: 147 LBS | OXYGEN SATURATION: 100 % | HEIGHT: 71 IN | TEMPERATURE: 97.5 F

## 2019-06-06 DIAGNOSIS — G43.719 INTRACTABLE CHRONIC MIGRAINE WITHOUT AURA AND WITHOUT STATUS MIGRAINOSUS: Primary | ICD-10-CM

## 2019-06-06 ASSESSMENT — PAIN SCALES - GENERAL: PAINLEVEL: MILD PAIN (2)

## 2019-06-06 ASSESSMENT — MIFFLIN-ST. JEOR: SCORE: 1387.92

## 2019-06-06 NOTE — NURSING NOTE
Chief Complaint   Patient presents with     Headache     [facility] RETURN BOTOX, CHRONIC MIGRAINES       Jayjay Loya, EMT

## 2019-06-06 NOTE — LETTER
6/6/2019     RE: Jessica Manriquez  [address]  [facility] [address]     Dear Colleague,    Thank you for referring your patient, Jessica Manriquez, to the [facility] PHYSICAL MEDICINE AND REHABILITATION at [facility]. Please see a copy of my visit note below.    BOTULINUM TOXIN PROCEDURE - HEADACHE - NOTE    Chief Complaint   Patient presents with     Headache     [facility] RETURN BOTOX, CHRONIC MIGRAINES       There were no vitals taken for this visit.       Current Outpatient Medications:      ALPRAZolam (XANAX) 0.5 MG tablet, Take 1 tablet (0.5 mg) by mouth every 6 hours as needed for anxiety, Disp: 40 tablet, Rfl: 0     amitriptyline (ELAVIL) 10 MG tablet, Take 1 tablet (10 mg) by mouth At Bedtime Take up to 5 tabs at bedtime prn insomnia., Disp: 100 tablet, Rfl: 3     aspirin-acetaminophen-caffeine (EXCEDRIN MIGRAINE) 250-250-65 MG per tablet, Take 1 tablet by mouth every 6 hours as needed for pain., Disp: 30 tablet, Rfl: 0     diazepam (VALIUM) 10 MG tablet, Place 1 tab vaginally for muscle spasms, Disp: 30 tablet, Rfl: 3     HYDROcodone-acetaminophen (NORCO) 5-325 MG per tablet, Take 1 tablet by mouth as needed, Disp: , Rfl:      hydrOXYzine (ATARAX) 25 MG tablet, Take 1-2 tablets (25-50 mg) by mouth every 6 hours as needed for itching, Disp: 60 tablet, Rfl: 1     ibuprofen (ADVIL,MOTRIN) 600 MG tablet, Take 1 tablet (600 mg) by mouth every 6 hours as needed for moderate pain (Patient not taking: Reported on 3/7/2019), Disp: 60 tablet, Rfl: 0     Lactobacillus (ACIDOPHILUS) 100 MG CAPS, Take 1 capsule by mouth daily, Disp: , Rfl:      Loperamide HCl (IMODIUM A-D PO), Take 1 tablet by mouth once as needed, Disp: , Rfl:      MULTI-VITAMIN OR TABS, 1 tablet by mouth DAILY, Disp: , Rfl:      OnabotulinumtoxinA (BOTOX IJ), Inject 200 Units as directed Total dose 200 units  Dose Administered:  175 units  Botox (Botulinum Toxin Type A)       2:1 Dilution (55 units unavoidable  waste) Unavoidable waste 25 units  Diluent Used:  Preservative Free Normal Saline Total Volume of Diluent Used:  2.9 ml Lot # /C3 with Expiration Date:  03/2021 NDC #: Botox 100u ([address]-02), Disp: , Rfl:      ondansetron (ZOFRAN ODT) 4 MG disintegrating tablet, Take 1-2 tablets (4-8 mg) by mouth every 8 hours as needed for nausea, Disp: 20 tablet, Rfl: 3     penciclovir (DENAVIR) 1 % cream, Apply topically every 2 hours (while awake), Disp: 1.5 g, Rfl: 3     SUMAtriptan (IMITREX) 50 MG tablet, Take 1 tablet (50 mg) by mouth at onset of headache for migraine May repeat dose in 2 hours.  Do not exceed 200 mg in 24 hours, Disp: 18 tablet, Rfl: 1     traMADol (ULTRAM) 50 MG tablet, Take 1-2 tablets ( mg) by mouth every 6 hours as needed for pain, Disp: 40 tablet, Rfl: 2     valACYclovir (VALTREX) 500 MG tablet, Take 1 tablet (500 mg) by mouth daily, Disp: 90 tablet, Rfl: 0     VITAMIN C 500 MG OR TABS, 1 tablet daily, Disp: , Rfl:      VITAMIN D, CHOLECALCIFEROL, PO, Take 1,000 Units by mouth daily, Disp: , Rfl:      zolpidem (AMBIEN) 5 MG tablet, Take 1 tablet (5 mg) by mouth nightly as needed for sleep, Disp: 90 tablet, Rfl: 1     Allergies   Allergen Reactions     Darvocet [Acetaminophen] Nausea and Vomiting     Erythromycin GI Disturbance     Flexeril [Cyclobenzaprine Hcl]      Lightheadedness, dizzy          PHYSICAL EXAM:  Last few days, she has had headaches.   She states a 'tiny' headache now.     HPI:    Patient reports the following new medical problems since last visit: UTI resolved with abx.  Cold resolved.     We reviewed the recommended safety guidelines for  Botox from any vaccine injection, such as the seasonal flu vaccine, by a minimum of 10-14 days with Jessica Manriquez. She acknowledged understanding.      RESPONSE TO PREVIOUS TREATMENT:  Change in headache pattern following last series of injections with 180 units of  Botox on 3/07/2019.    No problems reported    1.   Headache frequency during this injection cycle:  March 6 headaches, April; 6 headaches, and May was 3. In the last 2 weeks, she has had 2 headaches.   She felt the wear off and with the rain, her headaches have been worst.   She noted that the shots in the levators helped a lot.     2.  Headache duration during this injection cycle:  Headache duration ranged from 12 hours to 2 days.  Imitrex helps.     3.  Headache intensity during this injection cycle:    A.  5/10  =  Typical pain level.  B.  9/10  =  Worst pain level.  C.  0/10  =  Lowest pain level.    4.  Change in headache medication usage during this injection cycle:  (For Example:  Able to decrease use of oral pain medications.) No change in use of medication but does initiate use earlier in headache cycle.      5.  ER Visits During This Injection Cycle:  0      6.  Functional Performance:  Change in ADL's, social interaction, days lost from work, etc. Patient reports left work early two days. days lost from work due to headache during this injection cycle        BOTULINUM NEUROTOXIN INJECTION PROCEDURES:      VERIFICATION OF PATIENT IDENTIFICATION AND PROCEDURE     Initials   Patient Name fi   Patient  fi   Procedure Verified by: leilani     Prior to the start of the procedure and with procedural staff participation, I verbally confirmed the patient s identity using two indicators, relevant allergies, that the procedure was appropriate and matched the consent or emergent situation, and that the correct equipment/implants were available. Immediately prior to starting the procedure I conducted the Time Out with the procedural staff and re-confirmed the patient s name, procedure, and site/side. (The Joint Commission universal protocol was followed.)  Yes    Sedation (Moderate or Deep): None      Above assessments performed by:  Flori Trujillo MD      INDICATIONS FOR PROCEDURES:  Jessica Manriquez is a 48 year old patient with chronic migraine headaches associated  with cervicogenic  components.     Her baseline symptoms have been recalcitrant to oral medications and conservative therapy.  She is here today for reinjection with Botox.        GOAL OF PROCEDURE:  The goal of this procedure is to decrease pain       TOTAL DOSE ADMINISTERED:  Total dose 200 units   Dose Administered:  180 units  Botox (Botulinum Toxin Type A)       2:1 Dilution   Unavoidable waste 20 units    Diluent Used:  Preservative Free Normal Saline  Total Volume of Diluent Used:  4 ml  Lot #  [id number] with Expiration Date: 10/2021  NDC #: Botox 100u ([address]-35)    Medication guide was offered to patient and was declined.      CONSENT:  The risks, benefits, and treatment options were discussed with Jessica Manriquez and she agreed to proceed.    Written consent was obtained by fi.       EQUIPMENT USED:  Needle-25mm stimulating/recording  EMG/NCS Machine      SKIN PREPARATION:  Skin preparation was performed using an alcohol wipe.      GUIDANCE DESCRIPTION:  Electro-myographic guidance was necessary throughout the procedure to accurately identify all areas of spastic muscles while avoiding injection of non-spastic muscles, neighboring nerves and nearby vascular structures.       AREA/MUSCLE INJECTED:    1 & 2. SHOULDER GIRDLE & NECK MUSCLES: 60 units Botox = Total Dose, 2:1 Dilution                               Right Upper Trapezius 15 units of Botox at 3 sites               Left Upper Trapezius 15 units of Botox at 3 sites        Right Cervical Paraspinal (superior cervical) - 10 units of Botox at 2 site/s  Left Cervical Paraspinal (superior cervical) - 10 units of Botox at 2 site/s                    Left levator (at neck) - 5 units of Botox at 1 site    Right Lev at the neck  5 unit of Botox at 1 site        3. HEAD, JAW & SCALP MUSCLES: 120 units Botox = Total Dose, 2:1 Dilution     Left masseter - 5 units of Botox at 1 site       Right Occipitalis - 15 units of Botox at 3 site/s.  Left Occipitalis - 15  units of Botox at 3 site/s.       Right Frontalis - 10 units of Botox at 2 site/s  Left Frontalis - 10 units of Botox at 2 site/s        Right Temporalis - 15 units of Botox at 3 site/s.  Left Temporalis - 30 units of Botox at 6 site/s.       Right  - 5 units of Botox at 1 site/s.  Left  - 5 units of Botox at 1 site/s.     Procerus - 10 units of Botox at 1 site/s.         RESPONSE TO PROCEDURE:  Jessica Manriquez tolerated the procedure well and there were no immediate complications.   She was allowed to recover for an appropriate period of time and was discharged home in stable condition.      FOLLOW UP:  Jessica Manriquez was asked to follow up by phone in 7-14 days with Poonam Shoemaker RN, Care Coordinator, to report her response to this series of injections.  Based on the patient's previous response to this therapy, Jessica Manriquez was rescheduled for the next series of injections in 12 weeks.      PLAN (Medication Changes, Therapy Orders, Work or Disability Issues, etc.): Patient will continue to monitor response to today's injections.     No changes made today.     Again, thank you for allowing me to participate in the care of your patient.      Sincerely,    Flori Trujillo MD

## 2019-06-06 NOTE — PROGRESS NOTES
BOTULINUM TOXIN PROCEDURE - HEADACHE - NOTE    Chief Complaint   Patient presents with     Headache     [facility] RETURN BOTOX, CHRONIC MIGRAINES       There were no vitals taken for this visit.       Current Outpatient Medications:      ALPRAZolam (XANAX) 0.5 MG tablet, Take 1 tablet (0.5 mg) by mouth every 6 hours as needed for anxiety, Disp: 40 tablet, Rfl: 0     amitriptyline (ELAVIL) 10 MG tablet, Take 1 tablet (10 mg) by mouth At Bedtime Take up to 5 tabs at bedtime prn insomnia., Disp: 100 tablet, Rfl: 3     aspirin-acetaminophen-caffeine (EXCEDRIN MIGRAINE) 250-250-65 MG per tablet, Take 1 tablet by mouth every 6 hours as needed for pain., Disp: 30 tablet, Rfl: 0     diazepam (VALIUM) 10 MG tablet, Place 1 tab vaginally for muscle spasms, Disp: 30 tablet, Rfl: 3     HYDROcodone-acetaminophen (NORCO) 5-325 MG per tablet, Take 1 tablet by mouth as needed, Disp: , Rfl:      hydrOXYzine (ATARAX) 25 MG tablet, Take 1-2 tablets (25-50 mg) by mouth every 6 hours as needed for itching, Disp: 60 tablet, Rfl: 1     ibuprofen (ADVIL,MOTRIN) 600 MG tablet, Take 1 tablet (600 mg) by mouth every 6 hours as needed for moderate pain (Patient not taking: Reported on 3/7/2019), Disp: 60 tablet, Rfl: 0     Lactobacillus (ACIDOPHILUS) 100 MG CAPS, Take 1 capsule by mouth daily, Disp: , Rfl:      Loperamide HCl (IMODIUM A-D PO), Take 1 tablet by mouth once as needed, Disp: , Rfl:      MULTI-VITAMIN OR TABS, 1 tablet by mouth DAILY, Disp: , Rfl:      OnabotulinumtoxinA (BOTOX IJ), Inject 200 Units as directed Total dose 200 units  Dose Administered:  175 units  Botox (Botulinum Toxin Type A)       2:1 Dilution (55 units unavoidable waste) Unavoidable waste 25 units  Diluent Used:  Preservative Free Normal Saline Total Volume of Diluent Used:  2.9 ml Lot # /C3 with Expiration Date:  03/2021 NDC #: Botox 100u ([address]-76), Disp: , Rfl:      ondansetron (ZOFRAN ODT) 4 MG disintegrating tablet, Take 1-2 tablets (4-8 mg) by mouth  every 8 hours as needed for nausea, Disp: 20 tablet, Rfl: 3     penciclovir (DENAVIR) 1 % cream, Apply topically every 2 hours (while awake), Disp: 1.5 g, Rfl: 3     SUMAtriptan (IMITREX) 50 MG tablet, Take 1 tablet (50 mg) by mouth at onset of headache for migraine May repeat dose in 2 hours.  Do not exceed 200 mg in 24 hours, Disp: 18 tablet, Rfl: 1     traMADol (ULTRAM) 50 MG tablet, Take 1-2 tablets ( mg) by mouth every 6 hours as needed for pain, Disp: 40 tablet, Rfl: 2     valACYclovir (VALTREX) 500 MG tablet, Take 1 tablet (500 mg) by mouth daily, Disp: 90 tablet, Rfl: 0     VITAMIN C 500 MG OR TABS, 1 tablet daily, Disp: , Rfl:      VITAMIN D, CHOLECALCIFEROL, PO, Take 1,000 Units by mouth daily, Disp: , Rfl:      zolpidem (AMBIEN) 5 MG tablet, Take 1 tablet (5 mg) by mouth nightly as needed for sleep, Disp: 90 tablet, Rfl: 1     Allergies   Allergen Reactions     Darvocet [Acetaminophen] Nausea and Vomiting     Erythromycin GI Disturbance     Flexeril [Cyclobenzaprine Hcl]      Lightheadedness, dizzy          PHYSICAL EXAM:  Last few days, she has had headaches.   She states a 'tiny' headache now.     HPI:    Patient reports the following new medical problems since last visit: UTI resolved with abx.  Cold resolved.     We reviewed the recommended safety guidelines for  Botox from any vaccine injection, such as the seasonal flu vaccine, by a minimum of 10-14 days with Jessica Manriquez. She acknowledged understanding.      RESPONSE TO PREVIOUS TREATMENT:  Change in headache pattern following last series of injections with 180 units of  Botox on 3/07/2019.    No problems reported    1.  Headache frequency during this injection cycle:  March 6 headaches, April; 6 headaches, and May was 3. In the last 2 weeks, she has had 2 headaches.   She felt the wear off and with the rain, her headaches have been worst.   She noted that the shots in the levators helped a lot.     2.  Headache duration during  this injection cycle:  Headache duration ranged from 12 hours to 2 days.  Imitrex helps.     3.  Headache intensity during this injection cycle:    A.  5/10  =  Typical pain level.  B.  9/10  =  Worst pain level.  C.  0/10  =  Lowest pain level.    4.  Change in headache medication usage during this injection cycle:  (For Example:  Able to decrease use of oral pain medications.) No change in use of medication but does initiate use earlier in headache cycle.      5.  ER Visits During This Injection Cycle:  0      6.  Functional Performance:  Change in ADL's, social interaction, days lost from work, etc. Patient reports left work early two days. days lost from work due to headache during this injection cycle        BOTULINUM NEUROTOXIN INJECTION PROCEDURES:      VERIFICATION OF PATIENT IDENTIFICATION AND PROCEDURE     Initials   Patient Name fi   Patient  fi   Procedure Verified by: leilani     Prior to the start of the procedure and with procedural staff participation, I verbally confirmed the patient s identity using two indicators, relevant allergies, that the procedure was appropriate and matched the consent or emergent situation, and that the correct equipment/implants were available. Immediately prior to starting the procedure I conducted the Time Out with the procedural staff and re-confirmed the patient s name, procedure, and site/side. (The Joint Commission universal protocol was followed.)  Yes    Sedation (Moderate or Deep): None      Above assessments performed by:  Flori Trujillo MD      INDICATIONS FOR PROCEDURES:  Jessica Manriquez is a 48 year old patient with chronic migraine headaches associated with cervicogenic  components.     Her baseline symptoms have been recalcitrant to oral medications and conservative therapy.  She is here today for reinjection with Botox.        GOAL OF PROCEDURE:  The goal of this procedure is to decrease pain       TOTAL DOSE ADMINISTERED:  Total dose 200 units   Dose  Administered:  180 units  Botox (Botulinum Toxin Type A)       2:1 Dilution   Unavoidable waste 20 units    Diluent Used:  Preservative Free Normal Saline  Total Volume of Diluent Used:  4 ml  Lot #  [id number] with Expiration Date: 10/2021  NDC #: Botox 100u ([address]-44)    Medication guide was offered to patient and was declined.      CONSENT:  The risks, benefits, and treatment options were discussed with Jessica Manriquez and she agreed to proceed.    Written consent was obtained by fi.       EQUIPMENT USED:  Needle-25mm stimulating/recording  EMG/NCS Machine      SKIN PREPARATION:  Skin preparation was performed using an alcohol wipe.      GUIDANCE DESCRIPTION:  Electro-myographic guidance was necessary throughout the procedure to accurately identify all areas of spastic muscles while avoiding injection of non-spastic muscles, neighboring nerves and nearby vascular structures.       AREA/MUSCLE INJECTED:    1 & 2. SHOULDER GIRDLE & NECK MUSCLES: 60 units Botox = Total Dose, 2:1 Dilution                               Right Upper Trapezius 15 units of Botox at 3 sites               Left Upper Trapezius 15 units of Botox at 3 sites        Right Cervical Paraspinal (superior cervical) - 10 units of Botox at 2 site/s  Left Cervical Paraspinal (superior cervical) - 10 units of Botox at 2 site/s                    Left levator (at neck) - 5 units of Botox at 1 site    Right Lev at the neck  5 unit of Botox at 1 site        3. HEAD, JAW & SCALP MUSCLES: 120 units Botox = Total Dose, 2:1 Dilution     Left masseter - 5 units of Botox at 1 site       Right Occipitalis - 15 units of Botox at 3 site/s.  Left Occipitalis - 15 units of Botox at 3 site/s.       Right Frontalis - 10 units of Botox at 2 site/s  Left Frontalis - 10 units of Botox at 2 site/s        Right Temporalis - 15 units of Botox at 3 site/s.  Left Temporalis - 30 units of Botox at 6 site/s.       Right  - 5 units of Botox at 1 site/s.  Left   - 5 units of Botox at 1 site/s.     Procerus - 10 units of Botox at 1 site/s.         RESPONSE TO PROCEDURE:  Jessica Manriquez tolerated the procedure well and there were no immediate complications.   She was allowed to recover for an appropriate period of time and was discharged home in stable condition.      FOLLOW UP:  Jessica Manriquez was asked to follow up by phone in 7-14 days with Poonam Shoemaker RN, Care Coordinator, to report her response to this series of injections.  Based on the patient's previous response to this therapy, Jessica Manriquez was rescheduled for the next series of injections in 12 weeks.      PLAN (Medication Changes, Therapy Orders, Work or Disability Issues, etc.): Patient will continue to monitor response to today's injections.     No changes made today.

## 2019-07-22 ENCOUNTER — OFFICE VISIT (OUTPATIENT)
Dept: DERMATOLOGY | Facility: CLINIC | Age: 49
End: 2019-07-22
Payer: COMMERCIAL

## 2019-07-22 VITALS — HEART RATE: 83 BPM | OXYGEN SATURATION: 99 % | SYSTOLIC BLOOD PRESSURE: 105 MMHG | DIASTOLIC BLOOD PRESSURE: 73 MMHG

## 2019-07-22 DIAGNOSIS — D22.9 NEVUS: ICD-10-CM

## 2019-07-22 DIAGNOSIS — D23.9 DERMAL NEVUS: ICD-10-CM

## 2019-07-22 DIAGNOSIS — D18.01 ANGIOMA OF SKIN: ICD-10-CM

## 2019-07-22 DIAGNOSIS — L82.1 SEBORRHEIC KERATOSIS: ICD-10-CM

## 2019-07-22 DIAGNOSIS — L81.4 LENTIGO: Primary | ICD-10-CM

## 2019-07-22 PROCEDURE — 99213 OFFICE O/P EST LOW 20 MIN: CPT | Performed by: DERMATOLOGY

## 2019-07-22 NOTE — LETTER
7/22/2019         RE: Jessica Manriquez  [address]  [facility] [address]        Dear Colleague,    Thank you for referring your patient, Jessica Manriquez, to the [facility]. Please see a copy of my visit note below.    Jessica Manriquez is a 48 year old year old female patient here today for mole son kofi .  Patient states this has been present for years.  Patient reports the following symptoms:  none.  Patient reports the following previous treatments none.  Patient reports the following modifying factors none.  Associated symptoms: none.  Patient has no other skin complaints today.  Remainder of the HPI, Meds, PMH, Allergies, FH, and SH was reviewed in chart.      Past Medical History:   Diagnosis Date     Cervicalgia     MRI x2 canal stenosis, disk hernieations, degenrative changes Last MRI 2004 at [facility] pain clinic     Dysplasia of cervix, unspecified 11/01    SALOMÓN 3, treated with LEEP     Endometriosis, site unspecified     Doing ok on BCP's has not had laparoscopy     Other anxiety states     has used Ambien and Xanax prn     Pure hypercholesterolemia      Scapulocostal syndrome 9/23/2009     Selective IgA immunodeficiency (H)     recurrent respir infections     Temporomandibular joint disorders, unspecified     Seen at [facility], uses splint, PT , acupuncture       Past Surgical History:   Procedure Laterality Date     HC ENLARGE BREAST WITH IMPLANT  2000     HC REVISE BREAST RECONSTRUCTION  5/03     LEEP TX, CERVICAL  11/01    SALOMÓN 3, yearly paps until 2021     LIGATN/STRIP LONG & SHORT SAPHEN  11/04    Bilateral vein stripping     TUBAL LIGATION  10/30/09    filshie clips        Family History   Problem Relation Age of Onset     Arthritis Mother      Respiratory Mother         asthma     Allergies Mother      Cancer Mother      Blood Disease Mother         leukemia     Breast Cancer Mother      Breast Cancer Maternal Grandmother         age 60's     Heart Disease Maternal  Grandmother      Cerebrovascular Disease Paternal Grandmother      Cancer Paternal Grandfather         ?biliary     Alcohol/Drug Paternal Grandfather      Allergies Sister        Social History     Socioeconomic History     Marital status: Single     Spouse name: Not on file     Number of children: 0     Years of education: Not on file     Highest education level: Not on file   Occupational History     Occupation: Health      Employer: [employer]     Occupation: Author of book   Social Needs     Financial resource strain: Not on file     Food insecurity:     Worry: Not on file     Inability: Not on file     Transportation needs:     Medical: Not on file     Non-medical: Not on file   Tobacco Use     Smoking status: Never Smoker     Smokeless tobacco: Never Used   Substance and Sexual Activity     Alcohol use: No     Drug use: No     Sexual activity: Yes     Partners: Male     Birth control/protection: Surgical     Comment: tubal   Lifestyle     Physical activity:     Days per week: Not on file     Minutes per session: Not on file     Stress: Not on file   Relationships     Social connections:     Talks on phone: Not on file     Gets together: Not on file     Attends Spiritism service: Not on file     Active member of club or organization: Not on file     Attends meetings of clubs or organizations: Not on file     Relationship status: Not on file     Intimate partner violence:     Fear of current or ex partner: Not on file     Emotionally abused: Not on file     Physically abused: Not on file     Forced sexual activity: Not on file   Other Topics Concern     Parent/sibling w/ CABG, MI or angioplasty before 65F 55M? No   Social History Narrative     Not on file       Outpatient Encounter Medications as of 7/22/2019   Medication Sig Dispense Refill     ALPRAZolam (XANAX) 0.5 MG tablet Take 1 tablet (0.5 mg) by mouth every 6 hours as needed for anxiety 40 tablet 0     amitriptyline (ELAVIL) 10 MG  tablet Take 1 tablet (10 mg) by mouth At Bedtime Take up to 5 tabs at bedtime prn insomnia. 100 tablet 3     aspirin-acetaminophen-caffeine (EXCEDRIN MIGRAINE) 250-250-65 MG per tablet Take 1 tablet by mouth every 6 hours as needed for pain. 30 tablet 0     diazepam (VALIUM) 10 MG tablet Place 1 tab vaginally for muscle spasms 30 tablet 3     HYDROcodone-acetaminophen (NORCO) 5-325 MG per tablet Take 1 tablet by mouth as needed       hydrOXYzine (ATARAX) 25 MG tablet Take 1-2 tablets (25-50 mg) by mouth every 6 hours as needed for itching 60 tablet 1     ibuprofen (ADVIL,MOTRIN) 600 MG tablet Take 1 tablet (600 mg) by mouth every 6 hours as needed for moderate pain 60 tablet 0     Lactobacillus (ACIDOPHILUS) 100 MG CAPS Take 1 capsule by mouth daily       Loperamide HCl (IMODIUM A-D PO) Take 1 tablet by mouth once as needed       MULTI-VITAMIN OR TABS 1 tablet by mouth DAILY       OnabotulinumtoxinA (BOTOX IJ) Inject 200 Units as directed Total dose 200 units   Dose Administered:  175 units  Botox (Botulinum Toxin Type A)       2:1 Dilution (55 units unavoidable waste)  Unavoidable waste 25 units   Diluent Used:  Preservative Free Normal Saline  Total Volume of Diluent Used:  2.9 ml  Lot # /C3 with Expiration Date:  03/2021  NDC #: Botox 100u ([address]-27)       ondansetron (ZOFRAN ODT) 4 MG disintegrating tablet Take 1-2 tablets (4-8 mg) by mouth every 8 hours as needed for nausea 20 tablet 3     penciclovir (DENAVIR) 1 % cream Apply topically every 2 hours (while awake) 1.5 g 3     SUMAtriptan (IMITREX) 50 MG tablet Take 1 tablet (50 mg) by mouth at onset of headache for migraine May repeat dose in 2 hours.  Do not exceed 200 mg in 24 hours 18 tablet 1     traMADol (ULTRAM) 50 MG tablet Take 1-2 tablets ( mg) by mouth every 6 hours as needed for pain 40 tablet 2     valACYclovir (VALTREX) 500 MG tablet Take 1 tablet (500 mg) by mouth daily 90 tablet 0     VITAMIN C 500 MG OR TABS 1 tablet daily        VITAMIN D, CHOLECALCIFEROL, PO Take 1,000 Units by mouth daily       zolpidem (AMBIEN) 5 MG tablet Take 1 tablet (5 mg) by mouth nightly as needed for sleep 90 tablet 1     No facility-administered encounter medications on file as of 7/22/2019.              Review Of Systems  Skin: As above  Eyes: negative  Ears/Nose/Throat: negative  Respiratory: No shortness of breath, dyspnea on exertion, cough, or hemoptysis  Cardiovascular: negative  Gastrointestinal: negative  Genitourinary: negative  Musculoskeletal: negative  Neurologic: negative  Psychiatric: negative  Hematologic/Lymphatic/Immunologic: negative  Endocrine: negative      O:   NAD, WDWN, Alert & Oriented, Mood & Affect wnl, Vitals stable   Here today alone   /73   Pulse 83   SpO2 99%    General appearance normal   Vitals stable   Alert, oriented and in no acute distress      Following lymph nodes palpated: Occipital, Cervical, Supraclavicular no lad   tihg with 3 <1mm pigmented macules uniform regular borders and pigment networks   Scattered 1mm pigmented macule son trunk and ext with regula brorders and pigment netowrks      Stuck on papules and brown macules on trunk and ext   Red papules on trunk  Flesh colored papules on trunk     The remainder of the full exam was unremarkable; the following areas were examined:  conjunctiva/lids, oral mucosa, neck, peripheral vascular system, abdomen, lymph nodes, digits/nails, eccrine and apocrine glands, scalp/hair, face, neck, chest, abdomen, buttocks, back, RUE, LUE, RLE, LLE       Eyes: Conjunctivae/lids:Normal     ENT: Lips, buccal mucosa, tongue: normal    MSK:Normal    Cardiovascular: peripheral edema none    Pulm: Breathing Normal    Lymph Nodes: No Head and Neck Lymphadenopathy     Neuro/Psych: Orientation:Normal; Mood/Affect:Normal      A/P:  1. Seborrheic keratosis, lentigo, angioma, dermal nevus, nevi     BENIGN LESIONS DISCUSSED WITH PATIENT:  I discussed the specifics of tumor, prognosis, and  genetics of benign lesions.  I explained that treatment of these lesions would be purely cosmetic and not medically neccessary.  I discussed with patient different removal options including excision, cautery and /or laser.      Nature and genetics of benign skin lesions dicussed with patient.  Signs and Symptoms of skin cancer discussed with patient.  ABCDEs of melanoma reviewed with patient.  Patient encouraged to perform monthly skin exams.  UV precautions reviewed with patient.  Skin care regimen reviewed with patient: Eliminate harsh soaps, i.e. Dial, zest, irsih spring; Mild soaps such as Cetaphil or Dove sensitive skin, avoid hot or cold showers, aggressive use of emollients including vanicream, cetaphil or cerave discussed with patient.    Risks of non-melanoma skin cancer discussed with patient   Return to clinic [id number]      Again, thank you for allowing me to participate in the care of your patient.        Sincerely,        Sage Davis MD

## 2019-07-22 NOTE — PROGRESS NOTES
Jessica Manriquez is a 48 year old year old female patient here today for mole son kofi .  Patient states this has been present for years.  Patient reports the following symptoms:  none.  Patient reports the following previous treatments none.  Patient reports the following modifying factors none.  Associated symptoms: none.  Patient has no other skin complaints today.  Remainder of the HPI, Meds, PMH, Allergies, FH, and SH was reviewed in chart.      Past Medical History:   Diagnosis Date     Cervicalgia     MRI x2 canal stenosis, disk hernieations, degenrative changes Last MRI 2004 at [facility] pain clinic     Dysplasia of cervix, unspecified 11/01    SALOMÓN 3, treated with LEEP     Endometriosis, site unspecified     Doing ok on BCP's has not had laparoscopy     Other anxiety states     has used Ambien and Xanax prn     Pure hypercholesterolemia      Scapulocostal syndrome 9/23/2009     Selective IgA immunodeficiency (H)     recurrent respir infections     Temporomandibular joint disorders, unspecified     Seen at [facility], uses splint, PT , acupuncture       Past Surgical History:   Procedure Laterality Date     HC ENLARGE BREAST WITH IMPLANT  2000     HC REVISE BREAST RECONSTRUCTION  5/03     LEEP TX, CERVICAL  11/01    SALOMÓN 3, yearly paps until 2021     LIGATN/STRIP LONG & SHORT SAPHEN  11/04    Bilateral vein stripping     TUBAL LIGATION  10/30/09    filshie clips        Family History   Problem Relation Age of Onset     Arthritis Mother      Respiratory Mother         asthma     Allergies Mother      Cancer Mother      Blood Disease Mother         leukemia     Breast Cancer Mother      Breast Cancer Maternal Grandmother         age 60's     Heart Disease Maternal Grandmother      Cerebrovascular Disease Paternal Grandmother      Cancer Paternal Grandfather         ?biliary     Alcohol/Drug Paternal Grandfather      Allergies Sister        Social History     Socioeconomic History     Marital status: Single      Spouse name: Not on file     Number of children: 0     Years of education: Not on file     Highest education level: Not on file   Occupational History     Occupation: Health      Employer: [employer]     Occupation: Author of book   Social Needs     Financial resource strain: Not on file     Food insecurity:     Worry: Not on file     Inability: Not on file     Transportation needs:     Medical: Not on file     Non-medical: Not on file   Tobacco Use     Smoking status: Never Smoker     Smokeless tobacco: Never Used   Substance and Sexual Activity     Alcohol use: No     Drug use: No     Sexual activity: Yes     Partners: Male     Birth control/protection: Surgical     Comment: tubal   Lifestyle     Physical activity:     Days per week: Not on file     Minutes per session: Not on file     Stress: Not on file   Relationships     Social connections:     Talks on phone: Not on file     Gets together: Not on file     Attends Zoroastrianism service: Not on file     Active member of club or organization: Not on file     Attends meetings of clubs or organizations: Not on file     Relationship status: Not on file     Intimate partner violence:     Fear of current or ex partner: Not on file     Emotionally abused: Not on file     Physically abused: Not on file     Forced sexual activity: Not on file   Other Topics Concern     Parent/sibling w/ CABG, MI or angioplasty before 65F 55M? No   Social History Narrative     Not on file       Outpatient Encounter Medications as of 7/22/2019   Medication Sig Dispense Refill     ALPRAZolam (XANAX) 0.5 MG tablet Take 1 tablet (0.5 mg) by mouth every 6 hours as needed for anxiety 40 tablet 0     amitriptyline (ELAVIL) 10 MG tablet Take 1 tablet (10 mg) by mouth At Bedtime Take up to 5 tabs at bedtime prn insomnia. 100 tablet 3     aspirin-acetaminophen-caffeine (EXCEDRIN MIGRAINE) 250-250-65 MG per tablet Take 1 tablet by mouth every 6 hours as needed for pain. 30 tablet 0      diazepam (VALIUM) 10 MG tablet Place 1 tab vaginally for muscle spasms 30 tablet 3     HYDROcodone-acetaminophen (NORCO) 5-325 MG per tablet Take 1 tablet by mouth as needed       hydrOXYzine (ATARAX) 25 MG tablet Take 1-2 tablets (25-50 mg) by mouth every 6 hours as needed for itching 60 tablet 1     ibuprofen (ADVIL,MOTRIN) 600 MG tablet Take 1 tablet (600 mg) by mouth every 6 hours as needed for moderate pain 60 tablet 0     Lactobacillus (ACIDOPHILUS) 100 MG CAPS Take 1 capsule by mouth daily       Loperamide HCl (IMODIUM A-D PO) Take 1 tablet by mouth once as needed       MULTI-VITAMIN OR TABS 1 tablet by mouth DAILY       OnabotulinumtoxinA (BOTOX IJ) Inject 200 Units as directed Total dose 200 units   Dose Administered:  175 units  Botox (Botulinum Toxin Type A)       2:1 Dilution (55 units unavoidable waste)  Unavoidable waste 25 units   Diluent Used:  Preservative Free Normal Saline  Total Volume of Diluent Used:  2.9 ml  Lot # /C3 with Expiration Date:  03/2021  NDC #: Botox 100u ([address]-84)       ondansetron (ZOFRAN ODT) 4 MG disintegrating tablet Take 1-2 tablets (4-8 mg) by mouth every 8 hours as needed for nausea 20 tablet 3     penciclovir (DENAVIR) 1 % cream Apply topically every 2 hours (while awake) 1.5 g 3     SUMAtriptan (IMITREX) 50 MG tablet Take 1 tablet (50 mg) by mouth at onset of headache for migraine May repeat dose in 2 hours.  Do not exceed 200 mg in 24 hours 18 tablet 1     traMADol (ULTRAM) 50 MG tablet Take 1-2 tablets ( mg) by mouth every 6 hours as needed for pain 40 tablet 2     valACYclovir (VALTREX) 500 MG tablet Take 1 tablet (500 mg) by mouth daily 90 tablet 0     VITAMIN C 500 MG OR TABS 1 tablet daily       VITAMIN D, CHOLECALCIFEROL, PO Take 1,000 Units by mouth daily       zolpidem (AMBIEN) 5 MG tablet Take 1 tablet (5 mg) by mouth nightly as needed for sleep 90 tablet 1     No facility-administered encounter medications on file as of 7/22/2019.               Review Of Systems  Skin: As above  Eyes: negative  Ears/Nose/Throat: negative  Respiratory: No shortness of breath, dyspnea on exertion, cough, or hemoptysis  Cardiovascular: negative  Gastrointestinal: negative  Genitourinary: negative  Musculoskeletal: negative  Neurologic: negative  Psychiatric: negative  Hematologic/Lymphatic/Immunologic: negative  Endocrine: negative      O:   NAD, WDWN, Alert & Oriented, Mood & Affect wnl, Vitals stable   Here today alone   /73   Pulse 83   SpO2 99%    General appearance normal   Vitals stable   Alert, oriented and in no acute distress      Following lymph nodes palpated: Occipital, Cervical, Supraclavicular no lad   tihg with 3 <1mm pigmented macules uniform regular borders and pigment networks   Scattered 1mm pigmented macule son trunk and ext with regula brorders and pigment netowrks      Stuck on papules and brown macules on trunk and ext   Red papules on trunk  Flesh colored papules on trunk     The remainder of the full exam was unremarkable; the following areas were examined:  conjunctiva/lids, oral mucosa, neck, peripheral vascular system, abdomen, lymph nodes, digits/nails, eccrine and apocrine glands, scalp/hair, face, neck, chest, abdomen, buttocks, back, RUE, LUE, RLE, LLE       Eyes: Conjunctivae/lids:Normal     ENT: Lips, buccal mucosa, tongue: normal    MSK:Normal    Cardiovascular: peripheral edema none    Pulm: Breathing Normal    Lymph Nodes: No Head and Neck Lymphadenopathy     Neuro/Psych: Orientation:Normal; Mood/Affect:Normal      A/P:  1. Seborrheic keratosis, lentigo, angioma, dermal nevus, nevi     BENIGN LESIONS DISCUSSED WITH PATIENT:  I discussed the specifics of tumor, prognosis, and genetics of benign lesions.  I explained that treatment of these lesions would be purely cosmetic and not medically neccessary.  I discussed with patient different removal options including excision, cautery and /or laser.      Nature and genetics  of benign skin lesions dicussed with patient.  Signs and Symptoms of skin cancer discussed with patient.  ABCDEs of melanoma reviewed with patient.  Patient encouraged to perform monthly skin exams.  UV precautions reviewed with patient.  Skin care regimen reviewed with patient: Eliminate harsh soaps, i.e. Dial, zest, irsih spring; Mild soaps such as Cetaphil or Dove sensitive skin, avoid hot or cold showers, aggressive use of emollients including vanicream, cetaphil or cerave discussed with patient.    Risks of non-melanoma skin cancer discussed with patient   Return to clinic 1 [id number]

## 2019-09-05 ENCOUNTER — OFFICE VISIT (OUTPATIENT)
Dept: PHYSICAL MEDICINE AND REHAB | Facility: CLINIC | Age: 49
End: 2019-09-05
Payer: COMMERCIAL

## 2019-09-05 VITALS
HEIGHT: 71 IN | BODY MASS INDEX: 20.58 KG/M2 | OXYGEN SATURATION: 98 % | DIASTOLIC BLOOD PRESSURE: 72 MMHG | HEART RATE: 95 BPM | RESPIRATION RATE: 17 BRPM | SYSTOLIC BLOOD PRESSURE: 110 MMHG | WEIGHT: 147 LBS

## 2019-09-05 DIAGNOSIS — G43.719 INTRACTABLE CHRONIC MIGRAINE WITHOUT AURA AND WITHOUT STATUS MIGRAINOSUS: Primary | ICD-10-CM

## 2019-09-05 ASSESSMENT — MIFFLIN-ST. JEOR: SCORE: 1387.92

## 2019-09-05 ASSESSMENT — PAIN SCALES - GENERAL: PAINLEVEL: NO PAIN (0)

## 2019-09-05 NOTE — LETTER
"9/5/2019       RE: Jessica Manriquez  [address]  [facility] [address]     Dear Colleague,    Thank you for referring your patient, Jessica Manriquez, to the [facility] PHYSICAL MEDICINE AND REHABILITATION at [facility]. Please see a copy of my visit note below.    BOTULINUM TOXIN PROCEDURE - HEADACHE - NOTE    Chief Complaint   Patient presents with     RECHECK     Chronic migraine botox        /72   Pulse 95   Resp 17   Ht 1.803 m (5' 11\")   Wt 66.7 kg (147 lb)   SpO2 98%   BMI 20.50 kg/m          Current Outpatient Medications:      ALPRAZolam (XANAX) 0.5 MG tablet, Take 1 tablet (0.5 mg) by mouth every 6 hours as needed for anxiety, Disp: 40 tablet, Rfl: 0     amitriptyline (ELAVIL) 10 MG tablet, Take 1 tablet (10 mg) by mouth At Bedtime Take up to 5 tabs at bedtime prn insomnia., Disp: 100 tablet, Rfl: 3     aspirin-acetaminophen-caffeine (EXCEDRIN MIGRAINE) 250-250-65 MG per tablet, Take 1 tablet by mouth every 6 hours as needed for pain., Disp: 30 tablet, Rfl: 0     diazepam (VALIUM) 10 MG tablet, Place 1 tab vaginally for muscle spasms, Disp: 30 tablet, Rfl: 3     HYDROcodone-acetaminophen (NORCO) 5-325 MG per tablet, Take 1 tablet by mouth as needed, Disp: , Rfl:      hydrOXYzine (ATARAX) 25 MG tablet, Take 1-2 tablets (25-50 mg) by mouth every 6 hours as needed for itching, Disp: 60 tablet, Rfl: 1     ibuprofen (ADVIL,MOTRIN) 600 MG tablet, Take 1 tablet (600 mg) by mouth every 6 hours as needed for moderate pain, Disp: 60 tablet, Rfl: 0     Lactobacillus (ACIDOPHILUS) 100 MG CAPS, Take 1 capsule by mouth daily, Disp: , Rfl:      Loperamide HCl (IMODIUM A-D PO), Take 1 tablet by mouth once as needed, Disp: , Rfl:      MULTI-VITAMIN OR TABS, 1 tablet by mouth DAILY, Disp: , Rfl:      OnabotulinumtoxinA (BOTOX IJ), Inject 200 Units as directed Total dose 200 units  Dose Administered:  175 units  Botox (Botulinum Toxin Type A)       2:1 Dilution (55 " units unavoidable waste) Unavoidable waste 25 units  Diluent Used:  Preservative Free Normal Saline Total Volume of Diluent Used:  2.9 ml Lot # /C3 with Expiration Date:  03/2021 NDC #: Botox 100u ([address]-40), Disp: , Rfl:      ondansetron (ZOFRAN ODT) 4 MG disintegrating tablet, Take 1-2 tablets (4-8 mg) by mouth every 8 hours as needed for nausea, Disp: 20 tablet, Rfl: 3     penciclovir (DENAVIR) 1 % cream, Apply topically every 2 hours (while awake), Disp: 1.5 g, Rfl: 3     SUMAtriptan (IMITREX) 50 MG tablet, Take 1 tablet (50 mg) by mouth at onset of headache for migraine May repeat dose in 2 hours.  Do not exceed 200 mg in 24 hours, Disp: 18 tablet, Rfl: 1     traMADol (ULTRAM) 50 MG tablet, Take 1-2 tablets ( mg) by mouth every 6 hours as needed for pain, Disp: 40 tablet, Rfl: 2     valACYclovir (VALTREX) 500 MG tablet, Take 1 tablet (500 mg) by mouth daily, Disp: 90 tablet, Rfl: 0     VITAMIN C 500 MG OR TABS, 1 tablet daily, Disp: , Rfl:      VITAMIN D, CHOLECALCIFEROL, PO, Take 1,000 Units by mouth daily, Disp: , Rfl:      zolpidem (AMBIEN) 5 MG tablet, Take 1 tablet (5 mg) by mouth nightly as needed for sleep, Disp: 90 tablet, Rfl: 1     Allergies   Allergen Reactions     Darvocet [Acetaminophen] Nausea and Vomiting     Erythromycin GI Disturbance     Flexeril [Cyclobenzaprine Hcl]      Lightheadedness, dizzy          PHYSICAL EXAM:  Denies any headache     HPI:    Patient reports the following new medical problems since last visit: UTI resolved with abx.  Cold resolved.     We reviewed the recommended safety guidelines for  Botox from any vaccine injection, such as the seasonal flu vaccine, by a minimum of 10-14 days with Jessica Manriquez. She acknowledged understanding.      RESPONSE TO PREVIOUS TREATMENT:  Change in headache pattern following last series of injections with 180 units of  Botox on 6/06/2019.    No problems reported    1.  Headache frequency during this injection  cycle:  Overall she had 10 headaches over the cycle.  3 of these were severe and remainder were mild.   She felt the wear off and with the rain, her headaches have been worst.   She noted that the shots in the levators helped a lot.     2.  Headache duration during this injection cycle:  Headache duration ranged from 12 hours to 2 days.  Imitrex helps.     3.  Headache intensity during this injection cycle:    A.  2/10  =  Typical pain level.(improved)  B.  9/10  =  Worst pain level.  C.  0/10  =  Lowest pain level.    4.  Change in headache medication usage during this injection cycle:  (For Example:  Able to decrease use of oral pain medications.) No change in use of medication but does initiate use earlier in headache cycle.      5.  ER Visits During This Injection Cycle:  0      6.  Functional Performance:  Change in ADL's, social interaction, days lost from work, etc. Patient reports left work early two days. days lost from work due to headache during this injection cycle        BOTULINUM NEUROTOXIN INJECTION PROCEDURES:      VERIFICATION OF PATIENT IDENTIFICATION AND PROCEDURE     Initials   Patient Name fi   Patient  fi   Procedure Verified by: leilani     Prior to the start of the procedure and with procedural staff participation, I verbally confirmed the patient s identity using two indicators, relevant allergies, that the procedure was appropriate and matched the consent or emergent situation, and that the correct equipment/implants were available. Immediately prior to starting the procedure I conducted the Time Out with the procedural staff and re-confirmed the patient s name, procedure, and site/side. (The Joint Commission universal protocol was followed.)  Yes    Sedation (Moderate or Deep): None      Above assessments performed by:  Flori Trujillo MD      INDICATIONS FOR PROCEDURES:  Jessica Manriquez is a 48 year old patient with chronic migraine headaches associated with cervicogenic  components.      Her baseline symptoms have been recalcitrant to oral medications and conservative therapy.  She is here today for reinjection with Botox.        GOAL OF PROCEDURE:  The goal of this procedure is to decrease pain       TOTAL DOSE ADMINISTERED:  Total dose 200 units   Dose Administered:  180 units  Botox (Botulinum Toxin Type A)       2:1 Dilution   Unavoidable waste 20 units    Diluent Used:  Preservative Free Normal Saline  Total Volume of Diluent Used:  4 ml  Lot #  [id number] with Expiration Date: 3/2022  NDC #: Botox 100u ([address]-82)    Medication guide was offered to patient and was declined.      CONSENT:  The risks, benefits, and treatment options were discussed with Jessica Manriquez and she agreed to proceed.    Written consent was obtained by .       EQUIPMENT USED:  Needle-25mm stimulating/recording  EMG/NCS Machine      SKIN PREPARATION:  Skin preparation was performed using an alcohol wipe.      GUIDANCE DESCRIPTION:  Electro-myographic guidance was necessary throughout the procedure to accurately identify all areas of spastic muscles while avoiding injection of non-spastic muscles, neighboring nerves and nearby vascular structures.       AREA/MUSCLE INJECTED:    1 & 2. SHOULDER GIRDLE & NECK MUSCLES: 60 units Botox = Total Dose, 2:1 Dilution                               Right Upper Trapezius 15 units of Botox at 3 sites               Left Upper Trapezius 15 units of Botox at 3 sites        Right Cervical Paraspinal (superior cervical) - 10 units of Botox at 2 site/s  Left Cervical Paraspinal (superior cervical) - 10 units of Botox at 2 site/s                    Left levator (at neck) - 5 units of Botox at 1 site    Right Lev at the neck  5 unit of Botox at 1 site        3. HEAD, JAW & SCALP MUSCLES: 120 units Botox = Total Dose, 2:1 Dilution     Left masseter - 5 units of Botox at 1 site       Right Occipitalis - 15 units of Botox at 3 site/s.  Left Occipitalis - 15 units of Botox at 3  site/s.       Right Frontalis - 10 units of Botox at 2 site/s  Left Frontalis - 10 units of Botox at 2 site/s        Right Temporalis - 15 units of Botox at 3 site/s.  Left Temporalis - 30 units of Botox at 6 site/s.       Right  - 5 units of Botox at 1 site/s.  Left  - 5 units of Botox at 1 site/s.     Procerus - 10 units of Botox at 1 site/s.         RESPONSE TO PROCEDURE:  Jessica Manriquez tolerated the procedure well and there were no immediate complications.   She was allowed to recover for an appropriate period of time and was discharged home in stable condition.      FOLLOW UP:  Jessica Manriquez was asked to follow up by phone in 7-14 days with Poonam Shoemaker RN, Care Coordinator, to report her response to this series of injections.  Based on the patient's previous response to this therapy, Jessica Manriquez was rescheduled for the next series of injections in 12 weeks.      PLAN (Medication Changes, Therapy Orders, Work or Disability Issues, etc.): Patient will continue to monitor response to today's injections.     No changes made today.       Again, thank you for allowing me to participate in the care of your patient.      Sincerely,    Flori Trujillo MD

## 2019-09-05 NOTE — NURSING NOTE
Chief Complaint   Patient presents with     RECHECK     Chronic migraine botox     Medications reviewed and vital signs taken.   Todd Mack CMA

## 2019-09-05 NOTE — PROGRESS NOTES
"BOTULINUM TOXIN PROCEDURE - HEADACHE - NOTE    Chief Complaint   Patient presents with     RECHECK     Chronic migraine botox        /72   Pulse 95   Resp 17   Ht 1.803 m (5' 11\")   Wt 66.7 kg (147 lb)   SpO2 98%   BMI 20.50 kg/m         Current Outpatient Medications:      ALPRAZolam (XANAX) 0.5 MG tablet, Take 1 tablet (0.5 mg) by mouth every 6 hours as needed for anxiety, Disp: 40 tablet, Rfl: 0     amitriptyline (ELAVIL) 10 MG tablet, Take 1 tablet (10 mg) by mouth At Bedtime Take up to 5 tabs at bedtime prn insomnia., Disp: 100 tablet, Rfl: 3     aspirin-acetaminophen-caffeine (EXCEDRIN MIGRAINE) 250-250-65 MG per tablet, Take 1 tablet by mouth every 6 hours as needed for pain., Disp: 30 tablet, Rfl: 0     diazepam (VALIUM) 10 MG tablet, Place 1 tab vaginally for muscle spasms, Disp: 30 tablet, Rfl: 3     HYDROcodone-acetaminophen (NORCO) 5-325 MG per tablet, Take 1 tablet by mouth as needed, Disp: , Rfl:      hydrOXYzine (ATARAX) 25 MG tablet, Take 1-2 tablets (25-50 mg) by mouth every 6 hours as needed for itching, Disp: 60 tablet, Rfl: 1     ibuprofen (ADVIL,MOTRIN) 600 MG tablet, Take 1 tablet (600 mg) by mouth every 6 hours as needed for moderate pain, Disp: 60 tablet, Rfl: 0     Lactobacillus (ACIDOPHILUS) 100 MG CAPS, Take 1 capsule by mouth daily, Disp: , Rfl:      Loperamide HCl (IMODIUM A-D PO), Take 1 tablet by mouth once as needed, Disp: , Rfl:      MULTI-VITAMIN OR TABS, 1 tablet by mouth DAILY, Disp: , Rfl:      OnabotulinumtoxinA (BOTOX IJ), Inject 200 Units as directed Total dose 200 units  Dose Administered:  175 units  Botox (Botulinum Toxin Type A)       2:1 Dilution (55 units unavoidable waste) Unavoidable waste 25 units  Diluent Used:  Preservative Free Normal Saline Total Volume of Diluent Used:  2.9 ml Lot # /C3 with Expiration Date:  03/2021 NDC #: Botox 100u ([address]-90), Disp: , Rfl:      ondansetron (ZOFRAN ODT) 4 MG disintegrating tablet, Take 1-2 tablets (4-8 mg) " by mouth every 8 hours as needed for nausea, Disp: 20 tablet, Rfl: 3     penciclovir (DENAVIR) 1 % cream, Apply topically every 2 hours (while awake), Disp: 1.5 g, Rfl: 3     SUMAtriptan (IMITREX) 50 MG tablet, Take 1 tablet (50 mg) by mouth at onset of headache for migraine May repeat dose in 2 hours.  Do not exceed 200 mg in 24 hours, Disp: 18 tablet, Rfl: 1     traMADol (ULTRAM) 50 MG tablet, Take 1-2 tablets ( mg) by mouth every 6 hours as needed for pain, Disp: 40 tablet, Rfl: 2     valACYclovir (VALTREX) 500 MG tablet, Take 1 tablet (500 mg) by mouth daily, Disp: 90 tablet, Rfl: 0     VITAMIN C 500 MG OR TABS, 1 tablet daily, Disp: , Rfl:      VITAMIN D, CHOLECALCIFEROL, PO, Take 1,000 Units by mouth daily, Disp: , Rfl:      zolpidem (AMBIEN) 5 MG tablet, Take 1 tablet (5 mg) by mouth nightly as needed for sleep, Disp: 90 tablet, Rfl: 1     Allergies   Allergen Reactions     Darvocet [Acetaminophen] Nausea and Vomiting     Erythromycin GI Disturbance     Flexeril [Cyclobenzaprine Hcl]      Lightheadedness, dizzy          PHYSICAL EXAM:  Denies any headache     HPI:    Patient reports the following new medical problems since last visit: UTI resolved with abx.  Cold resolved.     We reviewed the recommended safety guidelines for  Botox from any vaccine injection, such as the seasonal flu vaccine, by a minimum of 10-14 days with Jessica Manriquez. She acknowledged understanding.      RESPONSE TO PREVIOUS TREATMENT:  Change in headache pattern following last series of injections with 180 units of  Botox on 6/06/2019.    No problems reported    1.  Headache frequency during this injection cycle:  Overall she had 10 headaches over the cycle.  3 of these were severe and remainder were mild.   She felt the wear off and with the rain, her headaches have been worst.   She noted that the shots in the levators helped a lot.     2.  Headache duration during this injection cycle:  Headache duration ranged from  12 hours to 2 days.  Imitrex helps.     3.  Headache intensity during this injection cycle:    A.  2/10  =  Typical pain level.(improved)  B.  9/10  =  Worst pain level.  C.  0/10  =  Lowest pain level.    4.  Change in headache medication usage during this injection cycle:  (For Example:  Able to decrease use of oral pain medications.) No change in use of medication but does initiate use earlier in headache cycle.      5.  ER Visits During This Injection Cycle:  0      6.  Functional Performance:  Change in ADL's, social interaction, days lost from work, etc. Patient reports left work early two days. days lost from work due to headache during this injection cycle        BOTULINUM NEUROTOXIN INJECTION PROCEDURES:      VERIFICATION OF PATIENT IDENTIFICATION AND PROCEDURE     Initials   Patient Name fi   Patient  fi   Procedure Verified by: leilani     Prior to the start of the procedure and with procedural staff participation, I verbally confirmed the patient s identity using two indicators, relevant allergies, that the procedure was appropriate and matched the consent or emergent situation, and that the correct equipment/implants were available. Immediately prior to starting the procedure I conducted the Time Out with the procedural staff and re-confirmed the patient s name, procedure, and site/side. (The Joint Commission universal protocol was followed.)  Yes    Sedation (Moderate or Deep): None      Above assessments performed by:  Flori Trujillo MD      INDICATIONS FOR PROCEDURES:  Jessica Manriquez is a 48 year old patient with chronic migraine headaches associated with cervicogenic  components.     Her baseline symptoms have been recalcitrant to oral medications and conservative therapy.  She is here today for reinjection with Botox.        GOAL OF PROCEDURE:  The goal of this procedure is to decrease pain       TOTAL DOSE ADMINISTERED:  Total dose 200 units   Dose Administered:  180 units  Botox (Botulinum Toxin  Type A)       2:1 Dilution   Unavoidable waste 20 units    Diluent Used:  Preservative Free Normal Saline  Total Volume of Diluent Used:  4 ml  Lot #  C3 with Expiration Date: 3/2022  NDC #: Botox 100u ([address]-72)    Medication guide was offered to patient and was declined.      CONSENT:  The risks, benefits, and treatment options were discussed with Jessica BLAKE Manriquez and she agreed to proceed.    Written consent was obtained by fi.       EQUIPMENT USED:  Needle-25mm stimulating/recording  EMG/NCS Machine      SKIN PREPARATION:  Skin preparation was performed using an alcohol wipe.      GUIDANCE DESCRIPTION:  Electro-myographic guidance was necessary throughout the procedure to accurately identify all areas of spastic muscles while avoiding injection of non-spastic muscles, neighboring nerves and nearby vascular structures.       AREA/MUSCLE INJECTED:    1 & 2. SHOULDER GIRDLE & NECK MUSCLES: 60 units Botox = Total Dose, 2:1 Dilution                               Right Upper Trapezius 15 units of Botox at 3 sites               Left Upper Trapezius 15 units of Botox at 3 sites        Right Cervical Paraspinal (superior cervical) - 10 units of Botox at 2 site/s  Left Cervical Paraspinal (superior cervical) - 10 units of Botox at 2 site/s                    Left levator (at neck) - 5 units of Botox at 1 site    Right Lev at the neck  5 unit of Botox at 1 site        3. HEAD, JAW & SCALP MUSCLES: 120 units Botox = Total Dose, 2:1 Dilution     Left masseter - 5 units of Botox at 1 site       Right Occipitalis - 15 units of Botox at 3 site/s.  Left Occipitalis - 15 units of Botox at 3 site/s.       Right Frontalis - 10 units of Botox at 2 site/s  Left Frontalis - 10 units of Botox at 2 site/s        Right Temporalis - 15 units of Botox at 3 site/s.  Left Temporalis - 30 units of Botox at 6 site/s.       Right  - 5 units of Botox at 1 site/s.  Left  - 5 units of Botox at 1 site/s.     Procerus -  10 units of Botox at 1 site/s.         RESPONSE TO PROCEDURE:  Jessica Manriquez tolerated the procedure well and there were no immediate complications.   She was allowed to recover for an appropriate period of time and was discharged home in stable condition.      FOLLOW UP:  Jessica Manriquez was asked to follow up by phone in 7-14 days with Poonam Shoemaker RN, Care Coordinator, to report her response to this series of injections.  Based on the patient's previous response to this therapy, Jessica Manriquez was rescheduled for the next series of injections in 12 weeks.      PLAN (Medication Changes, Therapy Orders, Work or Disability Issues, etc.): Patient will continue to monitor response to today's injections.     No changes made today.

## 2019-09-06 ENCOUNTER — MYC REFILL (OUTPATIENT)
Dept: FAMILY MEDICINE | Facility: CLINIC | Age: 49
End: 2019-09-06

## 2019-09-06 DIAGNOSIS — G43.909 MIGRAINE WITHOUT STATUS MIGRAINOSUS, NOT INTRACTABLE, UNSPECIFIED MIGRAINE TYPE: ICD-10-CM

## 2019-09-10 NOTE — TELEPHONE ENCOUNTER
S:  Refill request for sumatriptan 50mg tab prn for headache, can take 1 tab q 2 hrs for headache do not exceed 200mg in 24 hours.    B:  This medication originally ordered by [facility] in [facility]     R:  THis medication refill request should be handled by [facility]    R:  Routed to [facility]    Luigi Gastelum RN

## 2019-09-13 RX ORDER — SUMATRIPTAN 50 MG/1
50 TABLET, FILM COATED ORAL
Qty: 18 TABLET | Refills: 0 | Status: SHIPPED | OUTPATIENT
Start: 2019-09-13 | End: 2019-10-10

## 2019-09-13 NOTE — TELEPHONE ENCOUNTER
Refilled per [facility] protocol. Patient to follow-up with PCP for further refills.     Christy CARRERA RN   [facility]

## 2019-10-10 ENCOUNTER — OFFICE VISIT (OUTPATIENT)
Dept: FAMILY MEDICINE | Facility: CLINIC | Age: 49
End: 2019-10-10
Payer: COMMERCIAL

## 2019-10-10 VITALS
WEIGHT: 150 LBS | HEART RATE: 106 BPM | BODY MASS INDEX: 21 KG/M2 | OXYGEN SATURATION: 98 % | TEMPERATURE: 98.6 F | DIASTOLIC BLOOD PRESSURE: 76 MMHG | RESPIRATION RATE: 18 BRPM | HEIGHT: 71 IN | SYSTOLIC BLOOD PRESSURE: 122 MMHG

## 2019-10-10 DIAGNOSIS — G43.909 MIGRAINE WITHOUT STATUS MIGRAINOSUS, NOT INTRACTABLE, UNSPECIFIED MIGRAINE TYPE: ICD-10-CM

## 2019-10-10 DIAGNOSIS — Z23 NEED FOR PROPHYLACTIC VACCINATION AND INOCULATION AGAINST INFLUENZA: Primary | ICD-10-CM

## 2019-10-10 PROCEDURE — 90471 IMMUNIZATION ADMIN: CPT | Performed by: FAMILY MEDICINE

## 2019-10-10 PROCEDURE — 90686 IIV4 VACC NO PRSV 0.5 ML IM: CPT | Performed by: FAMILY MEDICINE

## 2019-10-10 PROCEDURE — 99213 OFFICE O/P EST LOW 20 MIN: CPT | Mod: 25 | Performed by: FAMILY MEDICINE

## 2019-10-10 RX ORDER — SUMATRIPTAN 50 MG/1
50 TABLET, FILM COATED ORAL
Qty: 18 TABLET | Refills: 11 | Status: SHIPPED | OUTPATIENT
Start: 2019-10-10 | End: 2021-02-01

## 2019-10-10 ASSESSMENT — PAIN SCALES - GENERAL: PAINLEVEL: NO PAIN (0)

## 2019-10-10 ASSESSMENT — MIFFLIN-ST. JEOR: SCORE: 1401.53

## 2019-10-10 NOTE — PROGRESS NOTES
"Subjective     Jessica Manriquez is a 48 year old female who presents to clinic today for the following health issues:    HPI   Medication Followup of Imitrex    Taking Medication as prescribed: yes    Side Effects:  None    Medication Helping Symptoms:  yes   Gets about 3 migraines/month as long as she's getting the botox injections.    Reviewed and updated as needed this visit by Provider  Problems         Review of Systems   ROS COMP: Constitutional, HEENT, cardiovascular, pulmonary, gi and gu systems are negative, except as otherwise noted.    Is having a HIDA scan next week.   Thinking she will be having a lap david soon.           Objective    /76   Pulse 106   Temp 98.6  F (37  C) (Tympanic)   Resp 18   Ht 1.803 m (5' 11\")   Wt 68 kg (150 lb)   SpO2 98%   Breastfeeding? No   BMI 20.92 kg/m    Body mass index is 20.92 kg/m .  Physical Exam   GENERAL: healthy, alert and no distress  NECK: no adenopathy, no asymmetry, masses, or scars and thyroid normal to palpation  RESP: lungs clear to auscultation - no rales, rhonchi or wheezes  CV: regular rate and rhythm, normal S1 S2, no S3 or S4, no murmur, click or rub, no peripheral edema and peripheral pulses strong  ABDOMEN: soft, nontender, no hepatosplenomegaly, no masses and bowel sounds normal  MS: no gross musculoskeletal defects noted, no edema    Diagnostic Test Results:  Labs reviewed in Epic        Assessment & Plan       ICD-10-CM    1. Migraine without status migrainosus, not intractable, unspecified migraine type G43.909 SUMAtriptan (IMITREX) 50 MG tablet              Return in about 1 year (around 10/10/2020) for Routine Visit.    Su Loya MD  [facility]      "

## 2019-10-10 NOTE — PATIENT INSTRUCTIONS
Our Clinic hours are:  Mondays    7:20 am - 7 pm  Tues -  Fri  7:20 am - 5 pm    Clinic Phone: [phone]    The clinic lab opens at 7:30 am Mon - Fri and appointments are required.    [facility]. [phone]  Monday  8 am - 7pm  Tues - Fri 8 am - 5:30 pm

## 2019-11-01 ENCOUNTER — TRANSFERRED RECORDS (OUTPATIENT)
Dept: HEALTH INFORMATION MANAGEMENT | Facility: CLINIC | Age: 49
End: 2019-11-01

## 2019-11-02 ENCOUNTER — HEALTH MAINTENANCE LETTER (OUTPATIENT)
Age: 49
End: 2019-11-02

## 2019-11-08 ENCOUNTER — MYC MEDICAL ADVICE (OUTPATIENT)
Dept: OBGYN | Facility: CLINIC | Age: 49
End: 2019-11-08

## 2019-11-11 NOTE — TELEPHONE ENCOUNTER
Notify pt that a BI-RADS category 1, does in fact mean that nothing remotely suspicious is seen on the imaging, so you are good to go!   Alyssia Gonsalves MD   [facility]

## 2019-11-18 ENCOUNTER — TELEPHONE (OUTPATIENT)
Dept: FAMILY MEDICINE | Facility: CLINIC | Age: 49
End: 2019-11-18

## 2019-11-18 NOTE — TELEPHONE ENCOUNTER
Form needs completion/signature for [employer] from [employer] - Paperwork placed in forms box.    Lexis Askew  [facility] [place] [facility]

## 2019-12-02 ENCOUNTER — DOCUMENTATION ONLY (OUTPATIENT)
Dept: CARE COORDINATION | Facility: CLINIC | Age: 49
End: 2019-12-02

## 2019-12-03 ENCOUNTER — TELEPHONE (OUTPATIENT)
Dept: PHYSICAL MEDICINE AND REHAB | Facility: CLINIC | Age: 49
End: 2019-12-03

## 2019-12-03 NOTE — TELEPHONE ENCOUNTER
Health Call Center    Phone Message    May a detailed message be left on voicemail: yes    Reason for Call: Other: Jessica calling to report that she has a cough and chest infection. Jessica is concerned that this will affect her upcoming Botox appointment on 12/5/19. Jessica would like a call back to discuss if she needs to reschedule, and if so, if she would be able to get in the following week. Please give Jessica a call back at your earliest convenience.     Action Taken: Message routed to:  [facility]

## 2019-12-03 NOTE — TELEPHONE ENCOUNTER
VM left asking for a return call.     Questions regarding the patient's symptoms:     Are you currently taking antibiotics?   Do you have a fever?     Will wait for the patient to return call to discuss.

## 2020-01-02 ENCOUNTER — OFFICE VISIT (OUTPATIENT)
Dept: OBGYN | Facility: CLINIC | Age: 50
End: 2020-01-02
Payer: COMMERCIAL

## 2020-01-02 VITALS
BODY MASS INDEX: 21.7 KG/M2 | SYSTOLIC BLOOD PRESSURE: 104 MMHG | WEIGHT: 155 LBS | HEIGHT: 71 IN | HEART RATE: 93 BPM | RESPIRATION RATE: 18 BRPM | DIASTOLIC BLOOD PRESSURE: 65 MMHG | TEMPERATURE: 97.1 F

## 2020-01-02 DIAGNOSIS — B00.9 HSV-1 (HERPES SIMPLEX VIRUS 1) INFECTION: ICD-10-CM

## 2020-01-02 DIAGNOSIS — B00.9 HSV (HERPES SIMPLEX VIRUS) INFECTION: ICD-10-CM

## 2020-01-02 DIAGNOSIS — G43.839 INTRACTABLE MENSTRUAL MIGRAINE WITHOUT STATUS MIGRAINOSUS: ICD-10-CM

## 2020-01-02 DIAGNOSIS — Z00.00 ROUTINE GENERAL MEDICAL EXAMINATION AT A HEALTH CARE FACILITY: Primary | ICD-10-CM

## 2020-01-02 DIAGNOSIS — T78.40XS ALLERGIC STATE, SEQUELA: ICD-10-CM

## 2020-01-02 DIAGNOSIS — R10.2 PELVIC PAIN IN FEMALE: ICD-10-CM

## 2020-01-02 DIAGNOSIS — G47.00 PERSISTENT INSOMNIA: ICD-10-CM

## 2020-01-02 PROCEDURE — 99396 PREV VISIT EST AGE 40-64: CPT | Performed by: OBSTETRICS & GYNECOLOGY

## 2020-01-02 RX ORDER — VALACYCLOVIR HYDROCHLORIDE 500 MG/1
500 TABLET, FILM COATED ORAL DAILY
Qty: 90 TABLET | Refills: 3 | Status: SHIPPED | OUTPATIENT
Start: 2020-01-02 | End: 2021-03-01

## 2020-01-02 RX ORDER — MAGNESIUM 200 MG
TABLET ORAL
COMMUNITY
End: 2021-07-22

## 2020-01-02 RX ORDER — MULTIVITAMIN WITH IRON
1 TABLET ORAL DAILY
COMMUNITY

## 2020-01-02 RX ORDER — ZOLPIDEM TARTRATE 5 MG/1
5 TABLET ORAL
Qty: 90 TABLET | Refills: 1 | Status: SHIPPED | OUTPATIENT
Start: 2020-01-02

## 2020-01-02 RX ORDER — AMITRIPTYLINE HYDROCHLORIDE 10 MG/1
10 TABLET ORAL AT BEDTIME
Qty: 100 TABLET | Refills: 3 | Status: SHIPPED | OUTPATIENT
Start: 2020-01-02 | End: 2021-01-26

## 2020-01-02 RX ORDER — HYDROXYZINE HYDROCHLORIDE 25 MG/1
25-50 TABLET, FILM COATED ORAL EVERY 6 HOURS PRN
Qty: 60 TABLET | Refills: 1 | Status: SHIPPED | OUTPATIENT
Start: 2020-01-02 | End: 2021-01-26

## 2020-01-02 RX ORDER — DIAZEPAM 10 MG
TABLET ORAL
Qty: 30 TABLET | Refills: 3 | Status: SHIPPED | OUTPATIENT
Start: 2020-01-02 | End: 2020-09-28

## 2020-01-02 RX ORDER — TRAMADOL HYDROCHLORIDE 50 MG/1
50-100 TABLET ORAL EVERY 6 HOURS PRN
Qty: 40 TABLET | Refills: 2 | Status: SHIPPED | OUTPATIENT
Start: 2020-01-02 | End: 2020-09-28

## 2020-01-02 RX ORDER — PENCICLOVIR 10 MG/G
CREAM TOPICAL
Qty: 1.5 G | Refills: 3 | Status: SHIPPED | OUTPATIENT
Start: 2020-01-02 | End: 2021-05-03

## 2020-01-02 ASSESSMENT — MIFFLIN-ST. JEOR: SCORE: 1419.21

## 2020-01-02 NOTE — NURSING NOTE
"Initial /65 (BP Location: Right arm, Patient Position: Chair, Cuff Size: Adult Large)   Pulse 93   Temp 97.1  F (36.2  C) (Tympanic)   Resp 18   Ht 1.803 m (5' 11\")   Wt 70.3 kg (155 lb)   LMP 12/08/2019   BMI 21.62 kg/m   Estimated body mass index is 21.62 kg/m  as calculated from the following:    Height as of this encounter: 1.803 m (5' 11\").    Weight as of this encounter: 70.3 kg (155 lb). .      "

## 2020-01-02 NOTE — PROGRESS NOTES
SUBJECTIVE:   CC: Jessica Manriquez is an 49 year old woman who presents for preventive health visit.   [facility] med refills--list reviewed.    Healthy Habits:    Do you get at least three servings of calcium containing foods daily (dairy, green leafy vegetables, etc.)? yes    Amount of exercise or daily activities, outside of work: 1 day(s) per week    Problems taking medications regularly No    Medication side effects: No    Have you had an eye exam in the past two years? yes    Do you see a dentist twice per year? yes    Do you have sleep apnea, excessive snoring or daytime drowsiness?no          Today's PHQ-2 Score:   PHQ-2 ( 1999 [employer]) 1/2/2020 12/31/2019   Q1: Little interest or pleasure in doing things 0 0   Q2: Feeling down, depressed or hopeless 0 0   PHQ-2 Score 0 0   Q1: Little interest or pleasure in doing things - Not at all   Q2: Feeling down, depressed or hopeless - Not at all   PHQ-2 Score - 0       Abuse: Current or Past(Physical, Sexual or Emotional)- No  Do you feel safe in your environment? Yes        Social History     Tobacco Use     Smoking status: Never Smoker     Smokeless tobacco: Never Used   Substance Use Topics     Alcohol use: No     If you drink alcohol do you typically have >3 drinks per day or >7 drinks per week? No                     Reviewed orders with patient.  Reviewed health maintenance and updated orders accordingly - Yes  BP Readings from Last 3 Encounters:   01/02/20 104/65   10/10/19 122/76   09/05/19 110/72    Wt Readings from Last 3 Encounters:   01/02/20 70.3 kg (155 lb)   10/10/19 68 kg (150 lb)   09/05/19 66.7 kg (147 lb)                  Patient Active Problem List   Diagnosis     Migraine headache     Insomnia     Unexplained endometrial cells on cervical Pap smear     ASCUS of cervix with negative high risk HPV     Pelvic pain in female     Past Surgical History:   Procedure Laterality Date     HC ENLARGE BREAST WITH IMPLANT  2000     HC REVISE BREAST  RECONSTRUCTION  5/03     LEEP TX, CERVICAL  11/01    SALOMÓN 3, yearly paps until 2021     LIGATN/STRIP LONG & SHORT SAPHEN  11/04    Bilateral vein stripping     TUBAL LIGATION  10/30/09    filshie clips       Social History     Tobacco Use     Smoking status: Never Smoker     Smokeless tobacco: Never Used   Substance Use Topics     Alcohol use: No     Family History   Problem Relation Age of Onset     Arthritis Mother      Respiratory Mother         asthma     Allergies Mother      Cancer Mother      Blood Disease Mother         leukemia     Breast Cancer Mother      Breast Cancer Maternal Grandmother         age 60's     Heart Disease Maternal Grandmother      Cerebrovascular Disease Paternal Grandmother      Cancer Paternal Grandfather         ?biliary     Alcohol/Drug Paternal Grandfather      Allergies Sister      Breast Cancer Sister          Current Outpatient Medications   Medication Sig Dispense Refill     amitriptyline (ELAVIL) 10 MG tablet Take 1 tablet (10 mg) by mouth At Bedtime Take up to 5 tabs at bedtime prn insomnia. 100 tablet 3     diazepam (VALIUM) 10 MG tablet Place 1 tab vaginally for muscle spasms 30 tablet 3     hydrOXYzine (ATARAX) 25 MG tablet Take 1-2 tablets (25-50 mg) by mouth every 6 hours as needed for itching 60 tablet 1     Lactobacillus (ACIDOPHILUS) 100 MG CAPS Take 1 capsule by mouth daily       magnesium 200 MG TABS        MULTI-VITAMIN OR TABS 1 tablet by mouth DAILY       OnabotulinumtoxinA (BOTOX IJ) Inject 200 Units as directed Total dose 200 units   Dose Administered:  175 units  Botox (Botulinum Toxin Type A)       2:1 Dilution (55 units unavoidable waste)  Unavoidable waste 25 units   Diluent Used:  Preservative Free Normal Saline  Total Volume of Diluent Used:  2.9 ml  Lot # /[id number] with Expiration Date:  03/2021  NDC #: Botox 100u ([address]-19)       ondansetron (ZOFRAN ODT) 4 MG disintegrating tablet Take 1-2 tablets (4-8 mg) by mouth every 8 hours as needed for nausea  20 tablet 3     penciclovir (DENAVIR) 1 % external cream Apply topically every 2 hours (while awake) 1.5 g 3     SUMAtriptan (IMITREX) 50 MG tablet Take 1 tablet (50 mg) by mouth at onset of headache for migraine May repeat dose in 2 hours.  Do not exceed 200 mg in 24 hours 18 tablet 11     traMADol (ULTRAM) 50 MG tablet Take 1-2 tablets ( mg) by mouth every 6 hours as needed for pain 40 tablet 2     valACYclovir (VALTREX) 500 MG tablet Take 1 tablet (500 mg) by mouth daily 90 tablet 3     vitamin (B COMPLEX-C) tablet Take 1 tablet by mouth daily       VITAMIN C 500 MG OR TABS 1 tablet daily       VITAMIN D, CHOLECALCIFEROL, PO Take 1,000 Units by mouth daily       zolpidem (AMBIEN) 5 MG tablet Take 1 tablet (5 mg) by mouth nightly as needed for sleep 90 tablet 1     ALPRAZolam (XANAX) 0.5 MG tablet Take 1 tablet (0.5 mg) by mouth every 6 hours as needed for anxiety (Patient not taking: Reported on 1/2/2020) 40 tablet 0     aspirin-acetaminophen-caffeine (EXCEDRIN MIGRAINE) 250-250-65 MG per tablet Take 1 tablet by mouth every 6 hours as needed for pain. 30 tablet 0     Loperamide HCl (IMODIUM A-D PO) Take 1 tablet by mouth once as needed       Allergies   Allergen Reactions     Darvocet [Acetaminophen] Nausea and Vomiting     Erythromycin GI Disturbance     Flexeril [Cyclobenzaprine Hcl]      Lightheadedness, dizzy         Mammogram Screening: Patient over age 50, mutual decision to screen reflected in health maintenance.    Pertinent mammograms are reviewed under the imaging tab.  History of abnormal Pap smear: NO - age 30- 65 PAP every 3 years recommended  PAP / HPV Latest Ref Rng & Units 12/31/2018 2/2/2015 1/29/2014   PAP - ASC-US(A) NIL OTHER-NIL, See Result   HPV 16 DNA NEG:Negative Negative - -   HPV 18 DNA NEG:Negative Negative - -   OTHER HR HPV NEG:Negative Negative - -     Reviewed and updated as needed this visit by clinical staff  Tobacco  Allergies  Meds  Med Hx  Surg Hx  Fam Hx  Soc Hx     "    Reviewed and updated as needed this visit by Provider            ROS:  CONSTITUTIONAL: NEGATIVE for fever, chills, change in weight  INTEGUMENTARU/SKIN: NEGATIVE for worrisome rashes, moles or lesions  EYES: NEGATIVE for vision changes or irritation  ENT: NEGATIVE for ear, mouth and throat problems  RESP: NEGATIVE for significant cough or SOB  BREAST: NEGATIVE for masses, tenderness or discharge  CV: NEGATIVE for chest pain, palpitations or peripheral edema  GI: NEGATIVE for nausea, abdominal pain, heartburn, or change in bowel habits  : NEGATIVE for unusual urinary or vaginal symptoms. Periods are regular.  MUSCULOSKELETAL: NEGATIVE for significant arthralgias or myalgia  NEURO: NEGATIVE for weakness, dizziness or paresthesias  PSYCHIATRIC: NEGATIVE for changes in mood or affect    OBJECTIVE:   /65 (BP Location: Right arm, Patient Position: Chair, Cuff Size: Adult Large)   Pulse 93   Temp 97.1  F (36.2  C) (Tympanic)   Resp 18   Ht 1.803 m (5' 11\")   Wt 70.3 kg (155 lb)   LMP 12/08/2019   BMI 21.62 kg/m    EXAM:  GENERAL: healthy, alert and no distress  EYES: Eyes grossly normal to inspection, PERRL and conjunctivae and sclerae normal  HENT: ear canals and TM's normal, nose and mouth without ulcers or lesions  NECK: no adenopathy, no asymmetry, masses, or scars and thyroid normal to palpation  RESP: lungs clear to auscultation - no rales, rhonchi or wheezes  BREAST: normal without masses, tenderness or nipple discharge and no palpable axillary masses or adenopathy  CV: regular rate and rhythm, normal S1 S2, no S3 or S4, no murmur, click or rub, no peripheral edema and peripheral pulses strong  ABDOMEN: soft, nontender, no hepatosplenomegaly, no masses and bowel sounds normal   (female): normal female external genitalia, normal urethral meatus, vaginal mucosa pink, moist, well rugated, and normal cervix/adnexa/uterus without masses or discharge  RECTAL: normal sphincter tone, no rectal " "masses  MS: no gross musculoskeletal defects noted, no edema  SKIN: no suspicious lesions or rashes  NEURO: Normal strength and tone, mentation intact and speech normal  PSYCH: mentation appears normal, affect normal/bright    Diagnostic Test Results:  Labs reviewed in Epic    ASSESSMENT/PLAN:       ICD-10-CM    1. Routine general medical examination at a health care facility Z00.00 TSH with free T4 reflex     Lipid Profile     Glucose   2. Persistent insomnia G47.00 zolpidem (AMBIEN) 5 MG tablet     amitriptyline (ELAVIL) 10 MG tablet   3. HSV (herpes simplex virus) infection B00.9 valACYclovir (VALTREX) 500 MG tablet   4. Intractable menstrual migraine without status migrainosus G43.839 traMADol (ULTRAM) 50 MG tablet   5. HSV-1 (herpes simplex virus 1) infection B00.9 penciclovir (DENAVIR) 1 % external cream   6. Allergic state, sequela T78.40XS hydrOXYzine (ATARAX) 25 MG tablet   7. Pelvic pain in female R10.2 diazepam (VALIUM) 10 MG tablet       COUNSELING:   Reviewed preventive health counseling, as reflected in patient instructions  Special attention given to:        med list, refills.       Regular exercise       Healthy diet/nutrition       Vision screening       Colon cancer screening    Estimated body mass index is 21.62 kg/m  as calculated from the following:    Height as of this encounter: 1.803 m (5' 11\").    Weight as of this encounter: 70.3 kg (155 lb).         reports that she has never smoked. She has never used smokeless tobacco.      Counseling Resources:  ATP IV Guidelines  Pooled Cohorts Equation Calculator  Breast Cancer Risk Calculator  FRAX Risk Assessment  ICSI Preventive Guidelines  Dietary Guidelines for Americans, 2010  USDA's MyPlate  ASA Prophylaxis  Lung CA Screening    Alyssia Gonsalves MD  [facility]  "

## 2020-01-28 ENCOUNTER — OFFICE VISIT (OUTPATIENT)
Dept: PHYSICAL MEDICINE AND REHAB | Facility: CLINIC | Age: 50
End: 2020-01-28
Payer: COMMERCIAL

## 2020-01-28 DIAGNOSIS — G43.719 INTRACTABLE CHRONIC MIGRAINE WITHOUT AURA AND WITHOUT STATUS MIGRAINOSUS: Primary | ICD-10-CM

## 2020-01-28 RX ORDER — BUPIVACAINE HYDROCHLORIDE 5 MG/ML
10 INJECTION, SOLUTION EPIDURAL; INTRACAUDAL ONCE
Status: COMPLETED | OUTPATIENT
Start: 2020-01-28 | End: 2020-01-28

## 2020-01-28 RX ADMIN — BUPIVACAINE HYDROCHLORIDE 50 MG: 5 INJECTION, SOLUTION EPIDURAL; INTRACAUDAL at 11:22

## 2020-01-28 ASSESSMENT — PAIN SCALES - GENERAL: PAINLEVEL: NO PAIN (1)

## 2020-01-28 NOTE — LETTER
1/28/2020       RE: Jessica Manriquez  [address]  [place] [address]     Dear Colleague,    Thank you for referring your patient, Jessica Manriquez, to the [facility] PH[facility]ICAL [facility]ICINE AND REHABILITATION at [facility]. Please see a copy of my visit note below.    BOTULINUM TOXIN PROCEDURE - HEADACHE - NOTE    Chief Complaint   Patient presents with     Botox     Chronic migraines       Current Outpatient Medications:      amitriptyline (ELAVIL) 10 MG tablet, Take 1 tablet (10 mg) by mouth At Bedtime Take up to 5 tabs at bedtime prn insomnia., Disp: 100 tablet, Rfl: 3     aspirin-acetaminophen-caffeine (EXCEDRIN MIGRAINE) 250-250-65 MG per tablet, Take 1 tablet by mouth every 6 hours as needed for pain., Disp: 30 tablet, Rfl: 0     diazepam (VALIUM) 10 MG tablet, Place 1 tab vaginally for muscle spasms, Disp: 30 tablet, Rfl: 3     hydrOXYzine (ATARAX) 25 MG tablet, Take 1-2 tablets (25-50 mg) by mouth every 6 hours as needed for itching, Disp: 60 tablet, Rfl: 1     Lactobacillus (ACIDOPHILUS) 100 MG CAPS, Take 1 capsule by mouth daily, Disp: , Rfl:      Loperamide HCl (IMODIUM A-D PO), Take 1 tablet by mouth once as needed, Disp: , Rfl:      magnesium 200 MG TABS, , Disp: , Rfl:      MULTI-VITAMIN OR TABS, 1 tablet by mouth DAILY, Disp: , Rfl:      OnabotulinumtoxinA (BOTOX IJ), Inject 200 Units as directed Total dose 200 units  Dose Administered:  175 units  Botox (Botulinum Toxin Type A)       2:1 Dilution (55 units unavoidable waste) Unavoidable waste 25 units  Diluent Used:  Preservative Free Normal Saline Total Volume of Diluent Used:  2.9 ml Lot # /C3 with Expiration Date:  03/2021 NDC #: Botox 100u ([address]-37), Disp: , Rfl:      ondansetron (ZOFRAN ODT) 4 MG disintegrating tablet, Take 1-2 tablets (4-8 mg) by mouth every 8 hours as needed for nausea, Disp: 20 tablet, Rfl: 3     penciclovir (DENAVIR) 1 % external cream, Apply topically every 2 hours  (while awake), Disp: 1.5 g, Rfl: 3     SUMAtriptan (IMITREX) 50 MG tablet, Take 1 tablet (50 mg) by mouth at onset of headache for migraine May repeat dose in 2 hours.  Do not exceed 200 mg in 24 hours, Disp: 18 tablet, Rfl: 11     traMADol (ULTRAM) 50 MG tablet, Take 1-2 tablets ( mg) by mouth every 6 hours as needed for pain, Disp: 40 tablet, Rfl: 2     valACYclovir (VALTREX) 500 MG tablet, Take 1 tablet (500 mg) by mouth daily, Disp: 90 tablet, Rfl: 3     vitamin (B COMPLEX-C) tablet, Take 1 tablet by mouth daily, Disp: , Rfl:      VITAMIN C 500 MG OR TABS, 1 tablet daily, Disp: , Rfl:      VITAMIN D, CHOLECALCIFEROL, PO, Take 1,000 Units by mouth daily, Disp: , Rfl:      zolpidem (AMBIEN) 5 MG tablet, Take 1 tablet (5 mg) by mouth nightly as needed for sleep, Disp: 90 tablet, Rfl: 1     ALPRAZolam (XANAX) 0.5 MG tablet, Take 1 tablet (0.5 mg) by mouth every 6 hours as needed for anxiety (Patient not taking: Reported on 1/2/2020), Disp: 40 tablet, Rfl: 0     Allergies   Allergen Reactions     Darvocet [Acetaminophen] Nausea and Vomiting     Erythromycin GI Disturbance     Flexeril [Cyclobenzaprine Hcl]      Lightheadedness, dizzy          PHYSICAL EXAM:  The patient reports to increased headaches this month but she is overdue for botox injections by about two months. She had a chest cold causing her to miss her appointment scheduled for early December.  She did have a headache yesterday and today is still having minimum pain of a 1/10.     HPI:    Patient denies any hospitalizations, ER visits or new diagnosis since last appointment.     We reviewed the recommended safety guidelines for  Botox from any vaccine injection, such as the seasonal flu vaccine, by a minimum of 10-14 days with Jessica Manriquez. She acknowledged understanding.      RESPONSE TO PREVIOUS TREATMENT:  Change in headache pattern following last series of injections with 180 units of  Botox on 9/5/2019.    No problems  reported    1.  Headache frequency during this injection cycle:  In September, she had 3 headaches, October she had 6 headaches, November she had 3 headaches, December she had 9 headaches and so far this month, January, she has had 11 headaches.   She was due for reinjection of botox 2019 but had a chest cold and could not make it to her appointment.      2.  Headache duration during this injection cycle:  Headache duration ranged from 6 hours to 2 days.  Imitrex helps with headaches.      3.  Headache intensity during this injection cycle:    A.  7/10  =  Typical pain level.  B.  9/10  =  Worst pain level.  C.  2/10  =  Lowest pain level.  Typical pain is a 4/10 with botox injections but this cycle has averaged a 7/10.     4.  Change in headache medication usage during this injection cycle:  (For Example:  Able to decrease use of oral pain medications.) Increase in oral medications due to missing appointment.     5.  ER Visits During This Injection Cycle:  0      6.  Functional Performance:  Change in ADL's, social interaction, days lost from work, etc. Patient has missed work and social events about 50% of the time due to being conservative due to being late for botox injections.         BOTULINUM NEUROTOXIN INJECTION PROCEDURES:      VERIFICATION OF PATIENT IDENTIFICATION AND PROCEDURE     Initials   Patient Name MD   Patient  MD   Procedure Verified by: MD     Prior to the start of the procedure and with procedural staff participation, I verbally confirmed the patient s identity using two indicators, relevant allergies, that the procedure was appropriate and matched the consent or emergent situation, and that the correct equipment/implants were available. Immediately prior to starting the procedure I conducted the Time Out with the procedural staff and re-confirmed the patient s name, procedure, and site/side. (The Joint Commission universal protocol was followed.)  Yes    Sedation (Moderate or  Deep): None    Above assessments performed by:  Flori Trujillo MD    INDICATIONS FOR PROCEDURES:  Jessica Manriquez is a 49 year old patient with chronic migraine headaches associated with cervicogenic  components.     Her baseline symptoms have been recalcitrant to oral medications and conservative therapy.  She is here today for reinjection with Botox.    GOAL OF PROCEDURE:  The goal of this procedure is to decrease pain     TOTAL DOSE ADMINISTERED:  Total dose 200 units   Dose Administered:  180 units  Botox (Botulinum Toxin Type A)       2:1 Dilution   Unavoidable waste 20 units    Diluent Used: Bupivacaine 0.5%  Lot [id number]  Exp 09/22  Total Volume of Diluent Used:  4 ml    Botox   Lot # [id number] with Expiration Date: 6/2022  NDC #: Botox 100u ([address]-39)    Medication guide was offered to patient and was declined.    CONSENT:  The risks, benefits, and treatment options were discussed with Jessica Manriquez and she agreed to proceed.    Written consent was obtained by MD.     EQUIPMENT USED:  Needle-25mm stimulating/recording  EMG/NCS Machine    SKIN PREPARATION:  Skin preparation was performed using an alcohol wipe.    GUIDANCE DESCRIPTION:  Electro-myographic guidance was necessary throughout the procedure to accurately identify all areas of spastic muscles while avoiding injection of non-spastic muscles, neighboring nerves and nearby vascular structures.       AREA/MUSCLE INJECTED:    1 & 2. SHOULDER GIRDLE & NECK MUSCLES: 60 units Botox = Total Dose, 2:1 Dilution                               Right Upper Trapezius 15 units of Botox at 3 sites               Left Upper Trapezius 15 units of Botox at 3 sites        Right Cervical Paraspinal (superior cervical) - 10 units of Botox at 2 site/s  Left Cervical Paraspinal (superior cervical) - 10 units of Botox at 2 site/s                    Left levator (at neck) - 5 units of Botox at 1 site    Right Lev at the neck  5 unit of Botox at 1 site        3. HEAD, JAW  & SCALP MUSCLES: 120 units Botox = Total Dose, 2:1 Dilution     Left masseter - 5 units of Botox at 1 site       Right Occipitalis - 15 units of Botox at 3 site/s.  Left Occipitalis - 15 units of Botox at 3 site/s.       Right Frontalis - 10 units of Botox at 2 site/s  Left Frontalis - 10 units of Botox at 2 site/s        Right Temporalis - 15 units of Botox at 3 site/s.  Left Temporalis - 30 units of Botox at 6 site/s.       Right  - 5 units of Botox at 1 site/s.  Left  - 5 units of Botox at 1 site/s.     Procerus - 10 units of Botox at 1 site/s.    RESPONSE TO PROCEDURE:  Jessica Manriquez tolerated the procedure well and there were no immediate complications.   She was allowed to recover for an appropriate period of time and was discharged home in stable condition.    FOLLOW UP:  Jessica Manriquez was asked to follow up by phone in 7-14 days with Poonam Shoemaker RN, Care Coordinator, to report her response to this series of injections.  Based on the patient's previous response to this therapy, Jessica Manriquez was rescheduled for the next series of injections in 12 weeks.      PLAN (Medication Changes, Therapy Orders, Work or Disability Issues, etc.): Patient will continue to monitor response to today's injections.     She is 2 months late due to respiratory infection.     No changes made today.     Again, thank you for allowing me to participate in the care of your patient.      Sincerely,    Flori Trujillo MD

## 2020-01-28 NOTE — NURSING NOTE
Chief Complaint   Patient presents with     Botox     Chronic migraines     Mersbacilio Vargas, CMA

## 2020-01-28 NOTE — PROGRESS NOTES
BOTULINUM TOXIN PROCEDURE - HEADACHE - NOTE    Chief Complaint   Patient presents with     Botox     Chronic migraines         Current Outpatient Medications:      amitriptyline (ELAVIL) 10 MG tablet, Take 1 tablet (10 mg) by mouth At Bedtime Take up to 5 tabs at bedtime prn insomnia., Disp: 100 tablet, Rfl: 3     aspirin-acetaminophen-caffeine (EXCEDRIN MIGRAINE) 250-250-65 MG per tablet, Take 1 tablet by mouth every 6 hours as needed for pain., Disp: 30 tablet, Rfl: 0     diazepam (VALIUM) 10 MG tablet, Place 1 tab vaginally for muscle spasms, Disp: 30 tablet, Rfl: 3     hydrOXYzine (ATARAX) 25 MG tablet, Take 1-2 tablets (25-50 mg) by mouth every 6 hours as needed for itching, Disp: 60 tablet, Rfl: 1     Lactobacillus (ACIDOPHILUS) 100 MG CAPS, Take 1 capsule by mouth daily, Disp: , Rfl:      Loperamide HCl (IMODIUM A-D PO), Take 1 tablet by mouth once as needed, Disp: , Rfl:      magnesium 200 MG TABS, , Disp: , Rfl:      MULTI-VITAMIN OR TABS, 1 tablet by mouth DAILY, Disp: , Rfl:      OnabotulinumtoxinA (BOTOX IJ), Inject 200 Units as directed Total dose 200 units  Dose Administered:  175 units  Botox (Botulinum Toxin Type A)       2:1 Dilution (55 units unavoidable waste) Unavoidable waste 25 units  Diluent Used:  Preservative Free Normal Saline Total Volume of Diluent Used:  2.9 ml Lot # /C3 with Expiration Date:  03/2021 NDC #: Botox 100u ([address]-75), Disp: , Rfl:      ondansetron (ZOFRAN ODT) 4 MG disintegrating tablet, Take 1-2 tablets (4-8 mg) by mouth every 8 hours as needed for nausea, Disp: 20 tablet, Rfl: 3     penciclovir (DENAVIR) 1 % external cream, Apply topically every 2 hours (while awake), Disp: 1.5 g, Rfl: 3     SUMAtriptan (IMITREX) 50 MG tablet, Take 1 tablet (50 mg) by mouth at onset of headache for migraine May repeat dose in 2 hours.  Do not exceed 200 mg in 24 hours, Disp: 18 tablet, Rfl: 11     traMADol (ULTRAM) 50 MG tablet, Take 1-2 tablets ( mg) by mouth every 6 hours  as needed for pain, Disp: 40 tablet, Rfl: 2     valACYclovir (VALTREX) 500 MG tablet, Take 1 tablet (500 mg) by mouth daily, Disp: 90 tablet, Rfl: 3     vitamin (B COMPLEX-C) tablet, Take 1 tablet by mouth daily, Disp: , Rfl:      VITAMIN C 500 MG OR TABS, 1 tablet daily, Disp: , Rfl:      VITAMIN D, CHOLECALCIFEROL, PO, Take 1,000 Units by mouth daily, Disp: , Rfl:      zolpidem (AMBIEN) 5 MG tablet, Take 1 tablet (5 mg) by mouth nightly as needed for sleep, Disp: 90 tablet, Rfl: 1     ALPRAZolam (XANAX) 0.5 MG tablet, Take 1 tablet (0.5 mg) by mouth every 6 hours as needed for anxiety (Patient not taking: Reported on 1/2/2020), Disp: 40 tablet, Rfl: 0     Allergies   Allergen Reactions     Darvocet [Acetaminophen] Nausea and Vomiting     Erythromycin GI Disturbance     Flexeril [Cyclobenzaprine Hcl]      Lightheadedness, dizzy          PHYSICAL EXAM:  The patient reports to increased headaches this month but she is overdue for botox injections by about two months. She had a chest cold causing her to miss her appointment scheduled for early December.  She did have a headache yesterday and today is still having minimum pain of a 1/10.     HPI:    Patient denies any hospitalizations, ER visits or new diagnosis since last appointment.     We reviewed the recommended safety guidelines for  Botox from any vaccine injection, such as the seasonal flu vaccine, by a minimum of 10-14 days with Jessica Manriquez. She acknowledged understanding.      RESPONSE TO PREVIOUS TREATMENT:  Change in headache pattern following last series of injections with 180 units of  Botox on 9/5/2019.    No problems reported    1.  Headache frequency during this injection cycle:  In September, she had 3 headaches, October she had 6 headaches, November she had 3 headaches, December she had 9 headaches and so far this month, January, she has had 11 headaches.   She was due for reinjection of botox December 5, 2019 but had a chest cold and  could not make it to her appointment.      2.  Headache duration during this injection cycle:  Headache duration ranged from 6 hours to 2 days.  Imitrex helps with headaches.      3.  Headache intensity during this injection cycle:    A.  7/10  =  Typical pain level.  B.  9/10  =  Worst pain level.  C.  2/10  =  Lowest pain level.  Typical pain is a 4/10 with botox injections but this cycle has averaged a 7/10.     4.  Change in headache medication usage during this injection cycle:  (For Example:  Able to decrease use of oral pain medications.) Increase in oral medications due to missing appointment.     5.  ER Visits During This Injection Cycle:  0      6.  Functional Performance:  Change in ADL's, social interaction, days lost from work, etc. Patient has missed work and social events about 50% of the time due to being conservative due to being late for botox injections.         BOTULINUM NEUROTOXIN INJECTION PROCEDURES:      VERIFICATION OF PATIENT IDENTIFICATION AND PROCEDURE     Initials   Patient Name MD   Patient  MD   Procedure Verified by: MD     Prior to the start of the procedure and with procedural staff participation, I verbally confirmed the patient s identity using two indicators, relevant allergies, that the procedure was appropriate and matched the consent or emergent situation, and that the correct equipment/implants were available. Immediately prior to starting the procedure I conducted the Time Out with the procedural staff and re-confirmed the patient s name, procedure, and site/side. (The Joint Commission universal protocol was followed.)  Yes    Sedation (Moderate or Deep): None      Above assessments performed by:  Flori Trujillo MD      INDICATIONS FOR PROCEDURES:  Jessica Manriquez is a 49 year old patient with chronic migraine headaches associated with cervicogenic  components.     Her baseline symptoms have been recalcitrant to oral medications and conservative therapy.  She is here  today for reinjection with Botox.        GOAL OF PROCEDURE:  The goal of this procedure is to decrease pain       TOTAL DOSE ADMINISTERED:  Total dose 200 units   Dose Administered:  180 units  Botox (Botulinum Toxin Type A)       2:1 Dilution   Unavoidable waste 20 units    Diluent Used: Bupivacaine 0.5%  Lot [id number]  Exp 09/22  Total Volume of Diluent Used:  4 ml    Botox   Lot # [id number] with Expiration Date: 6/2022  NDC #: Botox 100u ([address]-67)    Medication guide was offered to patient and was declined.      CONSENT:  The risks, benefits, and treatment options were discussed with Jessica Manriquez and she agreed to proceed.    Written consent was obtained by MD.       EQUIPMENT USED:  Needle-25mm stimulating/recording  EMG/NCS Machine      SKIN PREPARATION:  Skin preparation was performed using an alcohol wipe.      GUIDANCE DESCRIPTION:  Electro-myographic guidance was necessary throughout the procedure to accurately identify all areas of spastic muscles while avoiding injection of non-spastic muscles, neighboring nerves and nearby vascular structures.       AREA/MUSCLE INJECTED:    1 & 2. SHOULDER GIRDLE & NECK MUSCLES: 60 units Botox = Total Dose, 2:1 Dilution                               Right Upper Trapezius 15 units of Botox at 3 sites               Left Upper Trapezius 15 units of Botox at 3 sites        Right Cervical Paraspinal (superior cervical) - 10 units of Botox at 2 site/s  Left Cervical Paraspinal (superior cervical) - 10 units of Botox at 2 site/s                    Left levator (at neck) - 5 units of Botox at 1 site    Right Lev at the neck  5 unit of Botox at 1 site        3. HEAD, JAW & SCALP MUSCLES: 120 units Botox = Total Dose, 2:1 Dilution     Left masseter - 5 units of Botox at 1 site       Right Occipitalis - 15 units of Botox at 3 site/s.  Left Occipitalis - 15 units of Botox at 3 site/s.       Right Frontalis - 10 units of Botox at 2 site/s  Left Frontalis - 10 units of Botox at 2  site/s        Right Temporalis - 15 units of Botox at 3 site/s.  Left Temporalis - 30 units of Botox at 6 site/s.       Right  - 5 units of Botox at 1 site/s.  Left  - 5 units of Botox at 1 site/s.     Procerus - 10 units of Botox at 1 site/s.         RESPONSE TO PROCEDURE:  Jessica Manriquez tolerated the procedure well and there were no immediate complications.   She was allowed to recover for an appropriate period of time and was discharged home in stable condition.      FOLLOW UP:  Jessica Manriquez was asked to follow up by phone in 7-14 days with Poonam Shoemaker RN, Care Coordinator, to report her response to this series of injections.  Based on the patient's previous response to this therapy, Jessica Manriquez was rescheduled for the next series of injections in 12 weeks.      PLAN (Medication Changes, Therapy Orders, Work or Disability Issues, etc.): Patient will continue to monitor response to today's injections.     She is 2 months late due to respiratory infection.     No changes made today.

## 2020-02-03 ENCOUNTER — MYC MEDICAL ADVICE (OUTPATIENT)
Dept: OBGYN | Facility: CLINIC | Age: 50
End: 2020-02-03

## 2020-02-03 DIAGNOSIS — B37.31 YEAST INFECTION OF THE VAGINA: Primary | ICD-10-CM

## 2020-02-03 RX ORDER — FLUCONAZOLE 150 MG/1
150 TABLET ORAL ONCE
Qty: 1 TABLET | Refills: 3 | Status: SHIPPED | OUTPATIENT
Start: 2020-02-03 | End: 2020-02-03

## 2020-02-07 ENCOUNTER — TELEPHONE (OUTPATIENT)
Dept: FAMILY MEDICINE | Facility: CLINIC | Age: 50
End: 2020-02-07

## 2020-02-07 ENCOUNTER — MYC MEDICAL ADVICE (OUTPATIENT)
Dept: FAMILY MEDICINE | Facility: CLINIC | Age: 50
End: 2020-02-07

## 2020-02-07 NOTE — TELEPHONE ENCOUNTER
Panel Management Review      Patient has the following on her problem list: None      Composite cancer screening  Chart review shows that this patient is due/due soon for the following Colonoscopy and Fecal Colorectal (FIT)  Summary:    Patient is due/failing the following:   Colonoscopy/fit    Action needed:   Patient needs referral/order: colon/ fit    Type of outreach:    Phone, left message for patient to call back.     Questions for provider review:    None                                                                                                                                    Dory Harley MA       Chart routed to Care Team .

## 2020-02-10 ENCOUNTER — MYC MEDICAL ADVICE (OUTPATIENT)
Dept: OBGYN | Facility: CLINIC | Age: 50
End: 2020-02-10

## 2020-02-10 ENCOUNTER — TELEPHONE (OUTPATIENT)
Dept: OBGYN | Facility: CLINIC | Age: 50
End: 2020-02-10

## 2020-02-10 DIAGNOSIS — Z12.11 SPECIAL SCREENING FOR MALIGNANT NEOPLASMS, COLON: Primary | ICD-10-CM

## 2020-02-10 DIAGNOSIS — Z00.00 ROUTINE GENERAL MEDICAL EXAMINATION AT A HEALTH CARE FACILITY: Primary | ICD-10-CM

## 2020-02-10 NOTE — TELEPHONE ENCOUNTER
Reason for Call:  Other order    Detailed comments: Pt wants order for colonoscopy faxed to [facility] , fax # [phone] order has to specify monitored anesthesia care      Phone Number Patient can be reached at: Home number on file [phone] (home)    Best Time: today    Can we leave a detailed message on this number? YES    Call taken on 2/10/2020 at 12:36 PM by Jemima Andre

## 2020-02-11 NOTE — TELEPHONE ENCOUNTER
I left message for patient informing her that I faxed her order for colonoscopy. Omayra Ortiz, CMA

## 2020-02-26 ENCOUNTER — OFFICE VISIT (OUTPATIENT)
Dept: FAMILY MEDICINE | Facility: CLINIC | Age: 50
End: 2020-02-26
Payer: COMMERCIAL

## 2020-02-26 VITALS
HEIGHT: 71 IN | OXYGEN SATURATION: 98 % | DIASTOLIC BLOOD PRESSURE: 60 MMHG | BODY MASS INDEX: 21.59 KG/M2 | TEMPERATURE: 98.1 F | SYSTOLIC BLOOD PRESSURE: 94 MMHG | WEIGHT: 154.2 LBS | HEART RATE: 86 BPM

## 2020-02-26 DIAGNOSIS — Z12.11 SPECIAL SCREENING FOR MALIGNANT NEOPLASMS, COLON: ICD-10-CM

## 2020-02-26 DIAGNOSIS — Z01.818 PREOP GENERAL PHYSICAL EXAM: Primary | ICD-10-CM

## 2020-02-26 PROCEDURE — 99214 OFFICE O/P EST MOD 30 MIN: CPT | Performed by: NURSE PRACTITIONER

## 2020-02-26 ASSESSMENT — MIFFLIN-ST. JEOR: SCORE: 1415.58

## 2020-02-26 NOTE — PROGRESS NOTES
[facility]  [address] [address]  [phone]  Dept: [phone]    PRE-OP EVALUATION:  Today's date: 2020    Jessica Manriquez (: 1970) presents for pre-operative evaluation assessment as requested by Dr. Owens  She requires evaluation and anesthesia risk assessment prior to undergoing surgery/procedure Colonoscopy under general anesthesia.     Fax number for surgical facility: [phone]  Primary Physician: Su Loya  Type of Anesthesia Anticipated: MAC     Patient has a Health Care Directive or Living Will:  NO    Preop Questions 2020   Who is doing your surgery? Dr. Julio Owens   What are you having done? Screening colonoscopy   Date of Surgery/Procedure: 2020   Facility or Hospital where procedure/surgery will be performed: [facility]   1.  Do you have a history of Heart attack, stroke, stent, coronary bypass surgery, or other heart surgery? No   2.  Do you ever have any pain or discomfort in your chest? No   3.  Do you have a history of  Heart Failure? No   4.   Are you troubled by shortness of breath when:  walking on a level surface, or up a slight hill, or at night? No   5.  Do you currently have a cold, bronchitis or other respiratory infection? No   6.  Do you have a cough, shortness of breath, or wheezing? No   7.  Do you sometimes get pains in the calves of your legs when you walk? No   8. Do you or anyone in your family have previous history of blood clots? No   9.  Do you or does anyone in your family have a serious bleeding problem such as prolonged bleeding following surgeries or cuts? No   10. Have you ever had problems with anemia or been told to take iron pills? YES - in high school    11. Have you had any abnormal blood loss such as black, tarry or bloody stools, or abnormal vaginal bleeding? No   12. Have you ever had a blood transfusion? No   13. Have you or any of your relatives ever had problems  with anesthesia? No   14. Do you have sleep apnea, excessive snoring or daytime drowsiness? No   15. Do you have any prosthetic heart valves? No   16. Do you have prosthetic joints? No   17. Is there any chance that you may be pregnant? No         HPI:     HPI related to upcoming procedure: scheduled for screening colonoscopy under general anesthesia.       See problem list for active medical problems.  Problems all longstanding and stable, except as noted/documented.  See ROS for pertinent symptoms related to these conditions.      MEDICAL HISTORY:     Patient Active Problem List    Diagnosis Date Noted     Pelvic pain in female 08/03/2015     Priority: Medium     ASCUS of cervix with negative high risk HPV 03/05/2014     Priority: Medium     11/2001 LEEP  1/29/14: Pap - NIL, Neg HPV. Plan cotesting in 1 year. If neg then cotest in 3 yrs and then routine screening.   2/2/15:Pap--NIL, Neg HPV. Plan Pap + HPV cotesting in 3 years. Due 2/2018 12/31/18 ASCUS Pap, Neg HPV with endometrial cells. Plan cotest in 3 years.        Unexplained endometrial cells on cervical Pap smear 02/07/2014     Priority: Medium     Cervix stenotic due to nulliparous and h/o LEEP--sono ordered  Consider HSC D & C with Misoprostol preprime    If normal sono, then Pap 1 year       Insomnia 09/14/2010     Priority: Medium     Migraine headache 09/23/2009     Priority: Medium     (Problem list name updated by automated process. 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     Selective IgA immunodeficiency (H)      recurrent respir infections     Temporomandibular joint disorders, unspecified     Seen at [facility], uses splint, PT , acupuncture     Past Surgical History:   Procedure Laterality Date     HC ENLARGE BREAST WITH IMPLANT  2000     HC REVISE BREAST RECONSTRUCTION  5/03     LEEP TX, CERVICAL  11/01    SALOMÓN 3, yearly paps until 2021     LIGATN/STRIP LONG & SHORT SAPHEN  11/04    Bilateral vein stripping     TUBAL LIGATION  10/30/09    filshie clips     Current Outpatient Medications   Medication Sig Dispense Refill     amitriptyline (ELAVIL) 10 MG tablet Take 1 tablet (10 mg) by mouth At Bedtime Take up to 5 tabs at bedtime prn insomnia. 100 tablet 3     diazepam (VALIUM) 10 MG tablet Place 1 tab vaginally for muscle spasms 30 tablet 3     Lactobacillus (ACIDOPHILUS) 100 MG CAPS Take 1 capsule by mouth daily       magnesium 200 MG TABS        MULTI-VITAMIN OR TABS 1 tablet by mouth DAILY       OnabotulinumtoxinA (BOTOX IJ) Inject 200 Units as directed Total dose 200 units   Dose Administered:  175 units  Botox (Botulinum Toxin Type A)       2:1 Dilution (55 units unavoidable waste)  Unavoidable waste 25 units   Diluent Used:  Preservative Free Normal Saline  Total Volume of Diluent Used:  2.9 ml  Lot # /C3 with Expiration Date:  03/2021  NDC #: Botox 100u ([address]-64)       SUMAtriptan (IMITREX) 50 MG tablet Take 1 tablet (50 mg) by mouth at onset of headache for migraine May repeat dose in 2 hours.  Do not exceed 200 mg in 24 hours 18 tablet 11     traMADol (ULTRAM) 50 MG tablet Take 1-2 tablets ( mg) by mouth every 6 hours as needed for pain 40 tablet 2     valACYclovir (VALTREX) 500 MG tablet Take 1 tablet (500 mg) by mouth daily 90 tablet 3     vitamin (B COMPLEX-C) tablet Take 1 tablet by mouth daily       VITAMIN C 500 MG OR TABS 1 tablet daily       VITAMIN D, CHOLECALCIFEROL, PO Take 1,000 Units by mouth daily       zolpidem (AMBIEN) 5 MG tablet Take 1 tablet (5 mg) by mouth nightly as needed  "for sleep 90 tablet 1     ALPRAZolam (XANAX) 0.5 MG tablet Take 1 tablet (0.5 mg) by mouth every 6 hours as needed for anxiety (Patient not taking: Reported on 1/2/2020) 40 tablet 0     aspirin-acetaminophen-caffeine (EXCEDRIN MIGRAINE) 250-250-65 MG per tablet Take 1 tablet by mouth every 6 hours as needed for pain. 30 tablet 0     hydrOXYzine (ATARAX) 25 MG tablet Take 1-2 tablets (25-50 mg) by mouth every 6 hours as needed for itching (Patient not taking: Reported on 2/26/2020) 60 tablet 1     Loperamide HCl (IMODIUM A-D PO) Take 1 tablet by mouth once as needed       ondansetron (ZOFRAN ODT) 4 MG disintegrating tablet Take 1-2 tablets (4-8 mg) by mouth every 8 hours as needed for nausea (Patient not taking: Reported on 2/26/2020) 20 tablet 3     penciclovir (DENAVIR) 1 % external cream Apply topically every 2 hours (while awake) 1.5 g 3     OTC products: none    Allergies   Allergen Reactions     Darvocet [Acetaminophen] Nausea and Vomiting     Erythromycin GI Disturbance     Flexeril [Cyclobenzaprine Hcl]      Lightheadedness, dizzy        Latex Allergy: NO    Social History     Tobacco Use     Smoking status: Never Smoker     Smokeless tobacco: Never Used   Substance Use Topics     Alcohol use: No     History   Drug Use No       REVIEW OF SYSTEMS:   Constitutional, HEENT, cardiovascular, pulmonary, gi and gu systems are negative, except as otherwise noted.    EXAM:   BP 94/60 (BP Location: Right arm, Patient Position: Sitting, Cuff Size: Adult Regular)   Pulse 86   Temp 98.1  F (36.7  C) (Tympanic)   Ht 1.803 m (5' 11\")   Wt 69.9 kg (154 lb 3.2 oz)   SpO2 98%   BMI 21.51 kg/m      GENERAL APPEARANCE: healthy, alert and no distress     EYES: EOMI, PERRL     HENT: ear canals and TM's normal and nose and mouth without ulcers or lesions     NECK: no adenopathy, no asymmetry, masses, or scars and thyroid normal to palpation     RESP: lungs clear to auscultation - no rales, rhonchi or wheezes     CV: regular " rates and rhythm, normal S1 S2, no S3 or S4 and no murmur, click or rub     MS: extremities normal- no gross deformities noted, no evidence of inflammation in joints, FROM in all extremities.     SKIN: no suspicious lesions or rashes     NEURO: Normal strength and tone, sensory exam grossly normal, mentation intact and speech normal     PSYCH: mentation appears normal. and affect normal/bright     LYMPHATICS: No cervical adenopathy    DIAGNOSTICS:   EKG: Not indicated due to non-vascular surgery and low risk of event (age <65 and without cardiac risk factors)  No labs needed for healthy asymptomatic patient     Recent Labs   Lab Test 04/09/18  1925 08/26/15  1342 02/08/13  0923   HGB 12.2  --   --      --   --      --  140   POTASSIUM 3.7  --  4.3   CR 0.74 0.80 0.84        IMPRESSION:   Reason for surgery/procedure: screening colonoscopy   Diagnosis/reason for consult: pre-op evaluation     The proposed surgical procedure is considered INTERMEDIATE risk.    REVISED CARDIAC RISK INDEX  The patient has the following serious cardiovascular risks for perioperative complications such as (MI, PE, VFib and 3  AV Block):  No serious cardiac risks  INTERPRETATION: 0 risks: Class I (very low risk - 0.4% complication rate)    The patient has the following additional risks for perioperative complications:  No identified additional risks      ICD-10-CM    1. Preop general physical exam Z01.818    2. Special screening for malignant neoplasms, colon Z12.11        RECOMMENDATIONS:         --Patient is to take all scheduled medications on the day of surgery EXCEPT for modifications listed below.    APPROVAL GIVEN to proceed with proposed procedure, without further diagnostic evaluation       Signed Electronically by: BEATA Francis CNP    Copy of this evaluation report is provided to requesting physician.    [facility] Preop Guidelines    Revised Cardiac Risk Index

## 2020-02-26 NOTE — PATIENT INSTRUCTIONS
Before Your Surgery    Call your surgeon if there is any change in your health. This includes signs of a cold or flu (such as a sore throat, runny nose, cough, rash or fever).    Do not smoke, drink alcohol or take over the counter medicine (unless your surgeon or primary care doctor tells you to) for the 24 hours before and after surgery.    If you take prescribed drugs: Follow your doctor s orders about which medicines to take and which to stop until after surgery.    Eating and drinking prior to surgery: follow the instructions from your surgeon    Take a shower or bath the night before surgery. Use the soap your surgeon gave you to gently clean your skin. If you do not have soap from your surgeon, use your regular soap. Do not shave or scrub the surgery site.  Wear clean pajamas and have clean sheets on your bed.     Nothing to eat and drink after midnight

## 2020-03-09 ENCOUNTER — TRANSFERRED RECORDS (OUTPATIENT)
Dept: HEALTH INFORMATION MANAGEMENT | Facility: CLINIC | Age: 50
End: 2020-03-09

## 2020-03-11 ENCOUNTER — MYC MEDICAL ADVICE (OUTPATIENT)
Dept: FAMILY MEDICINE | Facility: CLINIC | Age: 50
End: 2020-03-11

## 2020-03-11 ENCOUNTER — MYC MEDICAL ADVICE (OUTPATIENT)
Dept: OBGYN | Facility: CLINIC | Age: 50
End: 2020-03-11

## 2020-03-31 ENCOUNTER — TELEPHONE (OUTPATIENT)
Dept: OBGYN | Facility: CLINIC | Age: 50
End: 2020-03-31

## 2020-04-23 ENCOUNTER — OFFICE VISIT (OUTPATIENT)
Dept: PHYSICAL MEDICINE AND REHAB | Facility: CLINIC | Age: 50
End: 2020-04-23
Payer: COMMERCIAL

## 2020-04-23 VITALS — SYSTOLIC BLOOD PRESSURE: 106 MMHG | HEART RATE: 99 BPM | DIASTOLIC BLOOD PRESSURE: 72 MMHG

## 2020-04-23 DIAGNOSIS — G43.719 INTRACTABLE CHRONIC MIGRAINE WITHOUT AURA AND WITHOUT STATUS MIGRAINOSUS: Primary | ICD-10-CM

## 2020-04-23 RX ORDER — BUPIVACAINE HYDROCHLORIDE 5 MG/ML
10 INJECTION, SOLUTION EPIDURAL; INTRACAUDAL ONCE
Status: COMPLETED | OUTPATIENT
Start: 2020-04-23 | End: 2020-04-23

## 2020-04-23 RX ADMIN — BUPIVACAINE HYDROCHLORIDE 50 MG: 5 INJECTION, SOLUTION EPIDURAL; INTRACAUDAL at 08:08

## 2020-04-23 ASSESSMENT — PAIN SCALES - GENERAL: PAINLEVEL: NO PAIN (0)

## 2020-04-23 NOTE — PROGRESS NOTES
BOTULINUM TOXIN PROCEDURE - HEADACHE - NOTE    Chief Complaint   Patient presents with     Botox     Chronic Migraines         Current Outpatient Medications:      amitriptyline (ELAVIL) 10 MG tablet, Take 1 tablet (10 mg) by mouth At Bedtime Take up to 5 tabs at bedtime prn insomnia., Disp: 100 tablet, Rfl: 3     aspirin-acetaminophen-caffeine (EXCEDRIN MIGRAINE) 250-250-65 MG per tablet, Take 1 tablet by mouth every 6 hours as needed for pain., Disp: 30 tablet, Rfl: 0     diazepam (VALIUM) 10 MG tablet, Place 1 tab vaginally for muscle spasms, Disp: 30 tablet, Rfl: 3     Lactobacillus (ACIDOPHILUS) 100 MG CAPS, Take 1 capsule by mouth daily, Disp: , Rfl:      Loperamide HCl (IMODIUM A-D PO), Take 1 tablet by mouth once as needed, Disp: , Rfl:      magnesium 200 MG TABS, , Disp: , Rfl:      MULTI-VITAMIN OR TABS, 1 tablet by mouth DAILY, Disp: , Rfl:      penciclovir (DENAVIR) 1 % external cream, Apply topically every 2 hours (while awake), Disp: 1.5 g, Rfl: 3     SUMAtriptan (IMITREX) 50 MG tablet, Take 1 tablet (50 mg) by mouth at onset of headache for migraine May repeat dose in 2 hours.  Do not exceed 200 mg in 24 hours, Disp: 18 tablet, Rfl: 11     traMADol (ULTRAM) 50 MG tablet, Take 1-2 tablets ( mg) by mouth every 6 hours as needed for pain, Disp: 40 tablet, Rfl: 2     UNABLE TO FIND, L-morgan, Disp: , Rfl:      valACYclovir (VALTREX) 500 MG tablet, Take 1 tablet (500 mg) by mouth daily, Disp: 90 tablet, Rfl: 3     vitamin (B COMPLEX-C) tablet, Take 1 tablet by mouth daily, Disp: , Rfl:      VITAMIN C 500 MG OR TABS, 1 tablet daily, Disp: , Rfl:      VITAMIN D, CHOLECALCIFEROL, PO, Take 1,000 Units by mouth daily, Disp: , Rfl:      Vitamin Mixture (VITAMIN E COMPLETE PO), , Disp: , Rfl:      zolpidem (AMBIEN) 5 MG tablet, Take 1 tablet (5 mg) by mouth nightly as needed for sleep, Disp: 90 tablet, Rfl: 1     ALPRAZolam (XANAX) 0.5 MG tablet, Take 1 tablet (0.5 mg) by mouth every 6 hours as needed for  anxiety (Patient not taking: Reported on 1/2/2020), Disp: 40 tablet, Rfl: 0     hydrOXYzine (ATARAX) 25 MG tablet, Take 1-2 tablets (25-50 mg) by mouth every 6 hours as needed for itching (Patient not taking: Reported on 2/26/2020), Disp: 60 tablet, Rfl: 1     OnabotulinumtoxinA (BOTOX IJ), Inject 200 Units as directed Total dose 200 units  Dose Administered:  175 units  Botox (Botulinum Toxin Type A)       2:1 Dilution (55 units unavoidable waste) Unavoidable waste 25 units  Diluent Used:  Preservative Free Normal Saline Total Volume of Diluent Used:  2.9 ml Lot # /C3 with Expiration Date:  03/2021 NDC #: Botox 100u ([address]-27), Disp: , Rfl:      ondansetron (ZOFRAN ODT) 4 MG disintegrating tablet, Take 1-2 tablets (4-8 mg) by mouth every 8 hours as needed for nausea (Patient not taking: Reported on 2/26/2020), Disp: 20 tablet, Rfl: 3     Allergies   Allergen Reactions     Darvocet [Acetaminophen] Nausea and Vomiting     Erythromycin GI Disturbance     Flexeril [Cyclobenzaprine Hcl]      Lightheadedness, dizzy          PHYSICAL EXAM:  The patient denies having a headache today.     HPI:    Patient denies any hospitalizations, ER visits or new diagnosis since last appointment. No changes to report.     We reviewed the recommended safety guidelines for  Botox from any vaccine injection, such as the seasonal flu vaccine, by a minimum of 10-14 days with Jessica Manriquez. She acknowledged understanding.      RESPONSE TO PREVIOUS TREATMENT:  Change in headache pattern following last series of injections with 180 units of  Botox on 1/28/2020.   No problems reported    1.  Headache frequency during this injection cycle:  February had 8 headaches, March had 7 headaches and April had 4 headaches as of today.      2.  Headache duration during this injection cycle:  Headache duration ranged from 5 hours to 2-3 days.      3.  Headache intensity during this injection cycle:    A.  6/10  =  Typical pain  level.  B.  9/10  =  Worst pain level.  C.  3/10  =  Lowest pain level.       4.  Change in headache medication usage during this injection cycle:  (For Example:  Able to decrease use of oral pain medications.) Increase in oral medications. Taking Imitrex, Zofran, Excedrin and Tramadol for headaches.     5.  ER Visits During This Injection Cycle:  0      6.  Functional Performance:  Change in ADL's, social interaction, days lost from work, etc. Missed 8 days of work due to headaches and 8 social events due to headaches over the whole cycle.         BOTULINUM NEUROTOXIN INJECTION PROCEDURES:      VERIFICATION OF PATIENT IDENTIFICATION AND PROCEDURE     Initials   Patient Name MD   Patient  MD   Procedure Verified by: MD     Prior to the start of the procedure and with procedural staff participation, I verbally confirmed the patient s identity using two indicators, relevant allergies, that the procedure was appropriate and matched the consent or emergent situation, and that the correct equipment/implants were available. Immediately prior to starting the procedure I conducted the Time Out with the procedural staff and re-confirmed the patient s name, procedure, and site/side. (The Joint Commission universal protocol was followed.)  Yes    Sedation (Moderate or Deep): None      Above assessments performed by:  Flori Trujillo MD, [facility], [place]   Department of Rehabilitation       INDICATIONS FOR PROCEDURES:  Jessica Manriquez is a 49 year old patient with chronic migraine headaches associated with cervicogenic  components.     Her baseline symptoms have been recalcitrant to oral medications and conservative therapy.  She is here today for reinjection with Botox.        GOAL OF PROCEDURE:  The goal of this procedure is to decrease pain       TOTAL DOSE ADMINISTERED:  Total dose 200 units   Dose Administered:  195 units  Botox (Botulinum Toxin Type A)       2:1 Dilution   Unavoidable waste 5 units    Diluent Used: Bupivacaine  0.5%  Lot [id number]  Exp 9/23  NDC  53954 466 03  Total Volume of Diluent Used:  4 ml    Botox   Lot #  [id number]  with Expiration Date: 9/22  NDC #: Botox 100u ([address]-56)    Medication guide was offered to patient and was declined.      CONSENT:  The risks, benefits, and treatment options were discussed with Jessica Manriquez and she agreed to proceed.    Written consent was obtained by MD.       EQUIPMENT USED:  Needle-25mm stimulating/recording  EMG/NCS Machine      SKIN PREPARATION:  Skin preparation was performed using an alcohol wipe.      GUIDANCE DESCRIPTION:  Electro-myographic guidance was necessary throughout the procedure to accurately identify all areas of spastic muscles while avoiding injection of non-spastic muscles, neighboring nerves and nearby vascular structures.       AREA/MUSCLE INJECTED:    1 & 2. SHOULDER GIRDLE & NECK MUSCLES: 60 units Botox = Total Dose, 2:1 Dilution                               Right Upper Trapezius 15 units of Botox at 3 sites               Left Upper Trapezius 15 units of Botox at 3 sites        Right Cervical Paraspinal (superior cervical) - 10 units of Botox at 2 site/s  Left Cervical Paraspinal (superior cervical) - 10 units of Botox at 2 site/s                    Left levator (at neck) - 5 units of Botox at 1 site    Right Lev at the neck  5 unit of Botox at 1 site            3. HEAD, JAW & SCALP MUSCLES: 135 units Botox = Total Dose, 2:1 Dilution     Left masseter - 5 units of Botox at 1 site    Right masseter 5 units at 1 site     Right parietal area 5 units in 1 site    Left parietal area  5 units in 1 site        Right Occipitalis - 15 units of Botox at 3 site/s.  Left Occipitalis - 15 units of Botox at 3 site/s.       Right Frontalis - 10 units of Botox at 2 site/s  Left Frontalis - 10 units of Botox at 2 site/s        Right Temporalis - 15 units of Botox at 3 site/s.  Left Temporalis - 30 units of Botox at 6 site/s.       Right  - 5 units of Botox at  1 site/s.  Left  - 5 units of Botox at 1 site/s.     Procerus - 10 units of Botox at 1 site/s.         RESPONSE TO PROCEDURE:  Jessica Manriquez tolerated the procedure well and there were no immediate complications.   She was allowed to recover for an appropriate period of time and was discharged home in stable condition.      FOLLOW UP:  Jessica Manriquez was asked to follow up by phone in 7-14 days with Poonam Shoemaker RN, Care Coordinator, to report her response to this series of injections.  Based on the patient's previous response to this therapy, Jessica Manriquez was rescheduled for the next series of injections in 12 weeks.      PLAN (Medication Changes, Therapy Orders, Work or Disability Issues, etc.): Patient will continue to monitor response to today's injections.   Did increase the dose today, adding to the masseter on the left and parietal areas bilaterally

## 2020-04-23 NOTE — LETTER
4/23/2020       RE: Jessica Manriquez  [address]  [place] [facility] [address]     Dear Colleague,    Thank you for referring your patient, Jessica Manriquez, to the [facility] PHYSICAL MEDICINE AND REHABILITATION at [facility]. Please see a copy of my visit note below.    BOTULINUM TOXIN PROCEDURE - HEADACHE - NOTE    Chief Complaint   Patient presents with     Botox     Chronic Migraines         Current Outpatient Medications:      amitriptyline (ELAVIL) 10 MG tablet, Take 1 tablet (10 mg) by mouth At Bedtime Take up to 5 tabs at bedtime prn insomnia., Disp: 100 tablet, Rfl: 3     aspirin-acetaminophen-caffeine (EXCEDRIN MIGRAINE) 250-250-65 MG per tablet, Take 1 tablet by mouth every 6 hours as needed for pain., Disp: 30 tablet, Rfl: 0     diazepam (VALIUM) 10 MG tablet, Place 1 tab vaginally for muscle spasms, Disp: 30 tablet, Rfl: 3     Lactobacillus (ACIDOPHILUS) 100 MG CAPS, Take 1 capsule by mouth daily, Disp: , Rfl:      Loperamide HCl (IMODIUM A-D PO), Take 1 tablet by mouth once as needed, Disp: , Rfl:      magnesium 200 MG TABS, , Disp: , Rfl:      MULTI-VITAMIN OR TABS, 1 tablet by mouth DAILY, Disp: , Rfl:      penciclovir (DENAVIR) 1 % external cream, Apply topically every 2 hours (while awake), Disp: 1.5 g, Rfl: 3     SUMAtriptan (IMITREX) 50 MG tablet, Take 1 tablet (50 mg) by mouth at onset of headache for migraine May repeat dose in 2 hours.  Do not exceed 200 mg in 24 hours, Disp: 18 tablet, Rfl: 11     traMADol (ULTRAM) 50 MG tablet, Take 1-2 tablets ( mg) by mouth every 6 hours as needed for pain, Disp: 40 tablet, Rfl: 2     UNABLE TO FIND, L-theinine, Disp: , Rfl:      valACYclovir (VALTREX) 500 MG tablet, Take 1 tablet (500 mg) by mouth daily, Disp: 90 tablet, Rfl: 3     vitamin (B COMPLEX-C) tablet, Take 1 tablet by mouth daily, Disp: , Rfl:      VITAMIN C 500 MG OR TABS, 1 tablet daily, Disp: , Rfl:      VITAMIN D, CHOLECALCIFEROL, PO, Take 1,000  Units by mouth daily, Disp: , Rfl:      Vitamin Mixture (VITAMIN E COMPLETE PO), , Disp: , Rfl:      zolpidem (AMBIEN) 5 MG tablet, Take 1 tablet (5 mg) by mouth nightly as needed for sleep, Disp: 90 tablet, Rfl: 1     ALPRAZolam (XANAX) 0.5 MG tablet, Take 1 tablet (0.5 mg) by mouth every 6 hours as needed for anxiety (Patient not taking: Reported on 1/2/2020), Disp: 40 tablet, Rfl: 0     hydrOXYzine (ATARAX) 25 MG tablet, Take 1-2 tablets (25-50 mg) by mouth every 6 hours as needed for itching (Patient not taking: Reported on 2/26/2020), Disp: 60 tablet, Rfl: 1     OnabotulinumtoxinA (BOTOX IJ), Inject 200 Units as directed Total dose 200 units  Dose Administered:  175 units  Botox (Botulinum Toxin Type A)       2:1 Dilution (55 units unavoidable waste) Unavoidable waste 25 units  Diluent Used:  Preservative Free Normal Saline Total Volume of Diluent Used:  2.9 ml Lot # /C3 with Expiration Date:  03/2021 NDC #: Botox 100u ([address]-04), Disp: , Rfl:      ondansetron (ZOFRAN ODT) 4 MG disintegrating tablet, Take 1-2 tablets (4-8 mg) by mouth every 8 hours as needed for nausea (Patient not taking: Reported on 2/26/2020), Disp: 20 tablet, Rfl: 3     Allergies   Allergen Reactions     Darvocet [Acetaminophen] Nausea and Vomiting     Erythromycin GI Disturbance     Flexeril [Cyclobenzaprine Hcl]      Lightheadedness, dizzy          PHYSICAL EXAM:  The patient denies having a headache today.     HPI:    Patient denies any hospitalizations, ER visits or new diagnosis since last appointment. No changes to report.     We reviewed the recommended safety guidelines for  Botox from any vaccine injection, such as the seasonal flu vaccine, by a minimum of 10-14 days with Jessica Manriquez. She acknowledged understanding.      RESPONSE TO PREVIOUS TREATMENT:  Change in headache pattern following last series of injections with 180 units of  Botox on 1/28/2020.   No problems reported    1.  Headache frequency during  this injection cycle:  February had 8 headaches, March had 7 headaches and April had 4 headaches as of today.      2.  Headache duration during this injection cycle:  Headache duration ranged from 5 hours to 2-3 days.      3.  Headache intensity during this injection cycle:    A.  6/10  =  Typical pain level.  B.  9/10  =  Worst pain level.  C.  3/10  =  Lowest pain level.       4.  Change in headache medication usage during this injection cycle:  (For Example:  Able to decrease use of oral pain medications.) Increase in oral medications. Taking Imitrex, Zofran, Excedrin and Tramadol for headaches.     5.  ER Visits During This Injection Cycle:  0      6.  Functional Performance:  Change in ADL's, social interaction, days lost from work, etc. Missed 8 days of work due to headaches and 8 social events due to headaches over the whole cycle.         BOTULINUM NEUROTOXIN INJECTION PROCEDURES:      VERIFICATION OF PATIENT IDENTIFICATION AND PROCEDURE     Initials   Patient Name MD   Patient  MD   Procedure Verified by: MD     Prior to the start of the procedure and with procedural staff participation, I verbally confirmed the patient s identity using two indicators, relevant allergies, that the procedure was appropriate and matched the consent or emergent situation, and that the correct equipment/implants were available. Immediately prior to starting the procedure I conducted the Time Out with the procedural staff and re-confirmed the patient s name, procedure, and site/side. (The Joint Commission universal protocol was followed.)  Yes    Sedation (Moderate or Deep): None      Above assessments performed by:  Flori Trujillo MD, [facility]   Department of Rehabilitation       INDICATIONS FOR PROCEDURES:  Jessica Manriquez is a 49 year old patient with chronic migraine headaches associated with cervicogenic  components.     Her baseline symptoms have been recalcitrant to oral medications and conservative therapy.  She is here  today for reinjection with Botox.        GOAL OF PROCEDURE:  The goal of this procedure is to decrease pain       TOTAL DOSE ADMINISTERED:  Total dose 200 units   Dose Administered:  195 units  Botox (Botulinum Toxin Type A)       2:1 Dilution   Unavoidable waste 5 units    Diluent Used: Bupivacaine 0.5%  Lot [id number]  Exp 9/23  NDC  21509 466 03  Total Volume of Diluent Used:  4 ml    Botox   Lot #  [id number]  with Expiration Date: 9/22  NDC #: Botox 100u ([address]-57)    Medication guide was offered to patient and was declined.      CONSENT:  The risks, benefits, and treatment options were discussed with Jessica Manriquez and she agreed to proceed.    Written consent was obtained by MD.       EQUIPMENT USED:  Needle-25mm stimulating/recording  EMG/NCS Machine      SKIN PREPARATION:  Skin preparation was performed using an alcohol wipe.      GUIDANCE DESCRIPTION:  Electro-myographic guidance was necessary throughout the procedure to accurately identify all areas of spastic muscles while avoiding injection of non-spastic muscles, neighboring nerves and nearby vascular structures.       AREA/MUSCLE INJECTED:    1 & 2. SHOULDER GIRDLE & NECK MUSCLES: 60 units Botox = Total Dose, 2:1 Dilution                               Right Upper Trapezius 15 units of Botox at 3 sites               Left Upper Trapezius 15 units of Botox at 3 sites        Right Cervical Paraspinal (superior cervical) - 10 units of Botox at 2 site/s  Left Cervical Paraspinal (superior cervical) - 10 units of Botox at 2 site/s                    Left levator (at neck) - 5 units of Botox at 1 site    Right Lev at the neck  5 unit of Botox at 1 site            3. HEAD, JAW & SCALP MUSCLES: 135 units Botox = Total Dose, 2:1 Dilution     Left masseter - 5 units of Botox at 1 site    Right masseter 5 units at 1 site     Right parietal area 5 units in 1 site    Left parietal area  5 units in 1 site        Right Occipitalis - 15 units of Botox at 3  site/s.  Left Occipitalis - 15 units of Botox at 3 site/s.       Right Frontalis - 10 units of Botox at 2 site/s  Left Frontalis - 10 units of Botox at 2 site/s        Right Temporalis - 15 units of Botox at 3 site/s.  Left Temporalis - 30 units of Botox at 6 site/s.       Right  - 5 units of Botox at 1 site/s.  Left  - 5 units of Botox at 1 site/s.     Procerus - 10 units of Botox at 1 site/s.         RESPONSE TO PROCEDURE:  Jessica Manriquez tolerated the procedure well and there were no immediate complications.   She was allowed to recover for an appropriate period of time and was discharged home in stable condition.      FOLLOW UP:  Jessica Manriquez was asked to follow up by phone in 7-14 days with Poonam Shoemaker RN, Care Coordinator, to report her response to this series of injections.  Based on the patient's previous response to this therapy, Jessica Manriquez was rescheduled for the next series of injections in 12 weeks.      PLAN (Medication Changes, Therapy Orders, Work or Disability Issues, etc.): Patient will continue to monitor response to today's injections.   Did increase the dose today, adding to the masseter on the left and parietal areas bilaterally          Again, thank you for allowing me to participate in the care of your patient.      Sincerely,    Flori Trujillo MD

## 2020-05-30 NOTE — NURSING NOTE
Chief Complaint   Patient presents with     RECHECK     [facility]- BOTOX-MIGRAINE F/U       
Principal Discharge DX:	Chest pain, unspecified type  Secondary Diagnosis:	Hypertension, unspecified type

## 2020-07-08 ENCOUNTER — MYC MEDICAL ADVICE (OUTPATIENT)
Dept: FAMILY MEDICINE | Facility: CLINIC | Age: 50
End: 2020-07-08

## 2020-07-08 NOTE — TELEPHONE ENCOUNTER
Video or phone visit recommended.  Can use a hold spot.  She will need labs, so if she wants to be seen F2F that is fine also.    Su Loya M.D.

## 2020-07-16 ENCOUNTER — OFFICE VISIT (OUTPATIENT)
Dept: PHYSICAL MEDICINE AND REHAB | Facility: CLINIC | Age: 50
End: 2020-07-16
Payer: COMMERCIAL

## 2020-07-16 VITALS — SYSTOLIC BLOOD PRESSURE: 104 MMHG | DIASTOLIC BLOOD PRESSURE: 70 MMHG

## 2020-07-16 DIAGNOSIS — G43.719 INTRACTABLE CHRONIC MIGRAINE WITHOUT AURA AND WITHOUT STATUS MIGRAINOSUS: Primary | ICD-10-CM

## 2020-07-16 DIAGNOSIS — G43.719 CHRONIC MIGRAINE WITHOUT AURA, INTRACTABLE, WITHOUT STATUS MIGRAINOSUS: Primary | ICD-10-CM

## 2020-07-16 RX ORDER — BUPIVACAINE HYDROCHLORIDE 5 MG/ML
10 INJECTION, SOLUTION EPIDURAL; INTRACAUDAL ONCE
Status: COMPLETED | OUTPATIENT
Start: 2020-07-16 | End: 2020-07-16

## 2020-07-16 RX ADMIN — BUPIVACAINE HYDROCHLORIDE 50 MG: 5 INJECTION, SOLUTION EPIDURAL; INTRACAUDAL at 11:00

## 2020-07-16 ASSESSMENT — PAIN SCALES - GENERAL: PAINLEVEL: MILD PAIN (2)

## 2020-07-16 NOTE — NURSING NOTE
Chief Complaint   Patient presents with     Botox     Chronic migraine      Ernestine Vargas, CMA

## 2020-07-16 NOTE — LETTER
7/16/2020       RE: Jessica Manriquez  [address]  [facility] [address]     Dear Colleague,    Thank you for referring your patient, Jessica Manriquez, to the [facility] PHYSICAL MEDICINE AND REHABILITATION at [facility]. Please see a copy of my visit note below.    BOTULINUM TOXIN PROCEDURE - HEADACHE - NOTE    Chief Complaint   Patient presents with     Botox     Chronic migraine      /70       Current Outpatient Medications:      amitriptyline (ELAVIL) 10 MG tablet, Take 1 tablet (10 mg) by mouth At Bedtime Take up to 5 tabs at bedtime prn insomnia., Disp: 100 tablet, Rfl: 3     aspirin-acetaminophen-caffeine (EXCEDRIN MIGRAINE) 250-250-65 MG per tablet, Take 1 tablet by mouth every 6 hours as needed for pain., Disp: 30 tablet, Rfl: 0     diazepam (VALIUM) 10 MG tablet, Place 1 tab vaginally for muscle spasms, Disp: 30 tablet, Rfl: 3     Lactobacillus (ACIDOPHILUS) 100 MG CAPS, Take 1 capsule by mouth daily, Disp: , Rfl:      Loperamide HCl (IMODIUM A-D PO), Take 1 tablet by mouth once as needed, Disp: , Rfl:      magnesium 200 MG TABS, , Disp: , Rfl:      MULTI-VITAMIN OR TABS, 1 tablet by mouth DAILY, Disp: , Rfl:      OnabotulinumtoxinA (BOTOX IJ), Inject 200 Units as directed Total dose 200 units  Dose Administered:  175 units  Botox (Botulinum Toxin Type A)       2:1 Dilution (55 units unavoidable waste) Unavoidable waste 25 units  Diluent Used:  Preservative Free Normal Saline Total Volume of Diluent Used:  2.9 ml Lot # /C3 with Expiration Date:  03/2021 NDC #: Botox 100u ([address]-51), Disp: , Rfl:      penciclovir (DENAVIR) 1 % external cream, Apply topically every 2 hours (while awake), Disp: 1.5 g, Rfl: 3     SUMAtriptan (IMITREX) 50 MG tablet, Take 1 tablet (50 mg) by mouth at onset of headache for migraine May repeat dose in 2 hours.  Do not exceed 200 mg in 24 hours, Disp: 18 tablet, Rfl: 11     traMADol (ULTRAM) 50 MG tablet, Take 1-2  tablets ( mg) by mouth every 6 hours as needed for pain, Disp: 40 tablet, Rfl: 2     valACYclovir (VALTREX) 500 MG tablet, Take 1 tablet (500 mg) by mouth daily, Disp: 90 tablet, Rfl: 3     vitamin (B COMPLEX-C) tablet, Take 1 tablet by mouth daily, Disp: , Rfl:      VITAMIN C 500 MG OR TABS, 1 tablet daily, Disp: , Rfl:      VITAMIN D, CHOLECALCIFEROL, PO, Take 1,000 Units by mouth daily, Disp: , Rfl:      Vitamin Mixture (VITAMIN E COMPLETE PO), , Disp: , Rfl:      zolpidem (AMBIEN) 5 MG tablet, Take 1 tablet (5 mg) by mouth nightly as needed for sleep, Disp: 90 tablet, Rfl: 1     ALPRAZolam (XANAX) 0.5 MG tablet, Take 1 tablet (0.5 mg) by mouth every 6 hours as needed for anxiety (Patient not taking: Reported on 1/2/2020), Disp: 40 tablet, Rfl: 0     hydrOXYzine (ATARAX) 25 MG tablet, Take 1-2 tablets (25-50 mg) by mouth every 6 hours as needed for itching (Patient not taking: Reported on 2/26/2020), Disp: 60 tablet, Rfl: 1     ondansetron (ZOFRAN ODT) 4 MG disintegrating tablet, Take 1-2 tablets (4-8 mg) by mouth every 8 hours as needed for nausea (Patient not taking: Reported on 2/26/2020), Disp: 20 tablet, Rfl: 3     UNABLE TO FIND, L-theinine, Disp: , Rfl:      Allergies   Allergen Reactions     Darvocet [Acetaminophen] Nausea and Vomiting     Erythromycin GI Disturbance     Flexeril [Cyclobenzaprine Hcl]      Lightheadedness, dizzy          PHYSICAL EXAM:  The patient notes a mild headache today. This headache is wearing off from last night. She has had 9 headaches over the last 12 days. The humidity, menstrual cycle and closer to the time of reinjection are relating to her headaches.      The increase in Botox was helpful. She would like to discuss coming in for Botox every 11 weeks.     HPI:    Patient denies any hospitalizations, ER visits or new diagnosis since last appointment.     We reviewed the recommended safety guidelines for  Botox from any vaccine injection, such as the seasonal  flu vaccine, by a minimum of 10-14 days with Jessica Manriquez. She acknowledged understanding.      RESPONSE TO PREVIOUS TREATMENT:  Change in headache pattern following last series of injections with 195 units of  Botox 20.   She has increased migraines in the last 10 days. She has had very difficult for her. We recommend to bring her in for botox injections in 10-11 weeks. We will send paperwork to authorize the q 10-12 weeks rotations.       1.  Headache frequency during this injection cycle: 3 headaches in May, 6 headaches in  and 9 headaches in July.      2.  Headache duration during this injection cycle:  Headache duration ranged from 12 hours 72 hours    3.  Headache intensity during this injection cycle:    A.  7/10  =  Typical pain level.  B.  9/10  =  Worst pain level.  C.  4/10  =  Lowest pain level.  These numbers have slightly increased.        4.  Change in headache medication usage during this injection cycle:  (For Example:  Able to decrease use of oral pain medications.) Increase in oral medications in July. Taking Imitrex, Zofran, Excedrin and Tramadol for headaches.     5.  ER Visits During This Injection Cycle:  0      6.  Functional Performance:  Change in ADL's, social interaction, days lost from work, etc. Missed 50% of social events/work this cycle.       BOTULINUM NEUROTOXIN INJECTION PROCEDURES:      VERIFICATION OF PATIENT IDENTIFICATION AND PROCEDURE     Initials   Patient Name MD   Patient  MD   Procedure Verified by: MD     Prior to the start of the procedure and with procedural staff participation, I verbally confirmed the patient s identity using two indicators, relevant allergies, that the procedure was appropriate and matched the consent or emergent situation, and that the correct equipment/implants were available. Immediately prior to starting the procedure I conducted the Time Out with the procedural staff and re-confirmed the patient s name, procedure, and site/side.  (The Joint Commission universal protocol was followed.)  Yes    Sedation (Moderate or Deep): None      Above assessments performed by:  Flori Trujillo MD, [facility]   Department of Rehabilitation         INDICATIONS FOR PROCEDURES:  Jessica Manriquez is a 49 year old patient with chronic migraine headaches associated with cervicogenic  components.     Her baseline symptoms have been recalcitrant to oral medications and conservative therapy.  She is here today for reinjection with Botox.        GOAL OF PROCEDURE:  The goal of this procedure is to decrease pain       TOTAL DOSE ADMINISTERED:  Total dose 200 units   Dose Administered:  195 units  Botox (Botulinum Toxin Type A)       2:1 Dilution   Unavoidable waste 5 units    Diluent Used: Bupivacaine 0.5%  Lot [id number]  Exp 2/23  NDC 46264 466 03  Total Volume of Diluent Used:  4 ml    Botox   Lot # [id number]  with Expiration Date: 2/23  NDC #: Botox 100u ([address]-54)    Medication guide was offered to patient and was declined.      CONSENT:  The risks, benefits, and treatment options were discussed with Jessica Manriquez and she agreed to proceed.    Written consent was obtained by MD.       EQUIPMENT USED:  Needle-25mm stimulating/recording  EMG/NCS Machine      SKIN PREPARATION:  Skin preparation was performed using an alcohol wipe.      GUIDANCE DESCRIPTION:  Electro-myographic guidance was necessary throughout the procedure to accurately identify all areas of spastic muscles while avoiding injection of non-spastic muscles, neighboring nerves and nearby vascular structures.       AREA/MUSCLE INJECTED:    1 & 2. SHOULDER GIRDLE & NECK MUSCLES: 60 units Botox = Total Dose, 2:1 Dilution                               Right Upper Trapezius 15 units of Botox at 3 sites               Left Upper Trapezius 15 units of Botox at 3 sites        Right Cervical Paraspinal (superior cervical) - 10 units of Botox at 2 site/s  Left Cervical Paraspinal (superior cervical) - 10 units of  Botox at 2 site/s                    Left levator (at neck) - 5 units of Botox at 1 site    Right Lev at the neck  5 unit of Botox at 1 site            3. HEAD, JAW & SCALP MUSCLES: 135 units Botox = Total Dose, 2:1 Dilution     Left masseter - 5 units of Botox at 1 site    Right masseter 5 units at 1 site     Right parietal area 5 units in 1 site    Left parietal area  5 units in 1 site        Right Occipitalis - 15 units of Botox at 3 site/s.  Left Occipitalis - 15 units of Botox at 3 site/s.       Right Frontalis - 10 units of Botox at 2 site/s  Left Frontalis - 10 units of Botox at 2 site/s        Right Temporalis - 15 units of Botox at 3 site/s.  Left Temporalis - 30 units of Botox at 6 site/s.       Right  - 5 units of Botox at 1 site/s.  Left  - 5 units of Botox at 1 site/s.     Procerus - 10 units of Botox at 1 site/s.         RESPONSE TO PROCEDURE:  Jessica Manriquez tolerated the procedure well and there were no immediate complications.   She was allowed to recover for an appropriate period of time and was discharged home in stable condition.      FOLLOW UP:  Jessica Manriquez was asked to follow up by phone in 7-14 days with Poonam Shoemaker RN, Care Coordinator, to report her response to this series of injections.  Based on the patient's previous response to this therapy, Jessica Manriquez was rescheduled for the next series of injections in 12 weeks.      PLAN (Medication Changes, Therapy Orders, Work or Disability Issues, etc.): Patient will continue to monitor response to today's injections.   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             Again, thank you for allowing me to participate in the care of your patient.      Sincerely,    Flori Trujillo MD

## 2020-07-16 NOTE — PROGRESS NOTES
BOTULINUM TOXIN PROCEDURE - HEADACHE - NOTE    Chief Complaint   Patient presents with     Botox     Chronic migraine      /70       Current Outpatient Medications:      amitriptyline (ELAVIL) 10 MG tablet, Take 1 tablet (10 mg) by mouth At Bedtime Take up to 5 tabs at bedtime prn insomnia., Disp: 100 tablet, Rfl: 3     aspirin-acetaminophen-caffeine (EXCEDRIN MIGRAINE) 250-250-65 MG per tablet, Take 1 tablet by mouth every 6 hours as needed for pain., Disp: 30 tablet, Rfl: 0     diazepam (VALIUM) 10 MG tablet, Place 1 tab vaginally for muscle spasms, Disp: 30 tablet, Rfl: 3     Lactobacillus (ACIDOPHILUS) 100 MG CAPS, Take 1 capsule by mouth daily, Disp: , Rfl:      Loperamide HCl (IMODIUM A-D PO), Take 1 tablet by mouth once as needed, Disp: , Rfl:      magnesium 200 MG TABS, , Disp: , Rfl:      MULTI-VITAMIN OR TABS, 1 tablet by mouth DAILY, Disp: , Rfl:      OnabotulinumtoxinA (BOTOX IJ), Inject 200 Units as directed Total dose 200 units  Dose Administered:  175 units  Botox (Botulinum Toxin Type A)       2:1 Dilution (55 units unavoidable waste) Unavoidable waste 25 units  Diluent Used:  Preservative Free Normal Saline Total Volume of Diluent Used:  2.9 ml Lot # /C3 with Expiration Date:  03/2021 NDC #: Botox 100u ([address]-00), Disp: , Rfl:      penciclovir (DENAVIR) 1 % external cream, Apply topically every 2 hours (while awake), Disp: 1.5 g, Rfl: 3     SUMAtriptan (IMITREX) 50 MG tablet, Take 1 tablet (50 mg) by mouth at onset of headache for migraine May repeat dose in 2 hours.  Do not exceed 200 mg in 24 hours, Disp: 18 tablet, Rfl: 11     traMADol (ULTRAM) 50 MG tablet, Take 1-2 tablets ( mg) by mouth every 6 hours as needed for pain, Disp: 40 tablet, Rfl: 2     valACYclovir (VALTREX) 500 MG tablet, Take 1 tablet (500 mg) by mouth daily, Disp: 90 tablet, Rfl: 3     vitamin (B COMPLEX-C) tablet, Take 1 tablet by mouth daily, Disp: , Rfl:      VITAMIN C 500 MG OR TABS, 1 tablet daily,  Disp: , Rfl:      VITAMIN D, CHOLECALCIFEROL, PO, Take 1,000 Units by mouth daily, Disp: , Rfl:      Vitamin Mixture (VITAMIN E COMPLETE PO), , Disp: , Rfl:      zolpidem (AMBIEN) 5 MG tablet, Take 1 tablet (5 mg) by mouth nightly as needed for sleep, Disp: 90 tablet, Rfl: 1     ALPRAZolam (XANAX) 0.5 MG tablet, Take 1 tablet (0.5 mg) by mouth every 6 hours as needed for anxiety (Patient not taking: Reported on 1/2/2020), Disp: 40 tablet, Rfl: 0     hydrOXYzine (ATARAX) 25 MG tablet, Take 1-2 tablets (25-50 mg) by mouth every 6 hours as needed for itching (Patient not taking: Reported on 2/26/2020), Disp: 60 tablet, Rfl: 1     ondansetron (ZOFRAN ODT) 4 MG disintegrating tablet, Take 1-2 tablets (4-8 mg) by mouth every 8 hours as needed for nausea (Patient not taking: Reported on 2/26/2020), Disp: 20 tablet, Rfl: 3     UNABLE TO FIND, L-theinine, Disp: , Rfl:      Allergies   Allergen Reactions     Darvocet [Acetaminophen] Nausea and Vomiting     Erythromycin GI Disturbance     Flexeril [Cyclobenzaprine Hcl]      Lightheadedness, dizzy          PHYSICAL EXAM:  The patient notes a mild headache today. This headache is wearing off from last night. She has had 9 headaches over the last 12 days. The humidity, menstrual cycle and closer to the time of reinjection are relating to her headaches.      The increase in Botox was helpful. She would like to discuss coming in for Botox every 11 weeks.     HPI:    Patient denies any hospitalizations, ER visits or new diagnosis since last appointment.     We reviewed the recommended safety guidelines for  Botox from any vaccine injection, such as the seasonal flu vaccine, by a minimum of 10-14 days with Jessica Manriquez. She acknowledged understanding.      RESPONSE TO PREVIOUS TREATMENT:  Change in headache pattern following last series of injections with 195 units of  Botox 4/23/20.   She has increased migraines in the last 10 days. She has had very difficult for her.  We recommend to bring her in for botox injections in 10-11 weeks. We will send paperwork to authorize the q 10-12 weeks rotations.       1.  Headache frequency during this injection cycle: 3 headaches in May, 6 headaches in  and 9 headaches in July.      2.  Headache duration during this injection cycle:  Headache duration ranged from 12 hours 72 hours    3.  Headache intensity during this injection cycle:    A.  7/10  =  Typical pain level.  B.  9/10  =  Worst pain level.  C.  4/10  =  Lowest pain level.  These numbers have slightly increased.        4.  Change in headache medication usage during this injection cycle:  (For Example:  Able to decrease use of oral pain medications.) Increase in oral medications in July. Taking Imitrex, Zofran, Excedrin and Tramadol for headaches.     5.  ER Visits During This Injection Cycle:  0      6.  Functional Performance:  Change in ADL's, social interaction, days lost from work, etc. Missed 50% of social events/work this cycle.       BOTULINUM NEUROTOXIN INJECTION PROCEDURES:      VERIFICATION OF PATIENT IDENTIFICATION AND PROCEDURE     Initials   Patient Name MD   Patient  MD   Procedure Verified by: MD     Prior to the start of the procedure and with procedural staff participation, I verbally confirmed the patient s identity using two indicators, relevant allergies, that the procedure was appropriate and matched the consent or emergent situation, and that the correct equipment/implants were available. Immediately prior to starting the procedure I conducted the Time Out with the procedural staff and re-confirmed the patient s name, procedure, and site/side. (The Joint Commission universal protocol was followed.)  Yes    Sedation (Moderate or Deep): None      Above assessments performed by:  Flori Trujillo MD, [facility]   Department of Rehabilitation         INDICATIONS FOR PROCEDURES:  Jessica Manriquez is a 49 year old patient with chronic migraine headaches associated  with cervicogenic  components.     Her baseline symptoms have been recalcitrant to oral medications and conservative therapy.  She is here today for reinjection with Botox.        GOAL OF PROCEDURE:  The goal of this procedure is to decrease pain       TOTAL DOSE ADMINISTERED:  Total dose 200 units   Dose Administered:  195 units  Botox (Botulinum Toxin Type A)       2:1 Dilution   Unavoidable waste 5 units    Diluent Used: Bupivacaine 0.5%  Lot [id number]  Exp 2/23  NDC 91018 466 03  Total Volume of Diluent Used:  4 ml    Botox   Lot # [id number]  with Expiration Date: 2/23  NDC #: Botox 100u ([address]-13)    Medication guide was offered to patient and was declined.      CONSENT:  The risks, benefits, and treatment options were discussed with Jessica Manriquez and she agreed to proceed.    Written consent was obtained by MD.       EQUIPMENT USED:  Needle-25mm stimulating/recording  EMG/NCS Machine      SKIN PREPARATION:  Skin preparation was performed using an alcohol wipe.      GUIDANCE DESCRIPTION:  Electro-myographic guidance was necessary throughout the procedure to accurately identify all areas of spastic muscles while avoiding injection of non-spastic muscles, neighboring nerves and nearby vascular structures.       AREA/MUSCLE INJECTED:    1 & 2. SHOULDER GIRDLE & NECK MUSCLES: 60 units Botox = Total Dose, 2:1 Dilution                               Right Upper Trapezius 15 units of Botox at 3 sites               Left Upper Trapezius 15 units of Botox at 3 sites        Right Cervical Paraspinal (superior cervical) - 10 units of Botox at 2 site/s  Left Cervical Paraspinal (superior cervical) - 10 units of Botox at 2 site/s                    Left levator (at neck) - 5 units of Botox at 1 site    Right Lev at the neck  5 unit of Botox at 1 site            3. HEAD, JAW & SCALP MUSCLES: 135 units Botox = Total Dose, 2:1 Dilution     Left masseter - 5 units of Botox at 1 site    Right masseter 5 units at 1 site      Right parietal area 5 units in 1 site    Left parietal area  5 units in 1 site        Right Occipitalis - 15 units of Botox at 3 site/s.  Left Occipitalis - 15 units of Botox at 3 site/s.       Right Frontalis - 10 units of Botox at 2 site/s  Left Frontalis - 10 units of Botox at 2 site/s        Right Temporalis - 15 units of Botox at 3 site/s.  Left Temporalis - 30 units of Botox at 6 site/s.       Right  - 5 units of Botox at 1 site/s.  Left  - 5 units of Botox at 1 site/s.     Procerus - 10 units of Botox at 1 site/s.         RESPONSE TO PROCEDURE:  Jessica Manriquez tolerated the procedure well and there were no immediate complications.   She was allowed to recover for an appropriate period of time and was discharged home in stable condition.      FOLLOW UP:  Jessica Manriquez was asked to follow up by phone in 7-14 days with Poonam Shoemaker RN, Care Coordinator, to report her response to this series of injections.  Based on the patient's previous response to this therapy, Jessica Manriquez was rescheduled for the next series of injections in 12 weeks.      PLAN (Medication Changes, Therapy Orders, Work or Disability Issues, etc.): Patient will continue to monitor response to today's injections.   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

## 2020-07-17 ENCOUNTER — OFFICE VISIT (OUTPATIENT)
Dept: FAMILY MEDICINE | Facility: CLINIC | Age: 50
End: 2020-07-17
Payer: COMMERCIAL

## 2020-07-17 VITALS
DIASTOLIC BLOOD PRESSURE: 74 MMHG | HEART RATE: 98 BPM | SYSTOLIC BLOOD PRESSURE: 104 MMHG | BODY MASS INDEX: 20.72 KG/M2 | RESPIRATION RATE: 18 BRPM | OXYGEN SATURATION: 98 % | WEIGHT: 148 LBS | TEMPERATURE: 99.6 F | HEIGHT: 71 IN

## 2020-07-17 DIAGNOSIS — G25.81 RESTLESS LEGS SYNDROME (RLS): Primary | ICD-10-CM

## 2020-07-17 DIAGNOSIS — R53.83 FATIGUE, UNSPECIFIED TYPE: ICD-10-CM

## 2020-07-17 LAB
BASOPHILS # BLD AUTO: 0 10E9/L (ref 0–0.2)
BASOPHILS NFR BLD AUTO: 0.5 %
DIFFERENTIAL METHOD BLD: NORMAL
EOSINOPHIL # BLD AUTO: 0.2 10E9/L (ref 0–0.7)
EOSINOPHIL NFR BLD AUTO: 3 %
ERYTHROCYTE [DISTWIDTH] IN BLOOD BY AUTOMATED COUNT: 13.2 % (ref 10–15)
FERRITIN SERPL-MCNC: 9 NG/ML (ref 8–252)
HBA1C MFR BLD: 5.6 % (ref 0–5.6)
HCT VFR BLD AUTO: 37.8 % (ref 35–47)
HGB BLD-MCNC: 12.7 G/DL (ref 11.7–15.7)
LYMPHOCYTES # BLD AUTO: 2.1 10E9/L (ref 0.8–5.3)
LYMPHOCYTES NFR BLD AUTO: 26.6 %
MCH RBC QN AUTO: 30.7 PG (ref 26.5–33)
MCHC RBC AUTO-ENTMCNC: 33.6 G/DL (ref 31.5–36.5)
MCV RBC AUTO: 91 FL (ref 78–100)
MONOCYTES # BLD AUTO: 0.5 10E9/L (ref 0–1.3)
MONOCYTES NFR BLD AUTO: 6.9 %
NEUTROPHILS # BLD AUTO: 4.9 10E9/L (ref 1.6–8.3)
NEUTROPHILS NFR BLD AUTO: 63 %
PLATELET # BLD AUTO: 315 10E9/L (ref 150–450)
RBC # BLD AUTO: 4.14 10E12/L (ref 3.8–5.2)
TSH SERPL DL<=0.005 MIU/L-ACNC: 2.67 MU/L (ref 0.4–4)
WBC # BLD AUTO: 7.8 10E9/L (ref 4–11)

## 2020-07-17 PROCEDURE — 99214 OFFICE O/P EST MOD 30 MIN: CPT | Performed by: FAMILY MEDICINE

## 2020-07-17 PROCEDURE — 82728 ASSAY OF FERRITIN: CPT | Performed by: FAMILY MEDICINE

## 2020-07-17 PROCEDURE — 85025 COMPLETE CBC W/AUTO DIFF WBC: CPT | Performed by: FAMILY MEDICINE

## 2020-07-17 PROCEDURE — 36415 COLL VENOUS BLD VENIPUNCTURE: CPT | Performed by: FAMILY MEDICINE

## 2020-07-17 PROCEDURE — 82306 VITAMIN D 25 HYDROXY: CPT | Performed by: FAMILY MEDICINE

## 2020-07-17 PROCEDURE — 83036 HEMOGLOBIN GLYCOSYLATED A1C: CPT | Performed by: FAMILY MEDICINE

## 2020-07-17 PROCEDURE — 84443 ASSAY THYROID STIM HORMONE: CPT | Performed by: FAMILY MEDICINE

## 2020-07-17 ASSESSMENT — MIFFLIN-ST. JEOR: SCORE: 1387.45

## 2020-07-17 NOTE — PROGRESS NOTES
"Subjective     Jessica Manriquez is a 49 year old female who presents to clinic today for the following health issues:    HPI       Joint Pain    Onset: restless legs and R Shoulder achy and feel squirmy feelings- used to be more infrequent.  In the past year it's several nights a week.    Description:   Location: R shoulder and both legs  Character: achy and squirmey feelings    Intensity: moderate    Progression of Symptoms: worse    Accompanying Signs & Symptoms:  Other symptoms: none    History:   Previous similar pain: no       Precipitating factors:   Trauma or overuse: no     Alleviating factors:  Improved by: nothing    Therapies Tried and outcome:  Lumbar roller for her legs.    Chronic Migraines- had shots yesterday.  Going to get the shots every 10 weeks instead of every 12.       Reviewed and updated as needed this visit by Provider         Review of Systems   Constitutional, HEENT, cardiovascular, pulmonary, gi and gu systems are negative, except as otherwise noted.      Objective    /74 (BP Location: Left arm, Patient Position: Sitting, Cuff Size: Adult Large)   Pulse 98   Temp 99.6  F (37.6  C) (Tympanic)   Resp 18   Ht 1.803 m (5' 11\")   Wt 67.1 kg (148 lb)   LMP 07/11/2020 (Exact Date)   SpO2 98%   Breastfeeding No   BMI 20.64 kg/m    Body mass index is 20.64 kg/m .  Physical Exam   GENERAL APPEARANCE: healthy, alert and no distress  PSYCH: mentation appears normal and affect normal/bright    Diagnostic Test Results:  Labs reviewed in Epic        Assessment & Plan     1. Restless legs syndrome (RLS)   r/o low iron stores, replace if necessary  Discussed options of medications for rls.   - Ferritin    2. Fatigue, unspecified type  R/o other causes for fatigue  Encouraged good sleep, regular exercise, healthy diet.   - Ferritin  - Vitamin D Deficiency  - CBC with platelets and differential  - Hemoglobin A1c  - TSH with free T4 reflex           No follow-ups on file.    Su Loya, " MD  [facility]

## 2020-07-20 LAB — DEPRECATED CALCIDIOL+CALCIFEROL SERPL-MC: 35 UG/L (ref 20–75)

## 2020-07-20 NOTE — RESULT ENCOUNTER NOTE
Ferritin or iron stores are very low at 9 (goal for Restless legs is >50).  I would recommend first supplementing iron over the counter (may also need a stool softener).  You can start by taking iron twice daily, taking with 500 mg of vitamin C is also recommended for absorption.  Ferrous gluconate seems to be tolerated well. Recheck ferritin in 2 months.  Lab only.    Thyroid is normal.  Blood counts/cell lines are normal.  HgbA1c is 5.6% which is normal but 5.7% and above is pre-diabetes, so cut back on carbs/simple sugars and increase activity.    Su Loya M.D.

## 2020-09-24 ENCOUNTER — OFFICE VISIT (OUTPATIENT)
Dept: PHYSICAL MEDICINE AND REHAB | Facility: CLINIC | Age: 50
End: 2020-09-24
Payer: COMMERCIAL

## 2020-09-24 VITALS — HEART RATE: 106 BPM | DIASTOLIC BLOOD PRESSURE: 78 MMHG | OXYGEN SATURATION: 99 % | SYSTOLIC BLOOD PRESSURE: 110 MMHG

## 2020-09-24 DIAGNOSIS — G43.719 INTRACTABLE CHRONIC MIGRAINE WITHOUT AURA AND WITHOUT STATUS MIGRAINOSUS: Primary | ICD-10-CM

## 2020-09-24 RX ORDER — BUPIVACAINE HYDROCHLORIDE 5 MG/ML
10 INJECTION, SOLUTION EPIDURAL; INTRACAUDAL ONCE
Status: COMPLETED | OUTPATIENT
Start: 2020-09-24 | End: 2020-09-24

## 2020-09-24 RX ADMIN — BUPIVACAINE HYDROCHLORIDE 50 MG: 5 INJECTION, SOLUTION EPIDURAL; INTRACAUDAL at 12:13

## 2020-09-24 NOTE — NURSING NOTE
Chief Complaint   Patient presents with     Botox     Chronic migraine     Ernestine Vargas, CMA

## 2020-09-24 NOTE — PROGRESS NOTES
BOTULINUM TOXIN PROCEDURE - HEADACHE - NOTE    Chief Complaint   Patient presents with     Botox     Chronic migraine     /78   Pulse 106   SpO2 99%       Current Outpatient Medications:      ALPRAZolam (XANAX) 0.5 MG tablet, Take 1 tablet (0.5 mg) by mouth every 6 hours as needed for anxiety, Disp: 40 tablet, Rfl: 0     amitriptyline (ELAVIL) 10 MG tablet, Take 1 tablet (10 mg) by mouth At Bedtime Take up to 5 tabs at bedtime prn insomnia., Disp: 100 tablet, Rfl: 3     aspirin-acetaminophen-caffeine (EXCEDRIN MIGRAINE) 250-250-65 MG per tablet, Take 1 tablet by mouth every 6 hours as needed for pain., Disp: 30 tablet, Rfl: 0     diazepam (VALIUM) 10 MG tablet, Place 1 tab vaginally for muscle spasms, Disp: 30 tablet, Rfl: 3     hydrOXYzine (ATARAX) 25 MG tablet, Take 1-2 tablets (25-50 mg) by mouth every 6 hours as needed for itching, Disp: 60 tablet, Rfl: 1     Lactobacillus (ACIDOPHILUS) 100 MG CAPS, Take 1 capsule by mouth daily, Disp: , Rfl:      Loperamide HCl (IMODIUM A-D PO), Take 1 tablet by mouth once as needed, Disp: , Rfl:      magnesium 200 MG TABS, , Disp: , Rfl:      MULTI-VITAMIN OR TABS, 1 tablet by mouth DAILY, Disp: , Rfl:      OnabotulinumtoxinA (BOTOX IJ), Inject 200 Units as directed Total dose 200 units  Dose Administered:  175 units  Botox (Botulinum Toxin Type A)       2:1 Dilution (55 units unavoidable waste) Unavoidable waste 25 units  Diluent Used:  Preservative Free Normal Saline Total Volume of Diluent Used:  2.9 ml Lot # /C3 with Expiration Date:  03/2021 NDC #: Botox 100u ([address]-78), Disp: , Rfl:      ondansetron (ZOFRAN ODT) 4 MG disintegrating tablet, Take 1-2 tablets (4-8 mg) by mouth every 8 hours as needed for nausea, Disp: 20 tablet, Rfl: 3     penciclovir (DENAVIR) 1 % external cream, Apply topically every 2 hours (while awake), Disp: 1.5 g, Rfl: 3     SUMAtriptan (IMITREX) 50 MG tablet, Take 1 tablet (50 mg) by mouth at onset of headache for migraine May  repeat dose in 2 hours.  Do not exceed 200 mg in 24 hours, Disp: 18 tablet, Rfl: 11     traMADol (ULTRAM) 50 MG tablet, Take 1-2 tablets ( mg) by mouth every 6 hours as needed for pain, Disp: 40 tablet, Rfl: 2     valACYclovir (VALTREX) 500 MG tablet, Take 1 tablet (500 mg) by mouth daily, Disp: 90 tablet, Rfl: 3     vitamin (B COMPLEX-C) tablet, Take 1 tablet by mouth daily, Disp: , Rfl:      VITAMIN C 500 MG OR TABS, 1 tablet daily, Disp: , Rfl:      VITAMIN D, CHOLECALCIFEROL, PO, Take 1,000 Units by mouth daily, Disp: , Rfl:      Vitamin Mixture (VITAMIN E COMPLETE PO), , Disp: , Rfl:      zolpidem (AMBIEN) 5 MG tablet, Take 1 tablet (5 mg) by mouth nightly as needed for sleep, Disp: 90 tablet, Rfl: 1     UNABLE TO FIND, L-morgan, Disp: , Rfl:     Current Facility-Administered Medications:      botulinum toxin type A (BOTOX) 100 units injection 200 Units, 200 Units, Intramuscular, Q90 Days, Flori Trujillo MD     Allergies   Allergen Reactions     Darvocet [Acetaminophen] Nausea and Vomiting     Erythromycin GI Disturbance     Flexeril [Cyclobenzaprine Hcl]      Lightheadedness, dizzy          PHYSICAL EXAM:  The patient notes a mild headache today. She is grading it a 1/10.        HPI:    Patient denies any hospitalizations, ER visits or new diagnosis since last appointment.     We reviewed the recommended safety guidelines for  Botox from any vaccine injection, such as the seasonal flu vaccine, by a minimum of 10-14 days with Jessica Manriquez. She acknowledged understanding.      RESPONSE TO PREVIOUS TREATMENT:  Change in headache pattern following last series of injections with 195 units of  Botox 7/16/20      1.  Headache frequency during this injection cycle: 10 headaches in July, 6 in August, and 5 in September as of today.   She is using the [employer] gera.     2.  Headache duration during this injection cycle:  Headache duration ranged from 12 hours to 3 days    3.  Headache  intensity during this injection cycle:    A.  4/10  =  Typical pain level.- if medication is taken   B.  8/10  =  Worst pain level.  C.  2/10  =  Lowest pain level.          4.  Change in headache medication usage during this injection cycle:  (For Example:  Able to decrease use of oral pain medications.) Increased in oral medication but feels she is taking the medication sooner to get ahead of the headaches.     5.  ER Visits During This Injection Cycle:  0      6.  Functional Performance:  Change in ADL's, social interaction, days lost from work, etc. 3 days of staying in bed.        BOTULINUM NEUROTOXIN INJECTION PROCEDURES:      VERIFICATION OF PATIENT IDENTIFICATION AND PROCEDURE     Initials   Patient Name MD   Patient  MD   Procedure Verified by: MD     Prior to the start of the procedure and with procedural staff participation, I verbally confirmed the patient s identity using two indicators, relevant allergies, that the procedure was appropriate and matched the consent or emergent situation, and that the correct equipment/implants were available. Immediately prior to starting the procedure I conducted the Time Out with the procedural staff and re-confirmed the patient s name, procedure, and site/side. (The Joint Commission universal protocol was followed.)  Yes    Sedation (Moderate or Deep): None      Above assessments performed by:  Flori Trujillo MD, [facility]   Department of Rehabilitation       INDICATIONS FOR PROCEDURES:  Jessica Manriquez is a 49 year old patient with chronic migraine headaches associated with cervicogenic  components.     Her baseline symptoms have been recalcitrant to oral medications and conservative therapy.  She is here today for reinjection with Botox.        GOAL OF PROCEDURE:  The goal of this procedure is to decrease pain       TOTAL DOSE ADMINISTERED:  Total dose 200 units   Dose Administered:  195 units  Botox (Botulinum Toxin Type A)       2:1 Dilution   Unavoidable waste 5  units    Diluent Used: Bupivacaine 0.5%  Lot # [id number]   Exp 4/23  NDC 20679 466 03  Total Volume of Diluent Used:  4 ml    Botox   Lot #  [id number]  with Expiration Date: 10/23  NDC #: Botox 100u ([address]-29)    Medication guide was offered to patient and was declined.      CONSENT:  The risks, benefits, and treatment options were discussed with Jessica Manriquez and she agreed to proceed.    Written consent was obtained by MD.       EQUIPMENT USED:  Needle-25mm stimulating/recording  EMG/NCS Machine      SKIN PREPARATION:  Skin preparation was performed using an alcohol wipe.      GUIDANCE DESCRIPTION:  Electro-myographic guidance was necessary throughout the procedure to accurately identify all areas of spastic muscles while avoiding injection of non-spastic muscles, neighboring nerves and nearby vascular structures.       AREA/MUSCLE INJECTED:    1 & 2. SHOULDER GIRDLE & NECK MUSCLES: 60 units Botox = Total Dose, 2:1 Dilution                               Right Upper Trapezius 15 units of Botox at 3 sites               Left Upper Trapezius 15 units of Botox at 3 sites        Right Cervical Paraspinal (superior cervical) - 10 units of Botox at 2 site/s  Left Cervical Paraspinal (superior cervical) - 10 units of Botox at 2 site/s                    Left levator (at neck) - 5 units of Botox at 1 site    Right Lev at the neck  5 unit of Botox at 1 site            3. HEAD, JAW & SCALP MUSCLES: 135 units Botox = Total Dose, 2:1 Dilution     Left masseter - 5 units of Botox at 1 site    Right masseter 5 units at 1 site     Right parietal area 5 units in 1 site    Left parietal area  5 units in 1 site        Right Occipitalis - 15 units of Botox at 3 site/s.  Left Occipitalis - 15 units of Botox at 3 site/s.       Right Frontalis - 10 units of Botox at 2 site/s  Left Frontalis - 10 units of Botox at 2 site/s        Right Temporalis - 15 units of Botox at 3 site/s.  Left Temporalis - 30 units of Botox at 6  site/s.       Right  - 5 units of Botox at 1 site/s.  Left  - 5 units of Botox at 1 site/s.     Procerus - 10 units of Botox at 1 site/s.         RESPONSE TO PROCEDURE:  Jessica Manriquez tolerated the procedure well and there were no immediate complications.   She was allowed to recover for an appropriate period of time and was discharged home in stable condition.          FOLLOW UP:  Jessica Manriquez was asked to follow up by phone in 7-14 days with Poonam Shoemaker RN, Care Coordinator, to report her response to this series of injections.  Based on the patient's previous response to this therapy, Jessica Manriquez was rescheduled for the next series of injections in 10 weeks.      PLAN (Medication Changes, Therapy Orders, Work or Disability Issues, etc.): Patient will continue to monitor response to today's injections.

## 2020-09-24 NOTE — LETTER
9/24/2020       RE: Jessica Manriquez  [address]  [place] [address]     Dear Colleague,    Thank you for referring your patient, Jessica Manriquez, to the [facility] PHYSICAL MEDICINE AND REHABILITATION at [facility]. Please see a copy of my visit note below.    BOTULINUM TOXIN PROCEDURE - HEADACHE - NOTE    Chief Complaint   Patient presents with     Botox     Chronic migraine     /78   Pulse 106   SpO2 99%       Current Outpatient Medications:      ALPRAZolam (XANAX) 0.5 MG tablet, Take 1 tablet (0.5 mg) by mouth every 6 hours as needed for anxiety, Disp: 40 tablet, Rfl: 0     amitriptyline (ELAVIL) 10 MG tablet, Take 1 tablet (10 mg) by mouth At Bedtime Take up to 5 tabs at bedtime prn insomnia., Disp: 100 tablet, Rfl: 3     aspirin-acetaminophen-caffeine (EXCEDRIN MIGRAINE) 250-250-65 MG per tablet, Take 1 tablet by mouth every 6 hours as needed for pain., Disp: 30 tablet, Rfl: 0     diazepam (VALIUM) 10 MG tablet, Place 1 tab vaginally for muscle spasms, Disp: 30 tablet, Rfl: 3     hydrOXYzine (ATARAX) 25 MG tablet, Take 1-2 tablets (25-50 mg) by mouth every 6 hours as needed for itching, Disp: 60 tablet, Rfl: 1     Lactobacillus (ACIDOPHILUS) 100 MG CAPS, Take 1 capsule by mouth daily, Disp: , Rfl:      Loperamide HCl (IMODIUM A-D PO), Take 1 tablet by mouth once as needed, Disp: , Rfl:      magnesium 200 MG TABS, , Disp: , Rfl:      MULTI-VITAMIN OR TABS, 1 tablet by mouth DAILY, Disp: , Rfl:      OnabotulinumtoxinA (BOTOX IJ), Inject 200 Units as directed Total dose 200 units  Dose Administered:  175 units  Botox (Botulinum Toxin Type A)       2:1 Dilution (55 units unavoidable waste) Unavoidable waste 25 units  Diluent Used:  Preservative Free Normal Saline Total Volume of Diluent Used:  2.9 ml Lot # /C3 with Expiration Date:  03/2021 NDC #: Botox 100u ([address]-14), Disp: , Rfl:      ondansetron (ZOFRAN ODT) 4 MG disintegrating tablet, Take  1-2 tablets (4-8 mg) by mouth every 8 hours as needed for nausea, Disp: 20 tablet, Rfl: 3     penciclovir (DENAVIR) 1 % external cream, Apply topically every 2 hours (while awake), Disp: 1.5 g, Rfl: 3     SUMAtriptan (IMITREX) 50 MG tablet, Take 1 tablet (50 mg) by mouth at onset of headache for migraine May repeat dose in 2 hours.  Do not exceed 200 mg in 24 hours, Disp: 18 tablet, Rfl: 11     traMADol (ULTRAM) 50 MG tablet, Take 1-2 tablets ( mg) by mouth every 6 hours as needed for pain, Disp: 40 tablet, Rfl: 2     valACYclovir (VALTREX) 500 MG tablet, Take 1 tablet (500 mg) by mouth daily, Disp: 90 tablet, Rfl: 3     vitamin (B COMPLEX-C) tablet, Take 1 tablet by mouth daily, Disp: , Rfl:      VITAMIN C 500 MG OR TABS, 1 tablet daily, Disp: , Rfl:      VITAMIN D, CHOLECALCIFEROL, PO, Take 1,000 Units by mouth daily, Disp: , Rfl:      Vitamin Mixture (VITAMIN E COMPLETE PO), , Disp: , Rfl:      zolpidem (AMBIEN) 5 MG tablet, Take 1 tablet (5 mg) by mouth nightly as needed for sleep, Disp: 90 tablet, Rfl: 1     UNABLE TO FIND, L-theinine, Disp: , Rfl:     Current Facility-Administered Medications:      botulinum toxin type A (BOTOX) 100 units injection 200 Units, 200 Units, Intramuscular, Q90 Days, Flori Trujillo MD     Allergies   Allergen Reactions     Darvocet [Acetaminophen] Nausea and Vomiting     Erythromycin GI Disturbance     Flexeril [Cyclobenzaprine Hcl]      Lightheadedness, dizzy          PHYSICAL EXAM:  The patient notes a mild headache today. She is grading it a 1/10.        HPI:    Patient denies any hospitalizations, ER visits or new diagnosis since last appointment.     We reviewed the recommended safety guidelines for  Botox from any vaccine injection, such as the seasonal flu vaccine, by a minimum of 10-14 days with Jessica Manriquez. She acknowledged understanding.      RESPONSE TO PREVIOUS TREATMENT:  Change in headache pattern following last series of injections with  195 units of  Botox 20      1.  Headache frequency during this injection cycle: 10 headaches in July, 6 in August, and 5 in September as of today.   She is using the [employer] gera.     2.  Headache duration during this injection cycle:  Headache duration ranged from 12 hours to 3 days    3.  Headache intensity during this injection cycle:    A.  4/10  =  Typical pain level.- if medication is taken   B.  8/10  =  Worst pain level.  C.  2/10  =  Lowest pain level.          4.  Change in headache medication usage during this injection cycle:  (For Example:  Able to decrease use of oral pain medications.) Increased in oral medication but feels she is taking the medication sooner to get ahead of the headaches.     5.  ER Visits During This Injection Cycle:  0      6.  Functional Performance:  Change in ADL's, social interaction, days lost from work, etc. 3 days of staying in bed.        BOTULINUM NEUROTOXIN INJECTION PROCEDURES:      VERIFICATION OF PATIENT IDENTIFICATION AND PROCEDURE     Initials   Patient Name MD   Patient  MD   Procedure Verified by: MD     Prior to the start of the procedure and with procedural staff participation, I verbally confirmed the patient s identity using two indicators, relevant allergies, that the procedure was appropriate and matched the consent or emergent situation, and that the correct equipment/implants were available. Immediately prior to starting the procedure I conducted the Time Out with the procedural staff and re-confirmed the patient s name, procedure, and site/side. (The Joint Commission universal protocol was followed.)  Yes    Sedation (Moderate or Deep): None      Above assessments performed by:  Flori Trujillo MD, [facility]   Department of Rehabilitation       INDICATIONS FOR PROCEDURES:  Jessica Manriquez is a 49 year old patient with chronic migraine headaches associated with cervicogenic  components.     Her baseline symptoms have been recalcitrant to oral  medications and conservative therapy.  She is here today for reinjection with Botox.        GOAL OF PROCEDURE:  The goal of this procedure is to decrease pain       TOTAL DOSE ADMINISTERED:  Total dose 200 units   Dose Administered:  195 units  Botox (Botulinum Toxin Type A)       2:1 Dilution   Unavoidable waste 5 units    Diluent Used: Bupivacaine 0.5%  Lot # [id number]   Exp 4/23  NDC 75852 466 03  Total Volume of Diluent Used:  4 ml    Botox   Lot #  [id number]  with Expiration Date: 10/23  NDC #: Botox 100u ([address]-68)    Medication guide was offered to patient and was declined.      CONSENT:  The risks, benefits, and treatment options were discussed with Jessica Manriquez and she agreed to proceed.    Written consent was obtained by MD.       EQUIPMENT USED:  Needle-25mm stimulating/recording  EMG/NCS Machine      SKIN PREPARATION:  Skin preparation was performed using an alcohol wipe.      GUIDANCE DESCRIPTION:  Electro-myographic guidance was necessary throughout the procedure to accurately identify all areas of spastic muscles while avoiding injection of non-spastic muscles, neighboring nerves and nearby vascular structures.       AREA/MUSCLE INJECTED:    1 & 2. SHOULDER GIRDLE & NECK MUSCLES: 60 units Botox = Total Dose, 2:1 Dilution                               Right Upper Trapezius 15 units of Botox at 3 sites               Left Upper Trapezius 15 units of Botox at 3 sites        Right Cervical Paraspinal (superior cervical) - 10 units of Botox at 2 site/s  Left Cervical Paraspinal (superior cervical) - 10 units of Botox at 2 site/s                    Left levator (at neck) - 5 units of Botox at 1 site    Right Lev at the neck  5 unit of Botox at 1 site            3. HEAD, JAW & SCALP MUSCLES: 135 units Botox = Total Dose, 2:1 Dilution     Left masseter - 5 units of Botox at 1 site    Right masseter 5 units at 1 site     Right parietal area 5 units in 1 site    Left parietal area  5 units in 1 site         Right Occipitalis - 15 units of Botox at 3 site/s.  Left Occipitalis - 15 units of Botox at 3 site/s.       Right Frontalis - 10 units of Botox at 2 site/s  Left Frontalis - 10 units of Botox at 2 site/s        Right Temporalis - 15 units of Botox at 3 site/s.  Left Temporalis - 30 units of Botox at 6 site/s.       Right  - 5 units of Botox at 1 site/s.  Left  - 5 units of Botox at 1 site/s.     Procerus - 10 units of Botox at 1 site/s.         RESPONSE TO PROCEDURE:  Jessica Manriquez tolerated the procedure well and there were no immediate complications.   She was allowed to recover for an appropriate period of time and was discharged home in stable condition.          FOLLOW UP:  Jessica Manriquez was asked to follow up by phone in 7-14 days with Poonam Shoemaker RN, Care Coordinator, to report her response to this series of injections.  Based on the patient's previous response to this therapy, Jessica Manriquez was rescheduled for the next series of injections in 10 weeks.      PLAN (Medication Changes, Therapy Orders, Work or Disability Issues, etc.): Patient will continue to monitor response to today's injections.              Again, thank you for allowing me to participate in the care of your patient.      Sincerely,    Flori Trujillo MD

## 2020-09-24 NOTE — LETTER
9/24/2020       RE: Jessica Manriquez  [address]  [facility] [address]     Dear Colleague,    Thank you for referring your patient, Jessica Manriquez, to the [facility] PHYSICAL MEDICINE [facility] REHABIL[facility]ATION [facility]. Please see a copy of my visit note below.    BOTULINUM TOXIN PROCEDURE - HEADACHE - NOTE  Chief Complaint   Patient presents with     Botox     Chronic migraine     /78   Pulse 106   SpO2 99%     Current Outpatient Medications:      ALPRAZolam (XANAX) 0.5 MG tablet, Take 1 tablet (0.5 mg) by mouth every 6 hours as needed for anxiety, Disp: 40 tablet, Rfl: 0     amitriptyline (ELAVIL) 10 MG tablet, Take 1 tablet (10 mg) by mouth At Bedtime Take up to 5 tabs at bedtime prn insomnia., Disp: 100 tablet, Rfl: 3     aspirin-acetaminophen-caffeine (EXCEDRIN MIGRAINE) 250-250-65 MG per tablet, Take 1 tablet by mouth every 6 hours as needed for pain., Disp: 30 tablet, Rfl: 0     diazepam (VALIUM) 10 MG tablet, Place 1 tab vaginally for muscle spasms, Disp: 30 tablet, Rfl: 3     hydrOXYzine (ATARAX) 25 MG tablet, Take 1-2 tablets (25-50 mg) by mouth every 6 hours as needed for itching, Disp: 60 tablet, Rfl: 1     Lactobacillus (ACIDOPHILUS) 100 MG CAPS, Take 1 capsule by mouth daily, Disp: , Rfl:      Loperamide HCl (IMODIUM A-D PO), Take 1 tablet by mouth once as needed, Disp: , Rfl:      magnesium 200 MG TABS, , Disp: , Rfl:      MULTI-VITAMIN OR TABS, 1 tablet by mouth DAILY, Disp: , Rfl:      OnabotulinumtoxinA (BOTOX IJ), Inject 200 Units as directed Total dose 200 units  Dose Administered:  175 units  Botox (Botulinum Toxin Type A)       2:1 Dilution (55 units unavoidable waste) Unavoidable waste 25 units  Diluent Used:  Preservative Free Normal Saline Total Volume of Diluent Used:  2.9 ml Lot # /C3 with Expiration Date:  03/2021 NDC #: Botox 100u ([address]-48), Disp: , Rfl:      ondansetron (ZOFRAN ODT) 4 MG disintegrating tablet, Take 1-2  tablets (4-8 mg) by mouth every 8 hours as needed for nausea, Disp: 20 tablet, Rfl: 3     penciclovir (DENAVIR) 1 % external cream, Apply topically every 2 hours (while awake), Disp: 1.5 g, Rfl: 3     SUMAtriptan (IMITREX) 50 MG tablet, Take 1 tablet (50 mg) by mouth at onset of headache for migraine May repeat dose in 2 hours.  Do not exceed 200 mg in 24 hours, Disp: 18 tablet, Rfl: 11     traMADol (ULTRAM) 50 MG tablet, Take 1-2 tablets ( mg) by mouth every 6 hours as needed for pain, Disp: 40 tablet, Rfl: 2     valACYclovir (VALTREX) 500 MG tablet, Take 1 tablet (500 mg) by mouth daily, Disp: 90 tablet, Rfl: 3     vitamin (B COMPLEX-C) tablet, Take 1 tablet by mouth daily, Disp: , Rfl:      VITAMIN C 500 MG OR TABS, 1 tablet daily, Disp: , Rfl:      VITAMIN D, CHOLECALCIFEROL, PO, Take 1,000 Units by mouth daily, Disp: , Rfl:      Vitamin Mixture (VITAMIN E COMPLETE PO), , Disp: , Rfl:      zolpidem (AMBIEN) 5 MG tablet, Take 1 tablet (5 mg) by mouth nightly as needed for sleep, Disp: 90 tablet, Rfl: 1     UNABLE TO FIND, L-theinine, Disp: , Rfl:     Current Facility-Administered Medications:      botulinum toxin type A (BOTOX) 100 units injection 200 Units, 200 Units, Intramuscular, Q90 Days, Flori Trujillo MD     Allergies   Allergen Reactions     Darvocet [Acetaminophen] Nausea and Vomiting     Erythromycin GI Disturbance     Flexeril [Cyclobenzaprine Hcl]      Lightheadedness, dizzy          PHYSICAL EXAM:  The patient notes a mild headache today. She is grading it a 1/10.        HPI:  Patient denies any hospitalizations, ER visits or new diagnosis since last appointment.     We reviewed the recommended safety guidelines for  Botox from any vaccine injection, such as the seasonal flu vaccine, by a minimum of 10-14 days with Jessica Manriquez. She acknowledged understanding.      RESPONSE TO PREVIOUS TREATMENT:  Change in headache pattern following last series of injections with 195  units of  Botox 20      1.  Headache frequency during this injection cycle: 10 headaches in July, 6 in August, and 5 in September as of today.   She is using the [employer] gera.     2.  Headache duration during this injection cycle:  Headache duration ranged from 12 hours to 3 days    3.  Headache intensity during this injection cycle:    A.  4/10  =  Typical pain level.- if medication is taken   B.  8/10  =  Worst pain level.  C.  2/10  =  Lowest pain level.        4.  Change in headache medication usage during this injection cycle:  (For Example:  Able to decrease use of oral pain medications.) Increased in oral medication but feels she is taking the medication sooner to get ahead of the headaches.     5.  ER Visits During This Injection Cycle:  0      6.  Functional Performance:  Change in ADL's, social interaction, days lost from work, etc. 3 days of staying in bed.        BOTULINUM NEUROTOXIN INJECTION PROCEDURES:  VERIFICATION OF PATIENT IDENTIFICATION AND PROCEDURE   Initials   Patient Name MD   Patient  MD   Procedure Verified by: MD     Prior to the start of the procedure and with procedural staff participation, I verbally confirmed the patient s identity using two indicators, relevant allergies, that the procedure was appropriate and matched the consent or emergent situation, and that the correct equipment/implants were available. Immediately prior to starting the procedure I conducted the Time Out with the procedural staff and re-confirmed the patient s name, procedure, and site/side. (The Joint Commission universal protocol was followed.)  Yes    Sedation (Moderate or Deep): None    Above assessments performed by:  Flori Trujillo MD, [facility]   Department of Rehabilitation       INDICATIONS FOR PROCEDURES:  Jessica Manriquez is a 49 year old patient with chronic migraine headaches associated with cervicogenic  components.     Her baseline symptoms have been recalcitrant to oral medications and  conservative therapy.  She is here today for reinjection with Botox.    GOAL OF PROCEDURE:  The goal of this procedure is to decrease pain     TOTAL DOSE ADMINISTERED:  Total dose 200 units   Dose Administered:  195 units  Botox (Botulinum Toxin Type A)       2:1 Dilution   Unavoidable waste 5 units    Diluent Used: Bupivacaine 0.5%  Lot # [id number]   Exp 4/23  NDC 58717 466 03  Total Volume of Diluent Used:  4 ml    Botox   Lot #  [id number]  with Expiration Date: 10/23  NDC #: Botox 100u ([address]-54)    Medication guide was offered to patient and was declined.    CONSENT:  The risks, benefits, and treatment options were discussed with Jessica Manriquez and she agreed to proceed.    Written consent was obtained by MD.     EQUIPMENT USED:  Needle-25mm stimulating/recording  EMG/NCS Machine    SKIN PREPARATION:  Skin preparation was performed using an alcohol wipe.    GUIDANCE DESCRIPTION:  Electro-myographic guidance was necessary throughout the procedure to accurately identify all areas of spastic muscles while avoiding injection of non-spastic muscles, neighboring nerves and nearby vascular structures.     AREA/MUSCLE INJECTED:    1 & 2. SHOULDER GIRDLE & NECK MUSCLES: 60 units Botox = Total Dose, 2:1 Dilution                               Right Upper Trapezius 15 units of Botox at 3 sites               Left Upper Trapezius 15 units of Botox at 3 sites        Right Cervical Paraspinal (superior cervical) - 10 units of Botox at 2 site/s  Left Cervical Paraspinal (superior cervical) - 10 units of Botox at 2 site/s                    Left levator (at neck) - 5 units of Botox at 1 site    Right Lev at the neck  5 unit of Botox at 1 site        3. HEAD, JAW & SCALP MUSCLES: 135 units Botox = Total Dose, 2:1 Dilution     Left masseter - 5 units of Botox at 1 site    Right masseter 5 units at 1 site     Right parietal area 5 units in 1 site    Left parietal area  5 units in 1 site        Right Occipitalis - 15 units of Botox  at 3 site/s.  Left Occipitalis - 15 units of Botox at 3 site/s.       Right Frontalis - 10 units of Botox at 2 site/s  Left Frontalis - 10 units of Botox at 2 site/s        Right Temporalis - 15 units of Botox at 3 site/s.  Left Temporalis - 30 units of Botox at 6 site/s.       Right  - 5 units of Botox at 1 site/s.  Left  - 5 units of Botox at 1 site/s.     Procerus - 10 units of Botox at 1 site/s.     RESPONSE TO PROCEDURE:  Jessica Manriquez tolerated the procedure well and there were no immediate complications.   She was allowed to recover for an appropriate period of time and was discharged home in stable condition.    FOLLOW UP:  Jessica Manriquez was asked to follow up by phone in 7-14 days with Poonam Shoemaker RN, Care Coordinator, to report her response to this series of injections.  Based on the patient's previous response to this therapy, Jessica Manriquez was rescheduled for the next series of injections in 10 weeks.    PLAN (Medication Changes, Therapy Orders, Work or Disability Issues, etc.): Patient will continue to monitor response to today's injections.       Again, thank you for allowing me to participate in the care of your patient.  Sincerely,    Flori Trujillo MD

## 2020-09-28 DIAGNOSIS — R10.2 PELVIC PAIN IN FEMALE: ICD-10-CM

## 2020-09-28 DIAGNOSIS — G43.839 INTRACTABLE MENSTRUAL MIGRAINE WITHOUT STATUS MIGRAINOSUS: ICD-10-CM

## 2020-09-28 RX ORDER — DIAZEPAM 10 MG
TABLET ORAL
Qty: 30 TABLET | Refills: 3 | Status: SHIPPED | OUTPATIENT
Start: 2020-09-28 | End: 2021-05-27

## 2020-09-28 RX ORDER — TRAMADOL HYDROCHLORIDE 50 MG/1
50-100 TABLET ORAL EVERY 6 HOURS PRN
Qty: 40 TABLET | Refills: 2 | Status: SHIPPED | OUTPATIENT
Start: 2020-09-28 | End: 2021-05-27

## 2020-09-28 NOTE — TELEPHONE ENCOUNTER
Last office visit 1/2/2020  Annual visit    Pelvic pain in female - valium    Intractable menstrual migraine without status migrainosus - tramadol    Outside of RN guidelines for refill.  Please review and advise.    Thank you.    Suzie Ellis   [facility]  RN

## 2020-10-04 ENCOUNTER — MYC MEDICAL ADVICE (OUTPATIENT)
Dept: OBGYN | Facility: CLINIC | Age: 50
End: 2020-10-04

## 2020-10-04 DIAGNOSIS — G47.00 INSOMNIA, UNSPECIFIED TYPE: Primary | ICD-10-CM

## 2020-10-05 RX ORDER — QUETIAPINE FUMARATE 50 MG/1
50 TABLET, FILM COATED ORAL
Qty: 60 TABLET | Refills: 3 | Status: SHIPPED | OUTPATIENT
Start: 2020-10-05 | End: 2021-01-26

## 2020-10-12 ENCOUNTER — MYC MEDICAL ADVICE (OUTPATIENT)
Dept: OBGYN | Facility: CLINIC | Age: 50
End: 2020-10-12

## 2020-10-12 DIAGNOSIS — Z80.3 FAMILY HISTORY OF MALIGNANT NEOPLASM OF BREAST: Primary | ICD-10-CM

## 2020-10-22 ENCOUNTER — VIRTUAL VISIT (OUTPATIENT)
Dept: FAMILY MEDICINE | Facility: OTHER | Age: 50
End: 2020-10-22

## 2020-10-22 ENCOUNTER — MYC MEDICAL ADVICE (OUTPATIENT)
Dept: FAMILY MEDICINE | Facility: CLINIC | Age: 50
End: 2020-10-22

## 2020-10-22 NOTE — PROGRESS NOTES
"Date: 10/22/2020 14:30:51  Clinician: Melissa Saunders  Clinician NPI: [id number]  Patient: Jessica Manriquez  Patient : 1970  Patient Address:  [address], [place] [address]  Patient Phone: [phone]  Visit Protocol: URI  Patient Summary:  Jessica is a 49 year old ( : 1970 ) female who initiated a [facility] Visit for COVID-19 (Coronavirus) evaluation and screening. When asked the question \"Please sign me up to receive news, health information and promotions from [facility].\", Jessica responded \"No\".    Jessica states her symptoms started gradually 1 month or more ago.   Her symptoms consist of a cough.   Symptom details   Cough: Jessica coughs a few times an hour and her cough is more bothersome at night. Phlegm does not come into her throat when she coughs. She believes her cough is caused by post-nasal drip.    Jessica denies having ear pain, headache, wheezing, fever, enlarged lymph nodes, nasal congestion, anosmia, vomiting, rhinitis, nausea, facial pain or pressure, myalgias, chills, malaise, sore throat, teeth pain, ageusia, and diarrhea. She also denies double sickening (worsening symptoms after initial improvement), taking antibiotic medication in the past month, and having recent facial or sinus surgery in the past 60 days. She is not experiencing dyspnea.   Precipitating events  She has not recently been exposed to someone with influenza. Jessica has not been in close contact with any high risk individuals.   Pertinent COVID-19 (Coronavirus) information  In the past 14 days, Jessica has not worked in a congregate living setting.   She does not work or volunteer as healthcare worker or a  and does not work or volunteer in a healthcare facility.   Jessica also has not lived in a congregate living setting in the past 14 days. She lives with a healthcare worker.   Jessica has not had a close contact with a laboratory-confirmed COVID-19 patient within 14 days of symptom onset.   Since December " 2019, Jessica and has not had upper respiratory infection or influenza-like illness. Has not been diagnosed with lab-confirmed COVID-19 test   Pertinent medical history  Jessica does not get yeast infections when she takes antibiotics.   Jessica does not need a return to work/school note.   Weight: 148 lbs   Jessica does not smoke or use smokeless tobacco.   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 I get Botox every 10 weeks for Chronic Intractable Migraine. My medications are all 'take as needed' and I have none I take daily. I just started 65 mg of iron 2/daily over the last 3 mos for low ferritin (it was 9) causing RLS. I just have an annoying cough - otherwise, I feel fine (besides the migraines).   Weight: 148 lbs    MEDICATIONS: zolpidem oral, Botox injection, Zofran oral, Valium oral, tramadol oral, sumatriptan oral, ALLERGIES: NKDA  Clinician Response:  Dear Jessica,  I am sorry you are not feeling well. To determine the most appropriate care for you, I would like you to be seen in person to further discuss your health history and symptoms.  You will not be charged for this [facility] Visit. Thank you for trusting us with your care.  COVID-19 (Coronavirus) General Information  Because there is currently no vaccine to prevent infection, the best way to protect yourself is to avoid being exposed to this virus. Common symptoms of COVID-19 include but are not limited to fever, cough, and shortness of breath. These symptoms appear 2-14 days after you are exposed to the virus that causes COVID-19. Click here for more information from the CDC on how to protect yourself.  If you are sick with COVID-19 or suspect you are infected with the virus that causes COVID-19, follow the steps here from the CDC to help prevent the disease from spreading to people in your home and community.  Click here for general  information from the CDC on testing.  If you develop any of these emergency warning signs for COVID-19, get medical attention immediately:     Trouble breathing    Persistent pain or pressure in the chest    New confusion or inability to arouse    Bluish lips or face      Call your doctor or clinic before going in. Call 911 if you have a medical emergency and notify the  you have or think you may have COVID-19.  For more detailed and up to date information on COVID-19 (Coronavirus), please visit the CDC website.   Diagnosis: Refer for additional evaluation  Diagnosis ICD: R69

## 2020-10-22 NOTE — TELEPHONE ENCOUNTER
Can be seen in clinic or can do an E-visit with me to have antibody testing done.    Su Loya M.D.

## 2020-10-22 NOTE — TELEPHONE ENCOUNTER
Probably needs testing to be certain this isn't COVID, people can have very mild symptoms.   Can do e-visit or [facility] for testing.    Su Loya M.D.

## 2020-10-22 NOTE — TELEPHONE ENCOUNTER
Patient was transferred to  to make appt. Patient is getting flu shot in [place] and would like to see a provider there.     Dory Harley MA

## 2020-10-29 ENCOUNTER — TRANSFERRED RECORDS (OUTPATIENT)
Dept: HEALTH INFORMATION MANAGEMENT | Facility: CLINIC | Age: 50
End: 2020-10-29

## 2020-11-16 ENCOUNTER — HEALTH MAINTENANCE LETTER (OUTPATIENT)
Age: 50
End: 2020-11-16

## 2020-12-03 ENCOUNTER — OFFICE VISIT (OUTPATIENT)
Dept: PHYSICAL MEDICINE AND REHAB | Facility: CLINIC | Age: 50
End: 2020-12-03
Payer: COMMERCIAL

## 2020-12-03 DIAGNOSIS — G43.719 INTRACTABLE CHRONIC MIGRAINE WITHOUT AURA AND WITHOUT STATUS MIGRAINOSUS: Primary | ICD-10-CM

## 2020-12-03 PROCEDURE — 64615 CHEMODENERV MUSC MIGRAINE: CPT | Performed by: PHYSICAL MEDICINE & REHABILITATION

## 2020-12-03 NOTE — LETTER
12/3/2020       RE: Jessica Manriquez  [address]  [facility] [address]     Dear Colleague,    Thank you for referring your patient, Jessica Manriquez, to the [facility] PHYSICAL MEDICINE AND REHABILITATION CLINIC [place] at [facility]. Please see a copy of my visit note below.    BOTULINUM TOXIN PROCEDURE - HEADACHE - NOTE    Chief Complaint   Patient presents with     Botox     Chronic migraines       Current Outpatient Medications:      ALPRAZolam (XANAX) 0.5 MG tablet, Take 1 tablet (0.5 mg) by mouth every 6 hours as needed for anxiety, Disp: 40 tablet, Rfl: 0     amitriptyline (ELAVIL) 10 MG tablet, Take 1 tablet (10 mg) by mouth At Bedtime Take up to 5 tabs at bedtime prn insomnia., Disp: 100 tablet, Rfl: 3     aspirin-acetaminophen-caffeine (EXCEDRIN MIGRAINE) 250-250-65 MG per tablet, Take 1 tablet by mouth every 6 hours as needed for pain., Disp: 30 tablet, Rfl: 0     diazepam (VALIUM) 10 MG tablet, Place 1 tab vaginally for muscle spasms, Disp: 30 tablet, Rfl: 3     hydrOXYzine (ATARAX) 25 MG tablet, Take 1-2 tablets (25-50 mg) by mouth every 6 hours as needed for itching, Disp: 60 tablet, Rfl: 1     Lactobacillus (ACIDOPHILUS) 100 MG CAPS, Take 1 capsule by mouth daily, Disp: , Rfl:      Loperamide HCl (IMODIUM A-D PO), Take 1 tablet by mouth once as needed, Disp: , Rfl:      magnesium 200 MG TABS, , Disp: , Rfl:      MULTI-VITAMIN OR TABS, 1 tablet by mouth DAILY, Disp: , Rfl:      OnabotulinumtoxinA (BOTOX IJ), Inject 200 Units as directed Total dose 200 units  Dose Administered:  175 units  Botox (Botulinum Toxin Type A)       2:1 Dilution (55 units unavoidable waste) Unavoidable waste 25 units  Diluent Used:  Preservative Free Normal Saline Total Volume of Diluent Used:  2.9 ml Lot # /C3 with Expiration Date:  03/2021 NDC #: Botox 100u ([address]-42), Disp: , Rfl:      ondansetron (ZOFRAN ODT) 4 MG disintegrating tablet, Take 1-2 tablets  (4-8 mg) by mouth every 8 hours as needed for nausea, Disp: 20 tablet, Rfl: 3     penciclovir (DENAVIR) 1 % external cream, Apply topically every 2 hours (while awake), Disp: 1.5 g, Rfl: 3     QUEtiapine (SEROQUEL) 50 MG tablet, Take 1 tablet (50 mg) by mouth nightly as needed (insomnia), Disp: 60 tablet, Rfl: 3     SUMAtriptan (IMITREX) 50 MG tablet, Take 1 tablet (50 mg) by mouth at onset of headache for migraine May repeat dose in 2 hours.  Do not exceed 200 mg in 24 hours, Disp: 18 tablet, Rfl: 11     traMADol (ULTRAM) 50 MG tablet, Take 1-2 tablets ( mg) by mouth every 6 hours as needed for pain, Disp: 40 tablet, Rfl: 2     UNABLE TO FIND, L-theinine, Disp: , Rfl:      valACYclovir (VALTREX) 500 MG tablet, Take 1 tablet (500 mg) by mouth daily, Disp: 90 tablet, Rfl: 3     vitamin (B COMPLEX-C) tablet, Take 1 tablet by mouth daily, Disp: , Rfl:      VITAMIN C 500 MG OR TABS, 1 tablet daily, Disp: , Rfl:      VITAMIN D, CHOLECALCIFEROL, PO, Take 1,000 Units by mouth daily, Disp: , Rfl:      Vitamin Mixture (VITAMIN E COMPLETE PO), , Disp: , Rfl:      zolpidem (AMBIEN) 5 MG tablet, Take 1 tablet (5 mg) by mouth nightly as needed for sleep, Disp: 90 tablet, Rfl: 1    Current Facility-Administered Medications:      botulinum toxin type A (BOTOX) 100 units injection 200 Units, 200 Units, Intramuscular, Q90 Days, Flori Trujillo MD     Allergies   Allergen Reactions     Darvocet [Acetaminophen] Nausea and Vomiting     Erythromycin GI Disturbance     Flexeril [Cyclobenzaprine Hcl]      Lightheadedness, dizzy          PHYSICAL EXAM:  The patient  Is here for reinjection of Botox. She denies having a headache today.         HPI:    Patient denies any hospitalizations, ER visits or new diagnosis since last appointment.     We reviewed the recommended safety guidelines for  Botox from any vaccine injection, such as the seasonal flu vaccine, by a minimum of 10-14 days with Jessica Manriquez. She  acknowledged understanding.      RESPONSE TO PREVIOUS TREATMENT:  Change in headache pattern following last series of injections with 195 units of  Botox 20.      1.  Headache frequency during this injection cycle: 5 headaches in November, 7 headaches in October, and 5 headaches in September    2.  Headache duration during this injection cycle:  Headache duration ranged from 24 hours to 3 days    3.  Headache intensity during this injection cycle:    A.  5/10  =  Typical pain level.- if medication is taken   B.  9/10  =  Worst pain level.  C.  2/10  =  Lowest pain level.          4.  Change in headache medication usage during this injection cycle:  (For Example:  Able to decrease use of oral pain medications.) Oral medication remained the same. Taking it early enough to get ahead of the headaches.     5.  ER Visits During This Injection Cycle:  0      6.  Functional Performance:  Change in ADL's, social interaction, days lost from work, etc. 3 days that she had to stay in bed.         BOTULINUM NEUROTOXIN INJECTION PROCEDURES:      VERIFICATION OF PATIENT IDENTIFICATION AND PROCEDURE     Initials   Patient Name MD   Patient  MD   Procedure Verified by: MD     Prior to the start of the procedure and with procedural staff participation, I verbally confirmed the patient s identity using two indicators, relevant allergies, that the procedure was appropriate and matched the consent or emergent situation, and that the correct equipment/implants were available. Immediately prior to starting the procedure I conducted the Time Out with the procedural staff and re-confirmed the patient s name, procedure, and site/side. (The Joint Commission universal protocol was followed.)  Yes    Sedation (Moderate or Deep): None      Above assessments performed by:  Flori Trujillo MD, [facility]   Department of Rehabilitation         INDICATIONS FOR PROCEDURES:  Jessica Manriquez is a 50 year old patient with chronic migraine headaches  associated with cervicogenic  components.     Her baseline symptoms have been recalcitrant to oral medications and conservative therapy.  She is here today for reinjection with Botox.        GOAL OF PROCEDURE:  The goal of this procedure is to decrease pain       TOTAL DOSE ADMINISTERED:  Total dose 200 units   Dose Administered:  200 units  Botox (Botulinum Toxin Type A)       2:1 Dilution   Unavoidable waste 0 units    Diluent Used: Bupivacaine 0.5%  Lot # [id number]  Exp 10/23  NDC 32414 466 03  Total Volume of Diluent Used:  4 ml    Botox   Lot #  [id number]  with Expiration Date: 8/23  NDC #: Botox 100u ([address]-81)    Medication guide was offered to patient and was declined.      CONSENT:  The risks, benefits, and treatment options were discussed with Jessica Manriquez and she agreed to proceed.    Written consent was obtained by MD.       EQUIPMENT USED:  Needle-25mm stimulating/recording  EMG/NCS Machine      SKIN PREPARATION:  Skin preparation was performed using an alcohol wipe.      GUIDANCE DESCRIPTION:  Electro-myographic guidance was necessary throughout the procedure to accurately identify all areas of spastic muscles while avoiding injection of non-spastic muscles, neighboring nerves and nearby vascular structures.       AREA/MUSCLE INJECTED:    1 & 2. SHOULDER GIRDLE & NECK MUSCLES: 65 units Botox = Total Dose, 2:1 Dilution                               Right Upper Trapezius 15 units of Botox at 3 sites               Left Upper Trapezius 15 units of Botox at 3 sites        Right Splenius  - 5 units of Botox at 1 site/s  Left Splenius - 5 units of Botox at 1 site/s     Right Semispinalis  - 5 units of Botox at 1 site/s  Left Semispinalis - 5 units of Botox at 1 site/s    Left ant scalenes 5 units in 1 site                    Left levator (at neck) - 5 units of Botox at 1 site    Right Lev at the neck  5 unit of Botox at 1 site            3. HEAD, JAW & SCALP MUSCLES: 135 units Botox = Total Dose, 2:1  Dilution     Left masseter - 5 units of Botox at 1 site    Right masseter 5 units at 1 site     Right parietal area 5 units in 1 site    Left parietal area  5 units in 1 site        Right Occipitalis - 15 units of Botox at 3 site/s.  Left Occipitalis - 15 units of Botox at 3 site/s.       Right Frontalis - 10 units of Botox at 2 site/s  Left Frontalis - 10 units of Botox at 2 site/s        Right Temporalis - 15 units of Botox at 3 site/s.  Left Temporalis - 30 units of Botox at 6 site/s.       Right  - 5 units of Botox at 1 site/s.  Left  - 5 units of Botox at 1 site/s.     Procerus - 10 units of Botox at 1 site/s.         RESPONSE TO PROCEDURE:  Jessica Manriquez tolerated the procedure well and there were no immediate complications.   She was allowed to recover for an appropriate period of time and was discharged home in stable condition.          FOLLOW UP:  Jessica Manriquez was asked to follow up by phone in 7-14 days with Poonam Shoemaker RN, Care Coordinator, to report her response to this series of injections.  Based on the patient's previous response to this therapy, Jessica Manriquez was rescheduled for the next series of injections in 10 weeks.      PLAN (Medication Changes, Therapy Orders, Work or Disability Issues, etc.): Patient will continue to monitor response to today's injections.        Again, thank you for allowing me to participate in the care of your patient.      Sincerely,    Flori Trujillo MD

## 2020-12-03 NOTE — PROGRESS NOTES
BOTULINUM TOXIN PROCEDURE - HEADACHE - NOTE    Chief Complaint   Patient presents with     Botox     Chronic migraines       Current Outpatient Medications:      ALPRAZolam (XANAX) 0.5 MG tablet, Take 1 tablet (0.5 mg) by mouth every 6 hours as needed for anxiety, Disp: 40 tablet, Rfl: 0     amitriptyline (ELAVIL) 10 MG tablet, Take 1 tablet (10 mg) by mouth At Bedtime Take up to 5 tabs at bedtime prn insomnia., Disp: 100 tablet, Rfl: 3     aspirin-acetaminophen-caffeine (EXCEDRIN MIGRAINE) 250-250-65 MG per tablet, Take 1 tablet by mouth every 6 hours as needed for pain., Disp: 30 tablet, Rfl: 0     diazepam (VALIUM) 10 MG tablet, Place 1 tab vaginally for muscle spasms, Disp: 30 tablet, Rfl: 3     hydrOXYzine (ATARAX) 25 MG tablet, Take 1-2 tablets (25-50 mg) by mouth every 6 hours as needed for itching, Disp: 60 tablet, Rfl: 1     Lactobacillus (ACIDOPHILUS) 100 MG CAPS, Take 1 capsule by mouth daily, Disp: , Rfl:      Loperamide HCl (IMODIUM A-D PO), Take 1 tablet by mouth once as needed, Disp: , Rfl:      magnesium 200 MG TABS, , Disp: , Rfl:      MULTI-VITAMIN OR TABS, 1 tablet by mouth DAILY, Disp: , Rfl:      OnabotulinumtoxinA (BOTOX IJ), Inject 200 Units as directed Total dose 200 units  Dose Administered:  175 units  Botox (Botulinum Toxin Type A)       2:1 Dilution (55 units unavoidable waste) Unavoidable waste 25 units  Diluent Used:  Preservative Free Normal Saline Total Volume of Diluent Used:  2.9 ml Lot # /C3 with Expiration Date:  03/2021 NDC #: Botox 100u ([address]-33), Disp: , Rfl:      ondansetron (ZOFRAN ODT) 4 MG disintegrating tablet, Take 1-2 tablets (4-8 mg) by mouth every 8 hours as needed for nausea, Disp: 20 tablet, Rfl: 3     penciclovir (DENAVIR) 1 % external cream, Apply topically every 2 hours (while awake), Disp: 1.5 g, Rfl: 3     QUEtiapine (SEROQUEL) 50 MG tablet, Take 1 tablet (50 mg) by mouth nightly as needed (insomnia), Disp: 60 tablet, Rfl: 3     SUMAtriptan (IMITREX)  50 MG tablet, Take 1 tablet (50 mg) by mouth at onset of headache for migraine May repeat dose in 2 hours.  Do not exceed 200 mg in 24 hours, Disp: 18 tablet, Rfl: 11     traMADol (ULTRAM) 50 MG tablet, Take 1-2 tablets ( mg) by mouth every 6 hours as needed for pain, Disp: 40 tablet, Rfl: 2     UNABLE TO FIND, L-theinine, Disp: , Rfl:      valACYclovir (VALTREX) 500 MG tablet, Take 1 tablet (500 mg) by mouth daily, Disp: 90 tablet, Rfl: 3     vitamin (B COMPLEX-C) tablet, Take 1 tablet by mouth daily, Disp: , Rfl:      VITAMIN C 500 MG OR TABS, 1 tablet daily, Disp: , Rfl:      VITAMIN D, CHOLECALCIFEROL, PO, Take 1,000 Units by mouth daily, Disp: , Rfl:      Vitamin Mixture (VITAMIN E COMPLETE PO), , Disp: , Rfl:      zolpidem (AMBIEN) 5 MG tablet, Take 1 tablet (5 mg) by mouth nightly as needed for sleep, Disp: 90 tablet, Rfl: 1    Current Facility-Administered Medications:      botulinum toxin type A (BOTOX) 100 units injection 200 Units, 200 Units, Intramuscular, Q90 Days, Flori Trujillo MD     Allergies   Allergen Reactions     Darvocet [Acetaminophen] Nausea and Vomiting     Erythromycin GI Disturbance     Flexeril [Cyclobenzaprine Hcl]      Lightheadedness, dizzy          PHYSICAL EXAM:  The patient  Is here for reinjection of Botox. She denies having a headache today.         HPI:    Patient denies any hospitalizations, ER visits or new diagnosis since last appointment.     We reviewed the recommended safety guidelines for  Botox from any vaccine injection, such as the seasonal flu vaccine, by a minimum of 10-14 days with Jessica Manriquez. She acknowledged understanding.      RESPONSE TO PREVIOUS TREATMENT:  Change in headache pattern following last series of injections with 195 units of  Botox 9/24/20.      1.  Headache frequency during this injection cycle: 5 headaches in November, 7 headaches in October, and 5 headaches in September    2.  Headache duration during this injection  cycle:  Headache duration ranged from 24 hours to 3 days    3.  Headache intensity during this injection cycle:    A.  5/10  =  Typical pain level.- if medication is taken   B.  9/10  =  Worst pain level.  C.  2/10  =  Lowest pain level.          4.  Change in headache medication usage during this injection cycle:  (For Example:  Able to decrease use of oral pain medications.) Oral medication remained the same. Taking it early enough to get ahead of the headaches.     5.  ER Visits During This Injection Cycle:  0      6.  Functional Performance:  Change in ADL's, social interaction, days lost from work, etc. 3 days that she had to stay in bed.         BOTULINUM NEUROTOXIN INJECTION PROCEDURES:      VERIFICATION OF PATIENT IDENTIFICATION AND PROCEDURE     Initials   Patient Name MD   Patient  MD   Procedure Verified by: MD     Prior to the start of the procedure and with procedural staff participation, I verbally confirmed the patient s identity using two indicators, relevant allergies, that the procedure was appropriate and matched the consent or emergent situation, and that the correct equipment/implants were available. Immediately prior to starting the procedure I conducted the Time Out with the procedural staff and re-confirmed the patient s name, procedure, and site/side. (The Joint Commission universal protocol was followed.)  Yes    Sedation (Moderate or Deep): None      Above assessments performed by:  Flori Trujillo MD, [facility]   Department of Rehabilitation         INDICATIONS FOR PROCEDURES:  Jessica Manriquez is a 50 year old patient with chronic migraine headaches associated with cervicogenic  components.     Her baseline symptoms have been recalcitrant to oral medications and conservative therapy.  She is here today for reinjection with Botox.        GOAL OF PROCEDURE:  The goal of this procedure is to decrease pain       TOTAL DOSE ADMINISTERED:  Total dose 200 units   Dose Administered:  200 units   Botox (Botulinum Toxin Type A)       2:1 Dilution   Unavoidable waste 0 units    Diluent Used: Bupivacaine 0.5%  Lot # [id number]  Exp 10/23  NDC 47093 466 03  Total Volume of Diluent Used:  4 ml    Botox   Lot #  [id number]  with Expiration Date: 8/23  NDC #: Botox 100u ([address]-99)    Medication guide was offered to patient and was declined.      CONSENT:  The risks, benefits, and treatment options were discussed with Jessica Manriquez and she agreed to proceed.    Written consent was obtained by MD.       EQUIPMENT USED:  Needle-25mm stimulating/recording  EMG/NCS Machine      SKIN PREPARATION:  Skin preparation was performed using an alcohol wipe.      GUIDANCE DESCRIPTION:  Electro-myographic guidance was necessary throughout the procedure to accurately identify all areas of spastic muscles while avoiding injection of non-spastic muscles, neighboring nerves and nearby vascular structures.       AREA/MUSCLE INJECTED:    1 & 2. SHOULDER GIRDLE & NECK MUSCLES: 65 units Botox = Total Dose, 2:1 Dilution                               Right Upper Trapezius 15 units of Botox at 3 sites               Left Upper Trapezius 15 units of Botox at 3 sites        Right Splenius  - 5 units of Botox at 1 site/s  Left Splenius - 5 units of Botox at 1 site/s     Right Semispinalis  - 5 units of Botox at 1 site/s  Left Semispinalis - 5 units of Botox at 1 site/s    Left ant scalenes 5 units in 1 site                    Left levator (at neck) - 5 units of Botox at 1 site    Right Lev at the neck  5 unit of Botox at 1 site            3. HEAD, JAW & SCALP MUSCLES: 135 units Botox = Total Dose, 2:1 Dilution     Left masseter - 5 units of Botox at 1 site    Right masseter 5 units at 1 site     Right parietal area 5 units in 1 site    Left parietal area  5 units in 1 site        Right Occipitalis - 15 units of Botox at 3 site/s.  Left Occipitalis - 15 units of Botox at 3 site/s.       Right Frontalis - 10 units of Botox at 2 site/s  Left  Frontalis - 10 units of Botox at 2 site/s        Right Temporalis - 15 units of Botox at 3 site/s.  Left Temporalis - 30 units of Botox at 6 site/s.       Right  - 5 units of Botox at 1 site/s.  Left  - 5 units of Botox at 1 site/s.     Procerus - 10 units of Botox at 1 site/s.         RESPONSE TO PROCEDURE:  Jessica Manriquez tolerated the procedure well and there were no immediate complications.   She was allowed to recover for an appropriate period of time and was discharged home in stable condition.          FOLLOW UP:  Jessica Manriquez was asked to follow up by phone in 7-14 days with Poonam Shoemaker RN, Care Coordinator, to report her response to this series of injections.  Based on the patient's previous response to this therapy, Jessica Manriquze was rescheduled for the next series of injections in 10 weeks.      PLAN (Medication Changes, Therapy Orders, Work or Disability Issues, etc.): Patient will continue to monitor response to today's injections.

## 2021-01-22 NOTE — TELEPHONE ENCOUNTER
Requested Prescriptions   Pending Prescriptions Disp Refills     hydrOXYzine (ATARAX) 25 MG tablet 60 tablet 1     Sig: Take 1-2 tablets (25-50 mg) by mouth every 6 hours as needed for itching       There is no refill protocol information for this order        Last office visit: Visit date not found with prescribing provider:  Dr. Ordonez   Future Office Visit:   Next 5 appointments (look out 90 days)    Jan 26, 2021  2:00 PM  PHYSICAL with Alyssia Gonsalves MD  [facility] ([facility] [address] [place] [id number] [phone]               Denise Behrendt  Specialty CSS

## 2021-01-25 RX ORDER — HYDROXYZINE HYDROCHLORIDE 25 MG/1
25-50 TABLET, FILM COATED ORAL EVERY 6 HOURS PRN
Qty: 60 TABLET | Refills: 1 | Status: CANCELLED | OUTPATIENT
Start: 2021-01-25

## 2021-01-26 ENCOUNTER — OFFICE VISIT (OUTPATIENT)
Dept: OBGYN | Facility: CLINIC | Age: 51
End: 2021-01-26
Payer: COMMERCIAL

## 2021-01-26 VITALS
TEMPERATURE: 98.1 F | HEART RATE: 89 BPM | RESPIRATION RATE: 18 BRPM | BODY MASS INDEX: 20.44 KG/M2 | SYSTOLIC BLOOD PRESSURE: 104 MMHG | HEIGHT: 71 IN | WEIGHT: 146 LBS | DIASTOLIC BLOOD PRESSURE: 74 MMHG

## 2021-01-26 DIAGNOSIS — G43.809 OTHER MIGRAINE WITHOUT STATUS MIGRAINOSUS, NOT INTRACTABLE: ICD-10-CM

## 2021-01-26 DIAGNOSIS — Z00.00 ROUTINE GENERAL MEDICAL EXAMINATION AT A HEALTH CARE FACILITY: Primary | ICD-10-CM

## 2021-01-26 PROCEDURE — 99396 PREV VISIT EST AGE 40-64: CPT | Performed by: OBSTETRICS & GYNECOLOGY

## 2021-01-26 PROCEDURE — 88175 CYTOPATH C/V AUTO FLUID REDO: CPT | Performed by: OBSTETRICS & GYNECOLOGY

## 2021-01-26 PROCEDURE — 87624 HPV HI-RISK TYP POOLED RSLT: CPT | Performed by: OBSTETRICS & GYNECOLOGY

## 2021-01-26 RX ORDER — FERROUS SULFATE 325(65) MG
325 TABLET ORAL
COMMUNITY

## 2021-01-26 RX ORDER — HYDROXYZINE HYDROCHLORIDE 25 MG/1
25-50 TABLET, FILM COATED ORAL EVERY 6 HOURS PRN
Qty: 60 TABLET | Refills: 1 | Status: SHIPPED | OUTPATIENT
Start: 2021-01-26 | End: 2021-07-14

## 2021-01-26 ASSESSMENT — MIFFLIN-ST. JEOR: SCORE: 1373.38

## 2021-01-26 NOTE — PROGRESS NOTES
SUBJECTIVE:   CC: Jessica Manriquez is an 50 year old woman who presents for preventive health visit.     Menses irregular   Patient has been advised of split billing requirements and indicates understanding: Yes  Healthy Habits:    Do you get at least three servings of calcium containing foods daily (dairy, green leafy vegetables, etc.)? yes    Amount of exercise or daily activities, outside of work: 1 day(s) per week    Problems taking medications regularly No    Medication side effects: No    Have you had an eye exam in the past two years? yes    Do you see a dentist twice per year? yes    Do you have sleep apnea, excessive snoring or daytime drowsiness?no          Today's PHQ-2 Score:   PHQ-2 ( 1999 [employer]) 1/26/2021 1/23/2021   Q1: Little interest or pleasure in doing things 0 0   Q2: Feeling down, depressed or hopeless 0 0   PHQ-2 Score 0 0   Q1: Little interest or pleasure in doing things - Not at all   Q2: Feeling down, depressed or hopeless - Not at all   PHQ-2 Score - 0       Abuse: Current or Past(Physical, Sexual or Emotional)- No  Do you feel safe in your environment? Yes        Social History     Tobacco Use     Smoking status: Never Smoker     Smokeless tobacco: Never Used   Substance Use Topics     Alcohol use: No     If you drink alcohol do you typically have >3 drinks per day or >7 drinks per week? No                     Reviewed orders with patient.  Reviewed health maintenance and updated orders accordingly - Yes  BP Readings from Last 3 Encounters:   01/26/21 104/74   09/24/20 110/78   07/17/20 104/74    Wt Readings from Last 3 Encounters:   01/26/21 66.2 kg (146 lb)   07/17/20 67.1 kg (148 lb)   02/26/20 69.9 kg (154 lb 3.2 oz)                  Patient Active Problem List   Diagnosis     Migraine headache     Insomnia     Unexplained endometrial cells on cervical Pap smear     ASCUS of cervix with negative high risk HPV     Pelvic pain in female     Past Surgical History:   Procedure Laterality  Date     HC ENLARGE BREAST WITH IMPLANT  2000     HC REVISE BREAST RECONSTRUCTION  5/03     LEEP TX, CERVICAL  11/01    SALOMÓN 3, yearly paps until 2021     LIGATN/STRIP LONG & SHORT SAPHEN  11/04    Bilateral vein stripping     TUBAL LIGATION  10/30/09    evangelina linton       Social History     Tobacco Use     Smoking status: Never Smoker     Smokeless tobacco: Never Used   Substance Use Topics     Alcohol use: No     Family History   Problem Relation Age of Onset     Arthritis Mother      Respiratory Mother         asthma     Allergies Mother      Cancer Mother      Blood Disease Mother         leukemia     Breast Cancer Mother      Breast Cancer Maternal Grandmother         age 60's     Heart Disease Maternal Grandmother      Cerebrovascular Disease Paternal Grandmother      Cancer Paternal Grandfather         ?biliary     Alcohol/Drug Paternal Grandfather      Allergies Sister      Breast Cancer Sister          Current Outpatient Medications   Medication Sig Dispense Refill     ALPRAZolam (XANAX) 0.5 MG tablet Take 1 tablet (0.5 mg) by mouth every 6 hours as needed for anxiety 40 tablet 0     aspirin-acetaminophen-caffeine (EXCEDRIN MIGRAINE) 250-250-65 MG per tablet Take 1 tablet by mouth every 6 hours as needed for pain. 30 tablet 0     diazepam (VALIUM) 10 MG tablet Place 1 tab vaginally for muscle spasms 30 tablet 3     ferrous sulfate (FEROSUL) 325 (65 Fe) MG tablet Take 325 mg by mouth daily (with breakfast)       hydrOXYzine (ATARAX) 25 MG tablet Take 1-2 tablets (25-50 mg) by mouth every 6 hours as needed for itching 60 tablet 1     Lactobacillus (ACIDOPHILUS) 100 MG CAPS Take 1 capsule by mouth daily       Loperamide HCl (IMODIUM A-D PO) Take 1 tablet by mouth once as needed       magnesium 200 MG TABS        MULTI-VITAMIN OR TABS 1 tablet by mouth DAILY       OnabotulinumtoxinA (BOTOX IJ) Inject 200 Units as directed Total dose 200 units   Dose Administered:  175 units  Botox (Botulinum Toxin Type A)        2:1 Dilution (55 units unavoidable waste)  Unavoidable waste 25 units   Diluent Used:  Preservative Free Normal Saline  Total Volume of Diluent Used:  2.9 ml  Lot # /C3 with Expiration Date:  03/2021  NDC #: Botox 100u ([address]-52)       ondansetron (ZOFRAN ODT) 4 MG disintegrating tablet Take 1-2 tablets (4-8 mg) by mouth every 8 hours as needed for nausea 20 tablet 3     penciclovir (DENAVIR) 1 % external cream Apply topically every 2 hours (while awake) 1.5 g 3     SUMAtriptan (IMITREX) 50 MG tablet Take 1 tablet (50 mg) by mouth at onset of headache for migraine May repeat dose in 2 hours.  Do not exceed 200 mg in 24 hours 18 tablet 11     traMADol (ULTRAM) 50 MG tablet Take 1-2 tablets ( mg) by mouth every 6 hours as needed for pain 40 tablet 2     valACYclovir (VALTREX) 500 MG tablet Take 1 tablet (500 mg) by mouth daily 90 tablet 3     vitamin (B COMPLEX-C) tablet Take 1 tablet by mouth daily       VITAMIN C 500 MG OR TABS 1 tablet daily       VITAMIN D, CHOLECALCIFEROL, PO Take 1,000 Units by mouth daily       Vitamin Mixture (VITAMIN E COMPLETE PO)        zolpidem (AMBIEN) 5 MG tablet Take 1 tablet (5 mg) by mouth nightly as needed for sleep 90 tablet 1     UNABLE TO FIND L-theinine       Allergies   Allergen Reactions     Darvocet [Acetaminophen] Nausea and Vomiting     Erythromycin GI Disturbance     Flexeril [Cyclobenzaprine Hcl]      Lightheadedness, dizzy         Breast CA Risk Screening:  No flowsheet data found.      Mammogram Screening - Alternate mammogram schedule due to breast cancer history  Pertinent mammograms are reviewed under the imaging tab.    Pertinent mammograms are reviewed under the imaging tab.  History of abnormal Pap smear: YES - other categories - see link Cervical Cytology Screening Guidelines  PAP / HPV Latest Ref Rng & Units 12/31/2018 2/2/2015 1/29/2014   PAP - ASC-US(A) NIL OTHER-NIL, See Result   HPV 16 DNA NEG:Negative Negative - -   HPV 18 DNA NEG:Negative  "Negative - -   OTHER HR HPV NEG:Negative Negative - -     Reviewed and updated as needed this visit by clinical staff  Tobacco  Allergies  Meds   Med Hx  Surg Hx  Fam Hx  Soc Hx        Reviewed and updated as needed this visit by Provider                    ROS:  CONSTITUTIONAL: NEGATIVE for fever, chills, change in weight  INTEGUMENTARU/SKIN: NEGATIVE for worrisome rashes, moles or lesions  EYES: NEGATIVE for vision changes or irritation  ENT: NEGATIVE for ear, mouth and throat problems  RESP: NEGATIVE for significant cough or SOB  BREAST: NEGATIVE for masses, tenderness or discharge  CV: NEGATIVE for chest pain, palpitations or peripheral edema  GI: NEGATIVE for nausea, abdominal pain, heartburn, or change in bowel habits  : NEGATIVE for unusual urinary or vaginal symptoms. Periods are regular.  MUSCULOSKELETAL: NEGATIVE for significant arthralgias or myalgia  NEURO: NEGATIVE for weakness, dizziness or paresthesias  PSYCHIATRIC: NEGATIVE for changes in mood or affect    OBJECTIVE:   /74 (BP Location: Right arm, Patient Position: Chair, Cuff Size: Adult Regular)   Pulse 89   Temp 98.1  F (36.7  C) (Tympanic)   Resp 18   Ht 1.803 m (5' 11\")   Wt 66.2 kg (146 lb)   LMP 11/13/2020   BMI 20.36 kg/m    EXAM:  GENERAL: healthy, alert and no distress  EYES: Eyes grossly normal to inspection, PERRL and conjunctivae and sclerae normal  HENT: ear canals and TM's normal, nose and mouth without ulcers or lesions  NECK: no adenopathy, no asymmetry, masses, or scars and thyroid normal to palpation  RESP: lungs clear to auscultation - no rales, rhonchi or wheezes  BREAST: normal without masses, tenderness or nipple discharge and no palpable axillary masses or adenopathy  CV: regular rate and rhythm, normal S1 S2, no S3 or S4, no murmur, click or rub, no peripheral edema and peripheral pulses strong  ABDOMEN: soft, nontender, no hepatosplenomegaly, no masses and bowel sounds normal   (female): normal female " "external genitalia, normal urethral meatus, vaginal mucosa pink, moist, well rugated, and normal cervix/adnexa/uterus without masses or discharge  MS: no gross musculoskeletal defects noted, no edema  SKIN: no suspicious lesions or rashes  NEURO: Normal strength and tone, mentation intact and speech normal  PSYCH: mentation appears normal, affect normal/bright    Diagnostic Test Results:  Labs reviewed in Epic    ASSESSMENT/PLAN:       ICD-10-CM    1. Routine general medical examination at a health care facility  Z00.00    2. Other migraine without status migrainosus, not intractable  G43.809 hydrOXYzine (ATARAX) 25 MG tablet       Patient has been advised of split billing requirements and indicates understanding: Yes  COUNSELING:   Reviewed preventive health counseling, as reflected in patient instructions  Special attention given to:        Regular exercise       Healthy diet/nutrition       Vision screening       (Chika)menopause management    Estimated body mass index is 20.36 kg/m  as calculated from the following:    Height as of this encounter: 1.803 m (5' 11\").    Weight as of this encounter: 66.2 kg (146 lb).        She reports that she has never smoked. She has never used smokeless tobacco.      Counseling Resources:  ATP IV Guidelines  Pooled Cohorts Equation Calculator  Breast Cancer Risk Calculator  BRCA-Related Cancer Risk Assessment: FHS-7 Tool  FRAX Risk Assessment  ICSI Preventive Guidelines  Dietary Guidelines for Americans, 2010  USDA's MyPlate  ASA Prophylaxis  Lung CA Screening    Alyssia Gonsalves MD  [facility] [place]  "

## 2021-01-26 NOTE — NURSING NOTE
"Initial /74 (BP Location: Right arm, Patient Position: Chair, Cuff Size: Adult Regular)   Pulse 89   Temp 98.1  F (36.7  C) (Tympanic)   Resp 18   Ht 1.803 m (5' 11\")   Wt 66.2 kg (146 lb)   LMP 11/13/2020   BMI 20.36 kg/m   Estimated body mass index is 20.36 kg/m  as calculated from the following:    Height as of this encounter: 1.803 m (5' 11\").    Weight as of this encounter: 66.2 kg (146 lb). .    '  "

## 2021-01-29 ENCOUNTER — MYC MEDICAL ADVICE (OUTPATIENT)
Dept: FAMILY MEDICINE | Facility: CLINIC | Age: 51
End: 2021-01-29

## 2021-01-29 DIAGNOSIS — G43.909 MIGRAINE WITHOUT STATUS MIGRAINOSUS, NOT INTRACTABLE, UNSPECIFIED MIGRAINE TYPE: ICD-10-CM

## 2021-01-29 DIAGNOSIS — G43.809 OTHER MIGRAINE WITHOUT STATUS MIGRAINOSUS, NOT INTRACTABLE: ICD-10-CM

## 2021-01-29 DIAGNOSIS — G25.81 RESTLESS LEGS SYNDROME (RLS): Primary | ICD-10-CM

## 2021-01-29 LAB
COPATH REPORT: NORMAL
PAP: NORMAL

## 2021-01-29 NOTE — TELEPHONE ENCOUNTER
Last office visit was 7.17.20 for restless leg syndrome. Please see mychart. She has many medication related questions. Would you like her to make an appointment? Scripts are pended.  Routed to provider.  Callie Wong RN

## 2021-02-01 LAB
FINAL DIAGNOSIS: NORMAL
HPV HR 12 DNA CVX QL NAA+PROBE: NEGATIVE
HPV16 DNA SPEC QL NAA+PROBE: NEGATIVE
HPV18 DNA SPEC QL NAA+PROBE: NEGATIVE
SPECIMEN DESCRIPTION: NORMAL
SPECIMEN SOURCE CVX/VAG CYTO: NORMAL

## 2021-02-01 RX ORDER — ONDANSETRON 4 MG/1
4-8 TABLET, ORALLY DISINTEGRATING ORAL EVERY 8 HOURS PRN
Qty: 20 TABLET | Refills: 3 | Status: SHIPPED | OUTPATIENT
Start: 2021-02-01

## 2021-02-01 RX ORDER — SUMATRIPTAN 50 MG/1
50 TABLET, FILM COATED ORAL
Qty: 18 TABLET | Refills: 11 | Status: SHIPPED | OUTPATIENT
Start: 2021-02-01 | End: 2022-03-28

## 2021-02-06 ENCOUNTER — HEALTH MAINTENANCE LETTER (OUTPATIENT)
Age: 51
End: 2021-02-06

## 2021-02-08 ENCOUNTER — MYC MEDICAL ADVICE (OUTPATIENT)
Dept: FAMILY MEDICINE | Facility: CLINIC | Age: 51
End: 2021-02-08

## 2021-02-09 ASSESSMENT — HEADACHE IMPACT TEST (HIT 6)
WHEN YOU HAVE A HEADACHE HOW OFTEN DO YOU WISH YOU COULD LIE DOWN: SOMETIMES
WHEN YOU HAVE HEADACHES HOW OFTEN IS THE PAIN SEVERE: SOMETIMES
HOW OFTEN DID HEADACHS LIMIT CONCENTRATION ON WORK OR DAILY ACTIVITY: VERY OFTEN
HOW OFTEN HAVE YOU FELT TOO TIRED TO WORK BECAUSE OF YOUR HEADACHES: VERY OFTEN
HIT6 TOTAL SCORE: 64
HOW OFTEN DO HEADACHES LIMIT YOUR DAILY ACTIVITIES: VERY OFTEN
HOW OFTEN HAVE YOU FELT FED UP OR IRRITATED BECAUSE OF YOUR HEADACHES: VERY OFTEN

## 2021-02-09 ASSESSMENT — ANXIETY QUESTIONNAIRES
2. NOT BEING ABLE TO STOP OR CONTROL WORRYING: NOT AT ALL
4. TROUBLE RELAXING: NOT AT ALL
7. FEELING AFRAID AS IF SOMETHING AWFUL MIGHT HAPPEN: NOT AT ALL
7. FEELING AFRAID AS IF SOMETHING AWFUL MIGHT HAPPEN: NOT AT ALL
1. FEELING NERVOUS, ANXIOUS, OR ON EDGE: SEVERAL DAYS
GAD7 TOTAL SCORE: 1
GAD7 TOTAL SCORE: 1
5. BEING SO RESTLESS THAT IT IS HARD TO SIT STILL: NOT AT ALL
6. BECOMING EASILY ANNOYED OR IRRITABLE: NOT AT ALL
3. WORRYING TOO MUCH ABOUT DIFFERENT THINGS: NOT AT ALL
GAD7 TOTAL SCORE: 1

## 2021-02-09 ASSESSMENT — ACTIVITIES OF DAILY LIVING (ADL): I_KEEP_THINKING_ABOUT_HOW_BADLY_I_WANT_THE_PAIN_TO_STOP: 1 = TO A SLIGHT DEGREE

## 2021-02-10 ASSESSMENT — ANXIETY QUESTIONNAIRES: GAD7 TOTAL SCORE: 1

## 2021-02-11 ENCOUNTER — MYC MEDICAL ADVICE (OUTPATIENT)
Dept: FAMILY MEDICINE | Facility: CLINIC | Age: 51
End: 2021-02-11

## 2021-02-11 ENCOUNTER — DOCUMENTATION ONLY (OUTPATIENT)
Dept: CARE COORDINATION | Facility: CLINIC | Age: 51
End: 2021-02-11

## 2021-02-16 ENCOUNTER — OFFICE VISIT (OUTPATIENT)
Dept: PHYSICAL MEDICINE AND REHAB | Facility: CLINIC | Age: 51
End: 2021-02-16
Payer: COMMERCIAL

## 2021-02-16 VITALS — OXYGEN SATURATION: 100 % | HEART RATE: 89 BPM | SYSTOLIC BLOOD PRESSURE: 117 MMHG | DIASTOLIC BLOOD PRESSURE: 71 MMHG

## 2021-02-16 DIAGNOSIS — G43.719 INTRACTABLE CHRONIC MIGRAINE WITHOUT AURA AND WITHOUT STATUS MIGRAINOSUS: Primary | ICD-10-CM

## 2021-02-16 PROCEDURE — 64615 CHEMODENERV MUSC MIGRAINE: CPT | Performed by: PHYSICAL MEDICINE & REHABILITATION

## 2021-02-16 ASSESSMENT — PAIN SCALES - GENERAL: PAINLEVEL: NO PAIN (0)

## 2021-02-16 NOTE — LETTER
2/16/2021       RE: Jessica Manriquez  [address]  [place] [address]     Dear Colleague,    Thank you for referring your patient, Jessica Manriquez, to the [facility] [place] [facility]. Please see a copy of my visit note below.    BOTULINUM TOXIN PROCEDURE - HEADACHE - NOTE    Chief Complaint   Patient presents with     Botox     Chronic migraines     /71   Pulse 89   SpO2 100%       Current Outpatient Medications:      ALPRAZolam (XANAX) 0.5 MG tablet, Take 1 tablet (0.5 mg) by mouth every 6 hours as needed for anxiety, Disp: 40 tablet, Rfl: 0     aspirin-acetaminophen-caffeine (EXCEDRIN MIGRAINE) 250-250-65 MG per tablet, Take 1 tablet by mouth every 6 hours as needed for pain., Disp: 30 tablet, Rfl: 0     diazepam (VALIUM) 10 MG tablet, Place 1 tab vaginally for muscle spasms, Disp: 30 tablet, Rfl: 3     ferrous sulfate (FEROSUL) 325 (65 Fe) MG tablet, Take 325 mg by mouth daily (with breakfast), Disp: , Rfl:      hydrOXYzine (ATARAX) 25 MG tablet, Take 1-2 tablets (25-50 mg) by mouth every 6 hours as needed for itching, Disp: 60 tablet, Rfl: 1     Lactobacillus (ACIDOPHILUS) 100 MG CAPS, Take 1 capsule by mouth daily, Disp: , Rfl:      Loperamide HCl (IMODIUM A-D PO), Take 1 tablet by mouth once as needed, Disp: , Rfl:      magnesium 200 MG TABS, , Disp: , Rfl:      MULTI-VITAMIN OR TABS, 1 tablet by mouth DAILY, Disp: , Rfl:      OnabotulinumtoxinA (BOTOX IJ), Inject 200 Units as directed Total dose 200 units  Dose Administered:  175 units  Botox (Botulinum Toxin Type A)       2:1 Dilution (55 units unavoidable waste) Unavoidable waste 25 units  Diluent Used:  Preservative Free Normal Saline Total Volume of Diluent Used:  2.9 ml Lot # /C3 with Expiration Date:  03/2021 NDC #: Botox 100u ([address]-26), Disp: , Rfl:      ondansetron (ZOFRAN ODT) 4 MG ODT tab, Take 1-2 tablets (4-8  mg) by mouth every 8 hours as needed for nausea, Disp: 20 tablet, Rfl: 3     penciclovir (DENAVIR) 1 % external cream, Apply topically every 2 hours (while awake), Disp: 1.5 g, Rfl: 3     SUMAtriptan (IMITREX) 50 MG tablet, Take 1 tablet (50 mg) by mouth at onset of headache for migraine May repeat dose in 2 hours.  Do not exceed 200 mg in 24 hours, Disp: 18 tablet, Rfl: 11     traMADol (ULTRAM) 50 MG tablet, Take 1-2 tablets ( mg) by mouth every 6 hours as needed for pain, Disp: 40 tablet, Rfl: 2     valACYclovir (VALTREX) 500 MG tablet, Take 1 tablet (500 mg) by mouth daily, Disp: 90 tablet, Rfl: 3     vitamin (B COMPLEX-C) tablet, Take 1 tablet by mouth daily, Disp: , Rfl:      VITAMIN C 500 MG OR TABS, 1 tablet daily, Disp: , Rfl:      VITAMIN D, CHOLECALCIFEROL, PO, Take 1,000 Units by mouth daily, Disp: , Rfl:      Vitamin Mixture (VITAMIN E COMPLETE PO), , Disp: , Rfl:      zolpidem (AMBIEN) 5 MG tablet, Take 1 tablet (5 mg) by mouth nightly as needed for sleep, Disp: 90 tablet, Rfl: 1     UNABLE TO FIND, L-morgan, Disp: , Rfl:     Current Facility-Administered Medications:      botulinum toxin type A (BOTOX) 100 units injection 200 Units, 200 Units, Intramuscular, Q90 Days, Flori Trujillo MD     Allergies   Allergen Reactions     Darvocet [Acetaminophen] Nausea and Vomiting     Erythromycin GI Disturbance     Flexeril [Cyclobenzaprine Hcl]      Lightheadedness, dizzy          PHYSICAL EXAM:  The patient is here for repeat Botox injections. She denies having a headache today.        HPI:    Patient denies any hospitalizations, ER visits or new diagnosis since last appointment.     We reviewed the recommended safety guidelines for  Botox from any vaccine injection, such as the seasonal flu vaccine, by a minimum of 10-14 days with Jessica Manriquez. She acknowledged understanding.    She reports she is waiting for her match results. 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    Her first choice is [place] and second is [place].       RESPONSE TO PREVIOUS TREATMENT:  Change in headache pattern following last series of injections with 200 units of  Botox 20.      1.  Headache frequency during this injection cycle: 17 headaches total over this cycle.     2.  Headache duration during this injection cycle:  Headache duration ranged from 4 hours to 24 hours. She had 4 hrs were half of the migraines, and would resolve with rest. The interviews did not help.     3.  Headache intensity during this injection cycle:    A.  5/10  =  Typical pain level.  B.  8/10  =  Worst pain level.  C.  2/10  =  Lowest pain level.          4.  Change in headache medication usage during this injection cycle:  (For Example:  Able to decrease use of oral pain medications.) Oral medication remained the same.     5.  ER Visits During This Injection Cycle:  0      6.  Functional Performance:  Change in ADL's, social interaction, days lost from work, etc. 2 days that she had to stay in bed.         BOTULINUM NEUROTOXIN INJECTION PROCEDURES:      VERIFICATION OF PATIENT IDENTIFICATION AND PROCEDURE     Initials   Patient Name MD   Patient  MD   Procedure Verified by: MD     Prior to the start of the procedure and with procedural staff participation, I verbally confirmed the patient s identity using two indicators, relevant allergies, that the procedure was appropriate and matched the consent or emergent situation, and that the correct equipment/implants were available. Immediately prior to starting the procedure I conducted the Time Out with the procedural staff and re-confirmed the patient s name, procedure, and site/side. (The Joint Commission universal protocol was followed.)  Yes    Sedation (Moderate or Deep): None      Above assessments performed by:  Flori Trujillo MD, [facility]   Department of Rehabilitation       INDICATIONS FOR PROCEDURES:  Jessica Manriquez is a 50 year old patient with chronic migraine  headaches associated with cervicogenic  components.     Her baseline symptoms have been recalcitrant to oral medications and conservative therapy.  She is here today for reinjection with Botox.        GOAL OF PROCEDURE:  The goal of this procedure is to decrease pain       TOTAL DOSE ADMINISTERED:  Total dose 200 units   Dose Administered:  200 units  Botox (Botulinum Toxin Type A)       2:1 Dilution   Unavoidable waste 0 units    Diluent Used: Bupivacaine 0.5%  Lot # [id number]  Exp 11/23  NDC 86874 466 03  Total Volume of Diluent Used:  4 ml    Botox   Lot # [id number] Expiration Date: 9/23  NDC #: Botox 100u ([address]-38)    Medication guide was offered to patient and was declined.    CONSENT:  The risks, benefits, and treatment options were discussed with Jessica Manriquez and she agreed to proceed.    Written consent was obtained by MD.         EQUIPMENT USED:  Needle-25mm stimulating/recording  EMG/NCS Machine        SKIN PREPARATION:  Skin preparation was performed using an alcohol wipe.        GUIDANCE DESCRIPTION:  Electro-myographic guidance was necessary throughout the procedure to accurately identify all areas of spastic muscles while avoiding injection of non-spastic muscles, neighboring nerves and nearby vascular structures.       AREA/MUSCLE INJECTED:    1 & 2. SHOULDER GIRDLE & NECK MUSCLES: 65 units Botox = Total Dose, 2:1 Dilution                               Right Upper Trapezius 15 units of Botox at 3 sites               Left Upper Trapezius 15 units of Botox at 3 sites        Right Splenius  - 5 units of Botox at 1 site/s  Left Splenius - 5 units of Botox at 1 site/s     Right Semispinalis  - 5 units of Botox at 1 site/s  Left Semispinalis - 5 units of Botox at 1 site/s    Left ant scalenes 5 units in 1 site                    Left levator (at neck) - 5 units of Botox at 1 site    Right Lev at the neck  5 unit of Botox at 1 site        3. HEAD, JAW & SCALP MUSCLES: 135 units Botox = Total Dose,  2:1 Dilution     Left masseter - 5 units of Botox at 1 site    Right masseter 5 units at 1 site     Right parietal area 5 units in 1 site    Left parietal area  5 units in 1 site        Right Occipitalis - 15 units of Botox at 3 site/s.  Left Occipitalis - 15 units of Botox at 3 site/s.       Right Frontalis - 10 units of Botox at 2 site/s  Left Frontalis - 10 units of Botox at 2 site/s        Right Temporalis - 15 units of Botox at 3 site/s.  Left Temporalis - 30 units of Botox at 6 site/s.       Right  - 5 units of Botox at 1 site/s.  Left  - 5 units of Botox at 1 site/s.     Procerus - 10 units of Botox at 1 site/s.      RESPONSE TO PROCEDURE:  Jessica Manriquez tolerated the procedure well and there were no immediate complications.   She was allowed to recover for an appropriate period of time and was discharged home in stable condition.    FOLLOW UP:  Jessica Manriquez was asked to follow up by phone in 7-14 days with Poonam Shoemaker RN, Care Coordinator, to report her response to this series of injections.  Based on the patient's previous response to this therapy, Jessica Manriquez was rescheduled for the next series of injections in 10 weeks.    PLAN (Medication Changes, Therapy Orders, Work or Disability Issues, etc.): Patient will continue to monitor response to today's injections.     Again, thank you for allowing me to participate in the care of your patient.      Sincerely,    Flori Trujillo MD

## 2021-02-16 NOTE — PROGRESS NOTES
BOTULINUM TOXIN PROCEDURE - HEADACHE - NOTE    Chief Complaint   Patient presents with     Botox     Chronic migraines     /71   Pulse 89   SpO2 100%       Current Outpatient Medications:      ALPRAZolam (XANAX) 0.5 MG tablet, Take 1 tablet (0.5 mg) by mouth every 6 hours as needed for anxiety, Disp: 40 tablet, Rfl: 0     aspirin-acetaminophen-caffeine (EXCEDRIN MIGRAINE) 250-250-65 MG per tablet, Take 1 tablet by mouth every 6 hours as needed for pain., Disp: 30 tablet, Rfl: 0     diazepam (VALIUM) 10 MG tablet, Place 1 tab vaginally for muscle spasms, Disp: 30 tablet, Rfl: 3     ferrous sulfate (FEROSUL) 325 (65 Fe) MG tablet, Take 325 mg by mouth daily (with breakfast), Disp: , Rfl:      hydrOXYzine (ATARAX) 25 MG tablet, Take 1-2 tablets (25-50 mg) by mouth every 6 hours as needed for itching, Disp: 60 tablet, Rfl: 1     Lactobacillus (ACIDOPHILUS) 100 MG CAPS, Take 1 capsule by mouth daily, Disp: , Rfl:      Loperamide HCl (IMODIUM A-D PO), Take 1 tablet by mouth once as needed, Disp: , Rfl:      magnesium 200 MG TABS, , Disp: , Rfl:      MULTI-VITAMIN OR TABS, 1 tablet by mouth DAILY, Disp: , Rfl:      OnabotulinumtoxinA (BOTOX IJ), Inject 200 Units as directed Total dose 200 units  Dose Administered:  175 units  Botox (Botulinum Toxin Type A)       2:1 Dilution (55 units unavoidable waste) Unavoidable waste 25 units  Diluent Used:  Preservative Free Normal Saline Total Volume of Diluent Used:  2.9 ml Lot # /C3 with Expiration Date:  03/2021 NDC #: Botox 100u ([address]-17), Disp: , Rfl:      ondansetron (ZOFRAN ODT) 4 MG ODT tab, Take 1-2 tablets (4-8 mg) by mouth every 8 hours as needed for nausea, Disp: 20 tablet, Rfl: 3     penciclovir (DENAVIR) 1 % external cream, Apply topically every 2 hours (while awake), Disp: 1.5 g, Rfl: 3     SUMAtriptan (IMITREX) 50 MG tablet, Take 1 tablet (50 mg) by mouth at onset of headache for migraine May repeat dose in 2 hours.  Do not exceed 200 mg in 24  hours, Disp: 18 tablet, Rfl: 11     traMADol (ULTRAM) 50 MG tablet, Take 1-2 tablets ( mg) by mouth every 6 hours as needed for pain, Disp: 40 tablet, Rfl: 2     valACYclovir (VALTREX) 500 MG tablet, Take 1 tablet (500 mg) by mouth daily, Disp: 90 tablet, Rfl: 3     vitamin (B COMPLEX-C) tablet, Take 1 tablet by mouth daily, Disp: , Rfl:      VITAMIN C 500 MG OR TABS, 1 tablet daily, Disp: , Rfl:      VITAMIN D, CHOLECALCIFEROL, PO, Take 1,000 Units by mouth daily, Disp: , Rfl:      Vitamin Mixture (VITAMIN E COMPLETE PO), , Disp: , Rfl:      zolpidem (AMBIEN) 5 MG tablet, Take 1 tablet (5 mg) by mouth nightly as needed for sleep, Disp: 90 tablet, Rfl: 1     UNABLE TO FIND, L-morgan, Disp: , Rfl:     Current Facility-Administered Medications:      botulinum toxin type A (BOTOX) 100 units injection 200 Units, 200 Units, Intramuscular, Q90 Days, Flori Trujillo MD     Allergies   Allergen Reactions     Darvocet [Acetaminophen] Nausea and Vomiting     Erythromycin GI Disturbance     Flexeril [Cyclobenzaprine Hcl]      Lightheadedness, dizzy          PHYSICAL EXAM:  The patient is here for repeat Botox injections. She denies having a headache today.        HPI:    Patient denies any hospitalizations, ER visits or new diagnosis since last appointment.     We reviewed the recommended safety guidelines for  Botox from any vaccine injection, such as the seasonal flu vaccine, by a minimum of 10-14 days with Jessica Manriquez. She acknowledged understanding.    She reports she is waiting for her match results. 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   Her first choice is [place] and second is [place].       RESPONSE TO PREVIOUS TREATMENT:  Change in headache pattern following last series of injections with 200 units of  Botox 9/24/20.      1.  Headache frequency during this injection cycle: 17 headaches total over this cycle.     2.  Headache duration during this injection cycle:  Headache  duration ranged from 4 hours to 24 hours. She had 4 hrs were half of the migraines, and would resolve with rest. The interviews did not help.     3.  Headache intensity during this injection cycle:    A.  5/10  =  Typical pain level.  B.  8/10  =  Worst pain level.  C.  2/10  =  Lowest pain level.          4.  Change in headache medication usage during this injection cycle:  (For Example:  Able to decrease use of oral pain medications.) Oral medication remained the same.     5.  ER Visits During This Injection Cycle:  0      6.  Functional Performance:  Change in ADL's, social interaction, days lost from work, etc. 2 days that she had to stay in bed.         BOTULINUM NEUROTOXIN INJECTION PROCEDURES:      VERIFICATION OF PATIENT IDENTIFICATION AND PROCEDURE     Initials   Patient Name MD   Patient  MD   Procedure Verified by: MD     Prior to the start of the procedure and with procedural staff participation, I verbally confirmed the patient s identity using two indicators, relevant allergies, that the procedure was appropriate and matched the consent or emergent situation, and that the correct equipment/implants were available. Immediately prior to starting the procedure I conducted the Time Out with the procedural staff and re-confirmed the patient s name, procedure, and site/side. (The Joint Commission universal protocol was followed.)  Yes    Sedation (Moderate or Deep): None      Above assessments performed by:  Flori Trujillo MD, [facility]   Department of Rehabilitation           INDICATIONS FOR PROCEDURES:  Jessica Manriquez is a 50 year old patient with chronic migraine headaches associated with cervicogenic  components.     Her baseline symptoms have been recalcitrant to oral medications and conservative therapy.  She is here today for reinjection with Botox.        GOAL OF PROCEDURE:  The goal of this procedure is to decrease pain       TOTAL DOSE ADMINISTERED:  Total dose 200 units   Dose Administered:  200  units  Botox (Botulinum Toxin Type A)       2:1 Dilution   Unavoidable waste 0 units    Diluent Used: Bupivacaine 0.5%  Lot # [id number]  Exp 11/23  NDC 01071 466 03  Total Volume of Diluent Used:  4 ml    Botox   Lot # [id number] Expiration Date: 9/23  NDC #: Botox 100u ([address]-30)    Medication guide was offered to patient and was declined.        CONSENT:  The risks, benefits, and treatment options were discussed with Jessica Manriquez and she agreed to proceed.    Written consent was obtained by MD.         EQUIPMENT USED:  Needle-25mm stimulating/recording  EMG/NCS Machine        SKIN PREPARATION:  Skin preparation was performed using an alcohol wipe.        GUIDANCE DESCRIPTION:  Electro-myographic guidance was necessary throughout the procedure to accurately identify all areas of spastic muscles while avoiding injection of non-spastic muscles, neighboring nerves and nearby vascular structures.       AREA/MUSCLE INJECTED:    1 & 2. SHOULDER GIRDLE & NECK MUSCLES: 65 units Botox = Total Dose, 2:1 Dilution                               Right Upper Trapezius 15 units of Botox at 3 sites               Left Upper Trapezius 15 units of Botox at 3 sites        Right Splenius  - 5 units of Botox at 1 site/s  Left Splenius - 5 units of Botox at 1 site/s     Right Semispinalis  - 5 units of Botox at 1 site/s  Left Semispinalis - 5 units of Botox at 1 site/s    Left ant scalenes 5 units in 1 site                    Left levator (at neck) - 5 units of Botox at 1 site    Right Lev at the neck  5 unit of Botox at 1 site            3. HEAD, JAW & SCALP MUSCLES: 135 units Botox = Total Dose, 2:1 Dilution     Left masseter - 5 units of Botox at 1 site    Right masseter 5 units at 1 site     Right parietal area 5 units in 1 site    Left parietal area  5 units in 1 site        Right Occipitalis - 15 units of Botox at 3 site/s.  Left Occipitalis - 15 units of Botox at 3 site/s.       Right Frontalis - 10 units of Botox at 2  site/s  Left Frontalis - 10 units of Botox at 2 site/s        Right Temporalis - 15 units of Botox at 3 site/s.  Left Temporalis - 30 units of Botox at 6 site/s.       Right  - 5 units of Botox at 1 site/s.  Left  - 5 units of Botox at 1 site/s.     Procerus - 10 units of Botox at 1 site/s.         RESPONSE TO PROCEDURE:  Jessica Manriquez tolerated the procedure well and there were no immediate complications.   She was allowed to recover for an appropriate period of time and was discharged home in stable condition.          FOLLOW UP:  Jessica Manriquez was asked to follow up by phone in 7-14 days with Poonam Shoemaker RN, Care Coordinator, to report her response to this series of injections.  Based on the patient's previous response to this therapy, Jessica Manriquez was rescheduled for the next series of injections in 10 weeks.      PLAN (Medication Changes, Therapy Orders, Work or Disability Issues, etc.): Patient will continue to monitor response to today's injections.

## 2021-03-01 DIAGNOSIS — B00.9 HSV (HERPES SIMPLEX VIRUS) INFECTION: ICD-10-CM

## 2021-03-01 RX ORDER — VALACYCLOVIR HYDROCHLORIDE 500 MG/1
500 TABLET, FILM COATED ORAL DAILY
Qty: 90 TABLET | Refills: 3 | Status: SHIPPED | OUTPATIENT
Start: 2021-03-01 | End: 2024-01-15

## 2021-03-01 NOTE — TELEPHONE ENCOUNTER
Valacyclovir   Last Written Prescription Date:  1/2/2020  Last Fill Quantity: 90,  # refills: 3   Last office visit: 1/26/2021 with prescribing provider:  Major Lopez Office Visit:  steph

## 2021-03-01 NOTE — TELEPHONE ENCOUNTER
Please advise if willing to refill medication without having lab work.     Thanks,    Sarah Ferguson RN

## 2021-03-01 NOTE — TELEPHONE ENCOUNTER
"Requested Prescriptions   Pending Prescriptions Disp Refills     valACYclovir (VALTREX) 500 MG tablet 90 tablet 3     Sig: Take 1 tablet (500 mg) by mouth daily       Antivirals for Herpes Protocol Failed - 3/1/2021 10:04 AM        Failed - Normal serum creatinine on file in past 12 months     Recent Labs   Lab Test 04/09/18 1925   CR 0.74       Ok to refill medication if creatinine is low          Passed - Patient is age 12 or older        Passed - Recent (12 mo) or future (30 days) visit within the authorizing provider's specialty     Patient has had an office visit with the authorizing provider or a provider within the authorizing providers department within the previous 12 mos or has a future within next 30 days. See \"Patient Info\" tab in inbasket, or \"Choose Columns\" in Meds & Orders section of the refill encounter.              Passed - Medication is active on med list             "

## 2021-04-27 ENCOUNTER — OFFICE VISIT (OUTPATIENT)
Dept: PHYSICAL MEDICINE AND REHAB | Facility: CLINIC | Age: 51
End: 2021-04-27
Payer: COMMERCIAL

## 2021-04-27 VITALS
HEART RATE: 86 BPM | RESPIRATION RATE: 16 BRPM | SYSTOLIC BLOOD PRESSURE: 107 MMHG | OXYGEN SATURATION: 97 % | DIASTOLIC BLOOD PRESSURE: 73 MMHG | TEMPERATURE: 98.3 F

## 2021-04-27 DIAGNOSIS — G43.719 INTRACTABLE CHRONIC MIGRAINE WITHOUT AURA AND WITHOUT STATUS MIGRAINOSUS: Primary | ICD-10-CM

## 2021-04-27 PROCEDURE — 64615 CHEMODENERV MUSC MIGRAINE: CPT | Performed by: PHYSICAL MEDICINE & REHABILITATION

## 2021-04-27 NOTE — LETTER
4/27/2021       RE: Jessica Manriquez  [address]  [facility] [address]     Dear Colleague,    Thank you for referring your patient, Jessica Manriquez, to the [facility] PHYSICAL MEDICINE AND REHABILITATION CLINIC [place] [facility]. Please see a copy of my visit note below.    BOTULINUM TOXIN PROCEDURE - HEADACHE - NOTE    Chief Complaint   Patient presents with     Botox     /73   Pulse 86   Temp 98.3  F (36.8  C)   Resp 16   SpO2 97%       Current Outpatient Medications:      ALPRAZolam (XANAX) 0.5 MG tablet, Take 1 tablet (0.5 mg) by mouth every 6 hours as needed for anxiety, Disp: 40 tablet, Rfl: 0     aspirin-acetaminophen-caffeine (EXCEDRIN MIGRAINE) 250-250-65 MG per tablet, Take 1 tablet by mouth every 6 hours as needed for pain., Disp: 30 tablet, Rfl: 0     diazepam (VALIUM) 10 MG tablet, Place 1 tab vaginally for muscle spasms, Disp: 30 tablet, Rfl: 3     ferrous sulfate (FEROSUL) 325 (65 Fe) MG tablet, Take 325 mg by mouth daily (with breakfast), Disp: , Rfl:      hydrOXYzine (ATARAX) 25 MG tablet, Take 1-2 tablets (25-50 mg) by mouth every 6 hours as needed for itching, Disp: 60 tablet, Rfl: 1     Lactobacillus (ACIDOPHILUS) 100 MG CAPS, Take 1 capsule by mouth daily, Disp: , Rfl:      Loperamide HCl (IMODIUM A-D PO), Take 1 tablet by mouth once as needed, Disp: , Rfl:      magnesium 200 MG TABS, , Disp: , Rfl:      MULTI-VITAMIN OR TABS, 1 tablet by mouth DAILY, Disp: , Rfl:      OnabotulinumtoxinA (BOTOX IJ), Inject 200 Units as directed Total dose 200 units  Dose Administered:  175 units  Botox (Botulinum Toxin Type A)       2:1 Dilution (55 units unavoidable waste) Unavoidable waste 25 units  Diluent Used:  Preservative Free Normal Saline Total Volume of Diluent Used:  2.9 ml Lot # /C3 with Expiration Date:  03/2021 NDC #: Botox 100u ([address]-92), Disp: , Rfl:      ondansetron (ZOFRAN ODT) 4 MG ODT tab, Take 1-2  tablets (4-8 mg) by mouth every 8 hours as needed for nausea, Disp: 20 tablet, Rfl: 3     penciclovir (DENAVIR) 1 % external cream, Apply topically every 2 hours (while awake), Disp: 1.5 g, Rfl: 3     SUMAtriptan (IMITREX) 50 MG tablet, Take 1 tablet (50 mg) by mouth at onset of headache for migraine May repeat dose in 2 hours.  Do not exceed 200 mg in 24 hours, Disp: 18 tablet, Rfl: 11     traMADol (ULTRAM) 50 MG tablet, Take 1-2 tablets ( mg) by mouth every 6 hours as needed for pain, Disp: 40 tablet, Rfl: 2     valACYclovir (VALTREX) 500 MG tablet, Take 1 tablet (500 mg) by mouth daily, Disp: 90 tablet, Rfl: 3     vitamin (B COMPLEX-C) tablet, Take 1 tablet by mouth daily, Disp: , Rfl:      VITAMIN C 500 MG OR TABS, 1 tablet daily, Disp: , Rfl:      VITAMIN D, CHOLECALCIFEROL, PO, Take 1,000 Units by mouth daily, Disp: , Rfl:      Vitamin Mixture (VITAMIN E COMPLETE PO), , Disp: , Rfl:      zolpidem (AMBIEN) 5 MG tablet, Take 1 tablet (5 mg) by mouth nightly as needed for sleep, Disp: 90 tablet, Rfl: 1    Current Facility-Administered Medications:      botulinum toxin type A (BOTOX) 100 units injection 200 Units, 200 Units, Intramuscular, Q90 Days, Flori Trujillo MD     Allergies   Allergen Reactions     Darvocet [Acetaminophen] Nausea and Vomiting     Erythromycin GI Disturbance     Flexeril [Cyclobenzaprine Hcl]      Lightheadedness, dizzy       Serotonin Reuptake Inhibitors Anxiety, Other (See Comments) and Palpitations        PHYSICAL EXAM:  The patient is here for repeat Botox injections. She denies having a headache today.        HPI:    Patient denies any hospitalizations, ER visits or new diagnosis since last appointment.     We reviewed the recommended safety guidelines for  Botox from any vaccine injection, such as the seasonal flu vaccine, by a minimum of 10-14 days with Jessica Manriquez. She acknowledged understanding.    She reports she is waiting for her match results. Her  interviews went well for college counseling.   Her first choice was [place] and second is [place]. She got into [place]. She had a stressful cycle, with her father having a TIA, and her mother fell and fractured a few ribs.       RESPONSE TO PREVIOUS TREATMENT:  Change in headache pattern following last series of injections with 200 units of  Botox 21.      1.  Headache frequency during this injection cycle: 17 headaches total over this cycle. April=7 march =5 and Feb = 5     2.  Headache duration during this injection cycle:  Headache duration ranged from 4 hours to 24 hours. She had 4 hrs were half of the migraines, and would resolve with rest. The interviews did not help.     3.  Headache intensity during this injection cycle:    A.  5/10  =  Typical pain level.  B.  8/10  =  Worst pain level.  C.  2/10  =  Lowest pain level.          4.  Change in headache medication usage during this injection cycle:  (For Example:  Able to decrease use of oral pain medications.) Oral medication remained the same.     5.  ER Visits During This Injection Cycle:  0      6.  Functional Performance:  Change in ADL's, social interaction, days lost from work, etc. 2 days that she had to stay in bed.         BOTULINUM NEUROTOXIN INJECTION PROCEDURES:      VERIFICATION OF PATIENT IDENTIFICATION AND PROCEDURE     Initials   Patient Name MD   Patient  MD   Procedure Verified by: MD     Prior to the start of the procedure and with procedural staff participation, I verbally confirmed the patient s identity using two indicators, relevant allergies, that the procedure was appropriate and matched the consent or emergent situation, and that the correct equipment/implants were available. Immediately prior to starting the procedure I conducted the Time Out with the procedural staff and re-confirmed the patient s name, procedure, and site/side. (The Joint Commission universal protocol was followed.)  Yes    Sedation (Moderate or Deep):  None      Above assessments performed by:  Flori Trujillo MD, [facility]   Department of Rehabilitation           INDICATIONS FOR PROCEDURES:  Jessica Manriquez is a 50 year old patient with chronic migraine headaches associated with cervicogenic  components.     Her baseline symptoms have been recalcitrant to oral medications and conservative therapy.  She is here today for reinjection with Botox.        GOAL OF PROCEDURE:  The goal of this procedure is to decrease pain       TOTAL DOSE ADMINISTERED:  Total dose 200 units   Dose Administered:  200 units  Botox (Botulinum Toxin Type A)       2:1 Dilution   Unavoidable waste 0 units    Diluent Used: Bupivacaine 0.5%  Lot # [id number]  Exp 7/23  NDC 14815 466 03  Total Volume of Diluent Used:  4 ml    Botox   Lot # [id number] Expiration Date: 7/23  NDC #: Botox 100u ([address]-80)    Medication guide was offered to patient and was declined.        CONSENT:  The risks, benefits, and treatment options were discussed with Jessica Manriquez and she agreed to proceed.    Written consent was obtained by MD.         EQUIPMENT USED:  Needle-25mm stimulating/recording  EMG/NCS Machine        SKIN PREPARATION:  Skin preparation was performed using an alcohol wipe.        GUIDANCE DESCRIPTION:  Electro-myographic guidance was necessary throughout the procedure to accurately identify all areas of spastic muscles while avoiding injection of non-spastic muscles, neighboring nerves and nearby vascular structures.       AREA/MUSCLE INJECTED:    1 & 2. SHOULDER GIRDLE & NECK MUSCLES: 65 units Botox = Total Dose, 2:1 Dilution                               Right Upper Trapezius 15 units of Botox at 3 sites               Left Upper Trapezius 15 units of Botox at 3 sites        Right Splenius  - 5 units of Botox at 1 site/s  Left Splenius - 5 units of Botox at 1 site/s     Right Semispinalis  - 5 units of Botox at 1 site/s  Left Semispinalis - 5 units of Botox at 1 site/s    Left ant scalenes 5  units in 1 site                    Left levator (at neck) - 5 units of Botox at 1 site    Right Lev at the neck  5 unit of Botox at 1 site            3. HEAD, JAW & SCALP MUSCLES: 135 units Botox = Total Dose, 2:1 Dilution     Left masseter - 5 units of Botox at 1 site    Right masseter 5 units at 1 site     Right parietal area 5 units in 1 site    Left parietal area  5 units in 1 site        Right Occipitalis - 15 units of Botox at 3 site/s.  Left Occipitalis - 15 units of Botox at 3 site/s.       Right Frontalis - 10 units of Botox at 2 site/s  Left Frontalis - 10 units of Botox at 2 site/s        Right Temporalis - 15 units of Botox at 3 site/s.  Left Temporalis - 30 units of Botox at 6 site/s.       Right  - 5 units of Botox at 1 site/s.  Left  - 5 units of Botox at 1 site/s.     Procerus - 10 units of Botox at 1 site/s.       RESPONSE TO PROCEDURE:  Jessica Manriquez tolerated the procedure well and there were no immediate complications.   She was allowed to recover for an appropriate period of time and was discharged home in stable condition.      FOLLOW UP:  Jessica Manriquez was asked to follow up by phone in 7-14 days with Poonam Shoemaker RN, Care Coordinator, to report her response to this series of injections.  Based on the patient's previous response to this therapy, Jessica Manriquez was rescheduled for the next series of injections in 10 weeks.      PLAN (Medication Changes, Therapy Orders, Work or Disability Issues, etc.): Patient will continue to monitor response to today's injections.     She will need to transfer to another physician in [place] for continuing of the Botox injections.   No further appointments are needed.     Again, thank you for allowing me to participate in the care of your patient.      Sincerely,    Flori Trujillo MD

## 2021-04-27 NOTE — PROGRESS NOTES
BOTULINUM TOXIN PROCEDURE - HEADACHE - NOTE    Chief Complaint   Patient presents with     Botox     /73   Pulse 86   Temp 98.3  F (36.8  C)   Resp 16   SpO2 97%       Current Outpatient Medications:      ALPRAZolam (XANAX) 0.5 MG tablet, Take 1 tablet (0.5 mg) by mouth every 6 hours as needed for anxiety, Disp: 40 tablet, Rfl: 0     aspirin-acetaminophen-caffeine (EXCEDRIN MIGRAINE) 250-250-65 MG per tablet, Take 1 tablet by mouth every 6 hours as needed for pain., Disp: 30 tablet, Rfl: 0     diazepam (VALIUM) 10 MG tablet, Place 1 tab vaginally for muscle spasms, Disp: 30 tablet, Rfl: 3     ferrous sulfate (FEROSUL) 325 (65 Fe) MG tablet, Take 325 mg by mouth daily (with breakfast), Disp: , Rfl:      hydrOXYzine (ATARAX) 25 MG tablet, Take 1-2 tablets (25-50 mg) by mouth every 6 hours as needed for itching, Disp: 60 tablet, Rfl: 1     Lactobacillus (ACIDOPHILUS) 100 MG CAPS, Take 1 capsule by mouth daily, Disp: , Rfl:      Loperamide HCl (IMODIUM A-D PO), Take 1 tablet by mouth once as needed, Disp: , Rfl:      magnesium 200 MG TABS, , Disp: , Rfl:      MULTI-VITAMIN OR TABS, 1 tablet by mouth DAILY, Disp: , Rfl:      OnabotulinumtoxinA (BOTOX IJ), Inject 200 Units as directed Total dose 200 units  Dose Administered:  175 units  Botox (Botulinum Toxin Type A)       2:1 Dilution (55 units unavoidable waste) Unavoidable waste 25 units  Diluent Used:  Preservative Free Normal Saline Total Volume of Diluent Used:  2.9 ml Lot # /C3 with Expiration Date:  03/2021 NDC #: Botox 100u ([address]-61), Disp: , Rfl:      ondansetron (ZOFRAN ODT) 4 MG ODT tab, Take 1-2 tablets (4-8 mg) by mouth every 8 hours as needed for nausea, Disp: 20 tablet, Rfl: 3     penciclovir (DENAVIR) 1 % external cream, Apply topically every 2 hours (while awake), Disp: 1.5 g, Rfl: 3     SUMAtriptan (IMITREX) 50 MG tablet, Take 1 tablet (50 mg) by mouth at onset of headache for migraine May repeat dose in 2 hours.  Do not exceed 200  mg in 24 hours, Disp: 18 tablet, Rfl: 11     traMADol (ULTRAM) 50 MG tablet, Take 1-2 tablets ( mg) by mouth every 6 hours as needed for pain, Disp: 40 tablet, Rfl: 2     valACYclovir (VALTREX) 500 MG tablet, Take 1 tablet (500 mg) by mouth daily, Disp: 90 tablet, Rfl: 3     vitamin (B COMPLEX-C) tablet, Take 1 tablet by mouth daily, Disp: , Rfl:      VITAMIN C 500 MG OR TABS, 1 tablet daily, Disp: , Rfl:      VITAMIN D, CHOLECALCIFEROL, PO, Take 1,000 Units by mouth daily, Disp: , Rfl:      Vitamin Mixture (VITAMIN E COMPLETE PO), , Disp: , Rfl:      zolpidem (AMBIEN) 5 MG tablet, Take 1 tablet (5 mg) by mouth nightly as needed for sleep, Disp: 90 tablet, Rfl: 1    Current Facility-Administered Medications:      botulinum toxin type A (BOTOX) 100 units injection 200 Units, 200 Units, Intramuscular, Q90 Days, Flori Trujillo MD     Allergies   Allergen Reactions     Darvocet [Acetaminophen] Nausea and Vomiting     Erythromycin GI Disturbance     Flexeril [Cyclobenzaprine Hcl]      Lightheadedness, dizzy       Serotonin Reuptake Inhibitors Anxiety, Other (See Comments) and Palpitations        PHYSICAL EXAM:  The patient is here for repeat Botox injections. She denies having a headache today.        HPI:    Patient denies any hospitalizations, ER visits or new diagnosis since last appointment.     We reviewed the recommended safety guidelines for  Botox from any vaccine injection, such as the seasonal flu vaccine, by a minimum of 10-14 days with Jessica Manriquez. She acknowledged understanding.    She reports she is waiting for her match results. 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   Her first choice was [place] and second is [place]. She got into [place]. She had a stressful cycle, with her father having a TIA, and her mother fell and fractured a few ribs.       RESPONSE TO PREVIOUS TREATMENT:  Change in headache pattern following last series of injections with 200 units of  Botox  21.      1.  Headache frequency during this injection cycle: 17 headaches total over this cycle. April=7 march =5 and Feb = 5     2.  Headache duration during this injection cycle:  Headache duration ranged from 4 hours to 24 hours. She had 4 hrs were half of the migraines, and would resolve with rest. The interviews did not help.     3.  Headache intensity during this injection cycle:    A.  5/10  =  Typical pain level.  B.  8/10  =  Worst pain level.  C.  2/10  =  Lowest pain level.          4.  Change in headache medication usage during this injection cycle:  (For Example:  Able to decrease use of oral pain medications.) Oral medication remained the same.     5.  ER Visits During This Injection Cycle:  0      6.  Functional Performance:  Change in ADL's, social interaction, days lost from work, etc. 2 days that she had to stay in bed.         BOTULINUM NEUROTOXIN INJECTION PROCEDURES:      VERIFICATION OF PATIENT IDENTIFICATION AND PROCEDURE     Initials   Patient Name MD   Patient  MD   Procedure Verified by: MD     Prior to the start of the procedure and with procedural staff participation, I verbally confirmed the patient s identity using two indicators, relevant allergies, that the procedure was appropriate and matched the consent or emergent situation, and that the correct equipment/implants were available. Immediately prior to starting the procedure I conducted the Time Out with the procedural staff and re-confirmed the patient s name, procedure, and site/side. (The Joint Commission universal protocol was followed.)  Yes    Sedation (Moderate or Deep): None      Above assessments performed by:  Flori Trujillo MD, [facility]   Department of Rehabilitation           INDICATIONS FOR PROCEDURES:  Jessica Manriquez is a 50 year old patient with chronic migraine headaches associated with cervicogenic  components.     Her baseline symptoms have been recalcitrant to oral medications and conservative therapy.  She  is here today for reinjection with Botox.        GOAL OF PROCEDURE:  The goal of this procedure is to decrease pain       TOTAL DOSE ADMINISTERED:  Total dose 200 units   Dose Administered:  200 units  Botox (Botulinum Toxin Type A)       2:1 Dilution   Unavoidable waste 0 units    Diluent Used: Bupivacaine 0.5%  Lot # [id number]  Exp 7/23  NDC 16328 466 03  Total Volume of Diluent Used:  4 ml    Botox   Lot # [id number] Expiration Date: 7/23  NDC #: Botox 100u ([address]-31)    Medication guide was offered to patient and was declined.        CONSENT:  The risks, benefits, and treatment options were discussed with Jessica Manriquez and she agreed to proceed.    Written consent was obtained by MD.         EQUIPMENT USED:  Needle-25mm stimulating/recording  EMG/NCS Machine        SKIN PREPARATION:  Skin preparation was performed using an alcohol wipe.        GUIDANCE DESCRIPTION:  Electro-myographic guidance was necessary throughout the procedure to accurately identify all areas of spastic muscles while avoiding injection of non-spastic muscles, neighboring nerves and nearby vascular structures.       AREA/MUSCLE INJECTED:    1 & 2. SHOULDER GIRDLE & NECK MUSCLES: 65 units Botox = Total Dose, 2:1 Dilution                               Right Upper Trapezius 15 units of Botox at 3 sites               Left Upper Trapezius 15 units of Botox at 3 sites        Right Splenius  - 5 units of Botox at 1 site/s  Left Splenius - 5 units of Botox at 1 site/s     Right Semispinalis  - 5 units of Botox at 1 site/s  Left Semispinalis - 5 units of Botox at 1 site/s    Left ant scalenes 5 units in 1 site                    Left levator (at neck) - 5 units of Botox at 1 site    Right Lev at the neck  5 unit of Botox at 1 site            3. HEAD, JAW & SCALP MUSCLES: 135 units Botox = Total Dose, 2:1 Dilution     Left masseter - 5 units of Botox at 1 site    Right masseter 5 units at 1 site     Right parietal area 5 units in 1  site    Left parietal area  5 units in 1 site        Right Occipitalis - 15 units of Botox at 3 site/s.  Left Occipitalis - 15 units of Botox at 3 site/s.       Right Frontalis - 10 units of Botox at 2 site/s  Left Frontalis - 10 units of Botox at 2 site/s        Right Temporalis - 15 units of Botox at 3 site/s.  Left Temporalis - 30 units of Botox at 6 site/s.       Right  - 5 units of Botox at 1 site/s.  Left  - 5 units of Botox at 1 site/s.     Procerus - 10 units of Botox at 1 site/s.         RESPONSE TO PROCEDURE:  Jessica Manriquez tolerated the procedure well and there were no immediate complications.   She was allowed to recover for an appropriate period of time and was discharged home in stable condition.          FOLLOW UP:  Jessica Manriquez was asked to follow up by phone in 7-14 days with Poonam Shoemaker RN, Care Coordinator, to report her response to this series of injections.  Based on the patient's previous response to this therapy, Jessica Manriquez was rescheduled for the next series of injections in 10 weeks.      PLAN (Medication Changes, Therapy Orders, Work or Disability Issues, etc.): Patient will continue to monitor response to today's injections.     She will need to transfer to another physician in [place] for continuing of the Botox injections.   No further appointments are needed.

## 2021-05-01 ENCOUNTER — MYC MEDICAL ADVICE (OUTPATIENT)
Dept: FAMILY MEDICINE | Facility: CLINIC | Age: 51
End: 2021-05-01

## 2021-05-03 ENCOUNTER — OFFICE VISIT (OUTPATIENT)
Dept: FAMILY MEDICINE | Facility: CLINIC | Age: 51
End: 2021-05-03
Payer: COMMERCIAL

## 2021-05-03 ENCOUNTER — ANCILLARY PROCEDURE (OUTPATIENT)
Dept: GENERAL RADIOLOGY | Facility: CLINIC | Age: 51
End: 2021-05-03
Attending: FAMILY MEDICINE
Payer: COMMERCIAL

## 2021-05-03 VITALS
TEMPERATURE: 97.8 F | SYSTOLIC BLOOD PRESSURE: 122 MMHG | HEART RATE: 100 BPM | WEIGHT: 149 LBS | BODY MASS INDEX: 20.86 KG/M2 | RESPIRATION RATE: 16 BRPM | OXYGEN SATURATION: 98 % | DIASTOLIC BLOOD PRESSURE: 70 MMHG | HEIGHT: 71 IN

## 2021-05-03 DIAGNOSIS — M22.2X1 PATELLOFEMORAL DISORDERS, RIGHT KNEE: ICD-10-CM

## 2021-05-03 DIAGNOSIS — M25.561 CHRONIC PAIN OF RIGHT KNEE: Primary | ICD-10-CM

## 2021-05-03 DIAGNOSIS — G89.29 CHRONIC PAIN OF RIGHT KNEE: Primary | ICD-10-CM

## 2021-05-03 PROCEDURE — 73560 X-RAY EXAM OF KNEE 1 OR 2: CPT | Mod: RT | Performed by: RADIOLOGY

## 2021-05-03 PROCEDURE — 99213 OFFICE O/P EST LOW 20 MIN: CPT | Performed by: FAMILY MEDICINE

## 2021-05-03 ASSESSMENT — MIFFLIN-ST. JEOR: SCORE: 1386.99

## 2021-05-03 ASSESSMENT — PAIN SCALES - GENERAL: PAINLEVEL: NO PAIN (1)

## 2021-05-03 NOTE — PATIENT INSTRUCTIONS
Our Clinic hours are:  Mondays    7:20 am - 7 pm  Tues -  Fri  7:20 am - 5 pm    Clinic Phone: [phone]    The clinic lab opens at 7:30 am Mon - Fri and appointments are required.    [facility]. [phone]  Monday  8 am - 7pm  Tues - Fri 8 am - 5:30 pm

## 2021-05-03 NOTE — PROGRESS NOTES
"    Assessment & Plan     Chronic pain of right knee    - XR Knee Standing Right 2 Views    Patellofemoral disorders, right knee  Suspect this is all PFS  Start PT  - PHYSICAL THERAPY REFERRAL; Future                 No follow-ups on file.    Su Loya MD  [facility]    Danya Lopez is a 50 year old who presents for the following health issues  accompanied by her self:    HPI     Musculoskeletal problem/pain  Onset/Duration: 1 month  Description  Location: knee - right  Joint Swelling: no  Redness: no  Pain: YES  Warmth: no  Intensity:  1/10  Progression of Symptoms:  Worsening- something doesn't feel right and it has been making a snapping sound.   Accompanying signs and symptoms:   Fevers: no  Numbness/tingling/weakness: no  History  Trauma to the area: no  Recent illness:  no  Previous similar problem: YES- 12 years ago runners knee  Previous evaluation:  YES- PT  Precipitating or alleviating factors:  Aggravating factors include: walking and climbing stairs- it will click  Therapies tried and outcome: NSAID - advil    No injury  Clicks/clunks when doing stairs  Realizes she has lost a lot of muscle mass in her legs/buttocks    No locking/giving out.    Review of Systems   Constitutional, HEENT, cardiovascular, pulmonary, gi and gu systems are negative, except as otherwise noted.      Objective    /70   Pulse 100   Temp 97.8  F (36.6  C) (Tympanic)   Resp 16   Ht 1.803 m (5' 11\")   Wt 67.6 kg (149 lb)   SpO2 98%   Breastfeeding No   BMI 20.78 kg/m    Body mass index is 20.78 kg/m .  Physical Exam   GENERAL APPEARANCE: healthy, alert and no distress  MS:   No effusion  Some clicking/clunking with extension of leg  She has weak quads  Negative McMurrays  Full range of motion  No instability with varus or valgus stress    Results for orders placed or performed in visit on 05/03/21 (from the past 24 hour(s))   XR Knee Standing Right 2 Views    Narrative    XR KNEE " STANDING RIGHT 2 VIEWS 5/3/2021 2:37 PM     HISTORY: right knee pain, no injury; Chronic pain of right knee;  Chronic pain of right knee      Impression    IMPRESSION: Negative exam.    RITA MORRELL MD

## 2021-05-11 ENCOUNTER — MYC MEDICAL ADVICE (OUTPATIENT)
Dept: FAMILY MEDICINE | Facility: CLINIC | Age: 51
End: 2021-05-11

## 2021-05-17 ENCOUNTER — OFFICE VISIT (OUTPATIENT)
Dept: FAMILY MEDICINE | Facility: CLINIC | Age: 51
End: 2021-05-17
Payer: COMMERCIAL

## 2021-05-17 ENCOUNTER — HOSPITAL ENCOUNTER (OUTPATIENT)
Dept: PHYSICAL THERAPY | Facility: CLINIC | Age: 51
Setting detail: THERAPIES SERIES
End: 2021-05-17
Attending: FAMILY MEDICINE
Payer: COMMERCIAL

## 2021-05-17 VITALS
DIASTOLIC BLOOD PRESSURE: 60 MMHG | BODY MASS INDEX: 20.86 KG/M2 | SYSTOLIC BLOOD PRESSURE: 116 MMHG | RESPIRATION RATE: 16 BRPM | HEIGHT: 71 IN | OXYGEN SATURATION: 98 % | TEMPERATURE: 97.8 F | HEART RATE: 97 BPM | WEIGHT: 149 LBS

## 2021-05-17 DIAGNOSIS — R20.8 BURNING SENSATION OF FEET: Primary | ICD-10-CM

## 2021-05-17 DIAGNOSIS — G25.81 RESTLESS LEGS SYNDROME (RLS): ICD-10-CM

## 2021-05-17 DIAGNOSIS — M22.2X1 PATELLOFEMORAL DISORDERS, RIGHT KNEE: ICD-10-CM

## 2021-05-17 LAB
FERRITIN SERPL-MCNC: 40 NG/ML (ref 8–252)
FOLATE SERPL-MCNC: 29.1 NG/ML
T4 FREE SERPL-MCNC: 0.8 NG/DL (ref 0.76–1.46)
TSH SERPL DL<=0.005 MIU/L-ACNC: 4.99 MU/L (ref 0.4–4)
VIT B12 SERPL-MCNC: 1267 PG/ML (ref 193–986)

## 2021-05-17 PROCEDURE — 82728 ASSAY OF FERRITIN: CPT | Performed by: FAMILY MEDICINE

## 2021-05-17 PROCEDURE — 97161 PT EVAL LOW COMPLEX 20 MIN: CPT | Mod: GP | Performed by: PHYSICAL THERAPIST

## 2021-05-17 PROCEDURE — 97110 THERAPEUTIC EXERCISES: CPT | Mod: GP | Performed by: PHYSICAL THERAPIST

## 2021-05-17 PROCEDURE — 36415 COLL VENOUS BLD VENIPUNCTURE: CPT | Performed by: FAMILY MEDICINE

## 2021-05-17 PROCEDURE — 82746 ASSAY OF FOLIC ACID SERUM: CPT | Performed by: FAMILY MEDICINE

## 2021-05-17 PROCEDURE — 99213 OFFICE O/P EST LOW 20 MIN: CPT | Performed by: FAMILY MEDICINE

## 2021-05-17 PROCEDURE — 84443 ASSAY THYROID STIM HORMONE: CPT | Performed by: FAMILY MEDICINE

## 2021-05-17 PROCEDURE — 82607 VITAMIN B-12: CPT | Performed by: FAMILY MEDICINE

## 2021-05-17 PROCEDURE — 84439 ASSAY OF FREE THYROXINE: CPT | Performed by: FAMILY MEDICINE

## 2021-05-17 ASSESSMENT — PAIN SCALES - GENERAL: PAINLEVEL: NO PAIN (1)

## 2021-05-17 ASSESSMENT — MIFFLIN-ST. JEOR: SCORE: 1386.99

## 2021-05-17 NOTE — PROGRESS NOTES
05/17/21 1400   General Information   Type of Visit Initial OP Ortho PT Evaluation   Start of Care Date 05/17/21   Referring Physician Su Loya MD   Patient/Family Goals Statement to work on strengthening/stability, reduce popping/clicking   Orders Evaluate and Treat   Date of Order 05/17/21   Certification Required? Yes   Medical Diagnosis Patellofemoral disorders, right knee   Surgical/Medical history reviewed Yes   Precautions/Limitations no known precautions/limitations   Body Part(s)   Body Part(s) Knee   Presentation and Etiology   Pertinent history of current problem (include personal factors and/or comorbidities that impact the POC) Pt states that she gets a lot of clicking in knees, states it feels like the joint wants to pop out of place.  Pain only 1/10 but wants to start strengthening and be proactive with knee before it gets worse. Primarily R knee, but L starting to get sore, unsure if from compensating with walking.  Tenderness/burning started ~ 2 weeks ago, popping about 8 weeks ago.   PMH: hx herniated cervical disc (chronic migraines), hx plantarfascitis and runners knee   Impairments A. Pain;R. Other   Impairment comment clicking/popping   Pain quality H. Other   Pain quality comment occasionally tender   Pain/symptoms exacerbated by M. Other;B. Walking   Pain exacerbation comment stairs, deep squatting, rolling in bed   Pain/symptoms eased by C. Rest   Progression of symptoms since onset: Worsened  (increased soreness more recently)   Current / Previous Interventions   Diagnostic Tests: X-ray   X-ray Results unremarkable   Prior Level of Function   Prior Level of Function-Mobility Independent   Prior Level of Function-ADLs Independent   Functional Level Prior Comment wants to get back into golf/tennis, potentially running eventually.  used to be , has knowledge for continued strengthening once symptoms reduce   Current Level of Function   Current Community Support Family/friend  caregiver   Patient role/employment history B. Student   Employment Comments more [employer]ent[employer] last few years d/t school   Fall Risk Screen   Fall screen completed by PT   Have you fallen 2 or more times in the past year? No   Have you fallen and had an injury in the past year? No   Is patient a fall risk? No   Abuse Screen (yes response referral indicated)   Feels Unsafe at Home or Work/School no   Feels Threatened by Someone no   Does Anyone Try to Keep You From Having Contact with Others or Doing Things Outside Your Home? no   Physical Signs of Abuse Present no   Knee Objective Findings   Side (if bilateral, select both right and left) Right   Integumentary  no edema noted   Gait/Locomotion WNL   Knee/Hip Strength Comments functional glute med weakness noted B w/ lateral step down R>L with clicking/popping, gastroc 5/5 B, good hip/knee control with squat   Rowan's Test neg B   Palpation no tenderness to palpation to knee structures B, crepitus noted patellafemoral joint L>R   Accessory Motion/Joint Mobility patellar mobility 1 quadrant medial and lateral sherry   Right Knee Extension PROM WNL   Right Knee Flexion PROM WNL   Right Knee Flexion Strength 4+/5 B   Right Knee Extension Strength 5/5 B   Right Hip Abduction Strength 5/5 B   Right Gastrocnemius Flexibility WNL B   Right Hip Flexor Flexibility WNL   Right Quadricep Flexibility WNL   Right ITB Flexibility WNL   Planned Therapy Interventions   Planned Therapy Interventions manual therapy;neuromuscular re-education;ROM;strengthening;stretching   Clinical Impression   Criteria for Skilled Therapeutic Interventions Met yes, treatment indicated   PT Diagnosis B impaired patellofemoral tracking with decreased strength   Influenced by the following impairments decreased strength, occcasional soreness   Functional limitations due to impairments stairs, walking, deep squatting, rolling in bed   Clinical Presentation Stable/Uncomplicated   Clinical Presentation  Rationale minimal functional limitations this date, minimal comorbidities/PMH to complicate progress   Clinical Decision Making (Complexity) Low complexity   Therapy Frequency other (see comments)  (every 2 weeks)   Predicted Duration of Therapy Intervention (days/wks) 12 weeks   Risk & Benefits of therapy have been explained Yes   Patient, Family & other staff in agreement with plan of care Yes   Clinical Impression Comments Pt is 51 y/o female with clicking/popping B knees with occassional soreness.  Pt presents with impaired patellofemoral tracking with decreased strength which affects her ability to perform stairs, walking, deep squatting, and rolling in bed compared to baseline.  Pt has good potential to benefit from skilled PT to address identified deficiits as pt has minimal functional limitations this date and is motivated to progress towards goals with minimal PMH to affect progress.   Education Assessment   Preferred Learning Style Listening;Demonstration;Pictures/video   Barriers to Learning No barriers   ORTHO GOALS   PT Ortho Eval Goals 1;2;3   Ortho Goal 1   Goal Identifier 1.   Goal Description Pt will report minimal difficulty with squatting to demonstrate increased functional strength and improved tolerance for daily activities.   Target Date 06/28/21   Ortho Goal 2   Goal Identifier 2.   Goal Description Pt will be able to perform stairs with minimal popping/clicking to demonstrate improved functional strength.   Target Date 08/09/21   Ortho Goal 3   Goal Identifier 3.    Goal Description Pt will be independent and consistent with HEP to facilitate carryover to home and decrease pain/disability in the future.   Target Date 08/09/21   Total Evaluation Time   PT Eval, Low Complexity Minutes (69130) 30   Therapy Certification   Certification date from 05/17/21   Certification date to 08/09/21   Medical Diagnosis Patellofemoral disorders, right knee       Maria Guadalupe Patel, PT, DTP, SCS  Doctor of Physical  Therapy #[id number]  [facility]  [phone]  [email or web address]

## 2021-05-17 NOTE — PROGRESS NOTES
[facility]          OUTPATIENT PHYSICAL THERAPY ORTHOPEDIC EVALUATION  PLAN OF TREATMENT FOR OUTPATIENT REHABILITATION  (COMPLETE FOR INITIAL CLAIMS ONLY)  Patient's Last Name, First Name, M.I.  YOB: 1970  Jessica Manriquez    Provider s Name:  [facility]   Medical Record No.  [id number]   Start of Care Date:  05/17/21   Onset Date:      Type:     _X__PT   ___OT   ___SLP Medical Diagnosis:  Patellofemoral disorders, right knee     PT Diagnosis:  B impaired patellofemoral tracking with decreased strength   Visits from SOC:  1      _________________________________________________________________________________  Plan of Treatment/Functional Goals:  manual therapy, neuromuscular re-education, ROM, strengthening, stretching           Goals  Goal Identifier: 1.  Goal Description: Pt will report minimal difficulty with squatting to demonstrate increased functional strength and improved tolerance for daily activities.  Target Date: 06/28/21    Goal Identifier: 2.  Goal Description: Pt will be able to perform stairs with minimal popping/clicking to demonstrate improved functional strength.  Target Date: 08/09/21    Goal Identifier: 3.   Goal Description: Pt will be independent and consistent with HEP to facilitate carryover to home and decrease pain/disability in the future.  Target Date: 08/09/21                                                           Therapy Frequency:  other (see comments)(every 2 weeks)  Predicted Duration of Therapy Intervention:  12 weeks    Maria Guadalupe Patel, PT                 I CERTIFY THE NEED FOR THESE SERVICES FURNISHED UNDER        THIS PLAN OF TREATMENT AND WHILE UNDER MY CARE     (Physician co-signature of this document indicates review and certification of the therapy plan).                       Certification Date From:  05/17/21   Certification Date To:  08/09/21    Referring Provider:  Su Loya,  MD    Initial Assessment        See Epic Evaluation Start of Care Date: 05/17/21

## 2021-05-17 NOTE — PROGRESS NOTES
"    Assessment & Plan     Burning sensation of feet  Etiology uncertain, can be a B5 deficiency but I'm unable to order this lab.   Encouraged to get back on her B complex vitamins  - Vitamin B12  - Folate  - TSH with free T4 reflex    Restless legs syndrome (RLS)  Resume oral iron  - Ferritin                 No follow-ups on file.    Su Loya MD  [facility]    Danya Lopez is a 50 year old who presents for the following health issues  accompanied by her self:    HPI   Chief Complaint   Patient presents with     Foot Burn     Patient noticed the last 2 weeks a burning at the soles of her feet. Patient has tried taking vitamin E, vitamin Super B complex and ,multivitamin and that has helped. Patient has no redness, swelling or discoloration. Patient will get a zap at times in her arch otherwise no tingling or numbness. Sheldon describes it \"as her feet are sunburn with a radiating heat\"      Patient is vegetarian and hasn't been eating as well as she usually tries to. Also stopped taking her B complex vitamin that she previously was taking. Just restarted that a few days ago and already feeling a little better.    Would also like ferritin checked, her RLS symptoms were better when taking the iron regularly but she got lax in this.         Review of Systems         Objective    /60   Pulse 97   Temp 97.8  F (36.6  C) (Tympanic)   Resp 16   Ht 1.803 m (5' 11\")   Wt 67.6 kg (149 lb)   SpO2 98%   Breastfeeding No   BMI 20.78 kg/m    Body mass index is 20.78 kg/m .  Physical Exam   GENERAL APPEARANCE: healthy, alert and no distress  SKIN: no suspicious lesions or rashes and feet are without significant erythema                "

## 2021-05-17 NOTE — PATIENT INSTRUCTIONS
Our Clinic hours are:  Mondays    7:20 am - 7 pm  Tues -  Fri  7:20 am - 5 pm    Clinic Phone: [phone]    The clinic lab opens at 7:30 am Mon - Fri and appointments are required.    [facility]. [phone]  Monday  8 am - 7pm  Tues - Fri 8 am - 5:30 pm

## 2021-05-27 ENCOUNTER — MYC MEDICAL ADVICE (OUTPATIENT)
Dept: OBGYN | Facility: CLINIC | Age: 51
End: 2021-05-27

## 2021-05-27 DIAGNOSIS — R10.2 PELVIC PAIN IN FEMALE: ICD-10-CM

## 2021-05-27 DIAGNOSIS — G43.839 INTRACTABLE MENSTRUAL MIGRAINE WITHOUT STATUS MIGRAINOSUS: ICD-10-CM

## 2021-05-27 RX ORDER — DIAZEPAM 10 MG
TABLET ORAL
Qty: 30 TABLET | Refills: 3 | Status: SHIPPED | OUTPATIENT
Start: 2021-05-27 | End: 2022-12-19

## 2021-05-27 RX ORDER — TRAMADOL HYDROCHLORIDE 50 MG/1
50-100 TABLET ORAL EVERY 6 HOURS PRN
Qty: 40 TABLET | Refills: 2 | Status: SHIPPED | OUTPATIENT
Start: 2021-05-27 | End: 2022-12-19

## 2021-05-27 NOTE — TELEPHONE ENCOUNTER
Last office visit: 1/26/2021 with prescribing provider: Dr. Gonsalves   Future Office Visit:  Currently not scheduled.        Requested Prescriptions   Pending Prescriptions Disp Refills     diazepam (VALIUM) 10 MG tablet 30 tablet 3     Sig: Place 1 tab vaginally for muscle spasms       There is no refill protocol information for this order        traMADol (ULTRAM) 50 MG tablet 40 tablet 2     Sig: Take 1-2 tablets ( mg) by mouth every 6 hours as needed for pain       There is no refill protocol information for this order        Please review and advise on patient Turned On Digitalhart message requests for refills on Diazepam and Tramadol.    Thank you.    Suzie Ellis   [facility] Clinic  RN

## 2021-05-31 ENCOUNTER — RECORDS - HEALTHEAST (OUTPATIENT)
Dept: ADMINISTRATIVE | Facility: CLINIC | Age: 51
End: 2021-05-31

## 2021-06-14 ENCOUNTER — HOSPITAL ENCOUNTER (OUTPATIENT)
Dept: PHYSICAL THERAPY | Facility: CLINIC | Age: 51
Setting detail: THERAPIES SERIES
End: 2021-06-14
Attending: FAMILY MEDICINE
Payer: COMMERCIAL

## 2021-06-14 PROCEDURE — 97110 THERAPEUTIC EXERCISES: CPT | Mod: GP | Performed by: PHYSICAL THERAPIST

## 2021-06-22 ENCOUNTER — OFFICE VISIT (OUTPATIENT)
Dept: DERMATOLOGY | Facility: CLINIC | Age: 51
End: 2021-06-22
Payer: COMMERCIAL

## 2021-06-22 VITALS — OXYGEN SATURATION: 100 % | SYSTOLIC BLOOD PRESSURE: 96 MMHG | HEART RATE: 78 BPM | DIASTOLIC BLOOD PRESSURE: 60 MMHG

## 2021-06-22 DIAGNOSIS — D22.9 NEVUS: ICD-10-CM

## 2021-06-22 DIAGNOSIS — D18.01 ANGIOMA OF SKIN: ICD-10-CM

## 2021-06-22 DIAGNOSIS — L82.1 SEBORRHEIC KERATOSIS: ICD-10-CM

## 2021-06-22 DIAGNOSIS — L81.4 LENTIGO: Primary | ICD-10-CM

## 2021-06-22 DIAGNOSIS — D23.9 DERMAL NEVUS: ICD-10-CM

## 2021-06-22 PROCEDURE — 99213 OFFICE O/P EST LOW 20 MIN: CPT | Performed by: DERMATOLOGY

## 2021-06-22 NOTE — PROGRESS NOTES
Jessica Manriquez is an extremely pleasant 50 year old year old female patient here today for moles on back.   .   Patient states this has been present for a while.  Patient reports the following symptoms:  none.  Patient reports the following previous treatments none.  These treatments did not work.  Patient reports the following modifying factors none.  Associated symptoms: none.  Patient has no other skin complaints today.  Remainder of the HPI, Meds, PMH, Allergies, FH, and SH was reviewed in chart.      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     Selective IgA immunodeficiency (H)     recurrent respir infections     Temporomandibular joint disorders, unspecified     Seen at [facility], uses splint, PT , acupuncture       Past Surgical History:   Procedure Laterality Date     HC ENLARGE BREAST WITH IMPLANT  2000     HC REVISE BREAST RECONSTRUCTION  5/03     LEEP TX, CERVICAL  11/01    SALOMÓN 3, yearly paps until 2021     LIGATN/STRIP LONG & SHORT SAPHEN  11/04    Bilateral vein stripping     TUBAL LIGATION  10/30/09    filshie clips        Family History   Problem Relation Age of Onset     Arthritis Mother      Respiratory Mother         asthma     Allergies Mother      Cancer Mother      Blood Disease Mother         leukemia     Breast Cancer Mother      Breast Cancer Maternal Grandmother         age 60's     Heart Disease Maternal Grandmother      Cerebrovascular Disease Paternal Grandmother      Cancer Paternal Grandfather         ?biliary     Alcohol/Drug Paternal Grandfather      Allergies Sister      Breast Cancer Sister         Social History     Socioeconomic History     Marital status: Single     Spouse name: Not on file     Number of children: 0     Years of education: Not on file     Highest education level: Not on file   Occupational History     Occupation: Health      Employer: [employer]     Occupation: Author of book   Social Needs     Financial resource strain: Not on file     Food insecurity     Worry: Not on file     Inability: Not on file     Transportation needs     Medical: Not on file     Non-medical: Not on file   Tobacco Use     Smoking status: Never Smoker     Smokeless tobacco: Never Used   Substance and Sexual Activity     Alcohol use: No     Drug use: No     Sexual activity: Yes     Partners: Male     Birth control/protection: Surgical     Comment: tubal   Lifestyle     Physical activity     Days per week: Not on file     Minutes per session: Not on file     Stress: Not on file   Relationships     Social connections     Talks on phone: Not on file     Gets together: Not on file     Attends Yazidism service: Not on file     Active member of club or organization: Not on file     Attends meetings of clubs or organizations: Not on file     Relationship status: Not on file     Intimate partner violence     Fear of current or ex partner: Not on file     Emotionally abused: Not on file     Physically abused: Not on file     Forced sexual activity: Not on file   Other Topics Concern     Parent/sibling w/ CABG, MI or angioplasty before 65F 55M? No   Social History Narrative     Not on file       Outpatient Encounter Medications as of 6/22/2021   Medication Sig Dispense Refill     ALPRAZolam (XANAX) 0.5 MG tablet Take 1 tablet (0.5 mg) by mouth every 6 hours as needed for anxiety 40 tablet 0     aspirin-acetaminophen-caffeine (EXCEDRIN MIGRAINE) 250-250-65 MG per tablet Take 1 tablet by mouth every 6 hours as needed for pain. 30 tablet 0     diazepam (VALIUM) 10 MG tablet Place 1 tab vaginally for  muscle spasms 30 tablet 3     ferrous sulfate (FEROSUL) 325 (65 Fe) MG tablet Take 325 mg by mouth daily (with breakfast)       hydrOXYzine (ATARAX) 25 MG tablet Take 1-2 tablets (25-50 mg) by mouth every 6 hours as needed for itching 60 tablet 1     Lactobacillus (ACIDOPHILUS) 100 MG CAPS Take 1 capsule by mouth daily       Loperamide HCl (IMODIUM A-D PO) Take 1 tablet by mouth once as needed       magnesium 200 MG TABS        MULTI-VITAMIN OR TABS 1 tablet by mouth DAILY       OnabotulinumtoxinA (BOTOX IJ) Inject 200 Units as directed Total dose 200 units   Dose Administered:  175 units  Botox (Botulinum Toxin Type A)       2:1 Dilution (55 units unavoidable waste)  Unavoidable waste 25 units   Diluent Used:  Preservative Free Normal Saline  Total Volume of Diluent Used:  2.9 ml  Lot # /C3 with Expiration Date:  03/2021  NDC #: Botox 100u ([address]-50)       ondansetron (ZOFRAN ODT) 4 MG ODT tab Take 1-2 tablets (4-8 mg) by mouth every 8 hours as needed for nausea 20 tablet 3     SUMAtriptan (IMITREX) 50 MG tablet Take 1 tablet (50 mg) by mouth at onset of headache for migraine May repeat dose in 2 hours.  Do not exceed 200 mg in 24 hours 18 tablet 11     traMADol (ULTRAM) 50 MG tablet Take 1-2 tablets ( mg) by mouth every 6 hours as needed for pain 40 tablet 2     valACYclovir (VALTREX) 500 MG tablet Take 1 tablet (500 mg) by mouth daily 90 tablet 3     vitamin (B COMPLEX-C) tablet Take 1 tablet by mouth daily       VITAMIN C 500 MG OR TABS 1 tablet daily       VITAMIN D, CHOLECALCIFEROL, PO Take 1,000 Units by mouth daily       Vitamin Mixture (VITAMIN E COMPLETE PO)        zolpidem (AMBIEN) 5 MG tablet Take 1 tablet (5 mg) by mouth nightly as needed for sleep 90 tablet 1     Facility-Administered Encounter Medications as of 6/22/2021   Medication Dose Route Frequency Provider Last Rate Last Admin     botulinum toxin type A (BOTOX) 100 units injection 200 Units  200 Units Intramuscular Q90 Days  Flori Trujillo MD                 O:   NAD, WDWN, Alert & Oriented, Mood & Affect wnl, Vitals stable   Here today alone   BP 96/60   Pulse 78   SpO2 100%    General appearance normal   Vitals stable   Alert, oriented and in no acute distress         Stuck on papules and brown macules on trunk and ext   Red papules on trunk                               Scattered 1mm pigmented macule son trunk and ext with regula brorders and pigment netowrks   Flesh colored papules on trunk     The remainder of the full exam was normal; the following areas were examined:  conjunctiva/lids, oral mucosa, neck, peripheral vascular system, abdomen, lymph nodes, digits/nails, eccrine and apocrine glands, scalp/hair, face, neck, chest, abdomen, buttocks, back, RUE, LUE, RLE, LLE       Eyes: Conjunctivae/lids:Normal     ENT: Lips, buccal mucosa, tongue: normal    MSK:Normal    Cardiovascular: peripheral edema none    Pulm: Breathing Normal    Lymph Nodes: No Head and Neck Lymphadenopathy     Neuro/Psych: Orientation:Alert and Orientedx3 ; Mood/Affect:normal       A/P:  1. Seborrheic keratosis, lentigo, angioma, dermal nevus, nevi   It was a pleasure speaking to Jessica Manriquez today.  Previous clinic notes and pertinent laboratory tests were reviewed prior to Jessica Manriquez's visit.  Signs and Symptoms of skin cancer discussed with patient.  Patient encouraged to perform monthly skin exams.  UV precautions reviewed with patient.  Return to clinic 12m onths

## 2021-06-22 NOTE — NURSING NOTE
Chief Complaint   Patient presents with     Skin Check       Vitals:    06/22/21 1408   BP: 96/60   Pulse: 78   SpO2: 100%     Wt Readings from Last 1 Encounters:   05/17/21 67.6 kg (149 lb)       Judith Pruitt LPN.................6/22/2021

## 2021-06-22 NOTE — LETTER
6/22/2021         RE: Jessica Manriquez  [address]  [facility] [address]        Dear Colleague,    Thank you for referring your patient, Jessica Manriquez, to the [facility]. Please see a copy of my visit note below.    Jessica Manriquez is an extremely pleasant 50 year old year old female patient here today for moles on back.   .   Patient states this has been present for a while.  Patient reports the following symptoms:  none.  Patient reports the following previous treatments none.  These treatments did not work.  Patient reports the following modifying factors none.  Associated symptoms: none.  Patient has no other skin complaints today.  Remainder of the HPI, Meds, PMH, Allergies, FH, and SH was reviewed in chart.      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     Selective IgA immunodeficiency (H)     recurrent respir infections     Temporomandibular joint disorders, unspecified     Seen at [facility], uses splint, PT , acupuncture       Past Surgical History:   Procedure Laterality Date     HC ENLARGE BREAST WITH IMPLANT  2000     HC REVISE BREAST RECONSTRUCTION  5/03     LEEP TX, CERVICAL  11/01    SALOMÓN 3, yearly paps until 2021     LIGATN/STRIP LONG & SHORT SAPHEN  11/04    Bilateral vein stripping     TUBAL LIGATION  10/30/09    evangelina clips        Family History   Problem Relation Age of Onset     Arthritis Mother      Respiratory Mother         asthma     Allergies Mother      Cancer Mother      Blood Disease Mother         leukemia     Breast Cancer Mother      Breast Cancer  Maternal Grandmother         age 60's     Heart Disease Maternal Grandmother      Cerebrovascular Disease Paternal Grandmother      Cancer Paternal Grandfather         ?biliary     Alcohol/Drug Paternal Grandfather      Allergies Sister      Breast Cancer Sister        Social History     Socioeconomic History     Marital status: Single     Spouse name: Not on file     Number of children: 0     Years of education: Not on file     Highest education level: Not on file   Occupational History     Occupation: Health      Employer: [employer]     Occupation: Author of book   Social Needs     Financial resource strain: Not on file     Food insecurity     Worry: Not on file     Inability: Not on file     Transportation needs     Medical: Not on file     Non-medical: Not on file   Tobacco Use     Smoking status: Never Smoker     Smokeless tobacco: Never Used   Substance and Sexual Activity     Alcohol use: No     Drug use: No     Sexual activity: Yes     Partners: Male     Birth control/protection: Surgical     Comment: tubal   Lifestyle     Physical activity     Days per week: Not on file     Minutes per session: Not on file     Stress: Not on file   Relationships     Social connections     Talks on phone: Not on file     Gets together: Not on file     Attends Alevism service: Not on file     Active member of club or organization: Not on file     Attends meetings of clubs or organizations: Not on file     Relationship status: Not on file     Intimate partner violence     Fear of current or ex partner: Not on file     Emotionally abused: Not on file     Physically abused: Not on file     Forced sexual activity: Not on file   Other Topics Concern     Parent/sibling w/ CABG, MI or angioplasty before 65F 55M? No   Social History Narrative     Not on file       Outpatient Encounter Medications as of 6/22/2021   Medication Sig Dispense Refill     ALPRAZolam (XANAX) 0.5 MG tablet Take 1 tablet (0.5 mg) by mouth  every 6 hours as needed for anxiety 40 tablet 0     aspirin-acetaminophen-caffeine (EXCEDRIN MIGRAINE) 250-250-65 MG per tablet Take 1 tablet by mouth every 6 hours as needed for pain. 30 tablet 0     diazepam (VALIUM) 10 MG tablet Place 1 tab vaginally for muscle spasms 30 tablet 3     ferrous sulfate (FEROSUL) 325 (65 Fe) MG tablet Take 325 mg by mouth daily (with breakfast)       hydrOXYzine (ATARAX) 25 MG tablet Take 1-2 tablets (25-50 mg) by mouth every 6 hours as needed for itching 60 tablet 1     Lactobacillus (ACIDOPHILUS) 100 MG CAPS Take 1 capsule by mouth daily       Loperamide HCl (IMODIUM A-D PO) Take 1 tablet by mouth once as needed       magnesium 200 MG TABS        MULTI-VITAMIN OR TABS 1 tablet by mouth DAILY       OnabotulinumtoxinA (BOTOX IJ) Inject 200 Units as directed Total dose 200 units   Dose Administered:  175 units  Botox (Botulinum Toxin Type A)       2:1 Dilution (55 units unavoidable waste)  Unavoidable waste 25 units   Diluent Used:  Preservative Free Normal Saline  Total Volume of Diluent Used:  2.9 ml  Lot # /C3 with Expiration Date:  03/2021  NDC #: Botox 100u ([address]-23)       ondansetron (ZOFRAN ODT) 4 MG ODT tab Take 1-2 tablets (4-8 mg) by mouth every 8 hours as needed for nausea 20 tablet 3     SUMAtriptan (IMITREX) 50 MG tablet Take 1 tablet (50 mg) by mouth at onset of headache for migraine May repeat dose in 2 hours.  Do not exceed 200 mg in 24 hours 18 tablet 11     traMADol (ULTRAM) 50 MG tablet Take 1-2 tablets ( mg) by mouth every 6 hours as needed for pain 40 tablet 2     valACYclovir (VALTREX) 500 MG tablet Take 1 tablet (500 mg) by mouth daily 90 tablet 3     vitamin (B COMPLEX-C) tablet Take 1 tablet by mouth daily       VITAMIN C 500 MG OR TABS 1 tablet daily       VITAMIN D, CHOLECALCIFEROL, PO Take 1,000 Units by mouth daily       Vitamin Mixture (VITAMIN E COMPLETE PO)        zolpidem (AMBIEN) 5 MG tablet Take 1 tablet (5 mg) by mouth nightly as  needed for sleep 90 tablet 1     Facility-Administered Encounter Medications as of 6/22/2021   Medication Dose Route Frequency Provider Last Rate Last Admin     botulinum toxin type A (BOTOX) 100 units injection 200 Units  200 Units Intramuscular Q90 Days Flori Trujillo MD                 O:   NAD, WDWN, Alert & Oriented, Mood & Affect wnl, Vitals stable   Here today alone   BP 96/60   Pulse 78   SpO2 100%    General appearance normal   Vitals stable   Alert, oriented and in no acute distress         Stuck on papules and brown macules on trunk and ext   Red papules on trunk                               Scattered 1mm pigmented macule son trunk and ext with regula brorders and pigment netowrks   Flesh colored papules on trunk     The remainder of the full exam was normal; the following areas were examined:  conjunctiva/lids, oral mucosa, neck, peripheral vascular system, abdomen, lymph nodes, digits/nails, eccrine and apocrine glands, scalp/hair, face, neck, chest, abdomen, buttocks, back, RUE, LUE, RLE, LLE       Eyes: Conjunctivae/lids:Normal     ENT: Lips, buccal mucosa, tongue: normal    MSK:Normal    Cardiovascular: peripheral edema none    Pulm: Breathing Normal    Lymph Nodes: No Head and Neck Lymphadenopathy     Neuro/Psych: Orientation:Alert and Orientedx3 ; Mood/Affect:normal       A/P:  1. Seborrheic keratosis, lentigo, angioma, dermal nevus, nevi   It was a pleasure speaking to Jessica Manriquez today.  Previous clinic notes and pertinent laboratory tests were reviewed prior to Jessica Manriquez's visit.  Signs and Symptoms of skin cancer discussed with patient.  Patient encouraged to perform monthly skin exams.  UV precautions reviewed with patient.  Return to clinic 12m ont        Again, thank you for allowing me to participate in the care of your patient.        Sincerely,        Sage Davis MD     color consistent with ethnicity/race

## 2021-06-24 ENCOUNTER — MYC MEDICAL ADVICE (OUTPATIENT)
Dept: FAMILY MEDICINE | Facility: CLINIC | Age: 51
End: 2021-06-24

## 2021-06-24 DIAGNOSIS — R79.89 ELEVATED TSH: Primary | ICD-10-CM

## 2021-06-24 NOTE — TELEPHONE ENCOUNTER
Patient will be coming in for a recheck for her thyroid labs 2 weeks early due to [place] of [place]. Labs pended. Routed to provider.   Callie Wong RN

## 2021-07-05 ENCOUNTER — HOSPITAL ENCOUNTER (OUTPATIENT)
Dept: PHYSICAL THERAPY | Facility: CLINIC | Age: 51
Setting detail: THERAPIES SERIES
End: 2021-07-05
Attending: FAMILY MEDICINE
Payer: COMMERCIAL

## 2021-07-05 DIAGNOSIS — M22.2X1 PATELLOFEMORAL DISORDERS, RIGHT KNEE: Primary | ICD-10-CM

## 2021-07-05 PROCEDURE — 97535 SELF CARE MNGMENT TRAINING: CPT | Mod: GP | Performed by: PHYSICAL THERAPIST

## 2021-07-05 PROCEDURE — 97110 THERAPEUTIC EXERCISES: CPT | Mod: GP | Performed by: PHYSICAL THERAPIST

## 2021-07-10 ENCOUNTER — MYC MEDICAL ADVICE (OUTPATIENT)
Dept: FAMILY MEDICINE | Facility: CLINIC | Age: 51
End: 2021-07-10

## 2021-07-14 ENCOUNTER — E-VISIT (OUTPATIENT)
Dept: FAMILY MEDICINE | Facility: CLINIC | Age: 51
End: 2021-07-14
Payer: COMMERCIAL

## 2021-07-14 ENCOUNTER — MYC MEDICAL ADVICE (OUTPATIENT)
Dept: FAMILY MEDICINE | Facility: CLINIC | Age: 51
End: 2021-07-14

## 2021-07-14 DIAGNOSIS — G43.809 OTHER MIGRAINE WITHOUT STATUS MIGRAINOSUS, NOT INTRACTABLE: ICD-10-CM

## 2021-07-14 DIAGNOSIS — F43.22 ADJUSTMENT DISORDER WITH ANXIOUS MOOD: Primary | ICD-10-CM

## 2021-07-14 PROCEDURE — 99421 OL DIG E/M SVC 5-10 MIN: CPT | Performed by: FAMILY MEDICINE

## 2021-07-14 RX ORDER — HYDROXYZINE HYDROCHLORIDE 25 MG/1
25-50 TABLET, FILM COATED ORAL EVERY 6 HOURS PRN
Qty: 60 TABLET | Refills: 1 | Status: SHIPPED | OUTPATIENT
Start: 2021-07-14 | End: 2022-12-19

## 2021-07-14 ASSESSMENT — ANXIETY QUESTIONNAIRES
8. IF YOU CHECKED OFF ANY PROBLEMS, HOW DIFFICULT HAVE THESE MADE IT FOR YOU TO DO YOUR WORK, TAKE CARE OF THINGS AT HOME, OR GET ALONG WITH OTHER PEOPLE?: VERY DIFFICULT
2. NOT BEING ABLE TO STOP OR CONTROL WORRYING: SEVERAL DAYS
7. FEELING AFRAID AS IF SOMETHING AWFUL MIGHT HAPPEN: NOT AT ALL
5. BEING SO RESTLESS THAT IT IS HARD TO SIT STILL: SEVERAL DAYS
7. FEELING AFRAID AS IF SOMETHING AWFUL MIGHT HAPPEN: NOT AT ALL
6. BECOMING EASILY ANNOYED OR IRRITABLE: NOT AT ALL
GAD7 TOTAL SCORE: 8
3. WORRYING TOO MUCH ABOUT DIFFERENT THINGS: MORE THAN HALF THE DAYS
GAD7 TOTAL SCORE: 8
1. FEELING NERVOUS, ANXIOUS, OR ON EDGE: MORE THAN HALF THE DAYS
4. TROUBLE RELAXING: MORE THAN HALF THE DAYS
GAD7 TOTAL SCORE: 8

## 2021-07-14 NOTE — TELEPHONE ENCOUNTER
Asked patient to do a E-visit with provider due to no clinic appts available.     Dory Harley MA

## 2021-07-15 ENCOUNTER — OFFICE VISIT (OUTPATIENT)
Dept: PHYSICAL MEDICINE AND REHAB | Facility: CLINIC | Age: 51
End: 2021-07-15
Payer: COMMERCIAL

## 2021-07-15 VITALS — HEART RATE: 99 BPM | OXYGEN SATURATION: 98 % | DIASTOLIC BLOOD PRESSURE: 70 MMHG | SYSTOLIC BLOOD PRESSURE: 103 MMHG

## 2021-07-15 DIAGNOSIS — G43.719 INTRACTABLE CHRONIC MIGRAINE WITHOUT AURA AND WITHOUT STATUS MIGRAINOSUS: Primary | ICD-10-CM

## 2021-07-15 PROCEDURE — 64615 CHEMODENERV MUSC MIGRAINE: CPT | Performed by: PHYSICAL MEDICINE & REHABILITATION

## 2021-07-15 ASSESSMENT — ANXIETY QUESTIONNAIRES: GAD7 TOTAL SCORE: 8

## 2021-07-15 NOTE — LETTER
7/15/2021       RE: Jessica Manriquez  [address]  [facility] [address]     Dear Colleague,    Thank you for referring your patient, Jessica Manriquez, to the [facility] [place] at [facility]. Please see a copy of my visit note below.    BOTULINUM TOXIN PROCEDURE - HEADACHE - NOTE    Chief Complaint   Patient presents with     Botox     Chronic migraines     /70   Pulse 99   SpO2 98%       Current Outpatient Medications:      ALPRAZolam (XANAX) 0.5 MG tablet, Take 1 tablet (0.5 mg) by mouth every 6 hours as needed for anxiety, Disp: 40 tablet, Rfl: 0     aspirin-acetaminophen-caffeine (EXCEDRIN MIGRAINE) 250-250-65 MG per tablet, Take 1 tablet by mouth every 6 hours as needed for pain., Disp: 30 tablet, Rfl: 0     diazepam (VALIUM) 10 MG tablet, Place 1 tab vaginally for muscle spasms, Disp: 30 tablet, Rfl: 3     ferrous sulfate (FEROSUL) 325 (65 Fe) MG tablet, Take 325 mg by mouth daily (with breakfast), Disp: , Rfl:      hydrOXYzine (ATARAX) 25 MG tablet, Take 1-2 tablets (25-50 mg) by mouth every 6 hours as needed for itching or anxiety, Disp: 60 tablet, Rfl: 1     Lactobacillus (ACIDOPHILUS) 100 MG CAPS, Take 1 capsule by mouth daily, Disp: , Rfl:      Loperamide HCl (IMODIUM A-D PO), Take 1 tablet by mouth once as needed, Disp: , Rfl:      magnesium 200 MG TABS, , Disp: , Rfl:      MULTI-VITAMIN OR TABS, 1 tablet by mouth DAILY, Disp: , Rfl:      OnabotulinumtoxinA (BOTOX IJ), Inject 200 Units as directed Total dose 200 units  Dose Administered:  175 units  Botox (Botulinum Toxin Type A)       2:1 Dilution (55 units unavoidable waste) Unavoidable waste 25 units  Diluent Used:  Preservative Free Normal Saline Total Volume of Diluent Used:  2.9 ml Lot # /C3 with Expiration Date:  03/2021 NDC #: Botox 100u ([address]-25), Disp: , Rfl:      ondansetron (ZOFRAN ODT) 4 MG ODT tab, Take 1-2  tablets (4-8 mg) by mouth every 8 hours as needed for nausea, Disp: 20 tablet, Rfl: 3     SUMAtriptan (IMITREX) 50 MG tablet, Take 1 tablet (50 mg) by mouth at onset of headache for migraine May repeat dose in 2 hours.  Do not exceed 200 mg in 24 hours, Disp: 18 tablet, Rfl: 11     traMADol (ULTRAM) 50 MG tablet, Take 1-2 tablets ( mg) by mouth every 6 hours as needed for pain, Disp: 40 tablet, Rfl: 2     valACYclovir (VALTREX) 500 MG tablet, Take 1 tablet (500 mg) by mouth daily, Disp: 90 tablet, Rfl: 3     vitamin (B COMPLEX-C) tablet, Take 1 tablet by mouth daily, Disp: , Rfl:      VITAMIN C 500 MG OR TABS, 1 tablet daily, Disp: , Rfl:      VITAMIN D, CHOLECALCIFEROL, PO, Take 1,000 Units by mouth daily, Disp: , Rfl:      Vitamin Mixture (VITAMIN E COMPLETE PO), , Disp: , Rfl:      zolpidem (AMBIEN) 5 MG tablet, Take 1 tablet (5 mg) by mouth nightly as needed for sleep, Disp: 90 tablet, Rfl: 1    Current Facility-Administered Medications:      botulinum toxin type A (BOTOX) 100 units injection 200 Units, 200 Units, Intramuscular, Q90 Days, Flori Trujillo MD     Allergies   Allergen Reactions     Darvocet [Acetaminophen] Nausea and Vomiting     Erythromycin GI Disturbance     Flexeril [Cyclobenzaprine Hcl]      Lightheadedness, dizzy       Serotonin Reuptake Inhibitors Anxiety, Other (See Comments) and Palpitations        PHYSICAL EXAM:  The patient is here for reinjection of Botox.   She does have a headache. Grading it a 2/10.      HPI:    Patient denies any hospitalizations, ER visits or new diagnosis since last appointment.     We reviewed the recommended safety guidelines for  Botox from any vaccine injection, such as the seasonal flu vaccine, by a minimum of 10-14 days with Jessica Manriquez. She acknowledged understanding.    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 She will continue to drive to [place] for Botox injections due to difficulty finding a provider in [place].  She will be following up with Dr Alexandra for the next two visits.       RESPONSE TO PREVIOUS TREATMENT:    1.  Headache frequency during this injection cycle: .May 7 headaches,  headaches,   headahces    2.  Headache duration during this injection cycle:  Headache duration ranged from 4 hours to 24 hours. One of her headaches lasted 3 days.     3.  Headache intensity during this injection cycle:    A.  6/10  =  Typical pain level.  B.  9/10  =  Worst pain level.  C.  2/10  =  Lowest pain level.          4.  Change in headache medication usage during this injection cycle:  (For Example:  Able to decrease use of oral pain medications.) Increased oral medication this cycle.     5.  ER Visits During This Injection Cycle:  0      6.  Functional Performance:  Change in ADL's, social interaction, days lost from work, etc. 3 days missed work/social events due to headaches.        BOTULINUM NEUROTOXIN INJECTION PROCEDURES:      VERIFICATION OF PATIENT IDENTIFICATION AND PROCEDURE     Initials   Patient Name MD   Patient  MD   Procedure Verified by: MD     Prior to the start of the procedure and with procedural staff participation, I verbally confirmed the patient s identity using two indicators, relevant allergies, that the procedure was appropriate and matched the consent or emergent situation, and that the correct equipment/implants were available. Immediately prior to starting the procedure I conducted the Time Out with the procedural staff and re-confirmed the patient s name, procedure, and site/side. (The Joint Commission universal protocol was followed.)  Yes    Sedation (Moderate or Deep): None      Above assessments performed by:  Flori Trujillo MD, [facility]   Department of Rehabilitation       INDICATIONS FOR PROCEDURES:  Jessica Manriquez is a 50 year old patient with chronic migraine headaches associated with cervicogenic  components.   Her baseline symptoms have been recalcitrant to oral medications  and conservative therapy.  She is here today for reinjection with Botox.    GOAL OF PROCEDURE:  The goal of this procedure is to decrease pain     TOTAL DOSE ADMINISTERED:  Total dose 200 units   Dose Administered:  200 units  Botox (Botulinum Toxin Type A)       2:1 Dilution   Unavoidable waste 0 units    Diluent Used: Bupivacaine 0.5%  Lot # [id number]  Exp 3/23  NDC 63617 [id number]  Total Volume of Diluent Used:  4 ml    Botox   Lot #  [id number] with Expiration Date: 10/23  NDC #: Botox 100u ([address]-73)    Medication guide was offered to patient and was declined.    CONSENT:  The risks, benefits, and treatment options were discussed with Jessica Manriquez and she agreed to proceed.    Written consent was obtained by MD.     EQUIPMENT USED:  Needle-25mm stimulating/recording  EMG/NCS Machine    SKIN PREPARATION:  Skin preparation was performed using an alcohol wipe.    GUIDANCE DESCRIPTION:  Electro-myographic guidance was necessary throughout the procedure to accurately identify all areas of spastic muscles while avoiding injection of non-spastic muscles, neighboring nerves and nearby vascular structures.     AREA/MUSCLE INJECTED:    1 & 2. SHOULDER GIRDLE & NECK MUSCLES: 65 units Botox = Total Dose, 2:1 Dilution                               Right Upper Trapezius 15 units of Botox at 3 sites               Left Upper Trapezius 15 units of Botox at 3 sites        Right Splenius  - 5 units of Botox at 1 site/s  Left Splenius - 5 units of Botox at 1 site/s     Right Semispinalis  - 5 units of Botox at 1 site/s  Left Semispinalis - 5 units of Botox at 1 site/s    Left ant scalenes 5 units in 1 site                    Left levator (at neck) - 5 units of Botox at 1 site    Right Lev at the neck  5 unit of Botox at 1 site     3. HEAD, JAW & SCALP MUSCLES: 135 units Botox = Total Dose, 2:1 Dilution     Left masseter - 5 units of Botox at 1 site    Right masseter 5 units at 1 site     Right parietal area 5 units in 1  site    Left parietal area  5 units in 1 site        Right Occipitalis - 15 units of Botox at 3 site/s.  Left Occipitalis - 15 units of Botox at 3 site/s.       Right Frontalis - 10 units of Botox at 2 site/s  Left Frontalis - 10 units of Botox at 2 site/s        Right Temporalis - 15 units of Botox at 3 site/s.  Left Temporalis - 30 units of Botox at 6 site/s.       Right  - 5 units of Botox at 1 site/s.  Left  - 5 units of Botox at 1 site/s.     Procerus - 10 units of Botox at 1 site/s.       RESPONSE TO PROCEDURE:  Jessica Manriquez tolerated the procedure well and there were no immediate complications.   She was allowed to recover for an appropriate period of time and was discharged home in stable condition.    FOLLOW UP:  Jessica Manriquez was asked to follow up by phone in 7-14 days with Poonam Shoemaker RN, Care Coordinator, to report her response to this series of injections.  Based on the patient's previous response to this therapy, Jessica Manriquez was rescheduled for the next series of injections in 10 weeks.    PLAN (Medication Changes, Therapy Orders, Work or Disability Issues, etc.): Patient will continue to monitor response to today's injections.     Again, thank you for allowing me to participate in the care of your patient.      Sincerely,    Flori Trujillo MD

## 2021-07-15 NOTE — PROGRESS NOTES
BOTULINUM TOXIN PROCEDURE - HEADACHE - NOTE    Chief Complaint   Patient presents with     Botox     Chronic migraines     /70   Pulse 99   SpO2 98%       Current Outpatient Medications:      ALPRAZolam (XANAX) 0.5 MG tablet, Take 1 tablet (0.5 mg) by mouth every 6 hours as needed for anxiety, Disp: 40 tablet, Rfl: 0     aspirin-acetaminophen-caffeine (EXCEDRIN MIGRAINE) 250-250-65 MG per tablet, Take 1 tablet by mouth every 6 hours as needed for pain., Disp: 30 tablet, Rfl: 0     diazepam (VALIUM) 10 MG tablet, Place 1 tab vaginally for muscle spasms, Disp: 30 tablet, Rfl: 3     ferrous sulfate (FEROSUL) 325 (65 Fe) MG tablet, Take 325 mg by mouth daily (with breakfast), Disp: , Rfl:      hydrOXYzine (ATARAX) 25 MG tablet, Take 1-2 tablets (25-50 mg) by mouth every 6 hours as needed for itching or anxiety, Disp: 60 tablet, Rfl: 1     Lactobacillus (ACIDOPHILUS) 100 MG CAPS, Take 1 capsule by mouth daily, Disp: , Rfl:      Loperamide HCl (IMODIUM A-D PO), Take 1 tablet by mouth once as needed, Disp: , Rfl:      magnesium 200 MG TABS, , Disp: , Rfl:      MULTI-VITAMIN OR TABS, 1 tablet by mouth DAILY, Disp: , Rfl:      OnabotulinumtoxinA (BOTOX IJ), Inject 200 Units as directed Total dose 200 units  Dose Administered:  175 units  Botox (Botulinum Toxin Type A)       2:1 Dilution (55 units unavoidable waste) Unavoidable waste 25 units  Diluent Used:  Preservative Free Normal Saline Total Volume of Diluent Used:  2.9 ml Lot # /C3 with Expiration Date:  03/2021 NDC #: Botox 100u ([address]-38), Disp: , Rfl:      ondansetron (ZOFRAN ODT) 4 MG ODT tab, Take 1-2 tablets (4-8 mg) by mouth every 8 hours as needed for nausea, Disp: 20 tablet, Rfl: 3     SUMAtriptan (IMITREX) 50 MG tablet, Take 1 tablet (50 mg) by mouth at onset of headache for migraine May repeat dose in 2 hours.  Do not exceed 200 mg in 24 hours, Disp: 18 tablet, Rfl: 11     traMADol (ULTRAM) 50 MG tablet, Take 1-2 tablets ( mg) by mouth  every 6 hours as needed for pain, Disp: 40 tablet, Rfl: 2     valACYclovir (VALTREX) 500 MG tablet, Take 1 tablet (500 mg) by mouth daily, Disp: 90 tablet, Rfl: 3     vitamin (B COMPLEX-C) tablet, Take 1 tablet by mouth daily, Disp: , Rfl:      VITAMIN C 500 MG OR TABS, 1 tablet daily, Disp: , Rfl:      VITAMIN D, CHOLECALCIFEROL, PO, Take 1,000 Units by mouth daily, Disp: , Rfl:      Vitamin Mixture (VITAMIN E COMPLETE PO), , Disp: , Rfl:      zolpidem (AMBIEN) 5 MG tablet, Take 1 tablet (5 mg) by mouth nightly as needed for sleep, Disp: 90 tablet, Rfl: 1    Current Facility-Administered Medications:      botulinum toxin type A (BOTOX) 100 units injection 200 Units, 200 Units, Intramuscular, Q90 Days, Flori Trujillo MD     Allergies   Allergen Reactions     Darvocet [Acetaminophen] Nausea and Vomiting     Erythromycin GI Disturbance     Flexeril [Cyclobenzaprine Hcl]      Lightheadedness, dizzy       Serotonin Reuptake Inhibitors Anxiety, Other (See Comments) and Palpitations        PHYSICAL EXAM:  The patient is here for reinjection of Botox.   She does have a headache. Grading it a 2/10.        HPI:    Patient denies any hospitalizations, ER visits or new diagnosis since last appointment.     We reviewed the recommended safety guidelines for  Botox from any vaccine injection, such as the seasonal flu vaccine, by a minimum of 10-14 days with Jessica Manriquez. She acknowledged understanding.    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 She will continue to drive to [place] for Botox injections due to difficulty finding a provider in [place]. She will be following up with Dr Alexandra for the next two visits.       RESPONSE TO PREVIOUS TREATMENT:        1.  Headache frequency during this injection cycle: .May 7 headaches, June 7 headaches,  July 5 headahces    2.  Headache duration during this injection cycle:  Headache duration ranged from 4 hours to 24 hours. One of her headaches lasted  3 days.     3.  Headache intensity during this injection cycle:    A.  6/10  =  Typical pain level.  B.  9/10  =  Worst pain level.  C.  2/10  =  Lowest pain level.          4.  Change in headache medication usage during this injection cycle:  (For Example:  Able to decrease use of oral pain medications.) Increased oral medication this cycle.     5.  ER Visits During This Injection Cycle:  0      6.  Functional Performance:  Change in ADL's, social interaction, days lost from work, etc. 3 days missed work/social events due to headaches.        BOTULINUM NEUROTOXIN INJECTION PROCEDURES:      VERIFICATION OF PATIENT IDENTIFICATION AND PROCEDURE     Initials   Patient Name MD   Patient  MD   Procedure Verified by: MD     Prior to the start of the procedure and with procedural staff participation, I verbally confirmed the patient s identity using two indicators, relevant allergies, that the procedure was appropriate and matched the consent or emergent situation, and that the correct equipment/implants were available. Immediately prior to starting the procedure I conducted the Time Out with the procedural staff and re-confirmed the patient s name, procedure, and site/side. (The Joint Commission universal protocol was followed.)  Yes    Sedation (Moderate or Deep): None      Above assessments performed by:  Flori Trujillo MD, [facility]   Department of Rehabilitation         INDICATIONS FOR PROCEDURES:  Jessica Manriquez is a 50 year old patient with chronic migraine headaches associated with cervicogenic  components.     Her baseline symptoms have been recalcitrant to oral medications and conservative therapy.  She is here today for reinjection with Botox.        GOAL OF PROCEDURE:  The goal of this procedure is to decrease pain       TOTAL DOSE ADMINISTERED:  Total dose 200 units   Dose Administered:  200 units  Botox (Botulinum Toxin Type A)       2:1 Dilution   Unavoidable waste 0 units    Diluent Used: Bupivacaine  0.5%  Lot # [id number]  Exp 3/23  NDC 47882 [id number]  Total Volume of Diluent Used:  4 ml    Botox   Lot #  [id number] with Expiration Date: 10/23  NDC #: Botox 100u ([address]-13)    Medication guide was offered to patient and was declined.        CONSENT:  The risks, benefits, and treatment options were discussed with Jessica Manriquez and she agreed to proceed.    Written consent was obtained by MD.         EQUIPMENT USED:  Needle-25mm stimulating/recording  EMG/NCS Machine        SKIN PREPARATION:  Skin preparation was performed using an alcohol wipe.        GUIDANCE DESCRIPTION:  Electro-myographic guidance was necessary throughout the procedure to accurately identify all areas of spastic muscles while avoiding injection of non-spastic muscles, neighboring nerves and nearby vascular structures.       AREA/MUSCLE INJECTED:    1 & 2. SHOULDER GIRDLE & NECK MUSCLES: 65 units Botox = Total Dose, 2:1 Dilution                               Right Upper Trapezius 15 units of Botox at 3 sites               Left Upper Trapezius 15 units of Botox at 3 sites        Right Splenius  - 5 units of Botox at 1 site/s  Left Splenius - 5 units of Botox at 1 site/s     Right Semispinalis  - 5 units of Botox at 1 site/s  Left Semispinalis - 5 units of Botox at 1 site/s    Left ant scalenes 5 units in 1 site                    Left levator (at neck) - 5 units of Botox at 1 site    Right Lev at the neck  5 unit of Botox at 1 site            3. HEAD, JAW & SCALP MUSCLES: 135 units Botox = Total Dose, 2:1 Dilution     Left masseter - 5 units of Botox at 1 site    Right masseter 5 units at 1 site     Right parietal area 5 units in 1 site    Left parietal area  5 units in 1 site        Right Occipitalis - 15 units of Botox at 3 site/s.  Left Occipitalis - 15 units of Botox at 3 site/s.       Right Frontalis - 10 units of Botox at 2 site/s  Left Frontalis - 10 units of Botox at 2 site/s        Right Temporalis - 15 units of Botox at 3  site/s.  Left Temporalis - 30 units of Botox at 6 site/s.       Right  - 5 units of Botox at 1 site/s.  Left  - 5 units of Botox at 1 site/s.     Procerus - 10 units of Botox at 1 site/s.         RESPONSE TO PROCEDURE:  Jessica Manriquez tolerated the procedure well and there were no immediate complications.   She was allowed to recover for an appropriate period of time and was discharged home in stable condition.          FOLLOW UP:  Jessica Manriquez was asked to follow up by phone in 7-14 days with Poonam Shoemaker RN, Care Coordinator, to report her response to this series of injections.  Based on the patient's previous response to this therapy, Jessica Manriquez was rescheduled for the next series of injections in 10 weeks.      PLAN (Medication Changes, Therapy Orders, Work or Disability Issues, etc.): Patient will continue to monitor response to today's injections.

## 2021-07-22 ENCOUNTER — OFFICE VISIT (OUTPATIENT)
Dept: FAMILY MEDICINE | Facility: CLINIC | Age: 51
End: 2021-07-22
Payer: COMMERCIAL

## 2021-07-22 ENCOUNTER — HOSPITAL ENCOUNTER (OUTPATIENT)
Dept: PHYSICAL THERAPY | Facility: CLINIC | Age: 51
Setting detail: THERAPIES SERIES
End: 2021-07-22
Attending: FAMILY MEDICINE
Payer: COMMERCIAL

## 2021-07-22 VITALS
RESPIRATION RATE: 16 BRPM | WEIGHT: 143.4 LBS | DIASTOLIC BLOOD PRESSURE: 78 MMHG | SYSTOLIC BLOOD PRESSURE: 122 MMHG | OXYGEN SATURATION: 98 % | TEMPERATURE: 98.8 F | HEIGHT: 71 IN | HEART RATE: 101 BPM | BODY MASS INDEX: 20.08 KG/M2

## 2021-07-22 DIAGNOSIS — R79.89 ABNORMAL TSH: Primary | ICD-10-CM

## 2021-07-22 DIAGNOSIS — R53.83 FATIGUE, UNSPECIFIED TYPE: ICD-10-CM

## 2021-07-22 DIAGNOSIS — R79.89 ELEVATED TSH: ICD-10-CM

## 2021-07-22 LAB
ERYTHROCYTE [DISTWIDTH] IN BLOOD BY AUTOMATED COUNT: 12.8 % (ref 10–15)
HCT VFR BLD AUTO: 38.8 % (ref 35–47)
HGB BLD-MCNC: 12.8 G/DL (ref 11.7–15.7)
MCH RBC QN AUTO: 30.3 PG (ref 26.5–33)
MCHC RBC AUTO-ENTMCNC: 33 G/DL (ref 31.5–36.5)
MCV RBC AUTO: 92 FL (ref 78–100)
PLATELET # BLD AUTO: 296 10E3/UL (ref 150–450)
RBC # BLD AUTO: 4.23 10E6/UL (ref 3.8–5.2)
TSH SERPL DL<=0.005 MIU/L-ACNC: 2.59 MU/L (ref 0.4–4)
WBC # BLD AUTO: 9.5 10E3/UL (ref 4–11)

## 2021-07-22 PROCEDURE — 85027 COMPLETE CBC AUTOMATED: CPT | Performed by: FAMILY MEDICINE

## 2021-07-22 PROCEDURE — 97110 THERAPEUTIC EXERCISES: CPT | Mod: GP | Performed by: PHYSICAL THERAPIST

## 2021-07-22 PROCEDURE — 36415 COLL VENOUS BLD VENIPUNCTURE: CPT | Performed by: FAMILY MEDICINE

## 2021-07-22 PROCEDURE — 82306 VITAMIN D 25 HYDROXY: CPT | Performed by: FAMILY MEDICINE

## 2021-07-22 PROCEDURE — 99213 OFFICE O/P EST LOW 20 MIN: CPT | Performed by: FAMILY MEDICINE

## 2021-07-22 PROCEDURE — 84443 ASSAY THYROID STIM HORMONE: CPT | Performed by: FAMILY MEDICINE

## 2021-07-22 PROCEDURE — 97140 MANUAL THERAPY 1/> REGIONS: CPT | Mod: GP | Performed by: PHYSICAL THERAPIST

## 2021-07-22 ASSESSMENT — MIFFLIN-ST. JEOR: SCORE: 1361.59

## 2021-07-22 ASSESSMENT — PAIN SCALES - GENERAL: PAINLEVEL: NO PAIN (0)

## 2021-07-22 NOTE — PROGRESS NOTES
"    Assessment & Plan     Abnormal TSH  Monitoring as recently mildly elevated  - TSH with free T4 reflex; Future    Fatigue, unspecified type  Encouraged in good self care, sleep, exercise, nutrition  - Vitamin D Deficiency; Future  - CBC with platelets; Future                 No follow-ups on file.    Su Loya MD  [facility]    Danya Lopez is a 50 year old who presents for the following health issues follow up on thyroid.   HPI     Thyroid    Hypothyroidism Follow-up    Since last visit, patient describes the following symptoms:  Skin is dry  Does feel cold all the time  Is more tired all the time.   No constipation        How many servings of fruits and vegetables do you eat daily?  2-3    On average, how many sweetened beverages do you drink each day (Examples: soda, juice, sweet tea, etc.  Do NOT count diet or artificially sweetened beverages)?   0    How many days per week do you exercise enough to make your heart beat faster? 3 or less    How many minutes a day do you exercise enough to make your heart beat faster? 30 - 60    How many days per week do you miss taking your medication? 0      Review of Systems   Constitutional, HEENT, cardiovascular, pulmonary, gi and gu systems are negative, except as otherwise noted.      Objective    /78 (BP Location: Right arm, Patient Position: Sitting, Cuff Size: Adult Regular)   Pulse 101   Temp 98.8  F (37.1  C) (Tympanic)   Resp 16   Ht 1.803 m (5' 11\")   Wt 65 kg (143 lb 6.4 oz)   LMP 07/08/2021 (Exact Date)   SpO2 98%   Breastfeeding No   BMI 20.00 kg/m    Body mass index is 20 kg/m .  Physical Exam   GENERAL APPEARANCE: healthy, alert and no distress  PSYCH: mentation appears normal and affect normal/bright                "

## 2021-07-23 LAB — DEPRECATED CALCIDIOL+CALCIFEROL SERPL-MC: 41 UG/L (ref 20–75)

## 2021-09-12 ENCOUNTER — HEALTH MAINTENANCE LETTER (OUTPATIENT)
Age: 51
End: 2021-09-12

## 2021-09-27 ENCOUNTER — OFFICE VISIT (OUTPATIENT)
Dept: OBGYN | Facility: CLINIC | Age: 51
End: 2021-09-27
Payer: COMMERCIAL

## 2021-09-27 DIAGNOSIS — Z53.9 ERRONEOUS ENCOUNTER--DISREGARD: Primary | ICD-10-CM

## 2021-09-27 NOTE — PROGRESS NOTES
Patient would like to be called when the December schedule opens up.    Keren Cutler RN on 9/27/2021 at 8:19 AM

## 2021-10-01 ENCOUNTER — OFFICE VISIT (OUTPATIENT)
Dept: PHYSICAL MEDICINE AND REHAB | Facility: CLINIC | Age: 51
End: 2021-10-01
Payer: COMMERCIAL

## 2021-10-01 VITALS
DIASTOLIC BLOOD PRESSURE: 72 MMHG | TEMPERATURE: 98.4 F | HEART RATE: 104 BPM | SYSTOLIC BLOOD PRESSURE: 103 MMHG | OXYGEN SATURATION: 98 % | RESPIRATION RATE: 16 BRPM

## 2021-10-01 DIAGNOSIS — G43.719 CHRONIC MIGRAINE WITHOUT AURA, INTRACTABLE, WITHOUT STATUS MIGRAINOSUS: Primary | ICD-10-CM

## 2021-10-01 PROCEDURE — 64615 CHEMODENERV MUSC MIGRAINE: CPT | Performed by: PHYSICAL MEDICINE & REHABILITATION

## 2021-10-01 RX ORDER — BUPIVACAINE HYDROCHLORIDE 5 MG/ML
4 INJECTION, SOLUTION EPIDURAL; INTRACAUDAL ONCE
Status: COMPLETED | OUTPATIENT
Start: 2021-10-01 | End: 2021-10-01

## 2021-10-01 RX ADMIN — BUPIVACAINE HYDROCHLORIDE 20 MG: 5 INJECTION, SOLUTION EPIDURAL; INTRACAUDAL at 17:19

## 2021-10-01 NOTE — LETTER
10/1/2021       RE: Jessica Manriquez  [address]  [place] [id number]     Dear Colleague,    Thank you for referring your patient, Jessica Manriquez, to the [facility] [place] [facility]. Please see a copy of my visit note below.    BOTULINUM TOXIN PROCEDURE - HEADACHE - NOTE    Chief Complaint   Patient presents with     Botox       Current Outpatient Medications:      ALPRAZolam (XANAX) 0.5 MG tablet, Take 1 tablet (0.5 mg) by mouth every 6 hours as needed for anxiety, Disp: 40 tablet, Rfl: 0     aspirin-acetaminophen-caffeine (EXCEDRIN MIGRAINE) 250-250-65 MG per tablet, Take 1 tablet by mouth every 6 hours as needed for pain., Disp: 30 tablet, Rfl: 0     diazepam (VALIUM) 10 MG tablet, Place 1 tab vaginally for muscle spasms, Disp: 30 tablet, Rfl: 3     ferrous sulfate (FEROSUL) 325 (65 Fe) MG tablet, Take 325 mg by mouth daily (with breakfast), Disp: , Rfl:      hydrOXYzine (ATARAX) 25 MG tablet, Take 1-2 tablets (25-50 mg) by mouth every 6 hours as needed for itching or anxiety, Disp: 60 tablet, Rfl: 1     Lactobacillus (ACIDOPHILUS) 100 MG CAPS, Take 1 capsule by mouth daily, Disp: , Rfl:      Loperamide HCl (IMODIUM A-D PO), Take 1 tablet by mouth once as needed, Disp: , Rfl:      MULTI-VITAMIN OR TABS, 1 tablet by mouth DAILY, Disp: , Rfl:      OnabotulinumtoxinA (BOTOX IJ), Inject 200 Units as directed Total dose 200 units  Dose Administered:  175 units  Botox (Botulinum Toxin Type A)       2:1 Dilution (55 units unavoidable waste) Unavoidable waste 25 units  Diluent Used:  Preservative Free Normal Saline Total Volume of Diluent Used:  2.9 ml Lot # /C3 with Expiration Date:  03/2021 NDC #: Botox 100u ([address]-51), Disp: , Rfl:      ondansetron (ZOFRAN ODT) 4 MG ODT tab, Take 1-2 tablets (4-8 mg) by mouth every 8 hours as needed for nausea, Disp: 20 tablet, Rfl: 3     SUMAtriptan  (IMITREX) 50 MG tablet, Take 1 tablet (50 mg) by mouth at onset of headache for migraine May repeat dose in 2 hours.  Do not exceed 200 mg in 24 hours, Disp: 18 tablet, Rfl: 11     traMADol (ULTRAM) 50 MG tablet, Take 1-2 tablets ( mg) by mouth every 6 hours as needed for pain, Disp: 40 tablet, Rfl: 2     valACYclovir (VALTREX) 500 MG tablet, Take 1 tablet (500 mg) by mouth daily, Disp: 90 tablet, Rfl: 3     vitamin (B COMPLEX-C) tablet, Take 1 tablet by mouth daily, Disp: , Rfl:      VITAMIN C 500 MG OR TABS, 1 tablet daily, Disp: , Rfl:      VITAMIN D, CHOLECALCIFEROL, PO, Take 1,000 Units by mouth daily, Disp: , Rfl:      Vitamin Mixture (VITAMIN E COMPLETE PO), , Disp: , Rfl:      zolpidem (AMBIEN) 5 MG tablet, Take 1 tablet (5 mg) by mouth nightly as needed for sleep, Disp: 90 tablet, Rfl: 1    Current Facility-Administered Medications:      botulinum toxin type A (BOTOX) 100 units injection 200 Units, 200 Units, Intramuscular, Q90 Days, Flori Trujillo MD, 200 Units at 07/15/21 1105     Allergies   Allergen Reactions     Darvocet [Acetaminophen] Nausea and Vomiting     Erythromycin GI Disturbance     Flexeril [Cyclobenzaprine Hcl]      Lightheadedness, dizzy       Serotonin Reuptake Inhibitors Anxiety, Other (See Comments) and Palpitations        PHYSICAL EXAM:    She does have a headache, rated 2/10.  No facial asymmetry.         HPI:    Patient denies new medical diagnoses, illnesses, hospitalizations, emergency room visits, and injuries since the previous injection with botulinum neurotoxin.     We reviewed the recommended safety guidelines for  Botox from any vaccine injection, such as the seasonal flu vaccine, by a minimum of 10-14 days with Jessica Manriquez. She acknowledged understanding.    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 She will see Dr Pichardo for  this visit and the next, due to the need for Friday appointments.      RESPONSE TO PREVIOUS TREATMENT:    Jessica Manriquez received 200 units of Botox on 7/15/2021.      1.  Headache frequency during this injection cycle: In the month of July, she had 7 headaches, In August she had 17 headaches, in September she had 16 headaches. 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     2.  Headache duration during this injection cycle:  Headache duration ranged from 4 hours to 3 days. One of her headaches lasted 3 days, but otherwise, no other multiple day headaches this cycle.     3.  Headache intensity during this injection cycle:    A.  6/10  =  Typical pain level.  B.  9/10  =  Worst pain level.  C.  2/10  =  Lowest pain level.      4. Change in headache medication usage during this injection cycle:  (For Example:  Able to decrease use of oral pain medications.) Increased oral medication this cycle due to an increase in headaches.     5.  ER Visits During This Injection Cycle:  No ER visits due to headaches.     6.  Functional Performance:  Change in ADL's, social interaction, days lost from work, etc. 3 days missed work/social events due to headaches.        BOTULINUM NEUROTOXIN INJECTION PROCEDURES:      VERIFICATION OF PATIENT IDENTIFICATION AND PROCEDURE     Initials   Patient Name PAG           Patient  PAG   Procedure Verified by: PAG     Prior to the start of the procedure and with procedural staff participation, I verbally confirmed the patient s identity using two indicators, relevant allergies, that the procedure was appropriate and matched the consent or emergent situation, and that the correct equipment/implants were available. Immediately prior to starting the procedure I conducted the Time Out with the procedural staff and re-confirmed the patient s  name, procedure, and site/side. (The Joint Commission universal protocol was followed.)  Yes    Sedation (Moderate or Deep): None      Above assessments performed by:    Aretha Pichardo MD       INDICATIONS FOR PROCEDURES:  Jessica Manriquez is a 50 year old patient with chronic migraine headaches associated with cervicogenic  components. Her baseline symptoms have been recalcitrant to oral medications and conservative therapy.  She is here today for reinjection with Botox.      GOAL OF PROCEDURE:  The goal of this procedure is to decrease pain       TOTAL DOSE: 200 UNITS BOTOX  Dose Administered:  200 units  Botox (Botulinum Toxin Type A)       2:1 Dilution   Diluent Used:  0.5% bupivacaine (Batch: [id number], Exp: 02/2023, NDC: [address]-169-10)  Total Volume of Diluent Used:  4 ml  Lot # /C4 with Expiration Date:  02/2024  NDC #: Botox 100u ([address]-02)      CONSENT:  The risks, benefits, and treatment options were discussed with Jessica Manriquez and she agreed to proceed.    Written consent was obtained by [facility].       EQUIPMENT USED:  Needle-25mm stimulating/recording  EMG/NCS Machine      SKIN PREPARATION:  Skin preparation was performed using an alcohol wipe.      GUIDANCE DESCRIPTION:  Electro-myographic guidance was necessary throughout the procedure to accurately identify all areas of spastic muscles while avoiding injection of non-spastic muscles, neighboring nerves and nearby vascular structures.       AREA/MUSCLE INJECTED:  200 UNITS BOTOX = TOTAL DOSE, 2:1 DILUTION    1 & 2. SHOULDER GIRDLE & NECK MUSCLES: 65 units Botox = Total Dose, 2:1 Dilution                               Right Upper Trapezius 15 units of Botox at 3 sites               Left Upper Trapezius 15 units of Botox at 3 sites        Right Splenius  - 5 units of Botox at 1 site/s  Left Splenius - 5 units of Botox at 1 site/s     Right Semispinalis  - 5 units of Botox at 1 site/s  Left Semispinalis - 5 units of Botox at 1 site/s    Left  Anterior Scalenes - 5 units of Botox at 1 site/s.                  Right Levator Scapula - 5 units of Botox at 1 site/s.     Left Levator Scapula -  5 units of Botox at 1 site/s.        3. HEAD, JAW & SCALP MUSCLES: 135 units Botox = Total Dose, 2:1 Dilution     Right masseter - 5 units of Botox at 1 site/s.  Left masseter - 5 units of Botox at 1 site/s.     Right Occipitalis (upper and lower) - 20 units of Botox at 3 site/s.  Left Occipitalis (upper and lower) - 20 units of Botox at 3 site/s.    Right Frontalis - 10 units of Botox at 2 site/s  Left Frontalis - 10 units of Botox at 2 site/s     Right Temporalis (upper and lower)  - 25 units of Botox at 5 site/s.  Left Temporalis (upper and lower) - 25 units of Botox at 5 site/s.    Right  - 5 units of Botox at 1 site/s.  Left  - 5 units of Botox at 1 site/s.     Procerus - 5 units of Botox at 1 site/s.       RESPONSE TO PROCEDURE:  Jessica Manriquez tolerated the procedure well and there were no immediate complications.   She was allowed to recover for an appropriate period of time and was discharged home in stable condition.    FOLLOW UP:  Jessica Manriquez was asked to follow up by phone in 7-14 days with Poonam Shoemaker RN, Care Coordinator, to report her response to this series of injections.  Based on the patient's previous response to this therapy, Jessica Manriquez was rescheduled for the next series of injections in 10 weeks.    PLAN (Medication Changes, Therapy Orders, Work or Disability Issues, etc.): Patient will continue to monitor response to today's injections.            Again, thank you for allowing me to participate in the care of your patient.      Sincerely,    Aretha Pichardo MD

## 2021-10-01 NOTE — PROGRESS NOTES
BOTULINUM TOXIN PROCEDURE - HEADACHE - NOTE    Chief Complaint   Patient presents with     Botox       Current Outpatient Medications:      ALPRAZolam (XANAX) 0.5 MG tablet, Take 1 tablet (0.5 mg) by mouth every 6 hours as needed for anxiety, Disp: 40 tablet, Rfl: 0     aspirin-acetaminophen-caffeine (EXCEDRIN MIGRAINE) 250-250-65 MG per tablet, Take 1 tablet by mouth every 6 hours as needed for pain., Disp: 30 tablet, Rfl: 0     diazepam (VALIUM) 10 MG tablet, Place 1 tab vaginally for muscle spasms, Disp: 30 tablet, Rfl: 3     ferrous sulfate (FEROSUL) 325 (65 Fe) MG tablet, Take 325 mg by mouth daily (with breakfast), Disp: , Rfl:      hydrOXYzine (ATARAX) 25 MG tablet, Take 1-2 tablets (25-50 mg) by mouth every 6 hours as needed for itching or anxiety, Disp: 60 tablet, Rfl: 1     Lactobacillus (ACIDOPHILUS) 100 MG CAPS, Take 1 capsule by mouth daily, Disp: , Rfl:      Loperamide HCl (IMODIUM A-D PO), Take 1 tablet by mouth once as needed, Disp: , Rfl:      MULTI-VITAMIN OR TABS, 1 tablet by mouth DAILY, Disp: , Rfl:      OnabotulinumtoxinA (BOTOX IJ), Inject 200 Units as directed Total dose 200 units  Dose Administered:  175 units  Botox (Botulinum Toxin Type A)       2:1 Dilution (55 units unavoidable waste) Unavoidable waste 25 units  Diluent Used:  Preservative Free Normal Saline Total Volume of Diluent Used:  2.9 ml Lot # /C3 with Expiration Date:  03/2021 NDC #: Botox 100u ([address]-84), Disp: , Rfl:      ondansetron (ZOFRAN ODT) 4 MG ODT tab, Take 1-2 tablets (4-8 mg) by mouth every 8 hours as needed for nausea, Disp: 20 tablet, Rfl: 3     SUMAtriptan (IMITREX) 50 MG tablet, Take 1 tablet (50 mg) by mouth at onset of headache for migraine May repeat dose in 2 hours.  Do not exceed 200 mg in 24 hours, Disp: 18 tablet, Rfl: 11     traMADol (ULTRAM) 50 MG tablet, Take 1-2 tablets ( mg) by mouth every 6 hours as needed for pain, Disp: 40 tablet, Rfl: 2     valACYclovir (VALTREX) 500 MG tablet,  Take 1 tablet (500 mg) by mouth daily, Disp: 90 tablet, Rfl: 3     vitamin (B COMPLEX-C) tablet, Take 1 tablet by mouth daily, Disp: , Rfl:      VITAMIN C 500 MG OR TABS, 1 tablet daily, Disp: , Rfl:      VITAMIN D, CHOLECALCIFEROL, PO, Take 1,000 Units by mouth daily, Disp: , Rfl:      Vitamin Mixture (VITAMIN E COMPLETE PO), , Disp: , Rfl:      zolpidem (AMBIEN) 5 MG tablet, Take 1 tablet (5 mg) by mouth nightly as needed for sleep, Disp: 90 tablet, Rfl: 1    Current Facility-Administered Medications:      botulinum toxin type A (BOTOX) 100 units injection 200 Units, 200 Units, Intramuscular, Q90 Days, Flori Trujillo MD, 200 Units at 07/15/21 1105     Allergies   Allergen Reactions     Darvocet [Acetaminophen] Nausea and Vomiting     Erythromycin GI Disturbance     Flexeril [Cyclobenzaprine Hcl]      Lightheadedness, dizzy       Serotonin Reuptake Inhibitors Anxiety, Other (See Comments) and Palpitations        PHYSICAL EXAM:    She does have a headache, rated 2/10.  No facial asymmetry.         HPI:    Patient denies new medical diagnoses, illnesses, hospitalizations, emergency room visits, and injuries since the previous injection with botulinum neurotoxin.     We reviewed the recommended safety guidelines for  Botox from any vaccine injection, such as the seasonal flu vaccine, by a minimum of 10-14 days with eJssica Manriquez. She acknowledged understanding.    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 She will see Dr Pichardo for this visit and the next, due to the need for Friday appointments.      RESPONSE TO PREVIOUS TREATMENT:    Jessica Manriquez received 200 units of Botox on 7/15/2021.      1.  Headache frequency during this injection cycle: In the month of July, she had 7 headaches, In August she had 17 headaches, in September she had 16 headaches. 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     2.  Headache duration during this injection cycle:  Headache duration ranged from 4 hours to 3 days. One of her headaches lasted 3 days, but otherwise, no other multiple day headaches this cycle.     3.  Headache intensity during this injection cycle:    A.  6/10  =  Typical pain level.  B.  9/10  =  Worst pain level.  C.  2/10  =  Lowest pain level.      4. Change in headache medication usage during this injection cycle:  (For Example:  Able to decrease use of oral pain medications.) Increased oral medication this cycle due to an increase in headaches.     5.  ER Visits During This Injection Cycle:  No ER visits due to headaches.     6.  Functional Performance:  Change in ADL's, social interaction, days lost from work, etc. 3 days missed work/social events due to headaches.        BOTULINUM NEUROTOXIN INJECTION PROCEDURES:      VERIFICATION OF PATIENT IDENTIFICATION AND PROCEDURE     Initials   Patient Name PAG           Patient  PAG   Procedure Verified by: PAG     Prior to the start of the procedure and with procedural staff participation, I verbally confirmed the patient s identity using two indicators, relevant allergies, that the procedure was appropriate and matched the consent or emergent situation, and that the correct equipment/implants were available. Immediately prior to starting the procedure I conducted the Time Out with the procedural staff and re-confirmed the patient s name, procedure, and site/side. (The Joint Commission universal protocol was followed.)  Yes    Sedation (Moderate or Deep): None      Above assessments performed by:    Aretha Pichardo MD       INDICATIONS FOR PROCEDURES:  Jessica Manriquez is a 50 year old patient with chronic migraine headaches associated with cervicogenic  components. Her  baseline symptoms have been recalcitrant to oral medications and conservative therapy.  She is here today for reinjection with Botox.      GOAL OF PROCEDURE:  The goal of this procedure is to decrease pain       TOTAL DOSE: 200 UNITS BOTOX  Dose Administered:  200 units  Botox (Botulinum Toxin Type A)       2:1 Dilution   Diluent Used:  0.5% bupivacaine (Batch: [id number], Exp: 02/2023, NDC: 55150-169-10)  Total Volume of Diluent Used:  4 ml  Lot # /C4 with Expiration Date:  02/2024  NDC #: Botox 100u ([address]-09)      CONSENT:  The risks, benefits, and treatment options were discussed with Jessica BLAKE Manriquez and she agreed to proceed.    Written consent was obtained by pag.       EQUIPMENT USED:  Needle-25mm stimulating/recording  EMG/NCS Machine      SKIN PREPARATION:  Skin preparation was performed using an alcohol wipe.      GUIDANCE DESCRIPTION:  Electro-myographic guidance was necessary throughout the procedure to accurately identify all areas of spastic muscles while avoiding injection of non-spastic muscles, neighboring nerves and nearby vascular structures.       AREA/MUSCLE INJECTED:  200 UNITS BOTOX = TOTAL DOSE, 2:1 DILUTION    1 & 2. SHOULDER GIRDLE & NECK MUSCLES: 65 units Botox = Total Dose, 2:1 Dilution                               Right Upper Trapezius 15 units of Botox at 3 sites               Left Upper Trapezius 15 units of Botox at 3 sites        Right Splenius  - 5 units of Botox at 1 site/s  Left Splenius - 5 units of Botox at 1 site/s     Right Semispinalis  - 5 units of Botox at 1 site/s  Left Semispinalis - 5 units of Botox at 1 site/s    Left Anterior Scalenes - 5 units of Botox at 1 site/s.                  Right Levator Scapula - 5 units of Botox at 1 site/s.     Left Levator Scapula -  5 units of Botox at 1 site/s.        3. HEAD, JAW & SCALP MUSCLES: 135 units Botox = Total Dose, 2:1 Dilution     Right masseter - 5 units of Botox at 1 site/s.  Left masseter - 5 units of Botox at 1  site/s.     Right Occipitalis (upper and lower) - 20 units of Botox at 3 site/s.  Left Occipitalis (upper and lower) - 20 units of Botox at 3 site/s.    Right Frontalis - 10 units of Botox at 2 site/s  Left Frontalis - 10 units of Botox at 2 site/s     Right Temporalis (upper and lower)  - 25 units of Botox at 5 site/s.  Left Temporalis (upper and lower) - 25 units of Botox at 5 site/s.    Right  - 5 units of Botox at 1 site/s.  Left  - 5 units of Botox at 1 site/s.     Procerus - 5 units of Botox at 1 site/s.       RESPONSE TO PROCEDURE:  Jessica Manriquez tolerated the procedure well and there were no immediate complications.   She was allowed to recover for an appropriate period of time and was discharged home in stable condition.    FOLLOW UP:  Jessica Manriquez was asked to follow up by phone in 7-14 days with Poonam Shoemaker RN, Care Coordinator, to report her response to this series of injections.  Based on the patient's previous response to this therapy, Jessica Manriquez was rescheduled for the next series of injections in 10 weeks.    PLAN (Medication Changes, Therapy Orders, Work or Disability Issues, etc.): Patient will continue to monitor response to today's injections.

## 2021-11-29 ENCOUNTER — TELEPHONE (OUTPATIENT)
Dept: OBGYN | Facility: CLINIC | Age: 51
End: 2021-11-29
Payer: COMMERCIAL

## 2021-11-29 DIAGNOSIS — R92.30 DENSE BREAST TISSUE ON MAMMOGRAM: ICD-10-CM

## 2021-11-29 DIAGNOSIS — Z80.3 FAMILY HISTORY OF MALIGNANT NEOPLASM OF BREAST: Primary | ICD-10-CM

## 2021-11-29 NOTE — TELEPHONE ENCOUNTER
Patient scheduled herself for MRI of breasts without order via telephone call from [facility].    They are requesting order with contrast.    Last visit 1/2/20 with Dr. Gonsalves. No future appt pending.    Routed to be reviewed and advised on order.    Keren Cutler RN on 11/29/2021 at 12:44 PM

## 2021-12-06 ENCOUNTER — TELEPHONE (OUTPATIENT)
Dept: OBGYN | Facility: CLINIC | Age: 51
End: 2021-12-06
Payer: COMMERCIAL

## 2021-12-06 NOTE — TELEPHONE ENCOUNTER
Routed to scheduling pool as There are minimal appointments available in this time frame.    Keren Cutler RN on 12/6/2021 at 11:10 AM

## 2021-12-06 NOTE — TELEPHONE ENCOUNTER
Reason for Call:  Other appointment    Detailed comments: Pt states she is living in [place] at the moment and is requesting an appointment (yearly exam) with Dr. Gonsalves. Pt has already contacted scheduling and was unable to obtain appt. in  timeframe she will be in [place]. She will be in town December 22nd thru December 30th and would like to be fit in for an appt. Please advise.    Phone Number Patient can be reached at: Cell number on file:    Telephone Information:   Mobile [phone]       Best Time: any    Can we leave a detailed message on this number? YES    Call taken on 12/6/2021 at 10:57 AM by Robert Anderson

## 2021-12-10 ENCOUNTER — OFFICE VISIT (OUTPATIENT)
Dept: PHYSICAL MEDICINE AND REHAB | Facility: CLINIC | Age: 51
End: 2021-12-10
Payer: COMMERCIAL

## 2021-12-10 VITALS — DIASTOLIC BLOOD PRESSURE: 64 MMHG | SYSTOLIC BLOOD PRESSURE: 99 MMHG | HEART RATE: 96 BPM | OXYGEN SATURATION: 98 %

## 2021-12-10 DIAGNOSIS — G43.719 CHRONIC MIGRAINE WITHOUT AURA, INTRACTABLE, WITHOUT STATUS MIGRAINOSUS: Primary | ICD-10-CM

## 2021-12-10 PROCEDURE — 96372 THER/PROPH/DIAG INJ SC/IM: CPT | Mod: 59 | Performed by: PHYSICAL MEDICINE & REHABILITATION

## 2021-12-10 PROCEDURE — 64615 CHEMODENERV MUSC MIGRAINE: CPT | Performed by: PHYSICAL MEDICINE & REHABILITATION

## 2021-12-10 ASSESSMENT — PAIN SCALES - GENERAL: PAINLEVEL: NO PAIN (0)

## 2021-12-10 NOTE — LETTER
12/10/2021       RE: Jessica Manriquez  [address]  [facility] [address]     Dear Colleague,    Thank you for referring your patient, Jessica Manriquez, to the [facility] [place] [facility]. Please see a copy of my visit note below.    BOTULINUM TOXIN PROCEDURE - HEADACHE - NOTE    Chief Complaint   Patient presents with     RECHECK     UMP RETURN BOTOX,       Current Outpatient Medications:      ALPRAZolam (XANAX) 0.5 MG tablet, Take 1 tablet (0.5 mg) by mouth every 6 hours as needed for anxiety, Disp: 40 tablet, Rfl: 0     aspirin-acetaminophen-caffeine (EXCEDRIN MIGRAINE) 250-250-65 MG per tablet, Take 1 tablet by mouth every 6 hours as needed for pain., Disp: 30 tablet, Rfl: 0     diazepam (VALIUM) 10 MG tablet, Place 1 tab vaginally for muscle spasms, Disp: 30 tablet, Rfl: 3     ferrous sulfate (FEROSUL) 325 (65 Fe) MG tablet, Take 325 mg by mouth daily (with breakfast), Disp: , Rfl:      hydrOXYzine (ATARAX) 25 MG tablet, Take 1-2 tablets (25-50 mg) by mouth every 6 hours as needed for itching or anxiety, Disp: 60 tablet, Rfl: 1     Lactobacillus (ACIDOPHILUS) 100 MG CAPS, Take 1 capsule by mouth daily, Disp: , Rfl:      Loperamide HCl (IMODIUM A-D PO), Take 1 tablet by mouth once as needed, Disp: , Rfl:      MULTI-VITAMIN OR TABS, 1 tablet by mouth DAILY, Disp: , Rfl:      OnabotulinumtoxinA (BOTOX IJ), Inject 200 Units as directed Total dose 200 units  Dose Administered:  175 units  Botox (Botulinum Toxin Type A)       2:1 Dilution (55 units unavoidable waste) Unavoidable waste 25 units  Diluent Used:  Preservative Free Normal Saline Total Volume of Diluent Used:  2.9 ml Lot # /C3 with Expiration Date:  03/2021 NDC #: Botox 100u ([address]-17), Disp: , Rfl:      ondansetron (ZOFRAN ODT) 4 MG ODT tab, Take 1-2 tablets (4-8 mg) by mouth every 8 hours as needed for nausea, Disp: 20 tablet,  Rfl: 3     SUMAtriptan (IMITREX) 50 MG tablet, Take 1 tablet (50 mg) by mouth at onset of headache for migraine May repeat dose in 2 hours.  Do not exceed 200 mg in 24 hours, Disp: 18 tablet, Rfl: 11     traMADol (ULTRAM) 50 MG tablet, Take 1-2 tablets ( mg) by mouth every 6 hours as needed for pain, Disp: 40 tablet, Rfl: 2     valACYclovir (VALTREX) 500 MG tablet, Take 1 tablet (500 mg) by mouth daily, Disp: 90 tablet, Rfl: 3     vitamin (B COMPLEX-C) tablet, Take 1 tablet by mouth daily, Disp: , Rfl:      VITAMIN C 500 MG OR TABS, 1 tablet daily, Disp: , Rfl:      VITAMIN D, CHOLECALCIFEROL, PO, Take 1,000 Units by mouth daily, Disp: , Rfl:      Vitamin Mixture (VITAMIN E COMPLETE PO), , Disp: , Rfl:      zolpidem (AMBIEN) 5 MG tablet, Take 1 tablet (5 mg) by mouth nightly as needed for sleep, Disp: 90 tablet, Rfl: 1     Allergies   Allergen Reactions     Darvocet [Acetaminophen] Nausea and Vomiting     Erythromycin GI Disturbance     Flexeril [Cyclobenzaprine Hcl]      Lightheadedness, dizzy       Serotonin Reuptake Inhibitors Anxiety, Other (See Comments) and Palpitations        PHYSICAL EXAM:  VS: BP 99/64   Pulse 96   SpO2 98%    She does have a headache today.  No facial asymmetry.         HPI:    Patient denies new medical diagnoses, illnesses, hospitalizations, emergency room visits, and injuries since the previous injection with botulinum neurotoxin.     We reviewed the recommended safety guidelines for  Botox from any vaccine injection, such as the seasonal flu vaccine, by a minimum of 10-14 days with Jessica Manriquez. She acknowledged understanding.    RESPONSE TO PREVIOUS TREATMENT:    Jessica Manriquez received 200 units of Botox on 10/1/2021.    1.  Headache frequency during this injection cycle: In the month of October, she had 7 headaches, In November she had 7 headaches, so far in December she has had 4 headaches. This is an improvement from last injection cycle.     2.  Headache  duration during this injection cycle:  Headache duration ranged from 4 hours to 48 hours.     3.  Headache intensity during this injection cycle:    A.  6/10  =  Typical pain level.  B.  9/10  =  Worst pain level.  C.  2/10  =  Lowest pain level.    4. Change in headache medication usage during this injection cycle:  (For Example: Able to decrease use of oral pain medications.) No changes in her headache medications.     5.  ER Visits During This Injection Cycle:  No ER visits due to headaches.     6.  Functional Performance: Change in ADL's, social interaction, days lost from work, etc. 3 days missed work/social events due to headaches.        BOTULINUM NEUROTOXIN INJECTION PROCEDURES:      VERIFICATION OF PATIENT IDENTIFICATION AND PROCEDURE     Initials   Patient Name ses         Patient  ses   Procedure Verified by: ses     Prior to the start of the procedure and with procedural staff participation, I verbally confirmed the patient s identity using two indicators, relevant allergies, that the procedure was appropriate and matched the consent or emergent situation, and that the correct equipment/implants were available. Immediately prior to starting the procedure I conducted the Time Out with the procedural staff and re-confirmed the patient s name, procedure, and site/side. (The Joint Commission universal protocol was followed.)  Yes    Sedation (Moderate or Deep): None      Above assessments performed by:    Aretha Pichardo MD       INDICATIONS FOR PROCEDURES:  Jessica Manriquez is a 51 year old patient with chronic migraine headaches associated with cervicogenic  components. Her baseline symptoms have been recalcitrant to oral medications and conservative therapy.  She is here today for reinjection with Botox.      GOAL OF PROCEDURE:  The goal of this procedure is to decrease pain       TOTAL DOSE: 200 UNITS BOTOX  Dose Administered:  200 units  Botox (Botulinum Toxin Type A)       2:1 Dilution    Diluent Used:  0.5% bupivacaine (Batch: [id number], Exp: 01/2024, NDC: 75134-393-40)  Total Volume of Diluent Used:  4 ml  Lot # [id number] with Expiration Date:  02/2024  NDC #: Botox 100u ([address]-88)      CONSENT:  The risks, benefits, and treatment options were discussed with Jessica Manriquez and she agreed to proceed.    Written consent was obtained by [facility].       EQUIPMENT USED:  Needle-25mm stimulating/recording  EMG/NCS Machine      SKIN PREPARATION:  Skin preparation was performed using an alcohol wipe.      GUIDANCE DESCRIPTION:  Electro-myographic guidance was necessary throughout the procedure to accurately identify all areas of spastic muscles while avoiding injection of non-spastic muscles, neighboring nerves and nearby vascular structures.       AREA/MUSCLE INJECTED:  200 UNITS BOTOX = TOTAL DOSE, 2:1 DILUTION    1 & 2. SHOULDER GIRDLE & NECK MUSCLES: 65 units Botox = Total Dose, 2:1 Dilution                               Right Upper Trapezius 15 units of Botox at 3 sites               Left Upper Trapezius 15 units of Botox at 3 sites        Right Splenius  - 5 units of Botox at 1 site/s  Left Splenius - 5 units of Botox at 1 site/s     Right Semispinalis  - 5 units of Botox at 1 site/s  Left Semispinalis - 5 units of Botox at 1 site/s    Left Anterior Scalenes - 5 units of Botox at 1 site/s.                  Right Levator Scapula - 5 units of Botox at 1 site/s.     Left Levator Scapula -  5 units of Botox at 1 site/s.        3. HEAD, JAW & SCALP MUSCLES: 135 units Botox = Total Dose, 2:1 Dilution     Right masseter - 5 units of Botox at 1 site/s.  Left masseter - 5 units of Botox at 1 site/s.     Right Occipitalis (upper and lower) - 20 units of Botox at 3 site/s.  Left Occipitalis (upper and lower) - 20 units of Botox at 3 site/s.    Right Frontalis - 10 units of Botox at 2 site/s  Left Frontalis - 10 units of Botox at 2 site/s     Right Temporalis (upper and lower)  - 25 units of Botox at 5  site/s.  Left Temporalis (upper and lower) - 25 units of Botox at 5 site/s.    Right  - 5 units of Botox at 1 site/s.  Left  - 5 units of Botox at 1 site/s.     Procerus - 5 units of Botox at 1 site/s.       RESPONSE TO PROCEDURE:  Jessica Manriquez tolerated the procedure well and there were no immediate complications.   She was allowed to recover for an appropriate period of time and was discharged home in stable condition.    FOLLOW UP:  Jessica Manriquez was asked to follow up by phone in 7-14 days with Poonam Shoemaker RN, Care Coordinator, to report her response to this series of injections.  Based on the patient's previous response to this therapy, Jessica Manriquez was rescheduled for the next series of injections in 10 weeks.    PLAN (Medication Changes, Therapy Orders, Work or Disability Issues, etc.): Patient will continue to monitor response to today's injections.            Again, thank you for allowing me to participate in the care of your patient.      Sincerely,    Aretha Pichardo MD

## 2021-12-10 NOTE — PROGRESS NOTES
BOTULINUM TOXIN PROCEDURE - HEADACHE - NOTE    Chief Complaint   Patient presents with     RECHECK     [facility] RETURN BOTOX,       Current Outpatient Medications:      ALPRAZolam (XANAX) 0.5 MG tablet, Take 1 tablet (0.5 mg) by mouth every 6 hours as needed for anxiety, Disp: 40 tablet, Rfl: 0     aspirin-acetaminophen-caffeine (EXCEDRIN MIGRAINE) 250-250-65 MG per tablet, Take 1 tablet by mouth every 6 hours as needed for pain., Disp: 30 tablet, Rfl: 0     diazepam (VALIUM) 10 MG tablet, Place 1 tab vaginally for muscle spasms, Disp: 30 tablet, Rfl: 3     ferrous sulfate (FEROSUL) 325 (65 Fe) MG tablet, Take 325 mg by mouth daily (with breakfast), Disp: , Rfl:      hydrOXYzine (ATARAX) 25 MG tablet, Take 1-2 tablets (25-50 mg) by mouth every 6 hours as needed for itching or anxiety, Disp: 60 tablet, Rfl: 1     Lactobacillus (ACIDOPHILUS) 100 MG CAPS, Take 1 capsule by mouth daily, Disp: , Rfl:      Loperamide HCl (IMODIUM A-D PO), Take 1 tablet by mouth once as needed, Disp: , Rfl:      MULTI-VITAMIN OR TABS, 1 tablet by mouth DAILY, Disp: , Rfl:      OnabotulinumtoxinA (BOTOX IJ), Inject 200 Units as directed Total dose 200 units  Dose Administered:  175 units  Botox (Botulinum Toxin Type A)       2:1 Dilution (55 units unavoidable waste) Unavoidable waste 25 units  Diluent Used:  Preservative Free Normal Saline Total Volume of Diluent Used:  2.9 ml Lot # /C3 with Expiration Date:  03/2021 NDC #: Botox 100u ([address]-72), Disp: , Rfl:      ondansetron (ZOFRAN ODT) 4 MG ODT tab, Take 1-2 tablets (4-8 mg) by mouth every 8 hours as needed for nausea, Disp: 20 tablet, Rfl: 3     SUMAtriptan (IMITREX) 50 MG tablet, Take 1 tablet (50 mg) by mouth at onset of headache for migraine May repeat dose in 2 hours.  Do not exceed 200 mg in 24 hours, Disp: 18 tablet, Rfl: 11     traMADol (ULTRAM) 50 MG tablet, Take 1-2 tablets ( mg) by mouth every 6 hours as needed for pain, Disp: 40 tablet, Rfl: 2     valACYclovir  (VALTREX) 500 MG tablet, Take 1 tablet (500 mg) by mouth daily, Disp: 90 tablet, Rfl: 3     vitamin (B COMPLEX-C) tablet, Take 1 tablet by mouth daily, Disp: , Rfl:      VITAMIN C 500 MG OR TABS, 1 tablet daily, Disp: , Rfl:      VITAMIN D, CHOLECALCIFEROL, PO, Take 1,000 Units by mouth daily, Disp: , Rfl:      Vitamin Mixture (VITAMIN E COMPLETE PO), , Disp: , Rfl:      zolpidem (AMBIEN) 5 MG tablet, Take 1 tablet (5 mg) by mouth nightly as needed for sleep, Disp: 90 tablet, Rfl: 1     Allergies   Allergen Reactions     Darvocet [Acetaminophen] Nausea and Vomiting     Erythromycin GI Disturbance     Flexeril [Cyclobenzaprine Hcl]      Lightheadedness, dizzy       Serotonin Reuptake Inhibitors Anxiety, Other (See Comments) and Palpitations        PHYSICAL EXAM:  VS: BP 99/64   Pulse 96   SpO2 98%    She does have a headache today.  No facial asymmetry.         HPI:    Patient denies new medical diagnoses, illnesses, hospitalizations, emergency room visits, and injuries since the previous injection with botulinum neurotoxin.     We reviewed the recommended safety guidelines for  Botox from any vaccine injection, such as the seasonal flu vaccine, by a minimum of 10-14 days with Jessica Manriquez. She acknowledged understanding.    RESPONSE TO PREVIOUS TREATMENT:    Jessica Manriquez received 200 units of Botox on 10/1/2021.    1.  Headache frequency during this injection cycle: In the month of October, she had 7 headaches, In November she had 7 headaches, so far in December she has had 4 headaches. This is an improvement from last injection cycle.     2.  Headache duration during this injection cycle:  Headache duration ranged from 4 hours to 48 hours.     3.  Headache intensity during this injection cycle:    A.  6/10  =  Typical pain level.  B.  9/10  =  Worst pain level.  C.  2/10  =  Lowest pain level.    4. Change in headache medication usage during this injection cycle:  (For Example: Able to decrease  use of oral pain medications.) No changes in her headache medications.     5.  ER Visits During This Injection Cycle:  No ER visits due to headaches.     6.  Functional Performance: Change in ADL's, social interaction, days lost from work, etc. 3 days missed work/social events due to headaches.        BOTULINUM NEUROTOXIN INJECTION PROCEDURES:      VERIFICATION OF PATIENT IDENTIFICATION AND PROCEDURE     Initials   Patient Name ses         Patient  ses   Procedure Verified by: ses     Prior to the start of the procedure and with procedural staff participation, I verbally confirmed the patient s identity using two indicators, relevant allergies, that the procedure was appropriate and matched the consent or emergent situation, and that the correct equipment/implants were available. Immediately prior to starting the procedure I conducted the Time Out with the procedural staff and re-confirmed the patient s name, procedure, and site/side. (The Joint Commission universal protocol was followed.)  Yes    Sedation (Moderate or Deep): None      Above assessments performed by:    Aretha Pichardo MD       INDICATIONS FOR PROCEDURES:  Jessica Manriquez is a 51 year old patient with chronic migraine headaches associated with cervicogenic  components. Her baseline symptoms have been recalcitrant to oral medications and conservative therapy.  She is here today for reinjection with Botox.      GOAL OF PROCEDURE:  The goal of this procedure is to decrease pain       TOTAL DOSE: 200 UNITS BOTOX  Dose Administered:  200 units  Botox (Botulinum Toxin Type A)       2:1 Dilution   Diluent Used:  0.5% bupivacaine (Batch: [id number], Exp: 2024, NDC: 469[address]-823-08)  Total Volume of Diluent Used:  4 ml  Lot # [id number] with Expiration Date:  2024  NDC #: Botox 100u ([address]-39)      CONSENT:  The risks, benefits, and treatment options were discussed with Jessica Manriquez and she agreed to proceed.    Written consent was obtained  by [facility].       EQUIPMENT USED:  Needle-25mm stimulating/recording  EMG/NCS Machine      SKIN PREPARATION:  Skin preparation was performed using an alcohol wipe.      GUIDANCE DESCRIPTION:  Electro-myographic guidance was necessary throughout the procedure to accurately identify all areas of spastic muscles while avoiding injection of non-spastic muscles, neighboring nerves and nearby vascular structures.       AREA/MUSCLE INJECTED:  200 UNITS BOTOX = TOTAL DOSE, 2:1 DILUTION    1 & 2. SHOULDER GIRDLE & NECK MUSCLES: 65 units Botox = Total Dose, 2:1 Dilution                               Right Upper Trapezius 15 units of Botox at 3 sites               Left Upper Trapezius 15 units of Botox at 3 sites        Right Splenius  - 5 units of Botox at 1 site/s  Left Splenius - 5 units of Botox at 1 site/s     Right Semispinalis  - 5 units of Botox at 1 site/s  Left Semispinalis - 5 units of Botox at 1 site/s    Left Anterior Scalenes - 5 units of Botox at 1 site/s.                  Right Levator Scapula - 5 units of Botox at 1 site/s.     Left Levator Scapula -  5 units of Botox at 1 site/s.        3. HEAD, JAW & SCALP MUSCLES: 135 units Botox = Total Dose, 2:1 Dilution     Right masseter - 5 units of Botox at 1 site/s.  Left masseter - 5 units of Botox at 1 site/s.     Right Occipitalis (upper and lower) - 20 units of Botox at 3 site/s.  Left Occipitalis (upper and lower) - 20 units of Botox at 3 site/s.    Right Frontalis - 10 units of Botox at 2 site/s  Left Frontalis - 10 units of Botox at 2 site/s     Right Temporalis (upper and lower)  - 25 units of Botox at 5 site/s.  Left Temporalis (upper and lower) - 25 units of Botox at 5 site/s.    Right  - 5 units of Botox at 1 site/s.  Left  - 5 units of Botox at 1 site/s.     Procerus - 5 units of Botox at 1 site/s.       RESPONSE TO PROCEDURE:  Jessica Manriquez tolerated the procedure well and there were no immediate complications.   She was allowed to  recover for an appropriate period of time and was discharged home in stable condition.    FOLLOW UP:  Jessica Manriquez was asked to follow up by phone in 7-14 days with Poonam Shoemaker RN, Care Coordinator, to report her response to this series of injections.  Based on the patient's previous response to this therapy, Jessica Manriquez was rescheduled for the next series of injections in 10 weeks.    PLAN (Medication Changes, Therapy Orders, Work or Disability Issues, etc.): Patient will continue to monitor response to today's injections.

## 2021-12-23 ENCOUNTER — TRANSFERRED RECORDS (OUTPATIENT)
Dept: HEALTH INFORMATION MANAGEMENT | Facility: CLINIC | Age: 51
End: 2021-12-23
Payer: COMMERCIAL

## 2021-12-23 NOTE — TELEPHONE ENCOUNTER
[facility] calling again requesting orders for pt coming in this afternoon (1p.m.) for MRI-requesting verbal order. Please call ph:  [phone].     Robert Anderson  Specialty [facility] CSS

## 2021-12-23 NOTE — TELEPHONE ENCOUNTER
Order placed.    Spoke with patient on the phone.  After speaking with [facility], patient advised that recommendation is for yearly breast mammography as well as MRI. Patient has not had mammography since 2019. MRI last year completed.    Patient will follow up with [facility] at appointment today.  Will see if screening mammography can be scheduled for future completion or added on today. Patient misunderstand as she thought MRI was to replace mammography.    Suzie Ellis   [facility]  RN

## 2021-12-29 DIAGNOSIS — G47.00 INSOMNIA, UNSPECIFIED TYPE: Primary | ICD-10-CM

## 2021-12-29 RX ORDER — AMITRIPTYLINE HYDROCHLORIDE 50 MG/1
50 TABLET ORAL
Qty: 90 TABLET | Refills: 1 | Status: SHIPPED | OUTPATIENT
Start: 2021-12-29 | End: 2022-12-19

## 2022-02-22 ENCOUNTER — DOCUMENTATION ONLY (OUTPATIENT)
Dept: PHYSICAL MEDICINE AND REHAB | Facility: CLINIC | Age: 52
End: 2022-02-22
Payer: COMMERCIAL

## 2022-02-27 ENCOUNTER — HEALTH MAINTENANCE LETTER (OUTPATIENT)
Age: 52
End: 2022-02-27

## 2022-03-04 ENCOUNTER — OFFICE VISIT (OUTPATIENT)
Dept: PHYSICAL MEDICINE AND REHAB | Facility: CLINIC | Age: 52
End: 2022-03-04
Payer: COMMERCIAL

## 2022-03-04 VITALS
HEART RATE: 100 BPM | SYSTOLIC BLOOD PRESSURE: 134 MMHG | DIASTOLIC BLOOD PRESSURE: 87 MMHG | RESPIRATION RATE: 16 BRPM | OXYGEN SATURATION: 100 %

## 2022-03-04 DIAGNOSIS — G43.719 CHRONIC MIGRAINE WITHOUT AURA, INTRACTABLE, WITHOUT STATUS MIGRAINOSUS: Primary | ICD-10-CM

## 2022-03-04 PROCEDURE — 96372 THER/PROPH/DIAG INJ SC/IM: CPT | Performed by: PHYSICAL MEDICINE & REHABILITATION

## 2022-03-04 PROCEDURE — 64615 CHEMODENERV MUSC MIGRAINE: CPT | Mod: 59 | Performed by: PHYSICAL MEDICINE & REHABILITATION

## 2022-03-04 RX ORDER — BUPIVACAINE HYDROCHLORIDE 5 MG/ML
4 INJECTION, SOLUTION EPIDURAL; INTRACAUDAL ONCE
Status: COMPLETED | OUTPATIENT
Start: 2022-03-04 | End: 2022-03-04

## 2022-03-04 RX ADMIN — BUPIVACAINE HYDROCHLORIDE 20 MG: 5 INJECTION, SOLUTION EPIDURAL; INTRACAUDAL at 13:42

## 2022-03-04 ASSESSMENT — PAIN SCALES - GENERAL: PAINLEVEL: NO PAIN (0)

## 2022-03-04 NOTE — LETTER
3/4/2022       RE: Jessica Manriquez  [address]  [place] [address]     Dear Colleague,    Thank you for referring your patient, Jessica Manriquez, to the [facility] [place] at [facility]. Please see a copy of my visit note below.    BOTULINUM TOXIN PROCEDURE - HEADACHE - NOTE    Chief Complaint   Patient presents with     Botox       Current Outpatient Medications:      ALPRAZolam (XANAX) 0.5 MG tablet, Take 1 tablet (0.5 mg) by mouth every 6 hours as needed for anxiety, Disp: 40 tablet, Rfl: 0     amitriptyline (ELAVIL) 50 MG tablet, Take 1 tablet (50 mg) by mouth nightly as needed for sleep, Disp: 90 tablet, Rfl: 1     aspirin-acetaminophen-caffeine (EXCEDRIN MIGRAINE) 250-250-65 MG per tablet, Take 1 tablet by mouth every 6 hours as needed for pain., Disp: 30 tablet, Rfl: 0     diazepam (VALIUM) 10 MG tablet, Place 1 tab vaginally for muscle spasms, Disp: 30 tablet, Rfl: 3     ferrous sulfate (FEROSUL) 325 (65 Fe) MG tablet, Take 325 mg by mouth daily (with breakfast), Disp: , Rfl:      hydrOXYzine (ATARAX) 25 MG tablet, Take 1-2 tablets (25-50 mg) by mouth every 6 hours as needed for itching or anxiety, Disp: 60 tablet, Rfl: 1     Lactobacillus (ACIDOPHILUS) 100 MG CAPS, Take 1 capsule by mouth daily, Disp: , Rfl:      Loperamide HCl (IMODIUM A-D PO), Take 1 tablet by mouth once as needed, Disp: , Rfl:      MULTI-VITAMIN OR TABS, 1 tablet by mouth DAILY, Disp: , Rfl:      OnabotulinumtoxinA (BOTOX IJ), Inject 200 Units as directed Total dose 200 units  Dose Administered:  175 units  Botox (Botulinum Toxin Type A)       2:1 Dilution (55 units unavoidable waste) Unavoidable waste 25 units  Diluent Used:  Preservative Free Normal Saline Total Volume of Diluent Used:  2.9 ml Lot # /C3 with Expiration Date:  03/2021 NDC #: Botox 100u ([address]-94), Disp: , Rfl:      ondansetron (ZOFRAN ODT) 4 MG  ODT tab, Take 1-2 tablets (4-8 mg) by mouth every 8 hours as needed for nausea, Disp: 20 tablet, Rfl: 3     SUMAtriptan (IMITREX) 50 MG tablet, Take 1 tablet (50 mg) by mouth at onset of headache for migraine May repeat dose in 2 hours.  Do not exceed 200 mg in 24 hours, Disp: 18 tablet, Rfl: 11     traMADol (ULTRAM) 50 MG tablet, Take 1-2 tablets ( mg) by mouth every 6 hours as needed for pain, Disp: 40 tablet, Rfl: 2     valACYclovir (VALTREX) 500 MG tablet, Take 1 tablet (500 mg) by mouth daily, Disp: 90 tablet, Rfl: 3     vitamin (B COMPLEX-C) tablet, Take 1 tablet by mouth daily, Disp: , Rfl:      VITAMIN C 500 MG OR TABS, 1 tablet daily, Disp: , Rfl:      VITAMIN D, CHOLECALCIFEROL, PO, Take 1,000 Units by mouth daily, Disp: , Rfl:      Vitamin Mixture (VITAMIN E COMPLETE PO), , Disp: , Rfl:      zolpidem (AMBIEN) 5 MG tablet, Take 1 tablet (5 mg) by mouth nightly as needed for sleep, Disp: 90 tablet, Rfl: 1    Current Facility-Administered Medications:      botulinum toxin type A (BOTOX) 100 units injection 200 Units, 200 Units, Intramuscular, Q10 Weeks, Standal, Aretha Hahn MD     Allergies   Allergen Reactions     Darvocet [Acetaminophen] Nausea and Vomiting     Erythromycin GI Disturbance     Flexeril [Cyclobenzaprine Hcl]      Lightheadedness, dizzy       Serotonin Reuptake Inhibitors Anxiety, Other (See Comments) and Palpitations        PHYSICAL EXAM:  VS: /87   Pulse 100   Resp 16   SpO2 100%    She does have a headache today.  No facial asymmetry.         HPI:    Jessica Manriquez is a 51 year old female with a history of chronic migraine headaches who presents to clinic for Botox injections for management of her chronic migraine headaches.    Patient denies new medical diagnoses, illnesses, hospitalizations, emergency room visits, and injuries since the previous injection with botulinum neurotoxin.     We reviewed the recommended safety guidelines for  Botox from any vaccine  injection, such as the seasonal flu vaccine, by a minimum of 10-14 days with Jessica Manriquez. She acknowledged understanding.    RESPONSE TO PREVIOUS TREATMENT:    Jessica Manriquez received 200 units of Botox on 12/10/2021.    1.  Headache frequency during this injection cycle: In the month of December, she had 8 headaches, In January she had 7 headaches and in February she had 13 headaches. She has had a headache almost every day over the last two weeks as the Botox has been wearing off.      2.  Headache duration during this injection cycle:  Headache duration ranged from 4 hours to 72 hours. In the last three weeks, she has had a few 72 hour migraines, but typical is 6-8 hours for her migraine duration.     3.  Headache intensity during this injection cycle:    A.  6/10  =  Typical pain level.  B.  9/10  =  Worst pain level.  C.  2/10  =  Lowest pain level.    4. Change in headache medication usage during this injection cycle:  (For Example: Able to decrease use of oral pain medications.) No changes in her headache medications.     5.  ER Visits During This Injection Cycle:  No ER visits due to headaches.     6.  Functional Performance: Change in ADL's, social interaction, days lost from work, etc. 3 days missed work/social events due to headaches over the last three weeks.       BOTULINUM NEUROTOXIN INJECTION PROCEDURES:      VERIFICATION OF PATIENT IDENTIFICATION AND PROCEDURE     Initials   Patient Name ses         Patient  ses   Procedure Verified by: ses     Prior to the start of the procedure and with procedural staff participation, I verbally confirmed the patient s identity using two indicators, relevant allergies, that the procedure was appropriate and matched the consent or emergent situation, and that the correct equipment/implants were available. Immediately prior to starting the procedure I conducted the Time Out with the procedural staff and re-confirmed the patient s name, procedure, and  site/side. (The Joint Commission universal protocol was followed.)  Yes    Sedation (Moderate or Deep): None      Above assessments performed by:    Aretha Pichardo MD       INDICATIONS FOR PROCEDURES:  Jessica Manriquez is a 51 year old patient with chronic migraine headaches associated with cervicogenic  components. Her baseline symptoms have been recalcitrant to oral medications and conservative therapy.  She is here today for reinjection with Botox.    GOAL OF PROCEDURE:  The goal of this procedure is to decrease pain.    TOTAL DOSE: 200 UNITS BOTOX  Dose Administered:  200 units  Botox (Botulinum Toxin Type A)       2:1 Dilution   Diluent Used:  0.5% bupivacaine (Lot: [id number], Exp: 07/01/2023, NDC: 2168-8853-89)  Total Volume of Diluent Used:  4 ml  Lot # [id number] with Expiration Date:  02/2024  NDC #: Botox 100u ([address]-53)      CONSENT:  The risks, benefits, and treatment options were discussed with Jessica Manriquez and she agreed to proceed.    Written consent was obtained by [facility].     EQUIPMENT USED:  Needle-25mm stimulating/recording  EMG/NCS Machine    SKIN PREPARATION:  Skin preparation was performed using an alcohol wipe.    GUIDANCE DESCRIPTION:  Electro-myographic guidance was necessary throughout the procedure to accurately identify all areas of spastic muscles while avoiding injection of non-spastic muscles, neighboring nerves and nearby vascular structures.     AREA/MUSCLE INJECTED:  200 UNITS BOTOX = TOTAL DOSE, 2:1 DILUTION    1 & 2. SHOULDER GIRDLE & NECK MUSCLES: 65 units Botox = Total Dose, 2:1 Dilution                               Right Upper Trapezius 15 units of Botox at 3 sites               Left Upper Trapezius 15 units of Botox at 3 sites        Right Splenius  - 5 units of Botox at 1 site/s  Left Splenius - 5 units of Botox at 1 site/s     Right Semispinalis  - 5 units of Botox at 1 site/s  Left Semispinalis - 5 units of Botox at 1 site/s    Left Anterior Scalenes - 5 units of  Botox at 1 site/s.                  Right Levator Scapula - 5 units of Botox at 1 site/s.     Left Levator Scapula -  5 units of Botox at 1 site/s.     3. HEAD, JAW & SCALP MUSCLES: 135 units Botox = Total Dose, 2:1 Dilution     Right masseter - 5 units of Botox at 1 site/s.  Left masseter - 5 units of Botox at 1 site/s.     Right Occipitalis (upper and lower) - 20 units of Botox at 3 site/s.  Left Occipitalis (upper and lower) - 20 units of Botox at 3 site/s.    Right Frontalis - 10 units of Botox at 2 site/s  Left Frontalis - 10 units of Botox at 2 site/s     Right Temporalis (upper and lower)  - 25 units of Botox at 5 site/s.  Left Temporalis (upper and lower) - 25 units of Botox at 5 site/s.    Right  - 5 units of Botox at 1 site/s.  Left  - 5 units of Botox at 1 site/s.     Procerus - 5 units of Botox at 1 site/s.       RESPONSE TO PROCEDURE:  Jessica Manriquez tolerated the procedure well and there were no immediate complications.   She was allowed to recover for an appropriate period of time and was discharged home in stable condition.    FOLLOW UP:  Jessica Manriquez was asked to follow up by phone in 7-14 days with Poonam Shoemaker RN, Care Coordinator, to report her response to this series of injections.  Based on the patient's previous response to this therapy, Jessica Manriquez was rescheduled for the next series of injections in 10 weeks.    PLAN (Medication Changes, Therapy Orders, Work or Disability Issues, etc.): Patient will continue to monitor response to today's injections.          rAetha Pichardo MD

## 2022-03-04 NOTE — PROGRESS NOTES
BOTULINUM TOXIN PROCEDURE - HEADACHE - NOTE    Chief Complaint   Patient presents with     Botox       Current Outpatient Medications:      ALPRAZolam (XANAX) 0.5 MG tablet, Take 1 tablet (0.5 mg) by mouth every 6 hours as needed for anxiety, Disp: 40 tablet, Rfl: 0     amitriptyline (ELAVIL) 50 MG tablet, Take 1 tablet (50 mg) by mouth nightly as needed for sleep, Disp: 90 tablet, Rfl: 1     aspirin-acetaminophen-caffeine (EXCEDRIN MIGRAINE) 250-250-65 MG per tablet, Take 1 tablet by mouth every 6 hours as needed for pain., Disp: 30 tablet, Rfl: 0     diazepam (VALIUM) 10 MG tablet, Place 1 tab vaginally for muscle spasms, Disp: 30 tablet, Rfl: 3     ferrous sulfate (FEROSUL) 325 (65 Fe) MG tablet, Take 325 mg by mouth daily (with breakfast), Disp: , Rfl:      hydrOXYzine (ATARAX) 25 MG tablet, Take 1-2 tablets (25-50 mg) by mouth every 6 hours as needed for itching or anxiety, Disp: 60 tablet, Rfl: 1     Lactobacillus (ACIDOPHILUS) 100 MG CAPS, Take 1 capsule by mouth daily, Disp: , Rfl:      Loperamide HCl (IMODIUM A-D PO), Take 1 tablet by mouth once as needed, Disp: , Rfl:      MULTI-VITAMIN OR TABS, 1 tablet by mouth DAILY, Disp: , Rfl:      OnabotulinumtoxinA (BOTOX IJ), Inject 200 Units as directed Total dose 200 units  Dose Administered:  175 units  Botox (Botulinum Toxin Type A)       2:1 Dilution (55 units unavoidable waste) Unavoidable waste 25 units  Diluent Used:  Preservative Free Normal Saline Total Volume of Diluent Used:  2.9 ml Lot # /[id number] with Expiration Date:  03/2021 NDC #: Botox 100u ([address]-59), Disp: , Rfl:      ondansetron (ZOFRAN ODT) 4 MG ODT tab, Take 1-2 tablets (4-8 mg) by mouth every 8 hours as needed for nausea, Disp: 20 tablet, Rfl: 3     SUMAtriptan (IMITREX) 50 MG tablet, Take 1 tablet (50 mg) by mouth at onset of headache for migraine May repeat dose in 2 hours.  Do not exceed 200 mg in 24 hours, Disp: 18 tablet, Rfl: 11     traMADol (ULTRAM) 50 MG tablet, Take 1-2 tablets  ( mg) by mouth every 6 hours as needed for pain, Disp: 40 tablet, Rfl: 2     valACYclovir (VALTREX) 500 MG tablet, Take 1 tablet (500 mg) by mouth daily, Disp: 90 tablet, Rfl: 3     vitamin (B COMPLEX-C) tablet, Take 1 tablet by mouth daily, Disp: , Rfl:      VITAMIN C 500 MG OR TABS, 1 tablet daily, Disp: , Rfl:      VITAMIN D, CHOLECALCIFEROL, PO, Take 1,000 Units by mouth daily, Disp: , Rfl:      Vitamin Mixture (VITAMIN E COMPLETE PO), , Disp: , Rfl:      zolpidem (AMBIEN) 5 MG tablet, Take 1 tablet (5 mg) by mouth nightly as needed for sleep, Disp: 90 tablet, Rfl: 1    Current Facility-Administered Medications:      botulinum toxin type A (BOTOX) 100 units injection 200 Units, 200 Units, Intramuscular, Q10 Weeks, Standal, Aretha Hahn MD     Allergies   Allergen Reactions     Darvocet [Acetaminophen] Nausea and Vomiting     Erythromycin GI Disturbance     Flexeril [Cyclobenzaprine Hcl]      Lightheadedness, dizzy       Serotonin Reuptake Inhibitors Anxiety, Other (See Comments) and Palpitations        PHYSICAL EXAM:  VS: /87   Pulse 100   Resp 16   SpO2 100%    She does have a headache today.  No facial asymmetry.         HPI:    Jessica Manriquez is a 51 year old female with a history of chronic migraine headaches who presents to clinic for Botox injections for management of her chronic migraine headaches.    Patient denies new medical diagnoses, illnesses, hospitalizations, emergency room visits, and injuries since the previous injection with botulinum neurotoxin.     We reviewed the recommended safety guidelines for  Botox from any vaccine injection, such as the seasonal flu vaccine, by a minimum of 10-14 days with Jessica Manriquez. She acknowledged understanding.    RESPONSE TO PREVIOUS TREATMENT:    Jessica Manriquez received 200 units of Botox on 12/10/2021.    1.  Headache frequency during this injection cycle: In the month of December, she had 8 headaches, In January she had 7  headaches and in February she had 13 headaches. She has had a headache almost every day over the last two weeks as the Botox has been wearing off.      2.  Headache duration during this injection cycle:  Headache duration ranged from 4 hours to 72 hours. In the last three weeks, she has had a few 72 hour migraines, but typical is 6-8 hours for her migraine duration.     3.  Headache intensity during this injection cycle:    A.  6/10  =  Typical pain level.  B.  9/10  =  Worst pain level.  C.  2/10  =  Lowest pain level.    4. Change in headache medication usage during this injection cycle:  (For Example: Able to decrease use of oral pain medications.) No changes in her headache medications.     5.  ER Visits During This Injection Cycle:  No ER visits due to headaches.     6.  Functional Performance: Change in ADL's, social interaction, days lost from work, etc. 3 days missed work/social events due to headaches over the last three weeks.       BOTULINUM NEUROTOXIN INJECTION PROCEDURES:      VERIFICATION OF PATIENT IDENTIFICATION AND PROCEDURE     Initials   Patient Name ses         Patient  ses   Procedure Verified by: ses     Prior to the start of the procedure and with procedural staff participation, I verbally confirmed the patient s identity using two indicators, relevant allergies, that the procedure was appropriate and matched the consent or emergent situation, and that the correct equipment/implants were available. Immediately prior to starting the procedure I conducted the Time Out with the procedural staff and re-confirmed the patient s name, procedure, and site/side. (The Joint Commission universal protocol was followed.)  Yes    Sedation (Moderate or Deep): None      Above assessments performed by:    Aretha Pichardo MD       INDICATIONS FOR PROCEDURES:  Jessica Manriquez is a 51 year old patient with chronic migraine headaches associated with cervicogenic  components. Her baseline symptoms have  been recalcitrant to oral medications and conservative therapy.  She is here today for reinjection with Botox.    GOAL OF PROCEDURE:  The goal of this procedure is to decrease pain.    TOTAL DOSE: 200 UNITS BOTOX  Dose Administered:  200 units  Botox (Botulinum Toxin Type A)       2:1 Dilution   Diluent Used:  0.5% bupivacaine (Lot: [id number], Exp: 07/01/2023, NDC: 0208-8041-46)  Total Volume of Diluent Used:  4 ml  Lot # [id number] with Expiration Date:  02/2024  NDC #: Botox 100u ([address]-88)      CONSENT:  The risks, benefits, and treatment options were discussed with Jessica LOZANO Declan and she agreed to proceed.    Written consent was obtained by [facility].     EQUIPMENT USED:  Needle-25mm stimulating/recording  EMG/NCS Machine    SKIN PREPARATION:  Skin preparation was performed using an alcohol wipe.    GUIDANCE DESCRIPTION:  Electro-myographic guidance was necessary throughout the procedure to accurately identify all areas of spastic muscles while avoiding injection of non-spastic muscles, neighboring nerves and nearby vascular structures.     AREA/MUSCLE INJECTED:  200 UNITS BOTOX = TOTAL DOSE, 2:1 DILUTION    1 & 2. SHOULDER GIRDLE & NECK MUSCLES: 65 units Botox = Total Dose, 2:1 Dilution                               Right Upper Trapezius 15 units of Botox at 3 sites               Left Upper Trapezius 15 units of Botox at 3 sites        Right Splenius  - 5 units of Botox at 1 site/s  Left Splenius - 5 units of Botox at 1 site/s     Right Semispinalis  - 5 units of Botox at 1 site/s  Left Semispinalis - 5 units of Botox at 1 site/s    Left Anterior Scalenes - 5 units of Botox at 1 site/s.                  Right Levator Scapula - 5 units of Botox at 1 site/s.     Left Levator Scapula -  5 units of Botox at 1 site/s.     3. HEAD, JAW & SCALP MUSCLES: 135 units Botox = Total Dose, 2:1 Dilution     Right masseter - 5 units of Botox at 1 site/s.  Left masseter - 5 units of Botox at 1 site/s.     Right Occipitalis (upper and  lower) - 20 units of Botox at 3 site/s.  Left Occipitalis (upper and lower) - 20 units of Botox at 3 site/s.    Right Frontalis - 10 units of Botox at 2 site/s  Left Frontalis - 10 units of Botox at 2 site/s     Right Temporalis (upper and lower)  - 25 units of Botox at 5 site/s.  Left Temporalis (upper and lower) - 25 units of Botox at 5 site/s.    Right  - 5 units of Botox at 1 site/s.  Left  - 5 units of Botox at 1 site/s.     Procerus - 5 units of Botox at 1 site/s.       RESPONSE TO PROCEDURE:  Jessica Manriquez tolerated the procedure well and there were no immediate complications.   She was allowed to recover for an appropriate period of time and was discharged home in stable condition.    FOLLOW UP:  Jessica Manriquez was asked to follow up by phone in 7-14 days with Poonam Shoemaker RN, Care Coordinator, to report her response to this series of injections.  Based on the patient's previous response to this therapy, Jessica Manriquez was rescheduled for the next series of injections in 10 weeks.    PLAN (Medication Changes, Therapy Orders, Work or Disability Issues, etc.): Patient will continue to monitor response to today's injections.

## 2022-03-16 ENCOUNTER — MYC MEDICAL ADVICE (OUTPATIENT)
Dept: OBGYN | Facility: CLINIC | Age: 52
End: 2022-03-16
Payer: COMMERCIAL

## 2022-03-16 DIAGNOSIS — R61 NIGHT SWEATS: Primary | ICD-10-CM

## 2022-03-16 RX ORDER — GABAPENTIN 300 MG/1
300 CAPSULE ORAL AT BEDTIME
Qty: 30 CAPSULE | Refills: 3 | Status: SHIPPED | OUTPATIENT
Start: 2022-03-16 | End: 2022-10-27

## 2022-03-16 NOTE — TELEPHONE ENCOUNTER
Per prior conversation with provider, patient requesting trial of Gabapentin for night sweats via MyChart. Patient planning to schedule annual with provider in May once schedule available, as patient nelson elsewhere.    Routed to [facility].    Keren Cutler RN on 3/16/2022 at 10:56 AM

## 2022-03-27 ENCOUNTER — MYC MEDICAL ADVICE (OUTPATIENT)
Dept: FAMILY MEDICINE | Facility: CLINIC | Age: 52
End: 2022-03-27
Payer: COMMERCIAL

## 2022-03-27 DIAGNOSIS — G43.909 MIGRAINE WITHOUT STATUS MIGRAINOSUS, NOT INTRACTABLE, UNSPECIFIED MIGRAINE TYPE: ICD-10-CM

## 2022-03-28 RX ORDER — SUMATRIPTAN 50 MG/1
50 TABLET, FILM COATED ORAL
Qty: 18 TABLET | Refills: 1 | Status: SHIPPED | OUTPATIENT
Start: 2022-03-28 | End: 2022-10-27

## 2022-03-28 NOTE — TELEPHONE ENCOUNTER
"Requested Prescriptions   Pending Prescriptions Disp Refills    SUMAtriptan (IMITREX) 50 MG tablet 18 tablet 1     Sig: Take 1 tablet (50 mg) by mouth at onset of headache for migraine May repeat dose in 2 hours.  Do not exceed 200 mg in 24 hours        Serotonin Agonists Failed - 3/28/2022  7:47 AM        Failed - Serotonin Agonist request needs review.     Please review patient's record. If patient has had 8 or more treatments in the past month, please forward to provider.            Passed - Blood pressure under 140/90 in past 12 months       BP Readings from Last 3 Encounters:   03/04/22 134/87   12/10/21 99/64   10/01/21 103/72                 Passed - Recent (12 mo) or future (30 days) visit within the authorizing provider's specialty     Patient has had an office visit with the authorizing provider or a provider within the authorizing providers department within the previous 12 mos or has a future within next 30 days. See \"Patient Info\" tab in inbasket, or \"Choose Columns\" in Meds & Orders section of the refill encounter.              Passed - Medication is active on med list        Passed - Patient is age 18 or older        Passed - No active pregnancy on record        Passed - No positive pregnancy test in past 12 months              "

## 2022-05-27 ENCOUNTER — OFFICE VISIT (OUTPATIENT)
Dept: PHYSICAL MEDICINE AND REHAB | Facility: CLINIC | Age: 52
End: 2022-05-27
Payer: COMMERCIAL

## 2022-05-27 VITALS
HEART RATE: 93 BPM | OXYGEN SATURATION: 98 % | SYSTOLIC BLOOD PRESSURE: 107 MMHG | DIASTOLIC BLOOD PRESSURE: 74 MMHG | TEMPERATURE: 98.2 F

## 2022-05-27 DIAGNOSIS — G43.719 CHRONIC MIGRAINE WITHOUT AURA, INTRACTABLE, WITHOUT STATUS MIGRAINOSUS: Primary | ICD-10-CM

## 2022-05-27 PROCEDURE — 64615 CHEMODENERV MUSC MIGRAINE: CPT | Mod: 59 | Performed by: PHYSICAL MEDICINE & REHABILITATION

## 2022-05-27 PROCEDURE — 96372 THER/PROPH/DIAG INJ SC/IM: CPT | Mod: GC | Performed by: PHYSICAL MEDICINE & REHABILITATION

## 2022-05-27 ASSESSMENT — PAIN SCALES - GENERAL: PAINLEVEL: MILD PAIN (2)

## 2022-05-27 NOTE — LETTER
5/27/2022       RE: Jessica Manriquez  [address]  [facility] [address]     Dear Colleague,    Thank you for referring your patient, Jessica Manriquez, to the [facility] [place] [facility]. Please see a copy of my visit note below.    BOTULINUM TOXIN PROCEDURE - HEADACHE - NOTE    Chief Complaint   Patient presents with     RECHECK     Return Botox       Current Outpatient Medications:      ALPRAZolam (XANAX) 0.5 MG tablet, Take 1 tablet (0.5 mg) by mouth every 6 hours as needed for anxiety, Disp: 40 tablet, Rfl: 0     amitriptyline (ELAVIL) 50 MG tablet, Take 1 tablet (50 mg) by mouth nightly as needed for sleep, Disp: 90 tablet, Rfl: 1     aspirin-acetaminophen-caffeine (EXCEDRIN MIGRAINE) 250-250-65 MG per tablet, Take 1 tablet by mouth every 6 hours as needed for pain., Disp: 30 tablet, Rfl: 0     diazepam (VALIUM) 10 MG tablet, Place 1 tab vaginally for muscle spasms, Disp: 30 tablet, Rfl: 3     ferrous sulfate (FEROSUL) 325 (65 Fe) MG tablet, Take 325 mg by mouth daily (with breakfast), Disp: , Rfl:      gabapentin (NEURONTIN) 300 MG capsule, Take 1 capsule (300 mg) by mouth At Bedtime, Disp: 30 capsule, Rfl: 3     gabapentin (NEURONTIN) 300 MG capsule, Take 1 capsule (300 mg) by mouth At Bedtime, Disp: 30 capsule, Rfl: 3     hydrOXYzine (ATARAX) 25 MG tablet, Take 1-2 tablets (25-50 mg) by mouth every 6 hours as needed for itching or anxiety, Disp: 60 tablet, Rfl: 1     Lactobacillus (ACIDOPHILUS) 100 MG CAPS, Take 1 capsule by mouth daily, Disp: , Rfl:      Loperamide HCl (IMODIUM A-D PO), Take 1 tablet by mouth once as needed, Disp: , Rfl:      MULTI-VITAMIN OR TABS, 1 tablet by mouth DAILY, Disp: , Rfl:      OnabotulinumtoxinA (BOTOX IJ), Inject 200 Units as directed Total dose 200 units  Dose Administered:  175 units  Botox (Botulinum Toxin Type A)       2:1 Dilution (55 units unavoidable  waste) Unavoidable waste 25 units  Diluent Used:  Preservative Free Normal Saline Total Volume of Diluent Used:  2.9 ml Lot # /C3 with Expiration Date:  03/2021 NDC #: Botox 100u ([address]-43), Disp: , Rfl:      ondansetron (ZOFRAN ODT) 4 MG ODT tab, Take 1-2 tablets (4-8 mg) by mouth every 8 hours as needed for nausea, Disp: 20 tablet, Rfl: 3     SUMAtriptan (IMITREX) 50 MG tablet, Take 1 tablet (50 mg) by mouth at onset of headache for migraine May repeat dose in 2 hours.  Do not exceed 200 mg in 24 hours, Disp: 18 tablet, Rfl: 1     traMADol (ULTRAM) 50 MG tablet, Take 1-2 tablets ( mg) by mouth every 6 hours as needed for pain, Disp: 40 tablet, Rfl: 2     valACYclovir (VALTREX) 500 MG tablet, Take 1 tablet (500 mg) by mouth daily, Disp: 90 tablet, Rfl: 3     vitamin (B COMPLEX-C) tablet, Take 1 tablet by mouth daily, Disp: , Rfl:      VITAMIN C 500 MG OR TABS, 1 tablet daily, Disp: , Rfl:      VITAMIN D, CHOLECALCIFEROL, PO, Take 1,000 Units by mouth daily, Disp: , Rfl:      Vitamin Mixture (VITAMIN E COMPLETE PO), , Disp: , Rfl:      zolpidem (AMBIEN) 5 MG tablet, Take 1 tablet (5 mg) by mouth nightly as needed for sleep, Disp: 90 tablet, Rfl: 1    Current Facility-Administered Medications:      botulinum toxin type A (BOTOX) 100 units injection 200 Units, 200 Units, Intramuscular, Q10 Weeks, Standal, Aretha Hahn MD, 200 Units at 03/04/22 1330     Allergies   Allergen Reactions     Darvocet [Acetaminophen] Nausea and Vomiting     Erythromycin GI Disturbance     Flexeril [Cyclobenzaprine Hcl]      Lightheadedness, dizzy       Serotonin Reuptake Inhibitors Anxiety, Other (See Comments) and Palpitations        PHYSICAL EXAM:  VS: /74   Pulse 93   Temp 98.2  F (36.8  C)   SpO2 98%    She does have a headache today.  No facial asymmetry.       HPI:    Jessica Manriquez is a 51 year old  female with a history of chronic migraine headaches who presents to clinic for Botox injections for  management of her chronic migraine headaches.    Patient denies new medical diagnoses, illnesses, hospitalizations, emergency room visits, and injuries since the previous injection with botulinum neurotoxin.     We reviewed the recommended safety guidelines for  Botox from any vaccine injection, such as the seasonal flu vaccine, by a minimum of 10-14 days with Jessica Manriquez. She acknowledged understanding.    RESPONSE TO PREVIOUS TREATMENT:    Jessica Manriquez received 200 units of Botox on 2022.    1.  Headache frequency during this injection cycle: In the month of December, she had 8 headaches, In January she had 7 headaches and in February she had 13 headaches. She has had a headache almost every day over the last two weeks as the Botox has been wearing off.      2.  Headache duration during this injection cycle:  Headache duration ranged from 4 hours to 72 hours. In the last three weeks, she has had a few 72 hour migraines, but typical is 6-8 hours for her migraine duration.     3.  Headache intensity during this injection cycle:    A.  6/10  =  Typical pain level.  B.  9/10  =  Worst pain level.  C.  0/10  =  Lowest pain level.    4. Change in headache medication usage during this injection cycle:  (For Example: Able to decrease use of oral pain medications.) No changes in her headache medications.     5.  ER Visits During This Injection Cycle:  No ER visits due to headaches.     6.  Functional Performance: Change in ADL's, social interaction, days lost from work, etc. 3 days missed work/social events due to headaches over the last three weeks.       BOTULINUM NEUROTOXIN INJECTION PROCEDURES:      VERIFICATION OF PATIENT IDENTIFICATION AND PROCEDURE     Initials   Patient Name ma        Patient  ma   Procedure Verified by: ma     Prior to the start of the procedure and with procedural staff participation, I verbally confirmed the patient s identity using two indicators, relevant allergies,  that the procedure was appropriate and matched the consent or emergent situation, and that the correct equipment/implants were available. Immediately prior to starting the procedure I conducted the Time Out with the procedural staff and re-confirmed the patient s name, procedure, and site/side. (The Joint Commission universal protocol was followed.)  Yes    Sedation (Moderate or Deep): None      Above assessments performed by:  Resident: Marni Yadav MD           The attending provider was present for the entire procedure documented below.    Aretha Pichardo MD       INDICATIONS FOR PROCEDURES:  Jessica Manriquez is a 51 year old patient with chronic migraine headaches associated with cervicogenic  components. Her baseline symptoms have been recalcitrant to oral medications and conservative therapy.  She is here today for reinjection with Botox.    GOAL OF PROCEDURE:  The goal of this procedure is to decrease pain.    TOTAL DOSE: 200 UNITS BOTOX  Dose Administered:  200 units  Botox (Botulinum Toxin Type A)       2:1 Dilution   Diluent Used:  0.5% bupivacaine (Lot: [id number], Exp: 07/01/2023, NDC: 6217-4083-20)  Total Volume of Diluent Used:  4 ml  Lot # [id number] with Expiration Date:  05/2024  NDC #: Botox 100u ([address]-77)      CONSENT:  The risks, benefits, and treatment options were discussed with Jessica Manriquez and she agreed to proceed.    Written consent was obtained by [facility].     EQUIPMENT USED:  Needle-25mm stimulating/recording  EMG/NCS Machine    SKIN PREPARATION:  Skin preparation was performed using an alcohol wipe.    GUIDANCE DESCRIPTION:  Electro-myographic guidance was necessary throughout the procedure to accurately identify all areas of spastic muscles while avoiding injection of non-spastic muscles, neighboring nerves and nearby vascular structures.     AREA/MUSCLE INJECTED:  200 UNITS BOTOX = TOTAL DOSE, 2:1 DILUTION    1 & 2. SHOULDER GIRDLE & NECK MUSCLES: 65 units Botox = Total Dose,  2:1 Dilution                               Right Upper Trapezius 15 units of Botox at 3 sites               Left Upper Trapezius 15 units of Botox at 3 sites        Right Splenius  - 5 units of Botox at 1 site/s  Left Splenius - 5 units of Botox at 1 site/s     Right Semispinalis  - 5 units of Botox at 1 site/s  Left Semispinalis - 5 units of Botox at 1 site/s    Left Anterior Scalenes - 5 units of Botox at 1 site/s.                  Right Levator Scapula - 5 units of Botox at 1 site/s.     Left Levator Scapula -  5 units of Botox at 1 site/s.     3. HEAD, JAW & SCALP MUSCLES: 135 units Botox = Total Dose, 2:1 Dilution     Right masseter - 5 units of Botox at 1 site/s.  Left masseter - 5 units of Botox at 1 site/s.     Right Occipitalis (upper and lower) - 20 units of Botox at 3 site/s.  Left Occipitalis (upper and lower) - 20 units of Botox at 3 site/s.    Right Frontalis - 10 units of Botox at 2 site/s  Left Frontalis - 10 units of Botox at 2 site/s     Right Temporalis (upper and lower)  - 25 units of Botox at 5 site/s.  Left Temporalis (upper and lower) - 25 units of Botox at 5 site/s.    Right  - 5 units of Botox at 1 site/s.  Left  - 5 units of Botox at 1 site/s.     Procerus - 5 units of Botox at 1 site/s.       RESPONSE TO PROCEDURE:  Jessica Manriquez tolerated the procedure well and there were no immediate complications.   She was allowed to recover for an appropriate period of time and was discharged home in stable condition.    FOLLOW UP:  Jessica Manriquez was asked to follow up by phone in 7-14 days with Poonam Shoeamker RN, Care Coordinator, to report her response to this series of injections.  Based on the patient's previous response to this therapy, Jessica Manriquez was rescheduled for the next series of injections in 10 weeks.    PLAN (Medication Changes, Therapy Orders, Work or Disability Issues, etc.): Patient will continue to monitor response to today's injections.           Sincerely,    Aretha Pichardo MD

## 2022-05-27 NOTE — PROGRESS NOTES
BOTULINUM TOXIN PROCEDURE - HEADACHE - NOTE    Chief Complaint   Patient presents with     RECHECK     Return Botox       Current Outpatient Medications:      ALPRAZolam (XANAX) 0.5 MG tablet, Take 1 tablet (0.5 mg) by mouth every 6 hours as needed for anxiety, Disp: 40 tablet, Rfl: 0     amitriptyline (ELAVIL) 50 MG tablet, Take 1 tablet (50 mg) by mouth nightly as needed for sleep, Disp: 90 tablet, Rfl: 1     aspirin-acetaminophen-caffeine (EXCEDRIN MIGRAINE) 250-250-65 MG per tablet, Take 1 tablet by mouth every 6 hours as needed for pain., Disp: 30 tablet, Rfl: 0     diazepam (VALIUM) 10 MG tablet, Place 1 tab vaginally for muscle spasms, Disp: 30 tablet, Rfl: 3     ferrous sulfate (FEROSUL) 325 (65 Fe) MG tablet, Take 325 mg by mouth daily (with breakfast), Disp: , Rfl:      gabapentin (NEURONTIN) 300 MG capsule, Take 1 capsule (300 mg) by mouth At Bedtime, Disp: 30 capsule, Rfl: 3     gabapentin (NEURONTIN) 300 MG capsule, Take 1 capsule (300 mg) by mouth At Bedtime, Disp: 30 capsule, Rfl: 3     hydrOXYzine (ATARAX) 25 MG tablet, Take 1-2 tablets (25-50 mg) by mouth every 6 hours as needed for itching or anxiety, Disp: 60 tablet, Rfl: 1     Lactobacillus (ACIDOPHILUS) 100 MG CAPS, Take 1 capsule by mouth daily, Disp: , Rfl:      Loperamide HCl (IMODIUM A-D PO), Take 1 tablet by mouth once as needed, Disp: , Rfl:      MULTI-VITAMIN OR TABS, 1 tablet by mouth DAILY, Disp: , Rfl:      OnabotulinumtoxinA (BOTOX IJ), Inject 200 Units as directed Total dose 200 units  Dose Administered:  175 units  Botox (Botulinum Toxin Type A)       2:1 Dilution (55 units unavoidable waste) Unavoidable waste 25 units  Diluent Used:  Preservative Free Normal Saline Total Volume of Diluent Used:  2.9 ml Lot # /[id number] with Expiration Date:  03/2021 NDC #: Botox 100u ([address]-68), Disp: , Rfl:      ondansetron (ZOFRAN ODT) 4 MG ODT tab, Take 1-2 tablets (4-8 mg) by mouth every 8 hours as needed for nausea, Disp: 20 tablet, Rfl:  3     SUMAtriptan (IMITREX) 50 MG tablet, Take 1 tablet (50 mg) by mouth at onset of headache for migraine May repeat dose in 2 hours.  Do not exceed 200 mg in 24 hours, Disp: 18 tablet, Rfl: 1     traMADol (ULTRAM) 50 MG tablet, Take 1-2 tablets ( mg) by mouth every 6 hours as needed for pain, Disp: 40 tablet, Rfl: 2     valACYclovir (VALTREX) 500 MG tablet, Take 1 tablet (500 mg) by mouth daily, Disp: 90 tablet, Rfl: 3     vitamin (B COMPLEX-C) tablet, Take 1 tablet by mouth daily, Disp: , Rfl:      VITAMIN C 500 MG OR TABS, 1 tablet daily, Disp: , Rfl:      VITAMIN D, CHOLECALCIFEROL, PO, Take 1,000 Units by mouth daily, Disp: , Rfl:      Vitamin Mixture (VITAMIN E COMPLETE PO), , Disp: , Rfl:      zolpidem (AMBIEN) 5 MG tablet, Take 1 tablet (5 mg) by mouth nightly as needed for sleep, Disp: 90 tablet, Rfl: 1    Current Facility-Administered Medications:      botulinum toxin type A (BOTOX) 100 units injection 200 Units, 200 Units, Intramuscular, Q10 Weeks, Standal, Aretha Hahn MD, 200 Units at 03/04/22 1330     Allergies   Allergen Reactions     Darvocet [Acetaminophen] Nausea and Vomiting     Erythromycin GI Disturbance     Flexeril [Cyclobenzaprine Hcl]      Lightheadedness, dizzy       Serotonin Reuptake Inhibitors Anxiety, Other (See Comments) and Palpitations        PHYSICAL EXAM:  VS: /74   Pulse 93   Temp 98.2  F (36.8  C)   SpO2 98%    She does have a headache today.  No facial asymmetry.       HPI:    Jessica Manriquez is a 51 year old  female with a history of chronic migraine headaches who presents to clinic for Botox injections for management of her chronic migraine headaches.    Patient denies new medical diagnoses, illnesses, hospitalizations, emergency room visits, and injuries since the previous injection with botulinum neurotoxin.     We reviewed the recommended safety guidelines for  Botox from any vaccine injection, such as the seasonal flu vaccine, by a minimum of  10-14 days with Jessica Manriquez. She acknowledged understanding.    RESPONSE TO PREVIOUS TREATMENT:    Jessica Manriquez received 200 units of Botox on 2022.    1.  Headache frequency during this injection cycle: In the month of December, she had 8 headaches, In January she had 7 headaches and in February she had 13 headaches. She has had a headache almost every day over the last two weeks as the Botox has been wearing off.      2.  Headache duration during this injection cycle:  Headache duration ranged from 4 hours to 72 hours. In the last three weeks, she has had a few 72 hour migraines, but typical is 6-8 hours for her migraine duration.     3.  Headache intensity during this injection cycle:    A.  6/10  =  Typical pain level.  B.  9/10  =  Worst pain level.  C.  0/10  =  Lowest pain level.    4. Change in headache medication usage during this injection cycle:  (For Example: Able to decrease use of oral pain medications.) No changes in her headache medications.     5.  ER Visits During This Injection Cycle:  No ER visits due to headaches.     6.  Functional Performance: Change in ADL's, social interaction, days lost from work, etc. 3 days missed work/social events due to headaches over the last three weeks.       BOTULINUM NEUROTOXIN INJECTION PROCEDURES:      VERIFICATION OF PATIENT IDENTIFICATION AND PROCEDURE     Initials   Patient Name ma        Patient  ma   Procedure Verified by: ma     Prior to the start of the procedure and with procedural staff participation, I verbally confirmed the patient s identity using two indicators, relevant allergies, that the procedure was appropriate and matched the consent or emergent situation, and that the correct equipment/implants were available. Immediately prior to starting the procedure I conducted the Time Out with the procedural staff and re-confirmed the patient s name, procedure, and site/side. (The Joint Commission universal protocol was followed.)   Yes    Sedation (Moderate or Deep): None      Above assessments performed by:  Resident: Marni Yadav MD           The attending provider was present for the entire procedure documented below.    Aretha Pichardo MD       INDICATIONS FOR PROCEDURES:  Jessica Manriquez is a 51 year old patient with chronic migraine headaches associated with cervicogenic  components. Her baseline symptoms have been recalcitrant to oral medications and conservative therapy.  She is here today for reinjection with Botox.    GOAL OF PROCEDURE:  The goal of this procedure is to decrease pain.    TOTAL DOSE: 200 UNITS BOTOX  Dose Administered:  200 units  Botox (Botulinum Toxin Type A)       2:1 Dilution   Diluent Used:  0.5% bupivacaine (Lot: [id number], Exp: 07/01/2023, NDC: 7339-5043-06)  Total Volume of Diluent Used:  4 ml  Lot # [id number] with Expiration Date:  05/2024  NDC #: Botox 100u ([address]-60)      CONSENT:  The risks, benefits, and treatment options were discussed with Jessica Manriquez and she agreed to proceed.    Written consent was obtained by [facility].     EQUIPMENT USED:  Needle-25mm stimulating/recording  EMG/NCS Machine    SKIN PREPARATION:  Skin preparation was performed using an alcohol wipe.    GUIDANCE DESCRIPTION:  Electro-myographic guidance was necessary throughout the procedure to accurately identify all areas of spastic muscles while avoiding injection of non-spastic muscles, neighboring nerves and nearby vascular structures.     AREA/MUSCLE INJECTED:  200 UNITS BOTOX = TOTAL DOSE, 2:1 DILUTION    1 & 2. SHOULDER GIRDLE & NECK MUSCLES: 65 units Botox = Total Dose, 2:1 Dilution                               Right Upper Trapezius 15 units of Botox at 3 sites               Left Upper Trapezius 15 units of Botox at 3 sites        Right Splenius  - 5 units of Botox at 1 site/s  Left Splenius - 5 units of Botox at 1 site/s     Right Semispinalis  - 5 units of Botox at 1 site/s  Left Semispinalis - 5 units of Botox at  1 site/s    Left Anterior Scalenes - 5 units of Botox at 1 site/s.                  Right Levator Scapula - 5 units of Botox at 1 site/s.     Left Levator Scapula -  5 units of Botox at 1 site/s.     3. HEAD, JAW & SCALP MUSCLES: 135 units Botox = Total Dose, 2:1 Dilution     Right masseter - 5 units of Botox at 1 site/s.  Left masseter - 5 units of Botox at 1 site/s.     Right Occipitalis (upper and lower) - 20 units of Botox at 3 site/s.  Left Occipitalis (upper and lower) - 20 units of Botox at 3 site/s.    Right Frontalis - 10 units of Botox at 2 site/s  Left Frontalis - 10 units of Botox at 2 site/s     Right Temporalis (upper and lower)  - 25 units of Botox at 5 site/s.  Left Temporalis (upper and lower) - 25 units of Botox at 5 site/s.    Right  - 5 units of Botox at 1 site/s.  Left  - 5 units of Botox at 1 site/s.     Procerus - 5 units of Botox at 1 site/s.       RESPONSE TO PROCEDURE:  Jessica Manriquez tolerated the procedure well and there were no immediate complications.   She was allowed to recover for an appropriate period of time and was discharged home in stable condition.    FOLLOW UP:  Jessica Manirquez was asked to follow up by phone in 7-14 days with Poonam Shoemaker RN, Care Coordinator, to report her response to this series of injections.  Based on the patient's previous response to this therapy, Jessica Manriquez was rescheduled for the next series of injections in 10 weeks.    PLAN (Medication Changes, Therapy Orders, Work or Disability Issues, etc.): Patient will continue to monitor response to today's injections.

## 2022-08-07 ENCOUNTER — MYC MEDICAL ADVICE (OUTPATIENT)
Dept: FAMILY MEDICINE | Facility: CLINIC | Age: 52
End: 2022-08-07

## 2022-08-27 ENCOUNTER — MYC MEDICAL ADVICE (OUTPATIENT)
Dept: FAMILY MEDICINE | Facility: CLINIC | Age: 52
End: 2022-08-27

## 2022-09-03 ENCOUNTER — HOSPITAL ENCOUNTER (OUTPATIENT)
Dept: MAMMOGRAPHY | Facility: CLINIC | Age: 52
Discharge: HOME | End: 2022-09-03
Attending: FAMILY MEDICINE | Admitting: FAMILY MEDICINE
Payer: COMMERCIAL

## 2022-09-03 DIAGNOSIS — Z12.31 VISIT FOR SCREENING MAMMOGRAM: ICD-10-CM

## 2022-09-03 PROCEDURE — 77063 BREAST TOMOSYNTHESIS BI: CPT

## 2022-10-07 ENCOUNTER — LAB (OUTPATIENT)
Dept: FAMILY MEDICINE | Facility: CLINIC | Age: 52
End: 2022-10-07
Attending: FAMILY MEDICINE
Payer: COMMERCIAL

## 2022-10-07 DIAGNOSIS — Z20.822 SUSPECTED 2019 NOVEL CORONAVIRUS INFECTION: ICD-10-CM

## 2022-10-07 PROCEDURE — U0005 INFEC AGEN DETEC AMPLI PROBE: HCPCS

## 2022-10-07 PROCEDURE — U0003 INFECTIOUS AGENT DETECTION BY NUCLEIC ACID (DNA OR RNA); SEVERE ACUTE RESPIRATORY SYNDROME CORONAVIRUS 2 (SARS-COV-2) (CORONAVIRUS DISEASE [COVID-19]), AMPLIFIED PROBE TECHNIQUE, MAKING USE OF HIGH THROUGHPUT TECHNOLOGIES AS DESCRIBED BY CMS-2020-01-R: HCPCS

## 2022-10-07 PROCEDURE — 99207 PR NO CHARGE LOS: CPT

## 2022-10-08 LAB — SARS-COV-2 RNA RESP QL NAA+PROBE: NEGATIVE

## 2022-10-18 ENCOUNTER — MYC MEDICAL ADVICE (OUTPATIENT)
Dept: FAMILY MEDICINE | Facility: CLINIC | Age: 52
End: 2022-10-18

## 2022-10-18 NOTE — TELEPHONE ENCOUNTER
Patient sent [employer] medichart message stating she needed an appointment to discuss her migraine medications and to discuss some ongoing positional pain in her right arm.  Writer called patient.  Patient stated she has a few imitrex pills left.  Patient was scheduled on Dr Loya's same day on Thur 10/27/22.    Luigi METZGER [facility]

## 2022-10-24 ENCOUNTER — OFFICE VISIT (OUTPATIENT)
Dept: PHYSICAL MEDICINE AND REHAB | Facility: CLINIC | Age: 52
End: 2022-10-24
Payer: COMMERCIAL

## 2022-10-24 DIAGNOSIS — G43.719 CHRONIC MIGRAINE WITHOUT AURA, INTRACTABLE, WITHOUT STATUS MIGRAINOSUS: Primary | ICD-10-CM

## 2022-10-24 PROCEDURE — 96372 THER/PROPH/DIAG INJ SC/IM: CPT | Performed by: PHYSICAL MEDICINE & REHABILITATION

## 2022-10-24 PROCEDURE — 95874 GUIDE NERV DESTR NEEDLE EMG: CPT | Performed by: PHYSICAL MEDICINE & REHABILITATION

## 2022-10-24 PROCEDURE — 64615 CHEMODENERV MUSC MIGRAINE: CPT | Mod: 59 | Performed by: PHYSICAL MEDICINE & REHABILITATION

## 2022-10-24 RX ORDER — BUPIVACAINE HYDROCHLORIDE 5 MG/ML
4 INJECTION, SOLUTION EPIDURAL; INTRACAUDAL ONCE
Status: COMPLETED | OUTPATIENT
Start: 2022-10-24 | End: 2022-10-24

## 2022-10-24 RX ADMIN — BUPIVACAINE HYDROCHLORIDE 20 MG: 5 INJECTION, SOLUTION EPIDURAL; INTRACAUDAL at 09:37

## 2022-10-24 NOTE — PROGRESS NOTES
[facility]     PM&R CLINIC NOTE  BOTULINUM TOXIN PROCEDURE      HPI  Chief Complaint   Patient presents with     RECHECK     RETURN BOTOX     Jessica Manriquez is a 51 year old female with a history of intractable migraine headaches who presents to clinic for botulinum toxin injections for management of chronic migraine headaches.     SINCE LAST VISIT  Jessica Manriquez was last seen here in clinic on 5/27/2022, at which time she received 200 units of Botox.    Patient denies new medical diagnoses, illnesses, hospitalizations, emergency room visits, and injuries since the previous injection with botulinum neurotoxin.    RESPONSE TO PREVIOUS TREATMENT    Side effects: No problems reported  Post-procedural headache: Yes, she did have a headache that started following Botox injections which lasted into the following day.     1.  Headache frequency during this injection cycle:  In June, she had 10 headache days, In July she had 7 headache days, In August she had 7 headache days, In September she had 11 headache days, and so far in October, she has had 11 headache days.  This is compared to her baseline headache frequency of >15 headache days per month.     2.  Headache duration during this injection cycle:  Headache duration ranged from 5 hours to 3 days. Patient reports at least one episode of multiple day headaches during this injection cycle which happened more recently.     3.  Headache intensity during this injection cycle:    A.  4/10  =  Typical pain level.  B.  9/10  =  Worst pain level.  C.  0/10  =  Lowest pain level.    4.  Change in headache medication usage during this injection cycle:  (For Example:  Able to decrease use of oral pain medications.) No, she uses Imitrex as a rescue medication and this usually works quite well.     5.  ER Visits During This Injection Cycle:  None.     6.  Functional Performance:  Change in ADL's, social interaction, days lost from work, etc.  Patient reports being able to more fully participate in social and family activities and responsibilities as headache symptoms have improved      PHYSICAL EXAM  VS: There were no vitals taken for this visit.   GEN: Pleasant and cooperative, in no acute distress  HEENT: No facial asymmetry    ALLERGIES  Allergies   Allergen Reactions     Darvocet [Acetaminophen] Nausea and Vomiting     Erythromycin GI Disturbance     Flexeril [Cyclobenzaprine Hcl]      Lightheadedness, dizzy       Serotonin Reuptake Inhibitors Anxiety, Other (See Comments) and Palpitations       CURRENT MEDICATIONS    Current Outpatient Medications:      ALPRAZolam (XANAX) 0.5 MG tablet, Take 1 tablet (0.5 mg) by mouth every 6 hours as needed for anxiety, Disp: 40 tablet, Rfl: 0     amitriptyline (ELAVIL) 50 MG tablet, Take 1 tablet (50 mg) by mouth nightly as needed for sleep, Disp: 90 tablet, Rfl: 1     aspirin-acetaminophen-caffeine (EXCEDRIN MIGRAINE) 250-250-65 MG per tablet, Take 1 tablet by mouth every 6 hours as needed for pain., Disp: 30 tablet, Rfl: 0     diazepam (VALIUM) 10 MG tablet, Place 1 tab vaginally for muscle spasms, Disp: 30 tablet, Rfl: 3     ferrous sulfate (FEROSUL) 325 (65 Fe) MG tablet, Take 325 mg by mouth daily (with breakfast), Disp: , Rfl:      gabapentin (NEURONTIN) 300 MG capsule, Take 1 capsule (300 mg) by mouth At Bedtime, Disp: 30 capsule, Rfl: 3     gabapentin (NEURONTIN) 300 MG capsule, Take 1 capsule (300 mg) by mouth At Bedtime, Disp: 30 capsule, Rfl: 3     hydrOXYzine (ATARAX) 25 MG tablet, Take 1-2 tablets (25-50 mg) by mouth every 6 hours as needed for itching or anxiety, Disp: 60 tablet, Rfl: 1     Lactobacillus (ACIDOPHILUS) 100 MG CAPS, Take 1 capsule by mouth daily, Disp: , Rfl:      Loperamide HCl (IMODIUM A-D PO), Take 1 tablet by mouth once as needed, Disp: , Rfl:      MULTI-VITAMIN OR TABS, 1 tablet by mouth DAILY, Disp: , Rfl:      OnabotulinumtoxinA (BOTOX IJ), Inject 200 Units as directed Total  dose 200 units  Dose Administered:  175 units  Botox (Botulinum Toxin Type A)       2:1 Dilution (55 units unavoidable waste) Unavoidable waste 25 units  Diluent Used:  Preservative Free Normal Saline Total Volume of Diluent Used:  2.9 ml Lot # /C3 with Expiration Date:  2021 NDC #: Botox 100u ([address]-05), Disp: , Rfl:      ondansetron (ZOFRAN ODT) 4 MG ODT tab, Take 1-2 tablets (4-8 mg) by mouth every 8 hours as needed for nausea, Disp: 20 tablet, Rfl: 3     SUMAtriptan (IMITREX) 50 MG tablet, Take 1 tablet (50 mg) by mouth at onset of headache for migraine May repeat dose in 2 hours.  Do not exceed 200 mg in 24 hours, Disp: 18 tablet, Rfl: 1     traMADol (ULTRAM) 50 MG tablet, Take 1-2 tablets ( mg) by mouth every 6 hours as needed for pain, Disp: 40 tablet, Rfl: 2     valACYclovir (VALTREX) 500 MG tablet, Take 1 tablet (500 mg) by mouth daily, Disp: 90 tablet, Rfl: 3     vitamin (B COMPLEX-C) tablet, Take 1 tablet by mouth daily, Disp: , Rfl:      VITAMIN C 500 MG OR TABS, 1 tablet daily, Disp: , Rfl:      VITAMIN D, CHOLECALCIFEROL, PO, Take 1,000 Units by mouth daily, Disp: , Rfl:      Vitamin Mixture (VITAMIN E COMPLETE PO), , Disp: , Rfl:      zolpidem (AMBIEN) 5 MG tablet, Take 1 tablet (5 mg) by mouth nightly as needed for sleep, Disp: 90 tablet, Rfl: 1    Current Facility-Administered Medications:      botulinum toxin type A (BOTOX) 100 units injection 200 Units, 200 Units, Intramuscular, Q10 Weeks, Standal, Aretha Hahn MD, 200 Units at 22 0948       BOTULINUM NEUROTOXIN INJECTION PROCEDURES    VERIFICATION OF PATIENT IDENTIFICATION AND PROCEDURE     Initials   Patient Name SES   Patient  SES   Procedure Verified by: SES     Prior to the start of the procedure and with procedural staff participation, I verbally confirmed the patient s identity using two indicators, relevant allergies, that the procedure was appropriate and matched the consent or emergent situation, and that  the correct equipment/implants were available. Immediately prior to starting the procedure I conducted the Time Out with the procedural staff and re-confirmed the patient s name, procedure, and site/side. (The Joint Commission universal protocol was followed.)  Yes    Sedation (Moderate or Deep): None    ABOVE ASSESSMENTS PERFORMED BY    Aretha Pichardo MD      INDICATIONS FOR PROCEDURES  Jessica Manriquez is a 51 year old patient with intractable pain secondary to the diagnosis of chronic migraine headaches. Her baseline symptoms have been recalcitrant to oral medications and conservative therapy.  She is here today for reinjection with Botox.    GOAL OF PROCEDURE  The goal of this procedure is to decrease pain.      TOTAL DOSE ADMINISTERED  Dose Administered:  200 units  Botox (Botulinum Toxin Type A)       2:1 Dilution   Unavoidable Drug Waste: No  Diluent Used:  0.5% bupivacaine (Lot: [id number], Exp: 03/01/2024, NDC: 0638-1162-01)  Total Volume of Diluent Used:  4 ml  Lot # [id number] with Expiration Date:  04/2024  Lot # [id number] with Expiration Date:  04/2024  NDC #: Botox 100u ([address]-76)      CONSENT  The risks, benefits, and treatment options were discussed with Jessica Manriquez and she agreed to proceed.    Written consent was obtained by [facility].     EQUIPMENT USED  Needle-25mm stimulating/recording  Needle-30 gauge  EMG/NCS Machine    SKIN PREPARATION  Skin preparation was performed using an alcohol wipe.    GUIDANCE DESCRIPTION  Electro-myographic guidance was necessary throughout the neck portion of the procedure to accurately identify all areas of spastic muscles while avoiding injection of non-spastic muscles, neighboring nerves and nearby vascular structures.       AREA/MUSCLE INJECTED:  200 UNITS BOTOX = TOTAL DOSE, 2:1 DILUTION    1 & 2. SHOULDER GIRDLE & NECK MUSCLES: 65 units Botox = Total Dose, 2:1 Dilution                               Right Upper Trapezius 15 units of Botox at 3 sites                Left Upper Trapezius 15 units of Botox at 3 sites        Right Splenius  - 5 units of Botox at 1 site/s  Left Splenius - 5 units of Botox at 1 site/s     Right Semispinalis  - 5 units of Botox at 1 site/s  Left Semispinalis - 5 units of Botox at 1 site/s    Left Anterior Scalenes - 5 units of Botox at 1 site/s.                  Right Levator Scapula - 5 units of Botox at 1 site/s.     Left Levator Scapula -  5 units of Botox at 1 site/s.     3. HEAD, JAW & SCALP MUSCLES: 135 units Botox = Total Dose, 2:1 Dilution     Right masseter - 5 units of Botox at 1 site/s.  Left masseter - 5 units of Botox at 1 site/s.     Right Occipitalis (upper and lower) - 20 units of Botox at 3 site/s.  Left Occipitalis (upper and lower) - 20 units of Botox at 3 site/s.    Right Frontalis - 10 units of Botox at 2 site/s  Left Frontalis - 10 units of Botox at 2 site/s     Right Temporalis (upper and lower)  - 25 units of Botox at 5 site/s.  Left Temporalis (upper and lower) - 25 units of Botox at 5 site/s.    Right  - 5 units of Botox at 1 site/s.  Left  - 5 units of Botox at 1 site/s.     Procerus - 5 units of Botox at 1 site/s.        RESPONSE TO PROCEDURE  Jessica Manriquez tolerated the procedure well and there were no immediate complications. She was allowed to recover for an appropriate period of time and was discharged home in stable condition.    ASSESSMENT AND PLAN   1. Botulinum toxin injections: No changes made to Botox dose or distribution today. Patient will continue to monitor response and report at next appointment.   2. Referrals: None.   3. Follow up: Jessica Manriquez was rescheduled for the next series of injections in 12 weeks, at which time we will evaluate response to today's injections. she may call the clinic prior with any questions or concerns prior to the next appointment.

## 2022-10-24 NOTE — LETTER
10/24/2022       RE: Jessica Manriquez  [address]  [facility] [address]     Dear Colleague,    Thank you for referring your patient, Jessica Manriquez, to the [facility] PHYSICAL MEDICINE [facility]ILIT[facility] CLINIC [place] at [facility]. Please see a copy of my visit note below.      [facility]     PM&R CLINIC NOTE  BOTULINUM TOXIN PROCEDURE      HPI  Chief Complaint   Patient presents with     RECHECK     RETURN BOTOX     Jessica Manriquez is a 51 year old female with a history of intractable migraine headaches who presents to clinic for botulinum toxin injections for management of chronic migraine headaches.     SINCE LAST VISIT  Jessica Manriquez was last seen here in clinic on 5/27/2022, at which time she received 200 units of Botox.    Patient denies new medical diagnoses, illnesses, hospitalizations, emergency room visits, and injuries since the previous injection with botulinum neurotoxin.    RESPONSE TO PREVIOUS TREATMENT    Side effects: No problems reported  Post-procedural headache: Yes, she did have a headache that started following Botox injections which lasted into the following day.     1.  Headache frequency during this injection cycle:  In June, she had 10 headache days, In July she had 7 headache days, In August she had 7 headache days, In September she had 11 headache days, and so far in October, she has had 11 headache days.  This is compared to her baseline headache frequency of >15 headache days per month.     2.  Headache duration during this injection cycle:  Headache duration ranged from 5 hours to 3 days. Patient reports at least one episode of multiple day headaches during this injection cycle which happened more recently.     3.  Headache intensity during this injection cycle:    A.  4/10  =  Typical pain level.  B.  9/10  =  Worst pain level.  C.  0/10  =  Lowest pain level.    4.  Change  in headache medication usage during this injection cycle:  (For Example:  Able to decrease use of oral pain medications.) No, she uses Imitrex as a rescue medication and this usually works quite well.     5.  ER Visits During This Injection Cycle:  None.     6.  Functional Performance:  Change in ADL's, social interaction, days lost from work, etc. Patient reports being able to more fully participate in social and family activities and responsibilities as headache symptoms have improved      PHYSICAL EXAM  VS: There were no vitals taken for this visit.   GEN: Pleasant and cooperative, in no acute distress  HEENT: No facial asymmetry    ALLERGIES  Allergies   Allergen Reactions     Darvocet [Acetaminophen] Nausea and Vomiting     Erythromycin GI Disturbance     Flexeril [Cyclobenzaprine Hcl]      Lightheadedness, dizzy       Serotonin Reuptake Inhibitors Anxiety, Other (See Comments) and Palpitations       CURRENT MEDICATIONS    Current Outpatient Medications:      ALPRAZolam (XANAX) 0.5 MG tablet, Take 1 tablet (0.5 mg) by mouth every 6 hours as needed for anxiety, Disp: 40 tablet, Rfl: 0     amitriptyline (ELAVIL) 50 MG tablet, Take 1 tablet (50 mg) by mouth nightly as needed for sleep, Disp: 90 tablet, Rfl: 1     aspirin-acetaminophen-caffeine (EXCEDRIN MIGRAINE) 250-250-65 MG per tablet, Take 1 tablet by mouth every 6 hours as needed for pain., Disp: 30 tablet, Rfl: 0     diazepam (VALIUM) 10 MG tablet, Place 1 tab vaginally for muscle spasms, Disp: 30 tablet, Rfl: 3     ferrous sulfate (FEROSUL) 325 (65 Fe) MG tablet, Take 325 mg by mouth daily (with breakfast), Disp: , Rfl:      gabapentin (NEURONTIN) 300 MG capsule, Take 1 capsule (300 mg) by mouth At Bedtime, Disp: 30 capsule, Rfl: 3     gabapentin (NEURONTIN) 300 MG capsule, Take 1 capsule (300 mg) by mouth At Bedtime, Disp: 30 capsule, Rfl: 3     hydrOXYzine (ATARAX) 25 MG tablet, Take 1-2 tablets (25-50 mg) by mouth every 6 hours as needed for itching or  anxiety, Disp: 60 tablet, Rfl: 1     Lactobacillus (ACIDOPHILUS) 100 MG CAPS, Take 1 capsule by mouth daily, Disp: , Rfl:      Loperamide HCl (IMODIUM A-D PO), Take 1 tablet by mouth once as needed, Disp: , Rfl:      MULTI-VITAMIN OR TABS, 1 tablet by mouth DAILY, Disp: , Rfl:      OnabotulinumtoxinA (BOTOX IJ), Inject 200 Units as directed Total dose 200 units  Dose Administered:  175 units  Botox (Botulinum Toxin Type A)       2:1 Dilution (55 units unavoidable waste) Unavoidable waste 25 units  Diluent Used:  Preservative Free Normal Saline Total Volume of Diluent Used:  2.9 ml Lot # [id number] with Expiration Date:  03/2021 NDC #: Botox 100u ([address]-46), Disp: , Rfl:      ondansetron (ZOFRAN ODT) 4 MG ODT tab, Take 1-2 tablets (4-8 mg) by mouth every 8 hours as needed for nausea, Disp: 20 tablet, Rfl: 3     SUMAtriptan (IMITREX) 50 MG tablet, Take 1 tablet (50 mg) by mouth at onset of headache for migraine May repeat dose in 2 hours.  Do not exceed 200 mg in 24 hours, Disp: 18 tablet, Rfl: 1     traMADol (ULTRAM) 50 MG tablet, Take 1-2 tablets ( mg) by mouth every 6 hours as needed for pain, Disp: 40 tablet, Rfl: 2     valACYclovir (VALTREX) 500 MG tablet, Take 1 tablet (500 mg) by mouth daily, Disp: 90 tablet, Rfl: 3     vitamin (B COMPLEX-C) tablet, Take 1 tablet by mouth daily, Disp: , Rfl:      VITAMIN C 500 MG OR TABS, 1 tablet daily, Disp: , Rfl:      VITAMIN D, CHOLECALCIFEROL, PO, Take 1,000 Units by mouth daily, Disp: , Rfl:      Vitamin Mixture (VITAMIN E COMPLETE PO), , Disp: , Rfl:      zolpidem (AMBIEN) 5 MG tablet, Take 1 tablet (5 mg) by mouth nightly as needed for sleep, Disp: 90 tablet, Rfl: 1    Current Facility-Administered Medications:      botulinum toxin type A (BOTOX) 100 units injection 200 Units, 200 Units, Intramuscular, Q10 Weeks, Standal, Aretha Hahn MD, 200 Units at 06/01/22 0948       BOTULINUM NEUROTOXIN INJECTION PROCEDURES    VERIFICATION OF PATIENT IDENTIFICATION AND  PROCEDURE     Initials   Patient Name SES   Patient  SES   Procedure Verified by: BEATA     Prior to the start of the procedure and with procedural staff participation, I verbally confirmed the patient s identity using two indicators, relevant allergies, that the procedure was appropriate and matched the consent or emergent situation, and that the correct equipment/implants were available. Immediately prior to starting the procedure I conducted the Time Out with the procedural staff and re-confirmed the patient s name, procedure, and site/side. (The Joint Commission universal protocol was followed.)  Yes    Sedation (Moderate or Deep): None    ABOVE ASSESSMENTS PERFORMED BY    Aretha Pichardo MD      INDICATIONS FOR PROCEDURES  Jessica Manriquez is a 51 year old patient with intractable pain secondary to the diagnosis of chronic migraine headaches. Her baseline symptoms have been recalcitrant to oral medications and conservative therapy.  She is here today for reinjection with Botox.    GOAL OF PROCEDURE  The goal of this procedure is to decrease pain.      TOTAL DOSE ADMINISTERED  Dose Administered:  200 units  Botox (Botulinum Toxin Type A)       2:1 Dilution   Unavoidable Drug Waste: No  Diluent Used:  0.5% bupivacaine (Lot: [id number], Exp: 2024, NDC: 7482-9951-37)  Total Volume of Diluent Used:  4 ml  Lot # [id number] with Expiration Date:  2024  Lot # [id number] with Expiration Date:  2024  NDC #: Botox 100u ([address]-61)      CONSENT  The risks, benefits, and treatment options were discussed with Jessica Manriquez and she agreed to proceed.    Written consent was obtained by [facility].     EQUIPMENT USED  Needle-25mm stimulating/recording  Needle-30 gauge  EMG/NCS Machine    SKIN PREPARATION  Skin preparation was performed using an alcohol wipe.    GUIDANCE DESCRIPTION  Electro-myographic guidance was necessary throughout the neck portion of the procedure to accurately identify all areas of spastic muscles  while avoiding injection of non-spastic muscles, neighboring nerves and nearby vascular structures.       AREA/MUSCLE INJECTED:  200 UNITS BOTOX = TOTAL DOSE, 2:1 DILUTION    1 & 2. SHOULDER GIRDLE & NECK MUSCLES: 65 units Botox = Total Dose, 2:1 Dilution                               Right Upper Trapezius 15 units of Botox at 3 sites               Left Upper Trapezius 15 units of Botox at 3 sites        Right Splenius  - 5 units of Botox at 1 site/s  Left Splenius - 5 units of Botox at 1 site/s     Right Semispinalis  - 5 units of Botox at 1 site/s  Left Semispinalis - 5 units of Botox at 1 site/s    Left Anterior Scalenes - 5 units of Botox at 1 site/s.                  Right Levator Scapula - 5 units of Botox at 1 site/s.     Left Levator Scapula -  5 units of Botox at 1 site/s.     3. HEAD, JAW & SCALP MUSCLES: 135 units Botox = Total Dose, 2:1 Dilution     Right masseter - 5 units of Botox at 1 site/s.  Left masseter - 5 units of Botox at 1 site/s.     Right Occipitalis (upper and lower) - 20 units of Botox at 3 site/s.  Left Occipitalis (upper and lower) - 20 units of Botox at 3 site/s.    Right Frontalis - 10 units of Botox at 2 site/s  Left Frontalis - 10 units of Botox at 2 site/s     Right Temporalis (upper and lower)  - 25 units of Botox at 5 site/s.  Left Temporalis (upper and lower) - 25 units of Botox at 5 site/s.    Right  - 5 units of Botox at 1 site/s.  Left  - 5 units of Botox at 1 site/s.     Procerus - 5 units of Botox at 1 site/s.        RESPONSE TO PROCEDURE  Jessica Manriquez tolerated the procedure well and there were no immediate complications. She was allowed to recover for an appropriate period of time and was discharged home in stable condition.    ASSESSMENT AND PLAN   1. Botulinum toxin injections: No changes made to Botox dose or distribution today. Patient will continue to monitor response and report at next appointment.   2. Referrals: None.   3. Follow up: Jessica LOZANO  Declan was rescheduled for the next series of injections in 12 weeks, at which time we will evaluate response to today's injections. she may call the clinic prior with any questions or concerns prior to the next appointment.       Sincerely,    Aretha Pichardo MD

## 2022-10-27 ENCOUNTER — OFFICE VISIT (OUTPATIENT)
Dept: FAMILY MEDICINE | Facility: CLINIC | Age: 52
End: 2022-10-27
Payer: COMMERCIAL

## 2022-10-27 VITALS
HEART RATE: 89 BPM | OXYGEN SATURATION: 97 % | TEMPERATURE: 99 F | RESPIRATION RATE: 20 BRPM | DIASTOLIC BLOOD PRESSURE: 76 MMHG | BODY MASS INDEX: 20.28 KG/M2 | WEIGHT: 145.38 LBS | SYSTOLIC BLOOD PRESSURE: 106 MMHG

## 2022-10-27 DIAGNOSIS — G43.909 MIGRAINE WITHOUT STATUS MIGRAINOSUS, NOT INTRACTABLE, UNSPECIFIED MIGRAINE TYPE: ICD-10-CM

## 2022-10-27 DIAGNOSIS — R61 NIGHT SWEATS: ICD-10-CM

## 2022-10-27 PROCEDURE — 99214 OFFICE O/P EST MOD 30 MIN: CPT | Performed by: FAMILY MEDICINE

## 2022-10-27 RX ORDER — SUMATRIPTAN 50 MG/1
50 TABLET, FILM COATED ORAL
Qty: 18 TABLET | Refills: 1 | Status: SHIPPED | OUTPATIENT
Start: 2022-10-27 | End: 2023-08-14

## 2022-10-27 RX ORDER — GABAPENTIN 300 MG/1
300 CAPSULE ORAL AT BEDTIME
Qty: 30 CAPSULE | Refills: 3 | Status: SHIPPED | OUTPATIENT
Start: 2022-10-27 | End: 2022-12-19

## 2022-10-27 NOTE — PATIENT INSTRUCTIONS
"Sign up for an account on [facility]    Send me a message or call my care team regarding having a prescription sent to them after the account is created.      This can result in significant cost savings for you!        Thank you for choosing [facility].      When you are out of refills or the refills say \"zero\", it is time to schedule your next appointment in clinic!    Our Clinic hours are:  Mondays    7:20 am - 7 pm  Tues -  Fri  7:20 am - 5 pm    To speak to the care team, call [phone] option #2      The clinic lab opens at 7:30 am Mon - Fri and appointments are required.    [facility]  Ph. [phone]  Monday-Thursday 8 am - 7pm  Tues/Wed/Fri 8 am - 5:30 pm       "

## 2022-10-27 NOTE — PROGRESS NOTES
Assessment & Plan         Night sweats  Well controlled on the gabapentin  - gabapentin (NEURONTIN) 300 MG capsule; Take 1 capsule (300 mg) by mouth At Bedtime    Migraine without status migrainosus, not intractable, unspecified migraine type  Gets botox injections for these. Was 11 weeks overdue and had her injections on Monday.  - SUMAtriptan (IMITREX) 50 MG tablet; Take 1 tablet (50 mg) by mouth at onset of headache for migraine May repeat dose in 2 hours.  Do not exceed 200 mg in 24 hours    Has physical in December with me already scheduled                 No follow-ups on file.    Su Loya MD  [facility]    Danya Lopez is a 51 year old, presenting for the following health issues:  Migraine      History of Present Illness       Reason for visit:  Right positional arm pain. No known mechanism of injury but sharp pain with certain movements.  Symptom onset:  More than a month  Symptoms include:  Sharp pain when reaching back, sharp pain when arm folded across midsection. Seems to be located on back of arm (triceps/deltoid?). Very sudden and sharp pain unlike a spasm or soreness from activity.  Symptom intensity:  Moderate  Symptom progression:  Staying the same  Had these symptoms before:  No  What makes it worse:  Moving my arm in specific ways creates the sudden pain.  What makes it better:  It doesn't hurt at all unless I move in a few specific ways.    She eats 2-3 servings of fruits and vegetables daily.She consumes 1 sweetened beverage(s) daily.She exercises with enough effort to increase her heart rate 10 to 19 minutes per day.  She exercises with enough effort to increase her heart rate 3 or less days per week.   She is taking medications regularly.       Migraine     Since your last clinic visit, how have your headaches changed?  Worse since she missed a dose of Botox    How often are you getting headaches or migraines? 4-12 per month depending if she gets  Botox     Are you able to do normal daily activities when you have a migraine? Yes    Are you taking rescue/relief medications? (Select all that apply) sumatriptan (Imitrex) and Other: Ultram    How helpful is your rescue/relief medication?  I get some relief    Are you taking any medications to prevent migraines? (Select all that apply)  Other: Botox    In the past 4 weeks, how often have you gone to urgent care or the emergency room because of your headaches?  0    Finally got her botox on Monday - was 11 weeks overdue for that.       Review of Systems   Constitutional, HEENT, cardiovascular, pulmonary, gi and gu systems are negative, except as otherwise noted.      Objective    /76   Pulse 89   Temp 99  F (37.2  C) (Tympanic)   Resp 20   Wt 65.9 kg (145 lb 6 oz)   LMP 10/07/2022 (Exact Date)   SpO2 97%   BMI 20.28 kg/m    Body mass index is 20.28 kg/m .  Physical Exam   GENERAL: healthy, alert and no distress  NECK: no adenopathy, no asymmetry, masses, or scars and thyroid normal to palpation  RESP: lungs clear to auscultation - no rales, rhonchi or wheezes  CV: regular rate and rhythm, normal S1 S2, no S3 or S4, no murmur, click or rub, no peripheral edema and peripheral pulses strong  ABDOMEN: soft, nontender, no hepatosplenomegaly, no masses and bowel sounds normal  MS: no gross musculoskeletal defects noted, no edema

## 2022-11-17 ENCOUNTER — TRANSCRIBE ORDERS (OUTPATIENT)
Dept: OTHER | Age: 52
End: 2022-11-17

## 2022-11-17 ENCOUNTER — TRANSFERRED RECORDS (OUTPATIENT)
Dept: HEALTH INFORMATION MANAGEMENT | Facility: CLINIC | Age: 52
End: 2022-11-17

## 2022-11-17 DIAGNOSIS — M67.911 TENDINOPATHY OF RIGHT ROTATOR CUFF: Primary | ICD-10-CM

## 2022-11-19 ENCOUNTER — HEALTH MAINTENANCE LETTER (OUTPATIENT)
Age: 52
End: 2022-11-19

## 2022-12-09 ENCOUNTER — HOSPITAL ENCOUNTER (OUTPATIENT)
Dept: PHYSICAL THERAPY | Facility: CLINIC | Age: 52
Setting detail: THERAPIES SERIES
Discharge: HOME | End: 2022-12-09
Attending: ORTHOPAEDIC SURGERY
Payer: COMMERCIAL

## 2022-12-09 PROCEDURE — 97110 THERAPEUTIC EXERCISES: CPT | Mod: GP | Performed by: PHYSICAL THERAPIST

## 2022-12-09 PROCEDURE — 97161 PT EVAL LOW COMPLEX 20 MIN: CPT | Mod: GP | Performed by: PHYSICAL THERAPIST

## 2022-12-09 NOTE — PROGRESS NOTES
PHYSICAL THERAPY EVALUATION    12/09/22 1300   General Information   Type of Visit Initial OP Ortho PT Evaluation   Start of Care Date 12/09/22   Referring Physician DR Cerna   Patient/Family Goals Statement get stronger, avoid re-injury   Orders Evaluate and Treat   Date of Order 11/17/22   Certification Required? No   Medical Diagnosis shoulder tendonitis   Body Part(s)   Body Part(s) Shoulder   Presentation and Etiology   Pertinent history of current problem (include personal factors and/or comorbidities that impact the POC) R shoulder pain, began this summer, moved back from [facility], cleaning house, gardening, she hadn't lived in for a year. Did get cortisone shot 11/17 which has helped.  Sx reaching back seat of car, behind back. Not so bad overhead.   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   Current Level of Function   Patient role/employment history A. Employed   Fall Risk Screen   Fall screen completed by PT   Have you fallen 2 or more times in the past year? No   Have you fallen and had an injury in the past year? Yes   Is patient a fall risk? No   Fall screen comments passed out   Abuse Screen (yes response referral indicated)   Physical Signs of Abuse Present no   Shoulder Objective Findings   Side (if bilateral, select both right and left) Right   Rivero-Wesly Test neg   Coracoid Test neg   Accessory Motion/Joint Mobility mild tight post glide R GH   Posture flat mid thoracic, mild anterior shld position   Right Shoulder Flexion AROM WNL   Right Shoulder Flexion PROM WNL   Right Shoulder Abduction AROM WNL   Right Shoulder ER PROM 90* at 90abd   Right Shoulder IR AROM to T9, L to T6-7   Right Shoulder IR PROM 50* at 90abd   Right Shoulder Flexion Strength 5   Right Shoulder ER Strength 5   Right Shoulder IR Strength 5   Right Shoulder Extension Strength 5   Planned Therapy Interventions   Planned Therapy Interventions  manual therapy;strengthening;stretching   Clinical Impression   Criteria for Skilled Therapeutic Interventions Met yes, treatment indicated   PT Diagnosis resolving shoulder tendonitis   Functional limitations due to impairments reaching back seat, hook bra, lifting heavy   Clinical Presentation Stable/Uncomplicated   Clinical Decision Making (Complexity) Low complexity   Therapy Frequency 1 time/week   Predicted Duration of Therapy Intervention (days/wks) see in 3wks likely 2-3 visits total   Risk & Benefits of therapy have been explained Yes   Patient, Family & other staff in agreement with plan of care Yes   Education Assessment   Preferred Learning Style Listening   Barriers to Learning No barriers   ORTHO GOALS   PT Ortho Eval Goals 1;2   Ortho Goal 1   Goal Identifier 1   Goal Description pt kirby be giovanni to hook bra behind back wo pain in 3wk   Target Date 12/30/22   Ortho Goal 2   Goal Identifier 2   Goal Description pt will be giovanni to lift.carry household items w/ pain in 6wk   Target Date 01/20/23   Total Evaluation Time   PT Eval, Low Complexity Minutes (10548) 15   [facility]  Kris Hoenk  PT  [facility]  [address] [place] [address]  [email or web address]   Office: [phone]  Voicemail: [phone]

## 2022-12-19 ENCOUNTER — OFFICE VISIT (OUTPATIENT)
Dept: FAMILY MEDICINE | Facility: CLINIC | Age: 52
End: 2022-12-19
Payer: COMMERCIAL

## 2022-12-19 VITALS
TEMPERATURE: 98.8 F | HEART RATE: 83 BPM | RESPIRATION RATE: 14 BRPM | HEIGHT: 71 IN | DIASTOLIC BLOOD PRESSURE: 72 MMHG | SYSTOLIC BLOOD PRESSURE: 112 MMHG | OXYGEN SATURATION: 98 % | BODY MASS INDEX: 19.77 KG/M2 | WEIGHT: 141.2 LBS

## 2022-12-19 DIAGNOSIS — Z80.3 FAMILY HISTORY OF MALIGNANT NEOPLASM OF BREAST: ICD-10-CM

## 2022-12-19 DIAGNOSIS — M62.838 MUSCLE SPASM: ICD-10-CM

## 2022-12-19 DIAGNOSIS — R61 NIGHT SWEATS: ICD-10-CM

## 2022-12-19 DIAGNOSIS — Z79.899 ENCOUNTER FOR LONG-TERM (CURRENT) USE OF MEDICATIONS: ICD-10-CM

## 2022-12-19 DIAGNOSIS — Z00.00 ROUTINE GENERAL MEDICAL EXAMINATION AT A HEALTH CARE FACILITY: Primary | ICD-10-CM

## 2022-12-19 DIAGNOSIS — R10.2 PELVIC PAIN IN FEMALE: ICD-10-CM

## 2022-12-19 DIAGNOSIS — G43.809 OTHER MIGRAINE WITHOUT STATUS MIGRAINOSUS, NOT INTRACTABLE: ICD-10-CM

## 2022-12-19 DIAGNOSIS — G43.839 INTRACTABLE MENSTRUAL MIGRAINE WITHOUT STATUS MIGRAINOSUS: ICD-10-CM

## 2022-12-19 DIAGNOSIS — Z13.220 SCREENING FOR HYPERLIPIDEMIA: ICD-10-CM

## 2022-12-19 LAB — CREAT UR-MCNC: 56 MG/DL

## 2022-12-19 PROCEDURE — 80307 DRUG TEST PRSMV CHEM ANLYZR: CPT | Performed by: FAMILY MEDICINE

## 2022-12-19 PROCEDURE — 99396 PREV VISIT EST AGE 40-64: CPT | Mod: 25 | Performed by: FAMILY MEDICINE

## 2022-12-19 PROCEDURE — 99213 OFFICE O/P EST LOW 20 MIN: CPT | Mod: 25 | Performed by: FAMILY MEDICINE

## 2022-12-19 RX ORDER — AMITRIPTYLINE HYDROCHLORIDE 10 MG/1
10 TABLET ORAL AT BEDTIME
Qty: 1 TABLET | Refills: 0 | COMMUNITY
Start: 2022-12-19

## 2022-12-19 RX ORDER — TRAMADOL HYDROCHLORIDE 50 MG/1
50-100 TABLET ORAL EVERY 6 HOURS PRN
Qty: 40 TABLET | Refills: 2 | Status: SHIPPED | OUTPATIENT
Start: 2022-12-19 | End: 2023-10-30

## 2022-12-19 RX ORDER — GABAPENTIN 300 MG/1
300 CAPSULE ORAL AT BEDTIME
Qty: 90 CAPSULE | Refills: 3 | Status: SHIPPED | OUTPATIENT
Start: 2022-12-19 | End: 2024-01-15

## 2022-12-19 RX ORDER — DIAZEPAM 10 MG
TABLET ORAL
Qty: 30 TABLET | Refills: 3 | Status: SHIPPED | OUTPATIENT
Start: 2022-12-19 | End: 2023-06-19

## 2022-12-19 RX ORDER — HYDROXYZINE HYDROCHLORIDE 25 MG/1
25-50 TABLET, FILM COATED ORAL EVERY 6 HOURS PRN
Qty: 60 TABLET | Refills: 1 | Status: SHIPPED | OUTPATIENT
Start: 2022-12-19 | End: 2023-10-30

## 2022-12-19 ASSESSMENT — ANXIETY QUESTIONNAIRES
GAD7 TOTAL SCORE: 0
2. NOT BEING ABLE TO STOP OR CONTROL WORRYING: NOT AT ALL
GAD7 TOTAL SCORE: 0
5. BEING SO RESTLESS THAT IT IS HARD TO SIT STILL: NOT AT ALL
1. FEELING NERVOUS, ANXIOUS, OR ON EDGE: NOT AT ALL
7. FEELING AFRAID AS IF SOMETHING AWFUL MIGHT HAPPEN: NOT AT ALL
6. BECOMING EASILY ANNOYED OR IRRITABLE: NOT AT ALL
3. WORRYING TOO MUCH ABOUT DIFFERENT THINGS: NOT AT ALL

## 2022-12-19 ASSESSMENT — ENCOUNTER SYMPTOMS
NERVOUS/ANXIOUS: 0
JOINT SWELLING: 0
FEVER: 0
ABDOMINAL PAIN: 0
HEADACHES: 1
FREQUENCY: 0
WEAKNESS: 0
NAUSEA: 0
DIARRHEA: 0
EYE PAIN: 0
CHILLS: 0
SORE THROAT: 0
ARTHRALGIAS: 0
HEMATURIA: 0
SHORTNESS OF BREATH: 0
PALPITATIONS: 0
MYALGIAS: 1
PARESTHESIAS: 0
CONSTIPATION: 0
HEMATOCHEZIA: 0
DYSURIA: 0
COUGH: 0
DIZZINESS: 0
HEARTBURN: 0
BREAST MASS: 0

## 2022-12-19 ASSESSMENT — PAIN SCALES - GENERAL: PAINLEVEL: NO PAIN (0)

## 2022-12-19 ASSESSMENT — PATIENT HEALTH QUESTIONNAIRE - PHQ9
SUM OF ALL RESPONSES TO PHQ QUESTIONS 1-9: 1
5. POOR APPETITE OR OVEREATING: NOT AT ALL

## 2022-12-19 NOTE — PROGRESS NOTES
SUBJECTIVE:   CC: Jessica is an 52 year old who presents for preventive health visit.   Patient has been advised of split billing requirements and indicates understanding: Yes  Healthy Habits:     Getting at least 3 servings of Calcium per day:  Yes    Bi-annual eye exam:  Yes    Dental care twice a year:  Yes    Sleep apnea or symptoms of sleep apnea:  None    Diet:  Vegetarian/vegan    Frequency of exercise:  1 day/week    Duration of exercise:  Less than 15 minutes    Taking medications regularly:  Yes    Medication side effects:  None    PHQ-2 Total Score: 0    Additional concerns today:  No              Today's PHQ-2 Score:   PHQ-2 ( 1999 [employer]) 12/19/2022   Q1: Little interest or pleasure in doing things 0   Q2: Feeling down, depressed or hopeless 0   PHQ-2 Score 0   PHQ-2 Total Score (12-17 Years)- Positive if 3 or more points; Administer PHQ-A if positive -   Q1: Little interest or pleasure in doing things Not at all   Q2: Feeling down, depressed or hopeless Not at all   PHQ-2 Score 0       Have you ever done Advance Care Planning? (For example, a Health Directive, POLST, or a discussion with a medical provider or your loved ones about your wishes): No, advance care planning information given to patient to review.  Advanced care planning was discussed at today's visit.    Social History     Tobacco Use     Smoking status: Never     Smokeless tobacco: Never   Substance Use Topics     Alcohol use: No         Alcohol Use 12/19/2022   Prescreen: >3 drinks/day or >7 drinks/week? Not Applicable   Prescreen: >3 drinks/day or >7 drinks/week? -       Reviewed orders with patient.  Reviewed health maintenance and updated orders accordingly - Yes  Lab work is in process  Labs reviewed in EPIC    Breast Cancer Screening:    FHS-7:   Breast CA Risk Assessment (FHS-7) 9/3/2022 12/19/2022   Did any of your first-degree relatives have breast or ovarian cancer? Yes Yes   Did any of your relatives have bilateral breast  cancer? Yes No   Did any man in your family have breast cancer? No No   Did any woman in your family have breast and ovarian cancer? No No   Did any woman in your family have breast cancer before age 50 y? Yes Yes   Do you have 2 or more relatives with breast and/or ovarian cancer? Unknown Yes   Do you have 2 or more relatives with breast and/or bowel cancer? No Yes       Mammogram Screening: Recommended annual mammography and annual MRI breast  Pertinent mammograms are reviewed under the imaging tab.    History of abnormal Pap smear: NO - age 30- 65 PAP every 3 years recommended  PAP / HPV Latest Ref Rng & Units 1/26/2021 12/31/2018 2/2/2015   PAP (Historical) - NIL ASC-US(A) NIL   HPV16 NEG:Negative Negative Negative -   HPV18 NEG:Negative Negative Negative -   HRHPV NEG:Negative Negative Negative -     Reviewed and updated as needed this visit by clinical staff   Tobacco  Allergies  Meds  Problems    Fam Hx          Reviewed and updated as needed this visit by Provider      Problems    Fam Hx             Review of Systems   Constitutional: Negative for chills and fever.   HENT: Negative for congestion, ear pain, hearing loss and sore throat.    Eyes: Negative for pain and visual disturbance.   Respiratory: Negative for cough and shortness of breath.    Cardiovascular: Negative for chest pain, palpitations and peripheral edema.   Gastrointestinal: Negative for abdominal pain, constipation, diarrhea, heartburn, hematochezia and nausea.   Breasts:  Negative for tenderness, breast mass and discharge.   Genitourinary: Negative for dysuria, frequency, genital sores, hematuria, pelvic pain, urgency, vaginal bleeding and vaginal discharge.   Musculoskeletal: Positive for myalgias. Negative for arthralgias and joint swelling.   Skin: Negative for rash.   Neurological: Positive for headaches. Negative for dizziness, weakness and paresthesias.   Psychiatric/Behavioral: Negative for mood changes. The patient is not  "nervous/anxious.           OBJECTIVE:   /72 (BP Location: Right arm, Patient Position: Chair, Cuff Size: Adult Regular)   Pulse 83   Temp 98.8  F (37.1  C) (Tympanic)   Resp 14   Ht 1.803 m (5' 11\")   Wt 64 kg (141 lb 3.2 oz)   LMP 10/07/2022   SpO2 98%   BMI 19.69 kg/m    Physical Exam  GENERAL: healthy, alert and no distress  EYES: Eyes grossly normal to inspection, PERRL and conjunctivae and sclerae normal  HENT: ear canals and TM's normal, nose and mouth without ulcers or lesions  NECK: no adenopathy, no asymmetry, masses, or scars and thyroid normal to palpation  RESP: lungs clear to auscultation - no rales, rhonchi or wheezes  BREAST: normal without masses, tenderness or nipple discharge and no palpable axillary masses or adenopathy  CV: regular rate and rhythm, normal S1 S2, no S3 or S4, no murmur, click or rub, no peripheral edema and peripheral pulses strong  ABDOMEN: soft, nontender, no hepatosplenomegaly, no masses and bowel sounds normal   (female): normal female external genitalia, normal urethral meatus , vaginal mucosa pink, moist, well rugated and normal cervix, adnexae, and uterus without masses.  MS: no gross musculoskeletal defects noted, no edema  SKIN: no suspicious lesions or rashes  NEURO: Normal strength and tone, mentation intact and speech normal  PSYCH: mentation appears normal, affect normal/bright    Diagnostic Test Results:  Labs reviewed in Epic    PHQ 12/19/2022   PHQ-9 Total Score 1   Q9: Thoughts of better off dead/self-harm past 2 weeks Not at all     EARNEST-7 SCORE 2/9/2021 7/14/2021 12/19/2022   Total Score 1 (minimal anxiety) 8 (mild anxiety) -   Total Score 1 8 0           ASSESSMENT/PLAN:   (Z00.00) Routine general medical examination at a health care facility  (primary encounter diagnosis)  Comment:    Plan:      (Z37.598) Encounter for long-term (current) use of medications  Comment:    Plan: CSA signed    (D12.226) Screening for hyperlipidemia  Comment:  Will " do as a future lab  Plan: Lipid panel reflex to direct LDL Fasting             (G43.839) Intractable menstrual migraine without status migrainosus  Comment: these are not just menstrual in nature   Sometimes uses the valium if she wakes with pain  Also uses Tramadol prn  Plan: [id number] - Urine Drug Confirmation Panel         (Comprehensive), traMADol (ULTRAM) 50 MG tablet             (R10.2) Pelvic pain in female  Comment: uses intravaginal valium prn  Plan: diazepam (VALIUM) 10 MG tablet             (M62.838) Muscle spasm  Comment:  Takes only 10 mg at bedtime - has 50 mg pills that were prescribed in error- the higher dose caused her to have some urinary retention issues  Plan: amitriptyline (ELAVIL) 10 MG tablet             (R61) Night sweats  Comment:    Plan: gabapentin (NEURONTIN) 300 MG capsule        stable    (G43.809) Other migraine without status migrainosus, not intractable  Comment:    Plan: hydrOXYzine (ATARAX) 25 MG tablet             (Z80.3) Family history of malignant neoplasm of breast  Comment:  Was told to have breast mammogram and MRI alternating every 6 months  Plan: MR Breast Bilateral w/o & w Contrast               Patient has been advised of split billing requirements and indicates understanding: Yes      COUNSELING:  Reviewed preventive health counseling, as reflected in patient instructions       Regular exercise       Healthy diet/nutrition        She reports that she has never smoked. She has never used smokeless tobacco.       Su Loya MD  [facility]

## 2022-12-19 NOTE — LETTER
Opioid / Opioid Plus Controlled Substance Agreement    This is an agreement between you and your provider about the safe and appropriate use of controlled substance/opioids prescribed by your care team. Controlled substances are medicines that can cause physical and mental dependence (abuse).    There are strict laws about having and using these medicines. We here at [facility] are committing to working with you in your efforts to get better. To support you in this work, we ll help you schedule regular office appointments for medicine refills. If we must cancel or change your appointment for any reason, we ll make sure you have enough medicine to last until your next appointment.     As a Provider, I will:    Listen carefully to your concerns and treat you with respect.     Recommend a treatment plan that I believe is in your best interest. This plan may involve therapies other than opioid pain medication.     Talk with you often about the possible benefits, and the risk of harm of any medicine that we prescribe for you.     Provide a plan on how to taper (discontinue or go off) using this medicine if the decision is made to stop its use.    As a Patient, I understand that opioid(s):     Are a controlled substance prescribed by my care team to help me function or work and manage my condition(s).     Are strong medicines and can cause serious side effects such as:    Drowsiness, which can seriously affect my driving ability    A lower breathing rate, enough to cause death    Harm to my thinking ability     Depression     Abuse of and addiction to this medicine    Need to be taken exactly as prescribed. Combining opioids with certain medicines or chemicals (such as illegal drugs, sedatives, sleeping pills, and benzodiazepines) can be dangerous or even fatal. If I stop opioids suddenly, I may have severe withdrawal symptoms.    Do not work for all types of pain nor for all patients. If they re not helpful, I may  be asked to stop them.    I am also being prescribed a benzodiazepine (tranquilizer) controlled substance: I understand this type of medicine is sedating and can increase the risk of death when taken together with opioids. I have talked to my care team about having prescription Narcan available for reversing the opioid medicine and have been instructed in its use. I will be very careful to take my medicines only as directed    The risks, benefits and side effects of these medicine(s) were explained to me. I agree that:  1. I will take part in other treatments as advised by my care team. This may be psychiatry or counseling, physical therapy, behavioral therapy, group treatment or a referral to a specialist.     2. I will keep all my appointments. I understand that this is part of the monitoring of opioids. My care team may require an office visit for EVERY opioid/controlled substance refill. If I miss appointments or don t follow instructions, my care team may stop my medicine.    3. I will take my medicines as prescribed. I will not change the dose or schedule unless my care team tells me to. There will be no refills if I run out early.     4. I may be asked to come to the clinic and complete a urine drug test or complete a pill count at any time. If I don t give a urine sample or participate in a pill count, the care team may stop my medicine.    5. I will only receive prescriptions from this clinic for chronic pain. If I am treated by another provider for acute pain issues, I will tell them that I am taking opioid pain medication for chronic pain and that I have a treatment agreement with this provider. I will inform my [facility] care team within one business day if I am given a prescription for any pain medication by another healthcare provider. My [facility] care team can contact other providers and pharmacists about my use of any medicines.    6. It is up to me to make sure that I don t run out  of my medicines on weekends or holidays. If my care team is willing to refill my opioid prescription without a visit, I must request refills only during office hours. Refills may take up to 3 business days to process. I will use one pharmacy to fill all my opioid and other controlled substance prescriptions. I will notify the clinic about any changes to my insurance or medication availability.    7. I am responsible for my prescriptions. If the medicine/prescription is lost, stolen or destroyed, it will not be replaced. I also agree not to share controlled substance medicines with anyone.    8. I am aware I should not use any illegal or recreational drugs. I agree not to drink alcohol unless my care team says I can.       9. If I enroll in the [place] [facility] program, I will tell my care team prior to my next refill.     10. I will tell my care team right away if I become pregnant, have a new medical problem treated outside of my regular clinic, or have a change in my medications.    11. I understand that this medicine can affect my thinking, judgment and reaction time. Alcohol and drugs affect the brain and body, which can affect the safety of my driving. Being under the influence of alcohol or drugs can affect my decision-making, behaviors, personal safety, and the safety of others. Driving while impaired (DWI) can occur if a person is driving, operating, or in physical control of a car, motorcycle, boat, snowmobile, ATV, motorbike, off-road vehicle, or any other motor vehicle ([place] Statute 169A.20). I understand the risk if I choose to drive or operate any vehicle or machinery.    I understand that if I do not follow any of the conditions above, my prescriptions or treatment may be stopped or changed.          Opioids  What You Need to Know    What are opioids?   Opioids are pain medicines that must be prescribed by a doctor. They are also known as narcotics.     Examples are:   1. morphine (MS Contin,  Naomi)  2. oxycodone (Oxycontin)  3. oxycodone and acetaminophen (Percocet)  4. hydrocodone and acetaminophen (Vicodin, Norco)   5. fentanyl patch (Duragesic)   6. hydromorphone (Dilaudid)   7. methadone  8. codeine (Tylenol #3)     What do opioids do well?   Opioids are best for severe short-term pain such as after a surgery or injury. They may work well for cancer pain. They may help some people with long-lasting (chronic) pain.     What do opioids NOT do well?   Opioids never get rid of pain entirely, and they don t work well for most patients with chronic pain. Opioids don t reduce swelling, one of the causes of pain.                                    Other ways to manage chronic pain and improve function include:       Treat the health problem that may be causing pain    Anti-inflammation medicines, which reduce swelling and tenderness, such as ibuprofen (Advil, Motrin) or naproxen (Aleve)    Acetaminophen (Tylenol)    Antidepressants and anti-seizure medicines, especially for nerve pain    Topical treatments such as patches or creams    Injections or nerve blocks    Chiropractic or osteopathic treatment    Acupuncture, massage, deep breathing, meditation, visual imagery, aromatherapy    Use heat or ice at the pain site    Physical therapy     Exercise    Stop smoking    Take part in therapy       Risks and side effects     Talk to your doctor before you start or decide to keep taking opioids. Possible side effects include:      Lowering your breathing rate enough to cause death    Overdose, including death, especially if taking higher than prescribed doses    Worse depression symptoms; less pleasure in things you usually enjoy    Feeling tired or sluggish    Slower thoughts or cloudy thinking    Being more sensitive to pain over time; pain is harder to control    Trouble sleeping or restless sleep    Changes in hormone levels (for example, less testosterone)    Changes in sex drive or ability to have  sex    Constipation    Unsafe driving    Itching and sweating    Dizziness    Nausea, throwing up and dry mouth    What else should I know about opioids?    Opioids may lead to dependence, tolerance, or addiction.      Dependence means that if you stop or reduce the medicine too quickly, you will have withdrawal symptoms. These include loose poop (diarrhea), jitters, flu-like symptoms, nervousness and tremors. Dependence is not the same as addiction.                       Tolerance means needing higher doses over time to get the same effect. This may increase the chance of serious side effects.      Addiction is when people improperly use a substance that harms their body, their mind or their relations with others. Use of opiates can cause a relapse of addiction if you have a history of drug or alcohol abuse.      People who have used opioids for a long time may have a lower quality of life, worse depression, higher levels of pain and more visits to doctors.    You can overdose on opioids. Take these steps to lower your risk of overdose:    1. Recognize the signs:  Signs of overdose include decrease or loss of consciousness (blackout), slowed breathing, trouble waking up and blue lips. If someone is worried about overdose, they should call 911.    2. Talk to your doctor about Narcan (naloxone).   If you are at risk for overdose, you may be given a prescription for Narcan. This medicine very quickly reverses the effects of opioids.   If you overdose, a friend or family member can give you Narcan while waiting for the ambulance. They need to know the signs of overdose and how to give Narcan.     3. Don't use alcohol or street drugs.   Taking them with opioids can cause death.    4. Do not take any of these medicines unless your doctor says it s OK. Taking these with opioids can cause death:    Benzodiazepines, such as lorazepam (Ativan), alprazolam (Xanax) or diazepam (Valium)    Muscle relaxers, such as  cyclobenzaprine (Flexeril)    Sleeping pills like zolpidem (Ambien)     Other opioids      How to keep you and other people safe while taking opioids:    1. Never share your opioids with others.  Opioid medicines are regulated by the Drug Enforcement Agency ([place]). Selling or sharing medications is a criminal act.    2. Be sure to store opioids in a secure place, locked up if possible. Young children can easily swallow them and overdose.    3. When you are traveling with your medicines, keep them in the original bottles. If you use a pill box, be sure you also carry a copy of your medicine list from your clinic or pharmacy.    4. Safe disposal of opioids    Most pharmacies have places to get rid of medicine, called disposal kiosks. Medicine disposal options are also available in every [facility]. Search your county and  medication disposal  to find more options. You can find more details at:  https://[email or web address].[email or web address].[email or web address]./living-green/managing-unwanted-medications     I agree that my provider, clinic care team, and pharmacy may work with any city, state or federal law enforcement agency that investigates the misuse, sale, or other diversion of my controlled medicine. I will allow my provider to discuss my care with, or share a copy of, this agreement with any other treating provider, pharmacy or emergency room where I receive care.    I have read this agreement and have asked questions about anything I did not understand.    _______________________________________________________  Patient Signature - Jessica Manriquez _____________________                   Date     _______________________________________________________  Provider Signature - Su Loya MD   _____________________                   Date     _______________________________________________________  Witness Signature (required if provider not present while patient signing)   _____________________                   Date

## 2022-12-21 LAB
GABAPENTIN UR QL CFM: PRESENT
NORDIAZEPAM UR QL CFM: PRESENT
OXAZEPAM UR QL CFM: PRESENT
TEMAZEPAM UR QL CFM: PRESENT

## 2022-12-28 ENCOUNTER — OFFICE VISIT (OUTPATIENT)
Dept: PHYSICAL MEDICINE AND REHAB | Facility: CLINIC | Age: 52
End: 2022-12-28
Payer: COMMERCIAL

## 2022-12-28 DIAGNOSIS — G43.719 CHRONIC MIGRAINE WITHOUT AURA, INTRACTABLE, WITHOUT STATUS MIGRAINOSUS: Primary | ICD-10-CM

## 2022-12-28 PROCEDURE — 64615 CHEMODENERV MUSC MIGRAINE: CPT | Performed by: PHYSICAL MEDICINE & REHABILITATION

## 2022-12-28 PROCEDURE — 95874 GUIDE NERV DESTR NEEDLE EMG: CPT | Performed by: PHYSICAL MEDICINE & REHABILITATION

## 2022-12-28 RX ORDER — BUPIVACAINE HYDROCHLORIDE 5 MG/ML
4 INJECTION, SOLUTION EPIDURAL; INTRACAUDAL ONCE
Status: COMPLETED | OUTPATIENT
Start: 2022-12-28 | End: 2022-12-28

## 2022-12-28 RX ADMIN — BUPIVACAINE HYDROCHLORIDE 20 MG: 5 INJECTION, SOLUTION EPIDURAL; INTRACAUDAL at 16:09

## 2022-12-28 NOTE — PROGRESS NOTES
[facility]– [place]     PM&R CLINIC NOTE  BOTULINUM TOXIN PROCEDURE      HPI  No chief complaint on file.    Jessica Manriquez is a 52 year old female with a history of intractable migraine headaches who presents to clinic for botulinum toxin injections for management of chronic migraine headaches.     SINCE LAST VISIT  Jessica Manriquez was last seen here in clinic on 10/24/2022, at which time she received 200 units of Botox.    Patient denies new medical diagnoses, illnesses, hospitalizations, emergency room visits, and injuries since the previous injection with botulinum neurotoxin.    RESPONSE TO PREVIOUS TREATMENT    Side effects: No problems reported  Post-procedural headache: Yes, she did have a headache that started following Botox injections which lasted into the following day.     1.  Headache frequency during this injection cycle:  In October, she experienced 10 headache days, In November she experienced 9 headache days, and in December she has had 5 headache days.  This is compared to her baseline headache frequency of >15 headache days per month.     2.  Headache duration during this injection cycle:  Headache duration ranged from 5 hours to 3 days. Patient reports at least one episode of multiple day headaches during this injection cycle which happened more recently.     3.  Headache intensity during this injection cycle:    A.  4/10  =  Typical pain level.  B.  9/10  =  Worst pain level.  C.  0/10  =  Lowest pain level.    4.  Change in headache medication usage during this injection cycle:  (For Example:  Able to decrease use of oral pain medications.) No, she uses Imitrex and Tramadol for migraine rescue medications.     5.  ER Visits During This Injection Cycle:  None.     6.  Functional Performance:  Change in ADL's, social interaction, days lost from work, etc. Patient reports being able to more fully participate in social and family activities and responsibilities as  headache symptoms have improved      PHYSICAL EXAM  VS: There were no vitals taken for this visit.   GEN: Pleasant and cooperative, in no acute distress  HEENT: No facial asymmetry    ALLERGIES  Allergies   Allergen Reactions     Darvocet [Acetaminophen] Nausea and Vomiting     Erythromycin GI Disturbance     Flexeril [Cyclobenzaprine Hcl]      Lightheadedness, dizzy       Serotonin Reuptake Inhibitors Anxiety, Other (See Comments) and Palpitations       CURRENT MEDICATIONS    Current Outpatient Medications:      ALPRAZolam (XANAX) 0.5 MG tablet, Take 1 tablet (0.5 mg) by mouth every 6 hours as needed for anxiety, Disp: 40 tablet, Rfl: 0     amitriptyline (ELAVIL) 10 MG tablet, Take 1 tablet (10 mg) by mouth At Bedtime, Disp: 1 tablet, Rfl: 0     aspirin-acetaminophen-caffeine (EXCEDRIN MIGRAINE) 250-250-65 MG per tablet, Take 1 tablet by mouth every 6 hours as needed for pain., Disp: 30 tablet, Rfl: 0     diazepam (VALIUM) 10 MG tablet, Place 1 tab vaginally for muscle spasms, Disp: 30 tablet, Rfl: 3     ferrous sulfate (FEROSUL) 325 (65 Fe) MG tablet, Take 325 mg by mouth daily (with breakfast), Disp: , Rfl:      gabapentin (NEURONTIN) 300 MG capsule, Take 1 capsule (300 mg) by mouth At Bedtime, Disp: 90 capsule, Rfl: 3     hydrOXYzine (ATARAX) 25 MG tablet, Take 1-2 tablets (25-50 mg) by mouth every 6 hours as needed for itching or anxiety, Disp: 60 tablet, Rfl: 1     Lactobacillus (ACIDOPHILUS) 100 MG CAPS, Take 1 capsule by mouth daily, Disp: , Rfl:      Loperamide HCl (IMODIUM A-D PO), Take 1 tablet by mouth once as needed, Disp: , Rfl:      MULTI-VITAMIN OR TABS, 1 tablet by mouth DAILY, Disp: , Rfl:      OnabotulinumtoxinA (BOTOX IJ), Inject 200 Units as directed Total dose 200 units  Dose Administered:  175 units  Botox (Botulinum Toxin Type A)       2:1 Dilution (55 units unavoidable waste) Unavoidable waste 25 units  Diluent Used:  Preservative Free Normal Saline Total Volume of Diluent Used:  2.9 ml Lot  # /C3 with Expiration Date:  2021 NDC #: Botox 100u ([address]-39), Disp: , Rfl:      ondansetron (ZOFRAN ODT) 4 MG ODT tab, Take 1-2 tablets (4-8 mg) by mouth every 8 hours as needed for nausea, Disp: 20 tablet, Rfl: 3     SUMAtriptan (IMITREX) 50 MG tablet, Take 1 tablet (50 mg) by mouth at onset of headache for migraine May repeat dose in 2 hours.  Do not exceed 200 mg in 24 hours, Disp: 18 tablet, Rfl: 1     traMADol (ULTRAM) 50 MG tablet, Take 1-2 tablets ( mg) by mouth every 6 hours as needed for pain, Disp: 40 tablet, Rfl: 2     valACYclovir (VALTREX) 500 MG tablet, Take 1 tablet (500 mg) by mouth daily, Disp: 90 tablet, Rfl: 3     vitamin (B COMPLEX-C) tablet, Take 1 tablet by mouth daily, Disp: , Rfl:      VITAMIN C 500 MG OR TABS, 1 tablet daily, Disp: , Rfl:      VITAMIN D, CHOLECALCIFEROL, PO, Take 1,000 Units by mouth daily, Disp: , Rfl:      Vitamin Mixture (VITAMIN E COMPLETE PO), , Disp: , Rfl:      zolpidem (AMBIEN) 5 MG tablet, Take 1 tablet (5 mg) by mouth nightly as needed for sleep, Disp: 90 tablet, Rfl: 1    Current Facility-Administered Medications:      botulinum toxin type A (BOTOX) 100 units injection 200 Units, 200 Units, Intramuscular, Q10 Weeks, Standal, Aretha Hahn MD, 200 Units at 10/24/22 0938       BOTULINUM NEUROTOXIN INJECTION PROCEDURES    VERIFICATION OF PATIENT IDENTIFICATION AND PROCEDURE     Initials   Patient Name SES   Patient  SES   Procedure Verified by: SES     Prior to the start of the procedure and with procedural staff participation, I verbally confirmed the patient s identity using two indicators, relevant allergies, that the procedure was appropriate and matched the consent or emergent situation, and that the correct equipment/implants were available. Immediately prior to starting the procedure I conducted the Time Out with the procedural staff and re-confirmed the patient s name, procedure, and site/side. (The [facility] universal  protocol was followed.)  Yes    Sedation (Moderate or Deep): None    ABOVE ASSESSMENTS PERFORMED BY    Aretha Pichardo MD      INDICATIONS FOR PROCEDURES  Jessica Manriquez is a 52 year old patient with intractable pain secondary to the diagnosis of chronic migraine headaches. Her baseline symptoms have been recalcitrant to oral medications and conservative therapy.  She is here today for reinjection with Botox.    GOAL OF PROCEDURE  The goal of this procedure is to decrease pain.      TOTAL DOSE ADMINISTERED  Dose Administered:  200 units  Botox (Botulinum Toxin Type A)       2:1 Dilution   Unavoidable Drug Waste: No  Diluent Used:  0.5% bupivacaine  Total Volume of Diluent Used:  4 ml  NDC #: Botox 100u ([address]-18)      CONSENT  The risks, benefits, and treatment options were discussed with Jessica Manriquez and she agreed to proceed.    Written consent was obtained by [facility].     EQUIPMENT USED  Needle-25mm stimulating/recording  Needle-30 gauge  EMG/NCS Machine    SKIN PREPARATION  Skin preparation was performed using an alcohol wipe.    GUIDANCE DESCRIPTION  Electro-myographic guidance was necessary throughout the neck portion of the procedure to accurately identify all areas of spastic muscles while avoiding injection of non-spastic muscles, neighboring nerves and nearby vascular structures.       AREA/MUSCLE INJECTED:  200 UNITS BOTOX = TOTAL DOSE, 2:1 DILUTION    1 & 2. SHOULDER GIRDLE & NECK MUSCLES: 65 units Botox = Total Dose, 2:1 Dilution                               Right Upper Trapezius 15 units of Botox at 3 sites               Left Upper Trapezius 15 units of Botox at 3 sites        Right Splenius  - 5 units of Botox at 1 site/s  Left Splenius - 5 units of Botox at 1 site/s     Right Semispinalis  - 5 units of Botox at 1 site/s  Left Semispinalis - 5 units of Botox at 1 site/s    Left Anterior Scalenes - 5 units of Botox at 1 site/s.                  Right Levator Scapula - 5 units of Botox at 1  site/s.     Left Levator Scapula -  5 units of Botox at 1 site/s.     3. HEAD, JAW & SCALP MUSCLES: 135 units Botox = Total Dose, 2:1 Dilution     Right masseter - 5 units of Botox at 1 site/s.  Left masseter - 5 units of Botox at 1 site/s.     Right Occipitalis (upper and lower) - 20 units of Botox at 3 site/s.  Left Occipitalis (upper and lower) - 20 units of Botox at 3 site/s.    Right Frontalis - 10 units of Botox at 2 site/s  Left Frontalis - 10 units of Botox at 2 site/s     Right Temporalis (upper and lower)  - 25 units of Botox at 5 site/s.  Left Temporalis (upper and lower) - 25 units of Botox at 5 site/s.    Right  - 5 units of Botox at 1 site/s.  Left  - 5 units of Botox at 1 site/s.     Procerus - 5 units of Botox at 1 site/s.      RESPONSE TO PROCEDURE  Jessica Manriquez tolerated the procedure well and there were no immediate complications. She was allowed to recover for an appropriate period of time and was discharged home in stable condition.    ASSESSMENT AND PLAN   1. Botulinum toxin injections: No changes made to Botox dose or distribution today. Patient will continue to monitor response and report at next appointment.   2. Referrals: None.   3. Follow up: Jessica Manriquez was rescheduled for the next series of injections in 12 weeks, at which time we will evaluate response to today's injections. she may call the clinic prior with any questions or concerns prior to the next appointment.

## 2022-12-28 NOTE — LETTER
12/28/2022       RE: Jessica Manriquez  [address]  [place] [place] [id number]     Dear Colleague,    Thank you for referring your patient, Jessica Manriquez, to the [facility]IN[facility] [place] [facility]. Please see a copy of my visit note below.      [facility]     PM&R CLINIC NOTE  BOTULINUM TOXIN PROCEDURE      HPI  No chief complaint on file.    Jessica Manriquez is a 52 year old female with a history of intractable migraine headaches who presents to clinic for botulinum toxin injections for management of chronic migraine headaches.     SINCE LAST VISIT  Jessica Manriquez was last seen here in clinic on 10/24/2022, at which time she received 200 units of Botox.    Patient denies new medical diagnoses, illnesses, hospitalizations, emergency room visits, and injuries since the previous injection with botulinum neurotoxin.    RESPONSE TO PREVIOUS TREATMENT    Side effects: No problems reported  Post-procedural headache: Yes, she did have a headache that started following Botox injections which lasted into the following day.     1.  Headache frequency during this injection cycle:  In October, she experienced 10 headache days, In November she experienced 9 headache days, and in December she has had 5 headache days.  This is compared to her baseline headache frequency of >15 headache days per month.     2.  Headache duration during this injection cycle:  Headache duration ranged from 5 hours to 3 days. Patient reports at least one episode of multiple day headaches during this injection cycle which happened more recently.     3.  Headache intensity during this injection cycle:    A.  4/10  =  Typical pain level.  B.  9/10  =  Worst pain level.  C.  0/10  =  Lowest pain level.    4.  Change in headache medication usage during this injection cycle:  (For Example:  Able to decrease use of  oral pain medications.) No, she uses Imitrex and Tramadol for migraine rescue medications.     5.  ER Visits During This Injection Cycle:  None.     6.  Functional Performance:  Change in ADL's, social interaction, days lost from work, etc. Patient reports being able to more fully participate in social and family activities and responsibilities as headache symptoms have improved      PHYSICAL EXAM  VS: There were no vitals taken for this visit.   GEN: Pleasant and cooperative, in no acute distress  HEENT: No facial asymmetry    ALLERGIES  Allergies   Allergen Reactions     Darvocet [Acetaminophen] Nausea and Vomiting     Erythromycin GI Disturbance     Flexeril [Cyclobenzaprine Hcl]      Lightheadedness, dizzy       Serotonin Reuptake Inhibitors Anxiety, Other (See Comments) and Palpitations       CURRENT MEDICATIONS    Current Outpatient Medications:      ALPRAZolam (XANAX) 0.5 MG tablet, Take 1 tablet (0.5 mg) by mouth every 6 hours as needed for anxiety, Disp: 40 tablet, Rfl: 0     amitriptyline (ELAVIL) 10 MG tablet, Take 1 tablet (10 mg) by mouth At Bedtime, Disp: 1 tablet, Rfl: 0     aspirin-acetaminophen-caffeine (EXCEDRIN MIGRAINE) 250-250-65 MG per tablet, Take 1 tablet by mouth every 6 hours as needed for pain., Disp: 30 tablet, Rfl: 0     diazepam (VALIUM) 10 MG tablet, Place 1 tab vaginally for muscle spasms, Disp: 30 tablet, Rfl: 3     ferrous sulfate (FEROSUL) 325 (65 Fe) MG tablet, Take 325 mg by mouth daily (with breakfast), Disp: , Rfl:      gabapentin (NEURONTIN) 300 MG capsule, Take 1 capsule (300 mg) by mouth At Bedtime, Disp: 90 capsule, Rfl: 3     hydrOXYzine (ATARAX) 25 MG tablet, Take 1-2 tablets (25-50 mg) by mouth every 6 hours as needed for itching or anxiety, Disp: 60 tablet, Rfl: 1     Lactobacillus (ACIDOPHILUS) 100 MG CAPS, Take 1 capsule by mouth daily, Disp: , Rfl:      Loperamide HCl (IMODIUM A-D PO), Take 1 tablet by mouth once as needed, Disp: , Rfl:      MULTI-VITAMIN OR TABS, 1  tablet by mouth DAILY, Disp: , Rfl:      OnabotulinumtoxinA (BOTOX IJ), Inject 200 Units as directed Total dose 200 units  Dose Administered:  175 units  Botox (Botulinum Toxin Type A)       2:1 Dilution (55 units unavoidable waste) Unavoidable waste 25 units  Diluent Used:  Preservative Free Normal Saline Total Volume of Diluent Used:  2.9 ml Lot # /C3 with Expiration Date:  2021 NDC #: Botox 100u ([address]-12), Disp: , Rfl:      ondansetron (ZOFRAN ODT) 4 MG ODT tab, Take 1-2 tablets (4-8 mg) by mouth every 8 hours as needed for nausea, Disp: 20 tablet, Rfl: 3     SUMAtriptan (IMITREX) 50 MG tablet, Take 1 tablet (50 mg) by mouth at onset of headache for migraine May repeat dose in 2 hours.  Do not exceed 200 mg in 24 hours, Disp: 18 tablet, Rfl: 1     traMADol (ULTRAM) 50 MG tablet, Take 1-2 tablets ( mg) by mouth every 6 hours as needed for pain, Disp: 40 tablet, Rfl: 2     valACYclovir (VALTREX) 500 MG tablet, Take 1 tablet (500 mg) by mouth daily, Disp: 90 tablet, Rfl: 3     vitamin (B COMPLEX-C) tablet, Take 1 tablet by mouth daily, Disp: , Rfl:      VITAMIN C 500 MG OR TABS, 1 tablet daily, Disp: , Rfl:      VITAMIN D, CHOLECALCIFEROL, PO, Take 1,000 Units by mouth daily, Disp: , Rfl:      Vitamin Mixture (VITAMIN E COMPLETE PO), , Disp: , Rfl:      zolpidem (AMBIEN) 5 MG tablet, Take 1 tablet (5 mg) by mouth nightly as needed for sleep, Disp: 90 tablet, Rfl: 1    Current Facility-Administered Medications:      botulinum toxin type A (BOTOX) 100 units injection 200 Units, 200 Units, Intramuscular, Q10 Weeks, Standal, Aretha Hahn MD, 200 Units at 10/24/22 0938       BOTULINUM NEUROTOXIN INJECTION PROCEDURES    VERIFICATION OF PATIENT IDENTIFICATION AND PROCEDURE     Initials   Patient Name SES   Patient  SES   Procedure Verified by: SES     Prior to the start of the procedure and with procedural staff participation, I verbally confirmed the patient s identity using two indicators,  relevant allergies, that the procedure was appropriate and matched the consent or emergent situation, and that the correct equipment/implants were available. Immediately prior to starting the procedure I conducted the Time Out with the procedural staff and re-confirmed the patient s name, procedure, and site/side. (The Joint Commission universal protocol was followed.)  Yes    Sedation (Moderate or Deep): None    ABOVE ASSESSMENTS PERFORMED BY    Aretha Pichardo MD      INDICATIONS FOR PROCEDURES  Jessica Manriquez is a 52 year old patient with intractable pain secondary to the diagnosis of chronic migraine headaches. Her baseline symptoms have been recalcitrant to oral medications and conservative therapy.  She is here today for reinjection with Botox.    GOAL OF PROCEDURE  The goal of this procedure is to decrease pain.      TOTAL DOSE ADMINISTERED  Dose Administered:  200 units  Botox (Botulinum Toxin Type A)       2:1 Dilution   Unavoidable Drug Waste: No  Diluent Used:  0.5% bupivacaine  Total Volume of Diluent Used:  4 ml  NDC #: Botox 100u ([address]-30)      CONSENT  The risks, benefits, and treatment options were discussed with Jessica Manriquez and she agreed to proceed.    Written consent was obtained by [facility].     EQUIPMENT USED  Needle-25mm stimulating/recording  Needle-30 gauge  EMG/NCS Machine    SKIN PREPARATION  Skin preparation was performed using an alcohol wipe.    GUIDANCE DESCRIPTION  Electro-myographic guidance was necessary throughout the neck portion of the procedure to accurately identify all areas of spastic muscles while avoiding injection of non-spastic muscles, neighboring nerves and nearby vascular structures.       AREA/MUSCLE INJECTED:  200 UNITS BOTOX = TOTAL DOSE, 2:1 DILUTION    1 & 2. SHOULDER GIRDLE & NECK MUSCLES: 65 units Botox = Total Dose, 2:1 Dilution                               Right Upper Trapezius 15 units of Botox at 3 sites               Left Upper Trapezius 15 units  of Botox at 3 sites        Right Splenius  - 5 units of Botox at 1 site/s  Left Splenius - 5 units of Botox at 1 site/s     Right Semispinalis  - 5 units of Botox at 1 site/s  Left Semispinalis - 5 units of Botox at 1 site/s    Left Anterior Scalenes - 5 units of Botox at 1 site/s.                  Right Levator Scapula - 5 units of Botox at 1 site/s.     Left Levator Scapula -  5 units of Botox at 1 site/s.     3. HEAD, JAW & SCALP MUSCLES: 135 units Botox = Total Dose, 2:1 Dilution     Right masseter - 5 units of Botox at 1 site/s.  Left masseter - 5 units of Botox at 1 site/s.     Right Occipitalis (upper and lower) - 20 units of Botox at 3 site/s.  Left Occipitalis (upper and lower) - 20 units of Botox at 3 site/s.    Right Frontalis - 10 units of Botox at 2 site/s  Left Frontalis - 10 units of Botox at 2 site/s     Right Temporalis (upper and lower)  - 25 units of Botox at 5 site/s.  Left Temporalis (upper and lower) - 25 units of Botox at 5 site/s.    Right  - 5 units of Botox at 1 site/s.  Left  - 5 units of Botox at 1 site/s.     Procerus - 5 units of Botox at 1 site/s.      RESPONSE TO PROCEDURE  Jessica Manriquez tolerated the procedure well and there were no immediate complications. She was allowed to recover for an appropriate period of time and was discharged home in stable condition.    ASSESSMENT AND PLAN   1. Botulinum toxin injections: No changes made to Botox dose or distribution today. Patient will continue to monitor response and report at next appointment.   2. Referrals: None.   3. Follow up: Jessica Manriquez was rescheduled for the next series of injections in 12 weeks, at which time we will evaluate response to today's injections. she may call the clinic prior with any questions or concerns prior to the next appointment.         Sincerely,    Aretha Pichardo MD

## 2023-02-10 ENCOUNTER — MYC MEDICAL ADVICE (OUTPATIENT)
Dept: FAMILY MEDICINE | Facility: CLINIC | Age: 53
End: 2023-02-10
Payer: COMMERCIAL

## 2023-02-23 NOTE — PROGRESS NOTES
PHYSICAL THERAPY DISCHARGE        Patient was seen one time. Pt did not attend a final session  No further contacts have been made.  Will discharge this episode of care.    Kris Hoenk, PT #[id number]  [facility]

## 2023-03-13 ENCOUNTER — OFFICE VISIT (OUTPATIENT)
Dept: DERMATOLOGY | Facility: CLINIC | Age: 53
End: 2023-03-13
Payer: COMMERCIAL

## 2023-03-13 DIAGNOSIS — L82.1 SEBORRHEIC KERATOSIS: ICD-10-CM

## 2023-03-13 DIAGNOSIS — L81.4 LENTIGO: Primary | ICD-10-CM

## 2023-03-13 DIAGNOSIS — D23.9 DERMAL NEVUS: ICD-10-CM

## 2023-03-13 DIAGNOSIS — D18.01 ANGIOMA OF SKIN: ICD-10-CM

## 2023-03-13 PROCEDURE — 99213 OFFICE O/P EST LOW 20 MIN: CPT | Performed by: DERMATOLOGY

## 2023-03-13 ASSESSMENT — PAIN SCALES - GENERAL: PAINLEVEL: NO PAIN (0)

## 2023-03-13 NOTE — PROGRESS NOTES
Jessica Manriquez is an extremely pleasant 52 year old year old female patient here today for brown spots on skin.   .   Patient states this has been present for a while.  Patient reports the following symptoms:  none.  Patient reports the following previous treatments none.  These treatments did not work.  Patient reports the following modifying factors none.  Associated symptoms: none.  Patient has no other skin complaints today.  Remainder of the HPI, Meds, PMH, Allergies, FH, and SH was reviewed in chart.      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     Selective IgA immunodeficiency (H)     recurrent respir infections     Temporomandibular joint disorders, unspecified     Seen at [facility], uses splint, PT , acupuncture       Past Surgical History:   Procedure Laterality Date     HC ENLARGE BREAST WITH IMPLANT  01/01/2000     HC REVISE BREAST RECONSTRUCTION  05/01/2003     LEEP TX, CERVICAL  11/01/2001    SALOMÓN 3, yearly paps until 2021     LIGATN/STRIP LONG & SHORT SAPHEN  11/01/2004    Bilateral vein stripping     TUBAL LIGATION  10/30/2009    evangelina clips        Family History   Problem Relation Age of Onset     Arthritis Mother      Respiratory Mother         asthma     Allergies Mother      Cancer Mother      Blood Disease Mother         leukemia     Breast Cancer Mother         age 79     Depression Mother      Anxiety Disorder Mother      Asthma Mother      Obesity Mother      Breast Cancer Maternal Grandmother         age 60 (she lived to 98)     Heart Disease Maternal Grandmother      Coronary Artery Disease  Maternal Grandmother      Mental Illness Maternal Grandmother         Depression     Cerebrovascular Disease Paternal Grandmother      Cancer Paternal Grandfather         ?biliary     Alcohol/Drug Paternal Grandfather      Other Cancer Paternal Grandfather         gall bladder  age 55?     Substance Abuse Paternal Grandfather      Allergies Sister      Breast Cancer Sister         age 53     Hyperlipidemia Father      Breast Cancer Cousin         Bilateral mastectomy age 46 approx     Other Cancer Cousin         Uterine discovered after gave birth (smoker)     Other Cancer Other         Bladder and mesothelioma (exposure during [place] and smoker)     Other Cancer Other         Esophageal cancer (smoker and work environ. exposure)     Other Cancer Other         Bladder (smoker)     Substance Abuse Other         drugs and alcohol       Social History     Socioeconomic History     Marital status: Single     Spouse name: Not on file     Number of children: 0     Years of education: Not on file     Highest education level: Not on file   Occupational History     Occupation: Health      Employer: [employer]     Occupation: Author of book   Tobacco Use     Smoking status: Never     Smokeless tobacco: Never   Vaping Use     Vaping Use: Never used   Substance and Sexual Activity     Alcohol use: No     Drug use: No     Sexual activity: Not Currently     Partners: Male     Birth control/protection: Condom, Female Surgical     Comment: tubal   Other Topics Concern     Parent/sibling w/ CABG, MI or angioplasty before 65F 55M? No   Social History Narrative     Not on file     Social Determinants of Health     Financial Resource Strain: Not on file   Food Insecurity: Not on file   Transportation Needs: Not on file   Physical Activity: Not on file   Stress: Not on file   Social Connections: Not on file   Intimate Partner Violence: Not on file   Housing Stability: Not on file       Outpatient Encounter  Medications as of 3/13/2023   Medication Sig Dispense Refill     ALPRAZolam (XANAX) 0.5 MG tablet Take 1 tablet (0.5 mg) by mouth every 6 hours as needed for anxiety 40 tablet 0     amitriptyline (ELAVIL) 10 MG tablet Take 1 tablet (10 mg) by mouth At Bedtime 1 tablet 0     aspirin-acetaminophen-caffeine (EXCEDRIN MIGRAINE) 250-250-65 MG per tablet Take 1 tablet by mouth every 6 hours as needed for pain. 30 tablet 0     diazepam (VALIUM) 10 MG tablet Place 1 tab vaginally for muscle spasms 30 tablet 3     ferrous sulfate (FEROSUL) 325 (65 Fe) MG tablet Take 325 mg by mouth daily (with breakfast)       gabapentin (NEURONTIN) 300 MG capsule Take 1 capsule (300 mg) by mouth At Bedtime 90 capsule 3     hydrOXYzine (ATARAX) 25 MG tablet Take 1-2 tablets (25-50 mg) by mouth every 6 hours as needed for itching or anxiety 60 tablet 1     Lactobacillus (ACIDOPHILUS) 100 MG CAPS Take 1 capsule by mouth daily       Loperamide HCl (IMODIUM A-D PO) Take 1 tablet by mouth once as needed       MULTI-VITAMIN OR TABS 1 tablet by mouth DAILY       OnabotulinumtoxinA (BOTOX IJ) Inject 200 Units as directed Total dose 200 units   Dose Administered:  175 units  Botox (Botulinum Toxin Type A)       2:1 Dilution (55 units unavoidable waste)  Unavoidable waste 25 units   Diluent Used:  Preservative Free Normal Saline  Total Volume of Diluent Used:  2.9 ml  Lot # /C3 with Expiration Date:  03/2021  NDC #: Botox 100u ([address]-93)       ondansetron (ZOFRAN ODT) 4 MG ODT tab Take 1-2 tablets (4-8 mg) by mouth every 8 hours as needed for nausea 20 tablet 3     SUMAtriptan (IMITREX) 50 MG tablet Take 1 tablet (50 mg) by mouth at onset of headache for migraine May repeat dose in 2 hours.  Do not exceed 200 mg in 24 hours 18 tablet 1     traMADol (ULTRAM) 50 MG tablet Take 1-2 tablets ( mg) by mouth every 6 hours as needed for pain 40 tablet 2     valACYclovir (VALTREX) 500 MG tablet Take 1 tablet (500 mg) by mouth daily 90 tablet 3      vitamin (B COMPLEX-C) tablet Take 1 tablet by mouth daily       VITAMIN C 500 MG OR TABS 1 tablet daily       VITAMIN D, CHOLECALCIFEROL, PO Take 1,000 Units by mouth daily       Vitamin Mixture (VITAMIN E COMPLETE PO)        zolpidem (AMBIEN) 5 MG tablet Take 1 tablet (5 mg) by mouth nightly as needed for sleep 90 tablet 1     No facility-administered encounter medications on file as of 3/13/2023.             O:   NAD, WDWN, Alert & Oriented, Mood & Affect wnl, Vitals stable   Here today alone   General appearance normal   Vitals stable   Alert, oriented and in no acute distress        Stuck on papules and brown macules on trunk and ext   Red papules on trunk  Flesh colored papules on trunk     The remainder of the full exam was normal; the following areas were examined:  conjunctiva/lids, , neck, peripheral vascular system, abdomen, lymph nodes, digits/nails, eccrine and apocrine glands, scalp/hair, face, neck, chest, abdomen, buttocks, back, RUE, LUE, RLE, LLE       Eyes: Conjunctivae/lids:Normal     ENT: Lips, buccal mucosa, tongue: normal    MSK:Normal    Cardiovascular: peripheral edema none    Pulm: Breathing Normal    Lymph Nodes: No Head and Neck Lymphadenopathy     Neuro/Psych: Orientation:Alert and Orientedx3 ; Mood/Affect:normal       A/P:  1. Seborrheic keratosis, lentigo, angioma, dermal nevus  It was a pleasure speaking to Jessica Manriquez today.  Previous clinic notes and pertinent laboratory tests were reviewed prior to Jessica Manriquez's visit.  Nature of benign skin lesions dicussed with patient.  Signs and Symptoms of skin cancer discussed with patient.  Patient encouraged to perform monthly skin exams.  UV precautions reviewed with patient.  Return to clinic 12 months

## 2023-03-13 NOTE — LETTER
3/13/2023         RE: Jessica Manriquez  [address]  [place] [place] [address]        Dear Colleague,    Thank you for referring your patient, Jessica Manriquez, to the [facility]. Please see a copy of my visit note below.    Jessica Manriquez is an extremely pleasant 52 year old year old female patient here today for brown spots on skin.   .   Patient states this has been present for a while.  Patient reports the following symptoms:  none.  Patient reports the following previous treatments none.  These treatments did not work.  Patient reports the following modifying factors none.  Associated symptoms: none.  Patient has no other skin complaints today.  Remainder of the HPI, Meds, PMH, Allergies, FH, and SH was reviewed in chart.      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     Selective IgA immunodeficiency (H)     recurrent respir infections     Temporomandibular joint disorders, unspecified     Seen at [facility], uses splint, PT , acupuncture       Past Surgical History:   Procedure Laterality Date     HC ENLARGE BREAST WITH IMPLANT  01/01/2000     HC REVISE BREAST RECONSTRUCTION  05/01/2003     LEEP TX, CERVICAL  11/01/2001    SALOMÓN 3, yearly paps until 2021     LIGATN/STRIP LONG & SHORT SAPHEN  11/01/2004    Bilateral vein stripping     TUBAL LIGATION  10/30/2009    evangelina linton        Family History   Problem Relation Age of Onset     Arthritis Mother      Respiratory Mother         asthma     Allergies Mother      Cancer Mother      Blood Disease Mother         leukemia     Breast  Cancer Mother         age 79     Depression Mother      Anxiety Disorder Mother      Asthma Mother      Obesity Mother      Breast Cancer Maternal Grandmother         age 60 (she lived to 98)     Heart Disease Maternal Grandmother      Coronary Artery Disease Maternal Grandmother      Mental Illness Maternal Grandmother         Depression     Cerebrovascular Disease Paternal Grandmother      Cancer Paternal Grandfather         ?biliary     Alcohol/Drug Paternal Grandfather      Other Cancer Paternal Grandfather         gall bladder  age 55?     Substance Abuse Paternal Grandfather      Allergies Sister      Breast Cancer Sister         age 53     Hyperlipidemia Father      Breast Cancer Cousin         Bilateral mastectomy age 46 approx     Other Cancer Cousin         Uterine discovered after gave birth (smoker)     Other Cancer Other         Bladder and mesothelioma (exposure during [place] and smoker)     Other Cancer Other         Esophageal cancer (smoker and work environ. exposure)     Other Cancer Other         Bladder (smoker)     Substance Abuse Other         drugs and alcohol       Social History     Socioeconomic History     Marital status: Single     Spouse name: Not on file     Number of children: 0     Years of education: Not on file     Highest education level: Not on file   Occupational History     Occupation: Health      Employer: [employer]     Occupation: Author of book   Tobacco Use     Smoking status: Never     Smokeless tobacco: Never   Vaping Use     Vaping Use: Never used   Substance and Sexual Activity     Alcohol use: No     Drug use: No     Sexual activity: Not Currently     Partners: Male     Birth control/protection: Condom, Female Surgical     Comment: tubal   Other Topics Concern     Parent/sibling w/ CABG, MI or angioplasty before 65F 55M? No   Social History Narrative     Not on file     Social Determinants of Health     Financial Resource Strain: Not on file    Food Insecurity: Not on file   Transportation Needs: Not on file   Physical Activity: Not on file   Stress: Not on file   Social Connections: Not on file   Intimate Partner Violence: Not on file   Housing Stability: Not on file       Outpatient Encounter Medications as of 3/13/2023   Medication Sig Dispense Refill     ALPRAZolam (XANAX) 0.5 MG tablet Take 1 tablet (0.5 mg) by mouth every 6 hours as needed for anxiety 40 tablet 0     amitriptyline (ELAVIL) 10 MG tablet Take 1 tablet (10 mg) by mouth At Bedtime 1 tablet 0     aspirin-acetaminophen-caffeine (EXCEDRIN MIGRAINE) 250-250-65 MG per tablet Take 1 tablet by mouth every 6 hours as needed for pain. 30 tablet 0     diazepam (VALIUM) 10 MG tablet Place 1 tab vaginally for muscle spasms 30 tablet 3     ferrous sulfate (FEROSUL) 325 (65 Fe) MG tablet Take 325 mg by mouth daily (with breakfast)       gabapentin (NEURONTIN) 300 MG capsule Take 1 capsule (300 mg) by mouth At Bedtime 90 capsule 3     hydrOXYzine (ATARAX) 25 MG tablet Take 1-2 tablets (25-50 mg) by mouth every 6 hours as needed for itching or anxiety 60 tablet 1     Lactobacillus (ACIDOPHILUS) 100 MG CAPS Take 1 capsule by mouth daily       Loperamide HCl (IMODIUM A-D PO) Take 1 tablet by mouth once as needed       MULTI-VITAMIN OR TABS 1 tablet by mouth DAILY       OnabotulinumtoxinA (BOTOX IJ) Inject 200 Units as directed Total dose 200 units   Dose Administered:  175 units  Botox (Botulinum Toxin Type A)       2:1 Dilution (55 units unavoidable waste)  Unavoidable waste 25 units   Diluent Used:  Preservative Free Normal Saline  Total Volume of Diluent Used:  2.9 ml  Lot # /C3 with Expiration Date:  03/2021  NDC #: Botox 100u ([address]-74)       ondansetron (ZOFRAN ODT) 4 MG ODT tab Take 1-2 tablets (4-8 mg) by mouth every 8 hours as needed for nausea 20 tablet 3     SUMAtriptan (IMITREX) 50 MG tablet Take 1 tablet (50 mg) by mouth at onset of headache for migraine May repeat dose in 2  hours.  Do not exceed 200 mg in 24 hours 18 tablet 1     traMADol (ULTRAM) 50 MG tablet Take 1-2 tablets ( mg) by mouth every 6 hours as needed for pain 40 tablet 2     valACYclovir (VALTREX) 500 MG tablet Take 1 tablet (500 mg) by mouth daily 90 tablet 3     vitamin (B COMPLEX-C) tablet Take 1 tablet by mouth daily       VITAMIN C 500 MG OR TABS 1 tablet daily       VITAMIN D, CHOLECALCIFEROL, PO Take 1,000 Units by mouth daily       Vitamin Mixture (VITAMIN E COMPLETE PO)        zolpidem (AMBIEN) 5 MG tablet Take 1 tablet (5 mg) by mouth nightly as needed for sleep 90 tablet 1     No facility-administered encounter medications on file as of 3/13/2023.             O:   NAD, WDWN, Alert & Oriented, Mood & Affect wnl, Vitals stable   Here today alone   General appearance normal   Vitals stable   Alert, oriented and in no acute distress        Stuck on papules and brown macules on trunk and ext   Red papules on trunk  Flesh colored papules on trunk     The remainder of the full exam was normal; the following areas were examined:  conjunctiva/lids, , neck, peripheral vascular system, abdomen, lymph nodes, digits/nails, eccrine and apocrine glands, scalp/hair, face, neck, chest, abdomen, buttocks, back, RUE, LUE, RLE, LLE       Eyes: Conjunctivae/lids:Normal     ENT: Lips, buccal mucosa, tongue: normal    MSK:Normal    Cardiovascular: peripheral edema none    Pulm: Breathing Normal    Lymph Nodes: No Head and Neck Lymphadenopathy     Neuro/Psych: Orientation:Alert and Orientedx3 ; Mood/Affect:normal       A/P:  1. Seborrheic keratosis, lentigo, angioma, dermal nevus  It was a pleasure speaking to Jessica Manriquez today.  Previous clinic notes and pertinent laboratory tests were reviewed prior to Jessica Manriquez's visit.  Nature of benign skin lesions dicussed with patient.  Signs and Symptoms of skin cancer discussed with patient.  Patient encouraged to perform monthly skin exams.  UV precautions reviewed with  patient.  Return to clinic 12 months        Again, thank you for allowing me to participate in the care of your patient.        Sincerely,        Sage Davis MD

## 2023-03-20 ENCOUNTER — OFFICE VISIT (OUTPATIENT)
Dept: PHYSICAL MEDICINE AND REHAB | Facility: CLINIC | Age: 53
End: 2023-03-20
Payer: COMMERCIAL

## 2023-03-20 VITALS
HEART RATE: 127 BPM | OXYGEN SATURATION: 98 % | RESPIRATION RATE: 16 BRPM | DIASTOLIC BLOOD PRESSURE: 87 MMHG | SYSTOLIC BLOOD PRESSURE: 120 MMHG | TEMPERATURE: 98.6 F

## 2023-03-20 DIAGNOSIS — G43.719 CHRONIC MIGRAINE WITHOUT AURA, INTRACTABLE, WITHOUT STATUS MIGRAINOSUS: Primary | ICD-10-CM

## 2023-03-20 PROCEDURE — 64615 CHEMODENERV MUSC MIGRAINE: CPT | Performed by: PHYSICAL MEDICINE & REHABILITATION

## 2023-03-20 PROCEDURE — 95874 GUIDE NERV DESTR NEEDLE EMG: CPT | Performed by: PHYSICAL MEDICINE & REHABILITATION

## 2023-03-20 RX ORDER — BUPIVACAINE HYDROCHLORIDE 5 MG/ML
4 INJECTION, SOLUTION EPIDURAL; INTRACAUDAL ONCE
Status: COMPLETED | OUTPATIENT
Start: 2023-03-20 | End: 2023-03-20

## 2023-03-20 RX ADMIN — BUPIVACAINE HYDROCHLORIDE 20 MG: 5 INJECTION, SOLUTION EPIDURAL; INTRACAUDAL at 12:05

## 2023-03-20 ASSESSMENT — PAIN SCALES - GENERAL: PAINLEVEL: NO PAIN (0)

## 2023-03-20 NOTE — PROGRESS NOTES
[facility]     PM&R CLINIC NOTE  BOTULINUM TOXIN PROCEDURE      HPI  Chief Complaint   Patient presents with     Botox     Jessica Manriquez is a 52 year old female with a history of intractable migraine headaches who presents to clinic for botulinum toxin injections for management of chronic migraine headaches.     SINCE LAST VISIT  Jessica Manriquez was last seen here in clinic on 12/28/2022, at which time she received 200 units of Botox.    Patient denies new medical diagnoses, illnesses, hospitalizations, emergency room visits, and injuries since the previous injection with botulinum neurotoxin.    RESPONSE TO PREVIOUS TREATMENT    Side effects: No problems reported  Post-procedural headache: Yes, she did have a mild headache that started following Botox injections which resolved with Excedrin.     1.  Headache frequency during this injection cycle:  In January, she experienced 3 headache days, In February she experienced 6 headache days, and in March she has had 6 headache days so far.  This is compared to her baseline headache frequency of >15 headache days per month.     2.  Headache duration during this injection cycle:  Headache duration ranged from 5 hours to 3 days. Patient reports at least one episode of multiple day headaches during this injection cycle which happened more recently.     3.  Headache intensity during this injection cycle:    A.  4/10  =  Typical pain level.  B.  9/10  =  Worst pain level.  C.  0/10  =  Lowest pain level.    4.  Change in headache medication usage during this injection cycle:  (For Example:  Able to decrease use of oral pain medications.) No, she uses Imitrex and Tramadol for migraine rescue medications.     5.  ER Visits During This Injection Cycle:  None.     6.  Functional Performance:  Change in ADL's, social interaction, days lost from work, etc. Patient reports being able to more fully participate in social and family activities  and responsibilities as headache symptoms have improved      PHYSICAL EXAM  VS: /87   Pulse (!) 127   Temp 98.6  F (37  C)   Resp 16   SpO2 98%    GEN: Pleasant and cooperative, in no acute distress  HEENT: No facial asymmetry    ALLERGIES  Allergies   Allergen Reactions     Darvocet [Acetaminophen] Nausea and Vomiting     Erythromycin GI Disturbance     Flexeril [Cyclobenzaprine Hcl]      Lightheadedness, dizzy       Serotonin Reuptake Inhibitors Anxiety, Other (See Comments) and Palpitations       CURRENT MEDICATIONS    Current Outpatient Medications:      ALPRAZolam (XANAX) 0.5 MG tablet, Take 1 tablet (0.5 mg) by mouth every 6 hours as needed for anxiety, Disp: 40 tablet, Rfl: 0     amitriptyline (ELAVIL) 10 MG tablet, Take 1 tablet (10 mg) by mouth At Bedtime, Disp: 1 tablet, Rfl: 0     aspirin-acetaminophen-caffeine (EXCEDRIN MIGRAINE) 250-250-65 MG per tablet, Take 1 tablet by mouth every 6 hours as needed for pain., Disp: 30 tablet, Rfl: 0     diazepam (VALIUM) 10 MG tablet, Place 1 tab vaginally for muscle spasms, Disp: 30 tablet, Rfl: 3     ferrous sulfate (FEROSUL) 325 (65 Fe) MG tablet, Take 325 mg by mouth daily (with breakfast), Disp: , Rfl:      gabapentin (NEURONTIN) 300 MG capsule, Take 1 capsule (300 mg) by mouth At Bedtime, Disp: 90 capsule, Rfl: 3     hydrOXYzine (ATARAX) 25 MG tablet, Take 1-2 tablets (25-50 mg) by mouth every 6 hours as needed for itching or anxiety, Disp: 60 tablet, Rfl: 1     Lactobacillus (ACIDOPHILUS) 100 MG CAPS, Take 1 capsule by mouth daily, Disp: , Rfl:      Loperamide HCl (IMODIUM A-D PO), Take 1 tablet by mouth once as needed, Disp: , Rfl:      MULTI-VITAMIN OR TABS, 1 tablet by mouth DAILY, Disp: , Rfl:      OnabotulinumtoxinA (BOTOX IJ), Inject 200 Units as directed Total dose 200 units  Dose Administered:  175 units  Botox (Botulinum Toxin Type A)       2:1 Dilution (55 units unavoidable waste) Unavoidable waste 25 units  Diluent Used:  Preservative Free  Normal Saline Total Volume of Diluent Used:  2.9 ml Lot # /C3 with Expiration Date:  2021 NDC #: Botox 100u ([address]-47), Disp: , Rfl:      ondansetron (ZOFRAN ODT) 4 MG ODT tab, Take 1-2 tablets (4-8 mg) by mouth every 8 hours as needed for nausea, Disp: 20 tablet, Rfl: 3     SUMAtriptan (IMITREX) 50 MG tablet, Take 1 tablet (50 mg) by mouth at onset of headache for migraine May repeat dose in 2 hours.  Do not exceed 200 mg in 24 hours, Disp: 18 tablet, Rfl: 1     traMADol (ULTRAM) 50 MG tablet, Take 1-2 tablets ( mg) by mouth every 6 hours as needed for pain, Disp: 40 tablet, Rfl: 2     valACYclovir (VALTREX) 500 MG tablet, Take 1 tablet (500 mg) by mouth daily, Disp: 90 tablet, Rfl: 3     vitamin (B COMPLEX-C) tablet, Take 1 tablet by mouth daily, Disp: , Rfl:      VITAMIN C 500 MG OR TABS, 1 tablet daily, Disp: , Rfl:      VITAMIN D, CHOLECALCIFEROL, PO, Take 1,000 Units by mouth daily, Disp: , Rfl:      Vitamin Mixture (VITAMIN E COMPLETE PO), , Disp: , Rfl:      zolpidem (AMBIEN) 5 MG tablet, Take 1 tablet (5 mg) by mouth nightly as needed for sleep, Disp: 90 tablet, Rfl: 1    Current Facility-Administered Medications:      botulinum toxin type A (BOTOX) 100 units injection 200 Units, 200 Units, Intramuscular, Q10 Weeks, StandalAretha MD       BOTULINUM NEUROTOXIN INJECTION PROCEDURES    VERIFICATION OF PATIENT IDENTIFICATION AND PROCEDURE     Initials   Patient Name SES   Patient  SES   Procedure Verified by: SES     Prior to the start of the procedure and with procedural staff participation, I verbally confirmed the patient s identity using two indicators, relevant allergies, that the procedure was appropriate and matched the consent or emergent situation, and that the correct equipment/implants were available. Immediately prior to starting the procedure I conducted the Time Out with the procedural staff and re-confirmed the patient s name, procedure, and site/side. (The Joint  Commission universal protocol was followed.)  Yes    Sedation (Moderate or Deep): None    ABOVE ASSESSMENTS PERFORMED BY    Aretha Pichardo MD      INDICATIONS FOR PROCEDURES  Jessica Manriquez is a 52 year old patient with intractable pain secondary to the diagnosis of chronic migraine headaches. Her baseline symptoms have been recalcitrant to oral medications and conservative therapy.  She is here today for reinjection with Botox.    GOAL OF PROCEDURE  The goal of this procedure is to decrease pain.      TOTAL DOSE ADMINISTERED  Dose Administered:  200 units  Botox (Botulinum Toxin Type A)       2:1 Dilution   Unavoidable Drug Waste: No  Diluent Used:  0.5% bupivacaine  Total Volume of Diluent Used:  4 ml  NDC #: Botox 100u ([address]-44)      CONSENT  The risks, benefits, and treatment options were discussed with Jessica Manriquez and she agreed to proceed.    Written consent was obtained by [facility].     EQUIPMENT USED  Needle-25mm stimulating/recording  Needle-30 gauge  EMG/NCS Machine    SKIN PREPARATION  Skin preparation was performed using an alcohol wipe.    GUIDANCE DESCRIPTION  Electro-myographic guidance was necessary throughout the neck portion of the procedure to accurately identify all areas of spastic muscles while avoiding injection of non-spastic muscles, neighboring nerves and nearby vascular structures.       AREA/MUSCLE INJECTED:  200 UNITS BOTOX = TOTAL DOSE, 2:1 DILUTION    1 & 2. SHOULDER GIRDLE & NECK MUSCLES: 65 units Botox = Total Dose, 2:1 Dilution                               Right Upper Trapezius 15 units of Botox at 3 sites               Left Upper Trapezius 15 units of Botox at 3 sites        Right Splenius  - 5 units of Botox at 1 site/s  Left Splenius - 5 units of Botox at 1 site/s     Right Semispinalis  - 5 units of Botox at 1 site/s  Left Semispinalis - 5 units of Botox at 1 site/s    Left Anterior Scalenes - 5 units of Botox at 1 site/s.                  Right Levator Scapula - 5  units of Botox at 1 site/s.     Left Levator Scapula -  5 units of Botox at 1 site/s.     3. HEAD, JAW & SCALP MUSCLES: 135 units Botox = Total Dose, 2:1 Dilution     Right masseter - 5 units of Botox at 1 site/s.  Left masseter - 5 units of Botox at 1 site/s.     Right Occipitalis (upper and lower) - 20 units of Botox at 3 site/s.  Left Occipitalis (upper and lower) - 20 units of Botox at 3 site/s.    Right Frontalis - 10 units of Botox at 2 site/s  Left Frontalis - 10 units of Botox at 2 site/s     Right Temporalis (upper and lower)  - 25 units of Botox at 5 site/s.  Left Temporalis (upper and lower) - 25 units of Botox at 5 site/s.    Right  - 5 units of Botox at 1 site/s.  Left  - 5 units of Botox at 1 site/s.     Procerus - 5 units of Botox at 1 site/s.      RESPONSE TO PROCEDURE  Jessica Manriquez tolerated the procedure well and there were no immediate complications. She was allowed to recover for an appropriate period of time and was discharged home in stable condition.    ASSESSMENT AND PLAN   1. Botulinum toxin injections: No changes made to Botox dose or distribution today. Patient will continue to monitor response and report at next appointment.   2. Referrals: None.   3. Follow up: Jessica Manriquez was rescheduled for the next series of injections in 12 weeks, at which time we will evaluate response to today's injections. she may call the clinic prior with any questions or concerns prior to the next appointment.

## 2023-03-20 NOTE — LETTER
3/20/2023       RE: Jessica Manriquez  [address]  [facility] [place] [address]     Dear Colleague,    Thank you for referring your patient, Jessica Manriquez, to the [facility] PHYSICAL MEDICINE AND REHABILITATION CLINIC [place] at [facility]. Please see a copy of my visit note below.      [facility]     PM&R CLINIC NOTE  BOTULINUM TOXIN PROCEDURE      HPI  Chief Complaint   Patient presents with     Botox     Jessica Manriquez is a 52 year old female with a history of intractable migraine headaches who presents to clinic for botulinum toxin injections for management of chronic migraine headaches.     SINCE LAST VISIT  Jessica Manriquez was last seen here in clinic on 12/28/2022, at which time she received 200 units of Botox.    Patient denies new medical diagnoses, illnesses, hospitalizations, emergency room visits, and injuries since the previous injection with botulinum neurotoxin.    RESPONSE TO PREVIOUS TREATMENT    Side effects: No problems reported  Post-procedural headache: Yes, she did have a mild headache that started following Botox injections which resolved with Excedrin.     1.  Headache frequency during this injection cycle:  In January, she experienced 3 headache days, In February she experienced 6 headache days, and in March she has had 6 headache days so far.  This is compared to her baseline headache frequency of >15 headache days per month.     2.  Headache duration during this injection cycle:  Headache duration ranged from 5 hours to 3 days. Patient reports at least one episode of multiple day headaches during this injection cycle which happened more recently.     3.  Headache intensity during this injection cycle:    A.  4/10  =  Typical pain level.  B.  9/10  =  Worst pain level.  C.  0/10  =  Lowest pain level.    4.  Change in headache medication usage during this injection cycle:  (For Example:   Able to decrease use of oral pain medications.) No, she uses Imitrex and Tramadol for migraine rescue medications.     5.  ER Visits During This Injection Cycle:  None.     6.  Functional Performance:  Change in ADL's, social interaction, days lost from work, etc. Patient reports being able to more fully participate in social and family activities and responsibilities as headache symptoms have improved      PHYSICAL EXAM  VS: /87   Pulse (!) 127   Temp 98.6  F (37  C)   Resp 16   SpO2 98%    GEN: Pleasant and cooperative, in no acute distress  HEENT: No facial asymmetry    ALLERGIES  Allergies   Allergen Reactions     Darvocet [Acetaminophen] Nausea and Vomiting     Erythromycin GI Disturbance     Flexeril [Cyclobenzaprine Hcl]      Lightheadedness, dizzy       Serotonin Reuptake Inhibitors Anxiety, Other (See Comments) and Palpitations       CURRENT MEDICATIONS    Current Outpatient Medications:      ALPRAZolam (XANAX) 0.5 MG tablet, Take 1 tablet (0.5 mg) by mouth every 6 hours as needed for anxiety, Disp: 40 tablet, Rfl: 0     amitriptyline (ELAVIL) 10 MG tablet, Take 1 tablet (10 mg) by mouth At Bedtime, Disp: 1 tablet, Rfl: 0     aspirin-acetaminophen-caffeine (EXCEDRIN MIGRAINE) 250-250-65 MG per tablet, Take 1 tablet by mouth every 6 hours as needed for pain., Disp: 30 tablet, Rfl: 0     diazepam (VALIUM) 10 MG tablet, Place 1 tab vaginally for muscle spasms, Disp: 30 tablet, Rfl: 3     ferrous sulfate (FEROSUL) 325 (65 Fe) MG tablet, Take 325 mg by mouth daily (with breakfast), Disp: , Rfl:      gabapentin (NEURONTIN) 300 MG capsule, Take 1 capsule (300 mg) by mouth At Bedtime, Disp: 90 capsule, Rfl: 3     hydrOXYzine (ATARAX) 25 MG tablet, Take 1-2 tablets (25-50 mg) by mouth every 6 hours as needed for itching or anxiety, Disp: 60 tablet, Rfl: 1     Lactobacillus (ACIDOPHILUS) 100 MG CAPS, Take 1 capsule by mouth daily, Disp: , Rfl:      Loperamide HCl (IMODIUM A-D PO), Take 1 tablet by mouth  once as needed, Disp: , Rfl:      MULTI-VITAMIN OR TABS, 1 tablet by mouth DAILY, Disp: , Rfl:      OnabotulinumtoxinA (BOTOX IJ), Inject 200 Units as directed Total dose 200 units  Dose Administered:  175 units  Botox (Botulinum Toxin Type A)       2:1 Dilution (55 units unavoidable waste) Unavoidable waste 25 units  Diluent Used:  Preservative Free Normal Saline Total Volume of Diluent Used:  2.9 ml Lot # /C3 with Expiration Date:  2021 NDC #: Botox 100u ([address]-01), Disp: , Rfl:      ondansetron (ZOFRAN ODT) 4 MG ODT tab, Take 1-2 tablets (4-8 mg) by mouth every 8 hours as needed for nausea, Disp: 20 tablet, Rfl: 3     SUMAtriptan (IMITREX) 50 MG tablet, Take 1 tablet (50 mg) by mouth at onset of headache for migraine May repeat dose in 2 hours.  Do not exceed 200 mg in 24 hours, Disp: 18 tablet, Rfl: 1     traMADol (ULTRAM) 50 MG tablet, Take 1-2 tablets ( mg) by mouth every 6 hours as needed for pain, Disp: 40 tablet, Rfl: 2     valACYclovir (VALTREX) 500 MG tablet, Take 1 tablet (500 mg) by mouth daily, Disp: 90 tablet, Rfl: 3     vitamin (B COMPLEX-C) tablet, Take 1 tablet by mouth daily, Disp: , Rfl:      VITAMIN C 500 MG OR TABS, 1 tablet daily, Disp: , Rfl:      VITAMIN D, CHOLECALCIFEROL, PO, Take 1,000 Units by mouth daily, Disp: , Rfl:      Vitamin Mixture (VITAMIN E COMPLETE PO), , Disp: , Rfl:      zolpidem (AMBIEN) 5 MG tablet, Take 1 tablet (5 mg) by mouth nightly as needed for sleep, Disp: 90 tablet, Rfl: 1    Current Facility-Administered Medications:      botulinum toxin type A (BOTOX) 100 units injection 200 Units, 200 Units, Intramuscular, Q10 Weeks, Standal, Aretha Hahn MD       BOTULINUM NEUROTOXIN INJECTION PROCEDURES    VERIFICATION OF PATIENT IDENTIFICATION AND PROCEDURE     Initials   Patient Name SES   Patient  SES   Procedure Verified by: SES     Prior to the start of the procedure and with procedural staff participation, I verbally confirmed the patient s  identity using two indicators, relevant allergies, that the procedure was appropriate and matched the consent or emergent situation, and that the correct equipment/implants were available. Immediately prior to starting the procedure I conducted the Time Out with the procedural staff and re-confirmed the patient s name, procedure, and site/side. (The Joint Commission universal protocol was followed.)  Yes    Sedation (Moderate or Deep): None    ABOVE ASSESSMENTS PERFORMED BY    Aretha Pichardo MD      INDICATIONS FOR PROCEDURES  Jessica Manriquez is a 52 year old patient with intractable pain secondary to the diagnosis of chronic migraine headaches. Her baseline symptoms have been recalcitrant to oral medications and conservative therapy.  She is here today for reinjection with Botox.    GOAL OF PROCEDURE  The goal of this procedure is to decrease pain.      TOTAL DOSE ADMINISTERED  Dose Administered:  200 units  Botox (Botulinum Toxin Type A)       2:1 Dilution   Unavoidable Drug Waste: No  Diluent Used:  0.5% bupivacaine  Total Volume of Diluent Used:  4 ml  NDC #: Botox 100u ([address]-66)      CONSENT  The risks, benefits, and treatment options were discussed with Jessica Manriquez and she agreed to proceed.    Written consent was obtained by [facility].     EQUIPMENT USED  Needle-25mm stimulating/recording  Needle-30 gauge  EMG/NCS Machine    SKIN PREPARATION  Skin preparation was performed using an alcohol wipe.    GUIDANCE DESCRIPTION  Electro-myographic guidance was necessary throughout the neck portion of the procedure to accurately identify all areas of spastic muscles while avoiding injection of non-spastic muscles, neighboring nerves and nearby vascular structures.       AREA/MUSCLE INJECTED:  200 UNITS BOTOX = TOTAL DOSE, 2:1 DILUTION    1 & 2. SHOULDER GIRDLE & NECK MUSCLES: 65 units Botox = Total Dose, 2:1 Dilution                               Right Upper Trapezius 15 units of Botox at 3 sites                Left Upper Trapezius 15 units of Botox at 3 sites        Right Splenius  - 5 units of Botox at 1 site/s  Left Splenius - 5 units of Botox at 1 site/s     Right Semispinalis  - 5 units of Botox at 1 site/s  Left Semispinalis - 5 units of Botox at 1 site/s    Left Anterior Scalenes - 5 units of Botox at 1 site/s.                  Right Levator Scapula - 5 units of Botox at 1 site/s.     Left Levator Scapula -  5 units of Botox at 1 site/s.     3. HEAD, JAW & SCALP MUSCLES: 135 units Botox = Total Dose, 2:1 Dilution     Right masseter - 5 units of Botox at 1 site/s.  Left masseter - 5 units of Botox at 1 site/s.     Right Occipitalis (upper and lower) - 20 units of Botox at 3 site/s.  Left Occipitalis (upper and lower) - 20 units of Botox at 3 site/s.    Right Frontalis - 10 units of Botox at 2 site/s  Left Frontalis - 10 units of Botox at 2 site/s     Right Temporalis (upper and lower)  - 25 units of Botox at 5 site/s.  Left Temporalis (upper and lower) - 25 units of Botox at 5 site/s.    Right  - 5 units of Botox at 1 site/s.  Left  - 5 units of Botox at 1 site/s.     Procerus - 5 units of Botox at 1 site/s.      RESPONSE TO PROCEDURE  Jessica Manriquez tolerated the procedure well and there were no immediate complications. She was allowed to recover for an appropriate period of time and was discharged home in stable condition.    ASSESSMENT AND PLAN   1. Botulinum toxin injections: No changes made to Botox dose or distribution today. Patient will continue to monitor response and report at next appointment.   2. Referrals: None.   3. Follow up: Jessica Manriquez was rescheduled for the next series of injections in 12 weeks, at which time we will evaluate response to today's injections. she may call the clinic prior with any questions or concerns prior to the next appointment.         Sincerely,    Aretha Pichardo MD

## 2023-03-23 ENCOUNTER — MYC MEDICAL ADVICE (OUTPATIENT)
Dept: FAMILY MEDICINE | Facility: CLINIC | Age: 53
End: 2023-03-23
Payer: COMMERCIAL

## 2023-03-23 NOTE — TELEPHONE ENCOUNTER
Prior Authorization's for high tech imaging are handled by the PT [employer] [facility].  If you need assistance or have questions on these authorizations,  please call  [phone].     All other Prior Authorizations are done in clinic by Provider and/or Care Team placing the order.    Thank you,     [facility] Referrals     no

## 2023-04-10 ENCOUNTER — HOSPITAL ENCOUNTER (OUTPATIENT)
Dept: MRI IMAGING | Facility: CLINIC | Age: 53
Discharge: HOME | End: 2023-04-10
Attending: FAMILY MEDICINE | Admitting: FAMILY MEDICINE
Payer: COMMERCIAL

## 2023-04-10 DIAGNOSIS — Z80.3 FAMILY HISTORY OF MALIGNANT NEOPLASM OF BREAST: ICD-10-CM

## 2023-04-10 PROCEDURE — 77049 MRI BREAST C-+ W/CAD BI: CPT

## 2023-04-10 PROCEDURE — A9585 GADOBUTROL INJECTION: HCPCS | Performed by: FAMILY MEDICINE

## 2023-04-10 PROCEDURE — 255N000002 HC RX 255 OP 636: Performed by: FAMILY MEDICINE

## 2023-04-10 PROCEDURE — 258N000003 HC RX IP 258 OP 636: Performed by: FAMILY MEDICINE

## 2023-04-10 RX ORDER — GADOBUTROL 604.72 MG/ML
6 INJECTION INTRAVENOUS ONCE
Status: COMPLETED | OUTPATIENT
Start: 2023-04-10 | End: 2023-04-10

## 2023-04-10 RX ADMIN — GADOBUTROL 6 ML: 604.72 INJECTION INTRAVENOUS at 14:34

## 2023-04-10 RX ADMIN — SODIUM CHLORIDE 50 ML: 9 INJECTION, SOLUTION INTRAVENOUS at 14:28

## 2023-05-24 ENCOUNTER — ANCILLARY PROCEDURE (OUTPATIENT)
Dept: GENERAL RADIOLOGY | Facility: CLINIC | Age: 53
End: 2023-05-24
Attending: PEDIATRICS
Payer: COMMERCIAL

## 2023-05-24 ENCOUNTER — OFFICE VISIT (OUTPATIENT)
Dept: ORTHOPEDICS | Facility: CLINIC | Age: 53
End: 2023-05-24
Payer: COMMERCIAL

## 2023-05-24 VITALS
BODY MASS INDEX: 19.67 KG/M2 | WEIGHT: 141 LBS | DIASTOLIC BLOOD PRESSURE: 72 MMHG | HEART RATE: 102 BPM | SYSTOLIC BLOOD PRESSURE: 112 MMHG

## 2023-05-24 DIAGNOSIS — M76.31 ILIOTIBIAL BAND SYNDROME OF RIGHT SIDE: ICD-10-CM

## 2023-05-24 DIAGNOSIS — G89.29 CHRONIC PAIN OF RIGHT KNEE: Primary | ICD-10-CM

## 2023-05-24 DIAGNOSIS — G89.29 CHRONIC PATELLOFEMORAL PAIN OF RIGHT KNEE: ICD-10-CM

## 2023-05-24 DIAGNOSIS — M25.561 CHRONIC PAIN OF RIGHT KNEE: ICD-10-CM

## 2023-05-24 DIAGNOSIS — G89.29 CHRONIC PAIN OF RIGHT KNEE: ICD-10-CM

## 2023-05-24 DIAGNOSIS — M25.561 CHRONIC PAIN OF RIGHT KNEE: Primary | ICD-10-CM

## 2023-05-24 DIAGNOSIS — M25.561 CHRONIC PATELLOFEMORAL PAIN OF RIGHT KNEE: ICD-10-CM

## 2023-05-24 DIAGNOSIS — M25.469 KNEE SWELLING: ICD-10-CM

## 2023-05-24 PROCEDURE — 73562 X-RAY EXAM OF KNEE 3: CPT | Mod: TC | Performed by: RADIOLOGY

## 2023-05-24 PROCEDURE — 99204 OFFICE O/P NEW MOD 45 MIN: CPT | Performed by: PEDIATRICS

## 2023-05-24 NOTE — PROGRESS NOTES
ASSESSMENT & PLAN    Jessica was seen today for pain.    Diagnoses and all orders for this visit:    Chronic pain of right knee  -     XR Knee Standing AP Graingers Bilat Lat Right; Future  -     Physical Therapy Referral; Future    Knee swelling  -     Physical Therapy Referral; Future    Chronic patellofemoral pain of right knee  -     Physical Therapy Referral; Future    Iliotibial band syndrome of right side  -     Physical Therapy Referral; Future      This issue is chronic and Unchanged.    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 IT band likely tighter on the right side as well.    Plan:  - Today's Plan of Care:  Rehab: Physical Therapy: [facility] - [phone]    Discussed activity considerations and other supportive care including Ice/Heat, OTC and other topical medications as needed.    -We also discussed other future treatment options:  MRI if not improving    Follow Up: 6 - 8 weeks and as needed    If you have any further questions for your physician or physician s care team you can call [phone] and use option 3 to leave a voice message.    Concerning signs and symptoms were reviewed and all questions were answered at this time.    Myra Weiss MD [facility]  Sports Medicine Physician  [facility]      -----  Chief Complaint   Patient presents with     Right Knee - Pain       SUBJECTIVE  Jessica Manriquez is a/an 52 year old female who is seen as a self referral for evaluation of right knee pain.     The patient is seen by themselves.    Onset: few years(s) ago, 1 year ago. Patient describes injury as wearing her patellar stabilizing brace while moving (moving homes)  Location of Pain: right knee; posterior medial, lateral, distal IT band  Worsened by: kneeling, flexion of the knee, flexing knee while sleeping or laying down, rotation of tibia  Better with: resting  Treatments tried:  ice, other medications: for Migranes (tramadol), home exercises, physical therapy (few visits, previously), previous imaging (xray 5/3/2021) and casting/splinting/bracing  Associated symptoms: swelling, weakness of right knee and feeling of instability, loud popping, snapping sounds    Orthopedic/Surgical history: YES - Date: chronic right knee pain; PFPS , has gone to PT in the past  Social History/Occupation: student      REVIEW OF SYSTEMS:  Review of Systems    OBJECTIVE:  /72   Pulse 102   Wt 64 kg (141 lb)   BMI 19.67 kg/m     General: healthy, alert and in no distress  Skin: no suspicious lesions or rash.  CV: distal perfusion intact  Resp: normal respiratory effort without conversational dyspnea   Psych: normal mood and affect  Gait: NORMAL  Neuro: Normal light sensory exam of lower extremity    Bilateral Knee exam    Inspection:      mild effusion right    Patella:      Crepitus noted in the patellofemoral joint bilateral    Tender:      Mild lateral knee, IT band    Non Tender:      remainder of knee area bilateral    Knee ROM:      Range of motion limited on the right in full flexion, otherwise normal extension    Hip ROM:     Full active and passive ROM bilateral    Strength:      5/5 with knee extension bilateral    Special Tests:     neg (-) Rowan right       neg (-) anterior drawer right       neg (-) posterior drawer right       neg (-) varus at 0 deg and 30 deg right       neg (-) valgus at 0 deg and 30 deg right    Gait:      normal    Neurovascular:      2+ peripheral pulses bilaterally and brisk capillary refill       sensation grossly intact    RADIOLOGY:  Final results and radiologist's interpretation, available in the [facility] health record.  Images were reviewed with the patient in the office today.  My personal interpretation of the performed imaging:     AP and sunrise bilateral and right lateral XR views of knees reviewed: no acute bony abnormality, no significant degenerative  change  - will follow official read    Reviewed PT notes  Review of the result(s) of each unique test - XR

## 2023-05-24 NOTE — PATIENT INSTRUCTIONS
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 IT band likely tighter on the right side as well.    Plan:  - Today's Plan of Care:  Rehab: Physical Therapy: [facility] - [phone]    Discussed activity considerations and other supportive care including Ice/Heat, OTC and other topical medications as needed.    -We also discussed other future treatment options:  MRI if not improving    Follow Up: 6 - 8 weeks and as needed    If you have any further questions for your physician or physician s care team you can call [phone] and use option 3 to leave a voice message.

## 2023-05-24 NOTE — LETTER
5/24/2023         RE: Jessica Manriquez  [address]  [place] [address]        Dear Colleague,    Thank you for referring your patient, Jessica Manriquez, to the [facility] [place]. Please see a copy of my visit note below.    ASSESSMENT & PLAN    Jessica was seen today for pain.    Diagnoses and all orders for this visit:    Chronic pain of right knee  -     XR Knee Standing AP [place] Bil[facility] Lat Right; Future  -     Physical Therapy Referral; Future    Knee swelling  -     Physical Therapy Referral; Future    Chronic patellofemoral pain of right knee  -     Physical Therapy Referral; Future    Iliotibial band syndrome of right side  -     Physical Therapy Referral; Future      This issue is chronic and Unchanged.    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 IT band likely tighter on the right side as well.    Plan:  - Today's Plan of Care:  Rehab: Physical Therapy: [facility] - [phone]    Discussed activity considerations and other supportive care including Ice/Heat, OTC and other topical medications as needed.    -We also discussed other future treatment options:  MRI if not improving    Follow Up: 6 - 8 weeks and as needed    If you have any further questions for your physician or physician s care team you can call [phone] and use option 3 to leave a voice message.    Concerning signs and symptoms were reviewed and all questions were answered at this time.    Myra Weiss MD [facility]  Sports Medicine Physician  [facility]      -----  Chief Complaint   Patient presents with     Right Knee - Pain       SUBJECTIVE  Jessica Manriquez is a/an 52 year old female who is seen as a self referral for evaluation of right knee pain.     The patient is seen by themselves.    Onset: few years(s) ago, 1 year ago. Patient describes injury as wearing her  patellar stabilizing brace while moving (moving homes)  Location of Pain: right knee; posterior medial, lateral, distal IT band  Worsened by: kneeling, flexion of the knee, flexing knee while sleeping or laying down, rotation of tibia  Better with: resting  Treatments tried: ice, other medications: for Migranes (tramadol), home exercises, physical therapy (few visits, previously), previous imaging (xray 5/3/2021) and casting/splinting/bracing  Associated symptoms: swelling, weakness of right knee and feeling of instability, loud popping, snapping sounds    Orthopedic/Surgical history: YES - Date: chronic right knee pain; PFPS , has gone to PT in the past  Social History/Occupation: student      REVIEW OF SYSTEMS:  Review of Systems    OBJECTIVE:  /72   Pulse 102   Wt 64 kg (141 lb)   BMI 19.67 kg/m     General: healthy, alert and in no distress  Skin: no suspicious lesions or rash.  CV: distal perfusion intact  Resp: normal respiratory effort without conversational dyspnea   Psych: normal mood and affect  Gait: NORMAL  Neuro: Normal light sensory exam of lower extremity    Bilateral Knee exam    Inspection:      mild effusion right    Patella:      Crepitus noted in the patellofemoral joint bilateral    Tender:      Mild lateral knee, IT band    Non Tender:      remainder of knee area bilateral    Knee ROM:      Range of motion limited on the right in full flexion, otherwise normal extension    Hip ROM:     Full active and passive ROM bilateral    Strength:      5/5 with knee extension bilateral    Special Tests:     neg (-) Rowan right       neg (-) anterior drawer right       neg (-) posterior drawer right       neg (-) varus at 0 deg and 30 deg right       neg (-) valgus at 0 deg and 30 deg right    Gait:      normal    Neurovascular:      2+ peripheral pulses bilaterally and brisk capillary refill       sensation grossly intact    RADIOLOGY:  Final results and radiologist's interpretation, available  in the [facility] health record.  Images were reviewed with the patient in the office today.  My personal interpretation of the performed imaging:     AP and sunrise bilateral and right lateral XR views of knees reviewed: no acute bony abnormality, no significant degenerative change  - will follow official read    Reviewed PT notes  Review of the result(s) of each unique test - XR             Again, thank you for allowing me to participate in the care of your patient.        Sincerely,        Myra Weiss MD

## 2023-05-31 ENCOUNTER — OFFICE VISIT (OUTPATIENT)
Dept: PHYSICAL MEDICINE AND REHAB | Facility: CLINIC | Age: 53
End: 2023-05-31
Payer: COMMERCIAL

## 2023-05-31 DIAGNOSIS — G43.719 CHRONIC MIGRAINE WITHOUT AURA, INTRACTABLE, WITHOUT STATUS MIGRAINOSUS: Primary | ICD-10-CM

## 2023-05-31 PROCEDURE — 95874 GUIDE NERV DESTR NEEDLE EMG: CPT | Performed by: PHYSICAL MEDICINE & REHABILITATION

## 2023-05-31 PROCEDURE — 64615 CHEMODENERV MUSC MIGRAINE: CPT | Performed by: PHYSICAL MEDICINE & REHABILITATION

## 2023-05-31 NOTE — PROGRESS NOTES
[facility]     PM&R CLINIC NOTE  BOTULINUM TOXIN PROCEDURE      HPI  No chief complaint on file.    Jessica Manriquez is a 52 year old female with a history of intractable migraine headaches who presents to clinic for botulinum toxin injections for management of chronic migraine headaches.     SINCE LAST VISIT  Jessica Manriquez was last seen here in clinic on 3/20/2023, at which time she received 200 units of Botox.    Patient denies new medical diagnoses, illnesses, hospitalizations, emergency room visits, and injuries since the previous injection with botulinum neurotoxin.    RESPONSE TO PREVIOUS TREATMENT    Side effects: No problems reported  Post-procedural headache: Yes, she did have a mild headache that started following Botox injections which resolved with Excedrin.     1.  Headache frequency during this injection cycle: In March, she had 6 migraine headaches, April she had 3, and May she has had 2 migraines.  This is compared to her baseline headache frequency of >15 headache days per month.     2.  Headache duration during this injection cycle:  Headache duration ranged from 5 hours to 24 hours. Patient reports probably no episodes of multiple day headaches during this injection cycle which happened more recently.     3.  Headache intensity during this injection cycle:    A.  4/10  =  Typical pain level.  B.  9/10  =  Worst pain level.  C.  0/10  =  Lowest pain level.    4.  Change in headache medication usage during this injection cycle:  (For Example:  Able to decrease use of oral pain medications.) No, she uses Imitrex and Tramadol for migraine rescue medications.     5.  ER Visits During This Injection Cycle:  None.     6.  Functional Performance:  Change in ADL's, social interaction, days lost from work, etc. Patient reports being able to more fully participate in social and family activities and responsibilities as headache symptoms have improved      PHYSICAL  EXAM  VS: There were no vitals taken for this visit.   GEN: Pleasant and cooperative, in no acute distress  HEENT: No facial asymmetry    ALLERGIES  Allergies   Allergen Reactions     Darvocet [Acetaminophen] Nausea and Vomiting     Erythromycin GI Disturbance     Flexeril [Cyclobenzaprine Hcl]      Lightheadedness, dizzy       Serotonin Reuptake Inhibitors (Ssris) Anxiety, Other (See Comments) and Palpitations       CURRENT MEDICATIONS    Current Outpatient Medications:      ALPRAZolam (XANAX) 0.5 MG tablet, Take 1 tablet (0.5 mg) by mouth every 6 hours as needed for anxiety, Disp: 40 tablet, Rfl: 0     amitriptyline (ELAVIL) 10 MG tablet, Take 1 tablet (10 mg) by mouth At Bedtime, Disp: 1 tablet, Rfl: 0     aspirin-acetaminophen-caffeine (EXCEDRIN MIGRAINE) 250-250-65 MG per tablet, Take 1 tablet by mouth every 6 hours as needed for pain., Disp: 30 tablet, Rfl: 0     diazepam (VALIUM) 10 MG tablet, Place 1 tab vaginally for muscle spasms, Disp: 30 tablet, Rfl: 3     ferrous sulfate (FEROSUL) 325 (65 Fe) MG tablet, Take 325 mg by mouth daily (with breakfast), Disp: , Rfl:      gabapentin (NEURONTIN) 300 MG capsule, Take 1 capsule (300 mg) by mouth At Bedtime, Disp: 90 capsule, Rfl: 3     hydrOXYzine (ATARAX) 25 MG tablet, Take 1-2 tablets (25-50 mg) by mouth every 6 hours as needed for itching or anxiety, Disp: 60 tablet, Rfl: 1     Lactobacillus (ACIDOPHILUS) 100 MG CAPS, Take 1 capsule by mouth daily, Disp: , Rfl:      Loperamide HCl (IMODIUM A-D PO), Take 1 tablet by mouth once as needed, Disp: , Rfl:      MULTI-VITAMIN OR TABS, 1 tablet by mouth DAILY, Disp: , Rfl:      OnabotulinumtoxinA (BOTOX IJ), Inject 200 Units as directed Total dose 200 units  Dose Administered:  175 units  Botox (Botulinum Toxin Type A)       2:1 Dilution (55 units unavoidable waste) Unavoidable waste 25 units  Diluent Used:  Preservative Free Normal Saline Total Volume of Diluent Used:  2.9 ml Lot # /C3 with Expiration Date:   2021 NDC #: Botox 100u ([address]-73), Disp: , Rfl:      ondansetron (ZOFRAN ODT) 4 MG ODT tab, Take 1-2 tablets (4-8 mg) by mouth every 8 hours as needed for nausea, Disp: 20 tablet, Rfl: 3     SUMAtriptan (IMITREX) 50 MG tablet, Take 1 tablet (50 mg) by mouth at onset of headache for migraine May repeat dose in 2 hours.  Do not exceed 200 mg in 24 hours, Disp: 18 tablet, Rfl: 1     traMADol (ULTRAM) 50 MG tablet, Take 1-2 tablets ( mg) by mouth every 6 hours as needed for pain, Disp: 40 tablet, Rfl: 2     valACYclovir (VALTREX) 500 MG tablet, Take 1 tablet (500 mg) by mouth daily, Disp: 90 tablet, Rfl: 3     vitamin (B COMPLEX-C) tablet, Take 1 tablet by mouth daily, Disp: , Rfl:      VITAMIN C 500 MG OR TABS, 1 tablet daily, Disp: , Rfl:      VITAMIN D, CHOLECALCIFEROL, PO, Take 1,000 Units by mouth daily, Disp: , Rfl:      Vitamin Mixture (VITAMIN E COMPLETE PO), , Disp: , Rfl:      zolpidem (AMBIEN) 5 MG tablet, Take 1 tablet (5 mg) by mouth nightly as needed for sleep, Disp: 90 tablet, Rfl: 1    Current Facility-Administered Medications:      botulinum toxin type A (BOTOX) 100 units injection 200 Units, 200 Units, Intramuscular, Q10 Weeks, Standal, Aretha Hahn MD, 200 Units at 23 1204       BOTULINUM NEUROTOXIN INJECTION PROCEDURES    VERIFICATION OF PATIENT IDENTIFICATION AND PROCEDURE     Initials   Patient Name SES   Patient  SES   Procedure Verified by: SES     Prior to the start of the procedure and with procedural staff participation, I verbally confirmed the patient s identity using two indicators, relevant allergies, that the procedure was appropriate and matched the consent or emergent situation, and that the correct equipment/implants were available. Immediately prior to starting the procedure I conducted the Time Out with the procedural staff and re-confirmed the patient s name, procedure, and site/side. (The Joint Commission universal protocol was followed.)  Yes    Sedation  (Moderate or Deep): None    ABOVE ASSESSMENTS PERFORMED BY    Aretha Pichardo MD      INDICATIONS FOR PROCEDURES  Jessica Manriquez is a 52 year old patient with intractable pain secondary to the diagnosis of chronic migraine headaches. Her baseline symptoms have been recalcitrant to oral medications and conservative therapy.  She is here today for reinjection with Botox.    GOAL OF PROCEDURE  The goal of this procedure is to decrease pain.      TOTAL DOSE ADMINISTERED  Dose Administered:  200 units  Botox (Botulinum Toxin Type A)       2:1 Dilution   Unavoidable Drug Waste: No  Diluent Used:  0.5% bupivacaine  Total Volume of Diluent Used:  4 ml  NDC #: Botox 100u ([address]-57)      CONSENT  The risks, benefits, and treatment options were discussed with Jessica Manriquez and she agreed to proceed.    Written consent was obtained by [facility].     EQUIPMENT USED  Needle-25mm stimulating/recording  Needle-30 gauge  EMG/NCS Machine    SKIN PREPARATION  Skin preparation was performed using an alcohol wipe.    GUIDANCE DESCRIPTION  Electro-myographic guidance was necessary throughout the neck portion of the procedure to accurately identify all areas of spastic muscles while avoiding injection of non-spastic muscles, neighboring nerves and nearby vascular structures.       AREA/MUSCLE INJECTED:  200 UNITS BOTOX = TOTAL DOSE, 2:1 DILUTION    1 & 2. SHOULDER GIRDLE & NECK MUSCLES: 65 units Botox = Total Dose, 2:1 Dilution                               Right Upper Trapezius 15 units of Botox at 3 sites               Left Upper Trapezius 15 units of Botox at 3 sites        Right Splenius  - 5 units of Botox at 1 site/s  Left Splenius - 5 units of Botox at 1 site/s     Right Semispinalis  - 5 units of Botox at 1 site/s  Left Semispinalis - 5 units of Botox at 1 site/s    Left Anterior Scalenes - 5 units of Botox at 1 site/s.                  Right Levator Scapula - 5 units of Botox at 1 site/s.     Left Levator Scapula -  5 units  of Botox at 1 site/s.     3. HEAD, JAW & SCALP MUSCLES: 135 units Botox = Total Dose, 2:1 Dilution     Right masseter - 5 units of Botox at 1 site/s.  Left masseter - 5 units of Botox at 1 site/s.     Right Occipitalis (upper and lower) - 20 units of Botox at 3 site/s.  Left Occipitalis (upper and lower) - 20 units of Botox at 3 site/s.    Right Frontalis - 10 units of Botox at 2 site/s  Left Frontalis - 10 units of Botox at 2 site/s     Right Temporalis (upper and lower)  - 25 units of Botox at 5 site/s.  Left Temporalis (upper and lower) - 25 units of Botox at 5 site/s.    Right  - 5 units of Botox at 1 site/s.  Left  - 5 units of Botox at 1 site/s.     Procerus - 5 units of Botox at 1 site/s.      RESPONSE TO PROCEDURE  Jessica Manriquez tolerated the procedure well and there were no immediate complications. She was allowed to recover for an appropriate period of time and was discharged home in stable condition.    ASSESSMENT AND PLAN   1. Botulinum toxin injections: No changes made to Botox dose or distribution today. Patient will continue to monitor response and report at next appointment.   2. Referrals: None.   3. Follow up: Jessica Manriquez was rescheduled for the next series of injections in 12 weeks, at which time we will evaluate response to today's injections. she may call the clinic prior with any questions or concerns prior to the next appointment.

## 2023-05-31 NOTE — LETTER
5/31/2023       RE: Jessica Manriquez  [address]  [facility] [address]     Dear Colleague,    Thank you for referring your patient, Jessica Manriquez, to the [facility] [place] at [facility]. Please see a copy of my visit note below.      [facility]     PM&R CLINIC NOTE  BOTULINUM TOXIN PROCEDURE      HPI  No chief complaint on file.    Jessica Manriquez is a 52 year old female with a history of intractable migraine headaches who presents to clinic for botulinum toxin injections for management of chronic migraine headaches.     SINCE LAST VISIT  Jessica Manriquez was last seen here in clinic on 3/20/2023, at which time she received 200 units of Botox.    Patient denies new medical diagnoses, illnesses, hospitalizations, emergency room visits, and injuries since the previous injection with botulinum neurotoxin.    RESPONSE TO PREVIOUS TREATMENT    Side effects: No problems reported  Post-procedural headache: Yes, she did have a mild headache that started following Botox injections which resolved with Excedrin.     1.  Headache frequency during this injection cycle: In March, she had 6 migraine headaches, April she had 3, and May she has had 2 migraines.  This is compared to her baseline headache frequency of >15 headache days per month.     2.  Headache duration during this injection cycle:  Headache duration ranged from 5 hours to 24 hours. Patient reports probably no episodes of multiple day headaches during this injection cycle which happened more recently.     3.  Headache intensity during this injection cycle:    A.  4/10  =  Typical pain level.  B.  9/10  =  Worst pain level.  C.  0/10  =  Lowest pain level.    4.  Change in headache medication usage during this injection cycle:  (For Example:  Able to decrease use of oral pain medications.) No, she uses Imitrex and  Tramadol for migraine rescue medications.     5.  ER Visits During This Injection Cycle:  None.     6.  Functional Performance:  Change in ADL's, social interaction, days lost from work, etc. Patient reports being able to more fully participate in social and family activities and responsibilities as headache symptoms have improved      PHYSICAL EXAM  VS: There were no vitals taken for this visit.   GEN: Pleasant and cooperative, in no acute distress  HEENT: No facial asymmetry    ALLERGIES  Allergies   Allergen Reactions    Darvocet [Acetaminophen] Nausea and Vomiting    Erythromycin GI Disturbance    Flexeril [Cyclobenzaprine Hcl]      Lightheadedness, dizzy      Serotonin Reuptake Inhibitors (Ssris) Anxiety, Other (See Comments) and Palpitations       CURRENT MEDICATIONS    Current Outpatient Medications:     ALPRAZolam (XANAX) 0.5 MG tablet, Take 1 tablet (0.5 mg) by mouth every 6 hours as needed for anxiety, Disp: 40 tablet, Rfl: 0    amitriptyline (ELAVIL) 10 MG tablet, Take 1 tablet (10 mg) by mouth At Bedtime, Disp: 1 tablet, Rfl: 0    aspirin-acetaminophen-caffeine (EXCEDRIN MIGRAINE) 250-250-65 MG per tablet, Take 1 tablet by mouth every 6 hours as needed for pain., Disp: 30 tablet, Rfl: 0    diazepam (VALIUM) 10 MG tablet, Place 1 tab vaginally for muscle spasms, Disp: 30 tablet, Rfl: 3    ferrous sulfate (FEROSUL) 325 (65 Fe) MG tablet, Take 325 mg by mouth daily (with breakfast), Disp: , Rfl:     gabapentin (NEURONTIN) 300 MG capsule, Take 1 capsule (300 mg) by mouth At Bedtime, Disp: 90 capsule, Rfl: 3    hydrOXYzine (ATARAX) 25 MG tablet, Take 1-2 tablets (25-50 mg) by mouth every 6 hours as needed for itching or anxiety, Disp: 60 tablet, Rfl: 1    Lactobacillus (ACIDOPHILUS) 100 MG CAPS, Take 1 capsule by mouth daily, Disp: , Rfl:     Loperamide HCl (IMODIUM A-D PO), Take 1 tablet by mouth once as needed, Disp: , Rfl:     MULTI-VITAMIN OR TABS, 1 tablet by mouth DAILY, Disp: , Rfl:      OnabotulinumtoxinA (BOTOX IJ), Inject 200 Units as directed Total dose 200 units  Dose Administered:  175 units  Botox (Botulinum Toxin Type A)       2:1 Dilution (55 units unavoidable waste) Unavoidable waste 25 units  Diluent Used:  Preservative Free Normal Saline Total Volume of Diluent Used:  2.9 ml Lot # /C3 with Expiration Date:  2021 NDC #: Botox 100u ([address]-74), Disp: , Rfl:     ondansetron (ZOFRAN ODT) 4 MG ODT tab, Take 1-2 tablets (4-8 mg) by mouth every 8 hours as needed for nausea, Disp: 20 tablet, Rfl: 3    SUMAtriptan (IMITREX) 50 MG tablet, Take 1 tablet (50 mg) by mouth at onset of headache for migraine May repeat dose in 2 hours.  Do not exceed 200 mg in 24 hours, Disp: 18 tablet, Rfl: 1    traMADol (ULTRAM) 50 MG tablet, Take 1-2 tablets ( mg) by mouth every 6 hours as needed for pain, Disp: 40 tablet, Rfl: 2    valACYclovir (VALTREX) 500 MG tablet, Take 1 tablet (500 mg) by mouth daily, Disp: 90 tablet, Rfl: 3    vitamin (B COMPLEX-C) tablet, Take 1 tablet by mouth daily, Disp: , Rfl:     VITAMIN C 500 MG OR TABS, 1 tablet daily, Disp: , Rfl:     VITAMIN D, CHOLECALCIFEROL, PO, Take 1,000 Units by mouth daily, Disp: , Rfl:     Vitamin Mixture (VITAMIN E COMPLETE PO), , Disp: , Rfl:     zolpidem (AMBIEN) 5 MG tablet, Take 1 tablet (5 mg) by mouth nightly as needed for sleep, Disp: 90 tablet, Rfl: 1    Current Facility-Administered Medications:     botulinum toxin type A (BOTOX) 100 units injection 200 Units, 200 Units, Intramuscular, Q10 Weeks, Standal, Aretha Hahn MD, 200 Units at 23 1204       BOTULINUM NEUROTOXIN INJECTION PROCEDURES    VERIFICATION OF PATIENT IDENTIFICATION AND PROCEDURE     Initials   Patient Name SES   Patient  SES   Procedure Verified by: SES     Prior to the start of the procedure and with procedural staff participation, I verbally confirmed the patient s identity using two indicators, relevant allergies, that the procedure was appropriate  and matched the consent or emergent situation, and that the correct equipment/implants were available. Immediately prior to starting the procedure I conducted the Time Out with the procedural staff and re-confirmed the patient s name, procedure, and site/side. (The Joint Commission universal protocol was followed.)  Yes    Sedation (Moderate or Deep): None    ABOVE ASSESSMENTS PERFORMED BY    Aretha Pichardo MD      INDICATIONS FOR PROCEDURES  Jessica Manriquez is a 52 year old patient with intractable pain secondary to the diagnosis of chronic migraine headaches. Her baseline symptoms have been recalcitrant to oral medications and conservative therapy.  She is here today for reinjection with Botox.    GOAL OF PROCEDURE  The goal of this procedure is to decrease pain.      TOTAL DOSE ADMINISTERED  Dose Administered:  200 units  Botox (Botulinum Toxin Type A)       2:1 Dilution   Unavoidable Drug Waste: No  Diluent Used:  0.5% bupivacaine  Total Volume of Diluent Used:  4 ml  NDC #: Botox 100u ([address]-96)      CONSENT  The risks, benefits, and treatment options were discussed with Jessica Manriquez and she agreed to proceed.    Written consent was obtained by [facility].     EQUIPMENT USED  Needle-25mm stimulating/recording  Needle-30 gauge  EMG/NCS Machine    SKIN PREPARATION  Skin preparation was performed using an alcohol wipe.    GUIDANCE DESCRIPTION  Electro-myographic guidance was necessary throughout the neck portion of the procedure to accurately identify all areas of spastic muscles while avoiding injection of non-spastic muscles, neighboring nerves and nearby vascular structures.       AREA/MUSCLE INJECTED:  200 UNITS BOTOX = TOTAL DOSE, 2:1 DILUTION    1 & 2. SHOULDER GIRDLE & NECK MUSCLES: 65 units Botox = Total Dose, 2:1 Dilution                               Right Upper Trapezius 15 units of Botox at 3 sites               Left Upper Trapezius 15 units of Botox at 3 sites        Right Splenius  - 5 units of  Botox at 1 site/s  Left Splenius - 5 units of Botox at 1 site/s     Right Semispinalis  - 5 units of Botox at 1 site/s  Left Semispinalis - 5 units of Botox at 1 site/s    Left Anterior Scalenes - 5 units of Botox at 1 site/s.                  Right Levator Scapula - 5 units of Botox at 1 site/s.     Left Levator Scapula -  5 units of Botox at 1 site/s.     3. HEAD, JAW & SCALP MUSCLES: 135 units Botox = Total Dose, 2:1 Dilution     Right masseter - 5 units of Botox at 1 site/s.  Left masseter - 5 units of Botox at 1 site/s.     Right Occipitalis (upper and lower) - 20 units of Botox at 3 site/s.  Left Occipitalis (upper and lower) - 20 units of Botox at 3 site/s.    Right Frontalis - 10 units of Botox at 2 site/s  Left Frontalis - 10 units of Botox at 2 site/s     Right Temporalis (upper and lower)  - 25 units of Botox at 5 site/s.  Left Temporalis (upper and lower) - 25 units of Botox at 5 site/s.    Right  - 5 units of Botox at 1 site/s.  Left  - 5 units of Botox at 1 site/s.     Procerus - 5 units of Botox at 1 site/s.      RESPONSE TO PROCEDURE  Jessica Manriquez tolerated the procedure well and there were no immediate complications. She was allowed to recover for an appropriate period of time and was discharged home in stable condition.    ASSESSMENT AND PLAN   Botulinum toxin injections: No changes made to Botox dose or distribution today. Patient will continue to monitor response and report at next appointment.   Referrals: None.   Follow up: Jessica Manriquez was rescheduled for the next series of injections in 12 weeks, at which time we will evaluate response to today's injections. she may call the clinic prior with any questions or concerns prior to the next appointment.       Sincerely,    Aretha Pichardo MD

## 2023-06-07 ASSESSMENT — ACTIVITIES OF DAILY LIVING (ADL)
SQUAT: ACTIVITY IS VERY DIFFICULT
GO UP STAIRS: ACTIVITY IS NOT DIFFICULT
SWELLING: THE SYMPTOM AFFECTS MY ACTIVITY SLIGHTLY
WALK: ACTIVITY IS NOT DIFFICULT
AS_A_RESULT_OF_YOUR_KNEE_INJURY,_HOW_WOULD_YOU_RATE_YOUR_CURRENT_LEVEL_OF_DAILY_ACTIVITY?: NEARLY NORMAL
PAIN: THE SYMPTOM AFFECTS MY ACTIVITY SLIGHTLY
AS_A_RESULT_OF_YOUR_KNEE_INJURY,_HOW_WOULD_YOU_RATE_YOUR_CURRENT_LEVEL_OF_DAILY_ACTIVITY?: NEARLY NORMAL
KNEEL ON THE FRONT OF YOUR KNEE: ACTIVITY IS FAIRLY DIFFICULT
RISE FROM A CHAIR: ACTIVITY IS NOT DIFFICULT
STIFFNESS: THE SYMPTOM AFFECTS MY ACTIVITY SLIGHTLY
HOW_WOULD_YOU_RATE_THE_CURRENT_FUNCTION_OF_YOUR_KNEE_DURING_YOUR_USUAL_DAILY_ACTIVITIES_ON_A_SCALE_FROM_0_TO_100_WITH_100_BEING_YOUR_LEVEL_OF_KNEE_FUNCTION_PRIOR_TO_YOUR_INJURY_AND_0_BEING_THE_INABILITY_TO_PERFORM_ANY_OF_YOUR_USUAL_DAILY_ACTIVITIES?: 80
WEAKNESS: THE SYMPTOM AFFECTS MY ACTIVITY MODERATELY
GO DOWN STAIRS: ACTIVITY IS NOT DIFFICULT
HOW_WOULD_YOU_RATE_THE_CURRENT_FUNCTION_OF_YOUR_KNEE_DURING_YOUR_USUAL_DAILY_ACTIVITIES_ON_A_SCALE_FROM_0_TO_100_WITH_100_BEING_YOUR_LEVEL_OF_KNEE_FUNCTION_PRIOR_TO_YOUR_INJURY_AND_0_BEING_THE_INABILITY_TO_PERFORM_ANY_OF_YOUR_USUAL_DAILY_ACTIVITIES?: 80
LIMPING: I HAVE THE SYMPTOM BUT IT DOES NOT AFFECT MY ACTIVITY
HOW_WOULD_YOU_RATE_THE_OVERALL_FUNCTION_OF_YOUR_KNEE_DURING_YOUR_USUAL_DAILY_ACTIVITIES?: ABNORMAL
GIVING WAY, BUCKLING OR SHIFTING OF KNEE: I HAVE THE SYMPTOM BUT IT DOES NOT AFFECT MY ACTIVITY
SIT WITH YOUR KNEE BENT: ACTIVITY IS NOT DIFFICULT
KNEE_ACTIVITY_OF_DAILY_LIVING_SUM: 52
STAND: ACTIVITY IS NOT DIFFICULT
RISE FROM A CHAIR: ACTIVITY IS NOT DIFFICULT
LIMPING: I HAVE THE SYMPTOM BUT IT DOES NOT AFFECT MY ACTIVITY
WALK: ACTIVITY IS NOT DIFFICULT
KNEEL ON THE FRONT OF YOUR KNEE: ACTIVITY IS FAIRLY DIFFICULT
KNEE_ACTIVITY_OF_DAILY_LIVING_SCORE: 74.29
PLEASE_INDICATE_YOR_PRIMARY_REASON_FOR_REFERRAL_TO_THERAPY:: KNEE
STAND: ACTIVITY IS NOT DIFFICULT
RAW_SCORE: 52
STIFFNESS: THE SYMPTOM AFFECTS MY ACTIVITY SLIGHTLY
SQUAT: ACTIVITY IS VERY DIFFICULT
GO UP STAIRS: ACTIVITY IS NOT DIFFICULT
PAIN: THE SYMPTOM AFFECTS MY ACTIVITY SLIGHTLY
GIVING WAY, BUCKLING OR SHIFTING OF KNEE: I HAVE THE SYMPTOM BUT IT DOES NOT AFFECT MY ACTIVITY
WEAKNESS: THE SYMPTOM AFFECTS MY ACTIVITY MODERATELY
SWELLING: THE SYMPTOM AFFECTS MY ACTIVITY SLIGHTLY
HOW_WOULD_YOU_RATE_THE_OVERALL_FUNCTION_OF_YOUR_KNEE_DURING_YOUR_USUAL_DAILY_ACTIVITIES?: ABNORMAL
GO DOWN STAIRS: ACTIVITY IS NOT DIFFICULT
SIT WITH YOUR KNEE BENT: ACTIVITY IS NOT DIFFICULT

## 2023-06-08 ENCOUNTER — THERAPY VISIT (OUTPATIENT)
Dept: PHYSICAL THERAPY | Facility: CLINIC | Age: 53
End: 2023-06-08
Attending: PEDIATRICS
Payer: COMMERCIAL

## 2023-06-08 DIAGNOSIS — M76.31 ILIOTIBIAL BAND SYNDROME OF RIGHT SIDE: ICD-10-CM

## 2023-06-08 DIAGNOSIS — G89.29 CHRONIC PAIN OF RIGHT KNEE: ICD-10-CM

## 2023-06-08 DIAGNOSIS — M25.561 CHRONIC PAIN OF RIGHT KNEE: ICD-10-CM

## 2023-06-08 DIAGNOSIS — G89.29 CHRONIC PATELLOFEMORAL PAIN OF RIGHT KNEE: ICD-10-CM

## 2023-06-08 DIAGNOSIS — M25.561 CHRONIC PATELLOFEMORAL PAIN OF RIGHT KNEE: ICD-10-CM

## 2023-06-08 DIAGNOSIS — M25.469 KNEE SWELLING: ICD-10-CM

## 2023-06-08 PROCEDURE — 97161 PT EVAL LOW COMPLEX 20 MIN: CPT | Mod: GP | Performed by: PHYSICAL THERAPIST

## 2023-06-08 PROCEDURE — 97110 THERAPEUTIC EXERCISES: CPT | Mod: GP | Performed by: PHYSICAL THERAPIST

## 2023-06-08 NOTE — PROGRESS NOTES
PHYSICAL THERAPY EVALUATION  Type of Visit: Evaluation    See electronic medical record for Abuse and Falls Screening details.    Subjective pt was seen for PT about 1-2 years ago. She was having popping with deep squatting and kneeling. Pt noted it started hurting in the last year (last summer). Pt noted started wearing a knee brace when moving and having pain on the posterior side of the knee. Pt has catered to the knee with the discomfort to an increase in pain. She notes pain when lying on her side with an increased knee flexion. Pt notes getting another brace of compression to the area with pain on the lateral portion of the knee at this time. Tubigrip is best for the area. Pt notes posterior and lateral pain is still present at this time.     Presenting condition or subjective complaint: severe pain behind knee when squat, kneel/sharp bend, patella clicks/pops LOUDLY  Date of onset: 05/24/23 (order date)    Relevant medical history: Anemia; Migraines or headaches; Neck injury; Pain at night or rest; Severe headaches   Dates & types of surgery: Varicose vein stripping 2006? 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 July 2022 severe, sharp, sudden pains, persistent tenderness BEHIND knee for 1st time ever. 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    Patient goals for therapy: Have really strong legs again (very sedentary last 8 yrs) so patella tracks, can get up easily from deep squat to pick something up, no popping, clicking, wobbly feeling in knee, no pain.  Need to get really fit again but have to fix knee first!    Pain assessment: Pain denied present with activities     Objective   KNEE EVALUATION  PAIN: Pain is Exacerbated By: squatting, kneeling (direct pressure on patella is good but worse with increased knee flexion), increased knee flexion is the worst whether non-WBing or WBing  INTEGUMENTARY (edema, incisions): slight swelling superior to the patella  POSTURE: - for rotation or change in aggravating Sx  GAIT: slight limp on the R LE with avoidance in stance phase  Balance/Proprioception: - trendelenburg, 15 sec holds  ROM: AROM WFL  Strength: hip abd- 3+/5; glute- 3/5;  HS- 4+/5; quad- 4+/5; PF-5/5  FLEXIBILITY: tight IT band  Special Tests: + Ehsan's test; anterior and posterior drawer;  Crepitus with LAQ  FUNCTIONAL TESTS: Double leg squat- in full squat, genu valgus with rotation and decrease WBing through the R LE; improper use of glutes; mini squat= no deviation and good form with mobility  PALPATION: tender to IT band insertion, deep hip rotators, and TFL on the R  JOINT MOBILITY: WFL      Assessment & Plan   CLINICAL IMPRESSIONS   Medical Diagnosis: Chronic pain of right knee (M25.561, G89.29), Knee swelling (M25.469), Chronic patellofemoral pain of right knee (M25.561, G89.29), Iliotibial band syndrome of right side (M76.31)    Treatment Diagnosis: R knee pain   Impression/Assessment: Patient is a 52 year old female with R knee pain complaints.  The following significant findings have been identified: Pain, Decreased ROM/flexibility, Decreased joint mobility, Decreased strength, Impaired gait, Impaired muscle performance and Decreased activity tolerance. These  impairments interfere with their ability to perform work tasks, recreational activities and community mobility as compared to previous level of function.     Clinical Decision Making (Complexity):   Clinical Presentation: Stable/Uncomplicated  Clinical Presentation Rationale: based on medical and personal factors listed in PT evaluation  Clinical Decision Making (Complexity): Low complexity    PLAN OF CARE  Treatment Interventions:  Modalities: E-stim, Ultrasound  Interventions: Gait Training, Manual Therapy, Neuromuscular Re-education, Therapeutic Activity, Therapeutic Exercise    Long Term Goals     PT Goal 1  Goal Identifier: STG  Goal Description: Pt will have no pain with deep knee flexion in non-WBing position in 4 weeks to be able to sleep comfortably.  Target Date: 07/06/23  PT Goal 2  Goal Identifier: LTG  Goal Description: Pt will have no pain (0/10) with deep squats in 10 weeks to resume gardening and exercises.  Target Date: 08/17/23  PT Goal 3  Goal Identifier: LTG  Goal Description: Pt will have 4/5 glute and hip abd strength to stabilize and support the area in 10 weeks.  Target Date: 08/17/23  PT Goal 4  Goal Identifier: LTG  Goal Description: Pt will be independent in home strengthening program for self management of Sx in 10 weeks.  Target Date: 08/17/23      Frequency of Treatment: 1x/wk  Duration of Treatment: 10 weeks    Education Assessment:   Learner/Method: Patient;Listening;Demonstration;Pictures/Video;No Barriers to Learning    Risks and benefits of evaluation/treatment have been explained.   Patient/Family/caregiver agrees with Plan of Care.     Evaluation Time: 20         Signing Clinician: Lisset Germain, PT

## 2023-06-18 ENCOUNTER — MYC MEDICAL ADVICE (OUTPATIENT)
Dept: FAMILY MEDICINE | Facility: CLINIC | Age: 53
End: 2023-06-18
Payer: COMMERCIAL

## 2023-06-18 DIAGNOSIS — R10.2 PELVIC PAIN IN FEMALE: ICD-10-CM

## 2023-06-19 RX ORDER — DIAZEPAM 10 MG
TABLET ORAL
Qty: 30 TABLET | Refills: 3 | Status: SHIPPED | OUTPATIENT
Start: 2023-06-19 | End: 2024-01-15

## 2023-06-19 NOTE — TELEPHONE ENCOUNTER
Refill provided.     PDMP Review       Value Time User    State PDMP site checked  Yes 6/19/2023 11:48 AM Liliane Pablo APRN CNP Lisa M. Forslund, APRN CNP

## 2023-07-03 ENCOUNTER — THERAPY VISIT (OUTPATIENT)
Dept: PHYSICAL THERAPY | Facility: CLINIC | Age: 53
End: 2023-07-03
Attending: PEDIATRICS
Payer: COMMERCIAL

## 2023-07-03 DIAGNOSIS — M25.561 CHRONIC PAIN OF RIGHT KNEE: Primary | ICD-10-CM

## 2023-07-03 DIAGNOSIS — G89.29 CHRONIC PAIN OF RIGHT KNEE: Primary | ICD-10-CM

## 2023-07-03 PROCEDURE — 97110 THERAPEUTIC EXERCISES: CPT | Mod: GP | Performed by: PHYSICAL THERAPIST

## 2023-07-10 ENCOUNTER — THERAPY VISIT (OUTPATIENT)
Dept: PHYSICAL THERAPY | Facility: CLINIC | Age: 53
End: 2023-07-10
Attending: PEDIATRICS
Payer: COMMERCIAL

## 2023-07-10 DIAGNOSIS — M25.561 CHRONIC PAIN OF RIGHT KNEE: Primary | ICD-10-CM

## 2023-07-10 DIAGNOSIS — G89.29 CHRONIC PAIN OF RIGHT KNEE: Primary | ICD-10-CM

## 2023-07-10 PROCEDURE — 97110 THERAPEUTIC EXERCISES: CPT | Mod: GP | Performed by: PHYSICAL THERAPIST

## 2023-08-01 ENCOUNTER — THERAPY VISIT (OUTPATIENT)
Dept: PHYSICAL THERAPY | Facility: CLINIC | Age: 53
End: 2023-08-01
Attending: PEDIATRICS
Payer: COMMERCIAL

## 2023-08-01 DIAGNOSIS — M25.561 CHRONIC PAIN OF RIGHT KNEE: Primary | ICD-10-CM

## 2023-08-01 DIAGNOSIS — G89.29 CHRONIC PAIN OF RIGHT KNEE: Primary | ICD-10-CM

## 2023-08-01 PROCEDURE — 97110 THERAPEUTIC EXERCISES: CPT | Mod: GP | Performed by: PHYSICAL THERAPIST

## 2023-08-04 ENCOUNTER — PATIENT OUTREACH (OUTPATIENT)
Dept: CARE COORDINATION | Facility: CLINIC | Age: 53
End: 2023-08-04
Payer: COMMERCIAL

## 2023-08-08 ENCOUNTER — THERAPY VISIT (OUTPATIENT)
Dept: PHYSICAL THERAPY | Facility: CLINIC | Age: 53
End: 2023-08-08
Attending: PEDIATRICS
Payer: COMMERCIAL

## 2023-08-08 DIAGNOSIS — G89.29 CHRONIC PAIN OF RIGHT KNEE: Primary | ICD-10-CM

## 2023-08-08 DIAGNOSIS — M25.561 CHRONIC PAIN OF RIGHT KNEE: Primary | ICD-10-CM

## 2023-08-08 PROCEDURE — 97110 THERAPEUTIC EXERCISES: CPT | Mod: GP | Performed by: PHYSICAL THERAPIST

## 2023-08-09 ENCOUNTER — OFFICE VISIT (OUTPATIENT)
Dept: PHYSICAL MEDICINE AND REHAB | Facility: CLINIC | Age: 53
End: 2023-08-09
Payer: COMMERCIAL

## 2023-08-09 DIAGNOSIS — G43.719 CHRONIC MIGRAINE WITHOUT AURA, INTRACTABLE, WITHOUT STATUS MIGRAINOSUS: Primary | ICD-10-CM

## 2023-08-09 PROCEDURE — 95874 GUIDE NERV DESTR NEEDLE EMG: CPT | Performed by: PHYSICAL MEDICINE & REHABILITATION

## 2023-08-09 PROCEDURE — 64615 CHEMODENERV MUSC MIGRAINE: CPT | Performed by: PHYSICAL MEDICINE & REHABILITATION

## 2023-08-09 RX ORDER — BUPIVACAINE HYDROCHLORIDE 5 MG/ML
4 INJECTION, SOLUTION EPIDURAL; INTRACAUDAL ONCE
Status: COMPLETED | OUTPATIENT
Start: 2023-08-09 | End: 2023-08-09

## 2023-08-09 RX ADMIN — BUPIVACAINE HYDROCHLORIDE 20 MG: 5 INJECTION, SOLUTION EPIDURAL; INTRACAUDAL at 17:54

## 2023-08-09 NOTE — PROGRESS NOTES
[facility]     PM&R CLINIC NOTE  BOTULINUM TOXIN PROCEDURE      HPI  No chief complaint on file.    Jessica Manriquez is a 52 year old female with a history of intractable migraine headaches who presents to clinic for botulinum toxin injections for management of chronic migraine headaches.     SINCE LAST VISIT  Jessica Manriquez was last seen here in clinic on 5/13/2023, at which time she received 200 units of Botox.    Patient denies new medical diagnoses, illnesses, hospitalizations, emergency room visits, and injuries since the previous injection with botulinum neurotoxin.    RESPONSE TO PREVIOUS TREATMENT    Side effects: No problems reported  Post-procedural headache: No.     1.  Headache frequency during this injection cycle: 2-5 migraine headache days per month. This is compared to her baseline headache frequency of >15 headache days per month.     2.  Headache duration during this injection cycle:  Headache duration ranged from 5 hours to 24 hours. Patient reports probably no episodes of multiple day headaches during this injection cycle which happened more recently.     3.  Headache intensity during this injection cycle:    A.  4/10  =  Typical pain level.  B.  9/10  =  Worst pain level.  C.  0/10  =  Lowest pain level.    4.  Change in headache medication usage during this injection cycle:  (For Example:  Able to decrease use of oral pain medications.) No, she uses Imitrex and Tramadol for migraine rescue medications.     5.  ER Visits During This Injection Cycle:  None.     6.  Functional Performance:  Change in ADL's, social interaction, days lost from work, etc. Patient reports being able to more fully participate in social and family activities and responsibilities as headache symptoms have improved      PHYSICAL EXAM  VS: There were no vitals taken for this visit.   GEN: Pleasant and cooperative, in no acute distress  HEENT: No facial  asymmetry    ALLERGIES  Allergies   Allergen Reactions    Darvocet [Acetaminophen] Nausea and Vomiting    Erythromycin GI Disturbance    Flexeril [Cyclobenzaprine Hcl]      Lightheadedness, dizzy      Serotonin Reuptake Inhibitors (Ssris) Anxiety, Other (See Comments) and Palpitations       CURRENT MEDICATIONS    Current Outpatient Medications:     ALPRAZolam (XANAX) 0.5 MG tablet, Take 1 tablet (0.5 mg) by mouth every 6 hours as needed for anxiety, Disp: 40 tablet, Rfl: 0    amitriptyline (ELAVIL) 10 MG tablet, Take 1 tablet (10 mg) by mouth At Bedtime, Disp: 1 tablet, Rfl: 0    aspirin-acetaminophen-caffeine (EXCEDRIN MIGRAINE) 250-250-65 MG per tablet, Take 1 tablet by mouth every 6 hours as needed for pain., Disp: 30 tablet, Rfl: 0    diazepam (VALIUM) 10 MG tablet, Place 1 tab vaginally for muscle spasms, Disp: 30 tablet, Rfl: 3    ferrous sulfate (FEROSUL) 325 (65 Fe) MG tablet, Take 325 mg by mouth daily (with breakfast), Disp: , Rfl:     gabapentin (NEURONTIN) 300 MG capsule, Take 1 capsule (300 mg) by mouth At Bedtime, Disp: 90 capsule, Rfl: 3    hydrOXYzine (ATARAX) 25 MG tablet, Take 1-2 tablets (25-50 mg) by mouth every 6 hours as needed for itching or anxiety, Disp: 60 tablet, Rfl: 1    Lactobacillus (ACIDOPHILUS) 100 MG CAPS, Take 1 capsule by mouth daily, Disp: , Rfl:     Loperamide HCl (IMODIUM A-D PO), Take 1 tablet by mouth once as needed, Disp: , Rfl:     MULTI-VITAMIN OR TABS, 1 tablet by mouth DAILY, Disp: , Rfl:     OnabotulinumtoxinA (BOTOX IJ), Inject 200 Units as directed Total dose 200 units  Dose Administered:  175 units  Botox (Botulinum Toxin Type A)       2:1 Dilution (55 units unavoidable waste) Unavoidable waste 25 units  Diluent Used:  Preservative Free Normal Saline Total Volume of Diluent Used:  2.9 ml Lot # /C3 with Expiration Date:  03/2021 NDC #: Botox 100u ([address]-06), Disp: , Rfl:     ondansetron (ZOFRAN ODT) 4 MG ODT tab, Take 1-2 tablets (4-8 mg) by mouth every 8  hours as needed for nausea, Disp: 20 tablet, Rfl: 3    SUMAtriptan (IMITREX) 50 MG tablet, Take 1 tablet (50 mg) by mouth at onset of headache for migraine May repeat dose in 2 hours.  Do not exceed 200 mg in 24 hours, Disp: 18 tablet, Rfl: 1    traMADol (ULTRAM) 50 MG tablet, Take 1-2 tablets ( mg) by mouth every 6 hours as needed for pain, Disp: 40 tablet, Rfl: 2    valACYclovir (VALTREX) 500 MG tablet, Take 1 tablet (500 mg) by mouth daily, Disp: 90 tablet, Rfl: 3    vitamin (B COMPLEX-C) tablet, Take 1 tablet by mouth daily, Disp: , Rfl:     VITAMIN C 500 MG OR TABS, 1 tablet daily, Disp: , Rfl:     VITAMIN D, CHOLECALCIFEROL, PO, Take 1,000 Units by mouth daily, Disp: , Rfl:     Vitamin Mixture (VITAMIN E COMPLETE PO), , Disp: , Rfl:     zolpidem (AMBIEN) 5 MG tablet, Take 1 tablet (5 mg) by mouth nightly as needed for sleep, Disp: 90 tablet, Rfl: 1    Current Facility-Administered Medications:     botulinum toxin type A (BOTOX) 100 units injection 200 Units, 200 Units, Intramuscular, Q10 Weeks, Aretha Pichardo MD, 200 Units at 23 1058       BOTULINUM NEUROTOXIN INJECTION PROCEDURES    VERIFICATION OF PATIENT IDENTIFICATION AND PROCEDURE     Initials   Patient Name SES   Patient  SES   Procedure Verified by: SES     Prior to the start of the procedure and with procedural staff participation, I verbally confirmed the patient s identity using two indicators, relevant allergies, that the procedure was appropriate and matched the consent or emergent situation, and that the correct equipment/implants were available. Immediately prior to starting the procedure I conducted the Time Out with the procedural staff and re-confirmed the patient s name, procedure, and site/side. (The Joint Commission universal protocol was followed.)  Yes    Sedation (Moderate or Deep): None    ABOVE ASSESSMENTS PERFORMED BY    Aretha Pichardo MD      INDICATIONS FOR PROCEDURES  Jessica Manriquez is a 52 year old  patient with intractable pain secondary to the diagnosis of chronic migraine headaches. Her baseline symptoms have been recalcitrant to oral medications and conservative therapy.  She is here today for reinjection with Botox.    GOAL OF PROCEDURE  The goal of this procedure is to decrease pain.      TOTAL DOSE ADMINISTERED  Dose Administered:  200 units  Botox (Botulinum Toxin Type A)       2:1 Dilution   Unavoidable Drug Waste: No  Diluent Used:  0.5% bupivacaine  Total Volume of Diluent Used:  4 ml  NDC #: Botox 100u ([address]-28)      CONSENT  The risks, benefits, and treatment options were discussed with Jessica Manriquez and she agreed to proceed.    Written consent was obtained by  [facility] .     EQUIPMENT USED  Needle-25mm stimulating/recording  Needle-30 gauge  EMG/NCS Machine    SKIN PREPARATION  Skin preparation was performed using an alcohol wipe.    GUIDANCE DESCRIPTION  Electro-myographic guidance was necessary throughout the neck portion of the procedure to accurately identify all areas of spastic muscles while avoiding injection of non-spastic muscles, neighboring nerves and nearby vascular structures.       AREA/MUSCLE INJECTED:  200 UNITS BOTOX = TOTAL DOSE, 2:1 DILUTION    1. SHOULDER GIRDLE & NECK MUSCLES: 65 UNITS BOTOX = TOTAL DOSE                               Right Upper Trapezius 15 units of Botox at 3 sites               Left Upper Trapezius 15 units of Botox at 3 sites        Right Splenius  - 5 units of Botox at 1 site/s  Left Splenius - 5 units of Botox at 1 site/s     Right Semispinalis  - 5 units of Botox at 1 site/s  Left Semispinalis - 5 units of Botox at 1 site/s    Left Anterior Scalenes - 5 units of Botox at 1 site/s.                  Right Levator Scapula - 5 units of Botox at 1 site/s.     Left Levator Scapula -  5 units of Botox at 1 site/s.     2. FACIAL, JAW & SCALP MUSCLES: 135 UNITS BOTOX = TOTAL DOSE     Right masseter - 5 units of Botox at 1 site/s.  Left masseter - 5 units of  Botox at 1 site/s.     Right Occipitalis (upper and lower) - 20 units of Botox at 3 site/s.  Left Occipitalis (upper and lower) - 20 units of Botox at 3 site/s.    Right Frontalis - 10 units of Botox at 2 site/s  Left Frontalis - 10 units of Botox at 2 site/s     Right Temporalis (upper and lower)  - 25 units of Botox at 5 site/s.  Left Temporalis (upper and lower) - 25 units of Botox at 5 site/s.    Right  - 5 units of Botox at 1 site/s.  Left  - 5 units of Botox at 1 site/s.     Procerus - 5 units of Botox at 1 site/s.      RESPONSE TO PROCEDURE  Jessica Manriquez tolerated the procedure well and there were no immediate complications. She was allowed to recover for an appropriate period of time and was discharged home in stable condition.    ASSESSMENT AND PLAN   Botulinum toxin injections: No changes made to Botox dose or distribution today. Patient will continue to monitor response and report at next appointment.   Referrals: None.   Follow up: Jessica Manriquez was rescheduled for the next series of injections in 12 weeks, at which time we will evaluate response to today's injections. she may call the clinic prior with any questions or concerns prior to the next appointment.

## 2023-08-09 NOTE — LETTER
8/9/2023       RE: Jessica Manriquez  [address]  [place] [address]       Dear Colleague,    Thank you for referring your patient, Jessica Manriquez, to the [facility] PHYSICAL MEDICINE AND REHABILITATION CLINIC [place] at [facility]. Please see a copy of my visit note below.      [facility]     PM&R CLINIC NOTE  BOTULINUM TOXIN PROCEDURE      HPI  No chief complaint on file.    Jessica Manriquez is a 52 year old female with a history of intractable migraine headaches who presents to clinic for botulinum toxin injections for management of chronic migraine headaches.     SINCE LAST VISIT  Jessica Manriquez was last seen here in clinic on 5/13/2023, at which time she received 200 units of Botox.    Patient denies new medical diagnoses, illnesses, hospitalizations, emergency room visits, and injuries since the previous injection with botulinum neurotoxin.    RESPONSE TO PREVIOUS TREATMENT    Side effects: No problems reported  Post-procedural headache: No.     1.  Headache frequency during this injection cycle: 2-5 migraine headache days per month. This is compared to her baseline headache frequency of >15 headache days per month.     2.  Headache duration during this injection cycle:  Headache duration ranged from 5 hours to 24 hours. Patient reports probably no episodes of multiple day headaches during this injection cycle which happened more recently.     3.  Headache intensity during this injection cycle:    A.  4/10  =  Typical pain level.  B.  9/10  =  Worst pain level.  C.  0/10  =  Lowest pain level.    4.  Change in headache medication usage during this injection cycle:  (For Example:  Able to decrease use of oral pain medications.) No, she uses Imitrex and Tramadol for migraine rescue medications.     5.  ER Visits During This Injection Cycle:  None.     6.  Functional Performance:  Change in ADL's, social  interaction, days lost from work, etc. Patient reports being able to more fully participate in social and family activities and responsibilities as headache symptoms have improved      PHYSICAL EXAM  VS: There were no vitals taken for this visit.   GEN: Pleasant and cooperative, in no acute distress  HEENT: No facial asymmetry    ALLERGIES  Allergies   Allergen Reactions    Darvocet [Acetaminophen] Nausea and Vomiting    Erythromycin GI Disturbance    Flexeril [Cyclobenzaprine Hcl]      Lightheadedness, dizzy      Serotonin Reuptake Inhibitors (Ssris) Anxiety, Other (See Comments) and Palpitations       CURRENT MEDICATIONS    Current Outpatient Medications:     ALPRAZolam (XANAX) 0.5 MG tablet, Take 1 tablet (0.5 mg) by mouth every 6 hours as needed for anxiety, Disp: 40 tablet, Rfl: 0    amitriptyline (ELAVIL) 10 MG tablet, Take 1 tablet (10 mg) by mouth At Bedtime, Disp: 1 tablet, Rfl: 0    aspirin-acetaminophen-caffeine (EXCEDRIN MIGRAINE) 250-250-65 MG per tablet, Take 1 tablet by mouth every 6 hours as needed for pain., Disp: 30 tablet, Rfl: 0    diazepam (VALIUM) 10 MG tablet, Place 1 tab vaginally for muscle spasms, Disp: 30 tablet, Rfl: 3    ferrous sulfate (FEROSUL) 325 (65 Fe) MG tablet, Take 325 mg by mouth daily (with breakfast), Disp: , Rfl:     gabapentin (NEURONTIN) 300 MG capsule, Take 1 capsule (300 mg) by mouth At Bedtime, Disp: 90 capsule, Rfl: 3    hydrOXYzine (ATARAX) 25 MG tablet, Take 1-2 tablets (25-50 mg) by mouth every 6 hours as needed for itching or anxiety, Disp: 60 tablet, Rfl: 1    Lactobacillus (ACIDOPHILUS) 100 MG CAPS, Take 1 capsule by mouth daily, Disp: , Rfl:     Loperamide HCl (IMODIUM A-D PO), Take 1 tablet by mouth once as needed, Disp: , Rfl:     MULTI-VITAMIN OR TABS, 1 tablet by mouth DAILY, Disp: , Rfl:     OnabotulinumtoxinA (BOTOX IJ), Inject 200 Units as directed Total dose 200 units  Dose Administered:  175 units  Botox (Botulinum Toxin Type A)       2:1 Dilution (55  units unavoidable waste) Unavoidable waste 25 units  Diluent Used:  Preservative Free Normal Saline Total Volume of Diluent Used:  2.9 ml Lot # /C3 with Expiration Date:  2021 NDC #: Botox 100u ([address]-92), Disp: , Rfl:     ondansetron (ZOFRAN ODT) 4 MG ODT tab, Take 1-2 tablets (4-8 mg) by mouth every 8 hours as needed for nausea, Disp: 20 tablet, Rfl: 3    SUMAtriptan (IMITREX) 50 MG tablet, Take 1 tablet (50 mg) by mouth at onset of headache for migraine May repeat dose in 2 hours.  Do not exceed 200 mg in 24 hours, Disp: 18 tablet, Rfl: 1    traMADol (ULTRAM) 50 MG tablet, Take 1-2 tablets ( mg) by mouth every 6 hours as needed for pain, Disp: 40 tablet, Rfl: 2    valACYclovir (VALTREX) 500 MG tablet, Take 1 tablet (500 mg) by mouth daily, Disp: 90 tablet, Rfl: 3    vitamin (B COMPLEX-C) tablet, Take 1 tablet by mouth daily, Disp: , Rfl:     VITAMIN C 500 MG OR TABS, 1 tablet daily, Disp: , Rfl:     VITAMIN D, CHOLECALCIFEROL, PO, Take 1,000 Units by mouth daily, Disp: , Rfl:     Vitamin Mixture (VITAMIN E COMPLETE PO), , Disp: , Rfl:     zolpidem (AMBIEN) 5 MG tablet, Take 1 tablet (5 mg) by mouth nightly as needed for sleep, Disp: 90 tablet, Rfl: 1    Current Facility-Administered Medications:     botulinum toxin type A (BOTOX) 100 units injection 200 Units, 200 Units, Intramuscular, Q10 Weeks, Standal, Aretha Hahn MD, 200 Units at 23 1058       BOTULINUM NEUROTOXIN INJECTION PROCEDURES    VERIFICATION OF PATIENT IDENTIFICATION AND PROCEDURE     Initials   Patient Name SES   Patient  SES   Procedure Verified by: SES     Prior to the start of the procedure and with procedural staff participation, I verbally confirmed the patient s identity using two indicators, relevant allergies, that the procedure was appropriate and matched the consent or emergent situation, and that the correct equipment/implants were available. Immediately prior to starting the procedure I conducted the Time  Out with the procedural staff and re-confirmed the patient s name, procedure, and site/side. (The Joint Commission universal protocol was followed.)  Yes    Sedation (Moderate or Deep): None    ABOVE ASSESSMENTS PERFORMED BY    Aretha Pichardo MD      INDICATIONS FOR PROCEDURES  Jessica Manriquez is a 52 year old patient with intractable pain secondary to the diagnosis of chronic migraine headaches. Her baseline symptoms have been recalcitrant to oral medications and conservative therapy.  She is here today for reinjection with Botox.    GOAL OF PROCEDURE  The goal of this procedure is to decrease pain.      TOTAL DOSE ADMINISTERED  Dose Administered:  200 units  Botox (Botulinum Toxin Type A)       2:1 Dilution   Unavoidable Drug Waste: No  Diluent Used:  0.5% bupivacaine  Total Volume of Diluent Used:  4 ml  NDC #: Botox 100u ([address]-53)      CONSENT  The risks, benefits, and treatment options were discussed with Jessica Manriquez and she agreed to proceed.    Written consent was obtained by  [facility] .     EQUIPMENT USED  Needle-25mm stimulating/recording  Needle-30 gauge  EMG/NCS Machine    SKIN PREPARATION  Skin preparation was performed using an alcohol wipe.    GUIDANCE DESCRIPTION  Electro-myographic guidance was necessary throughout the neck portion of the procedure to accurately identify all areas of spastic muscles while avoiding injection of non-spastic muscles, neighboring nerves and nearby vascular structures.       AREA/MUSCLE INJECTED:  200 UNITS BOTOX = TOTAL DOSE, 2:1 DILUTION    1. SHOULDER GIRDLE & NECK MUSCLES: 65 UNITS BOTOX = TOTAL DOSE                               Right Upper Trapezius 15 units of Botox at 3 sites               Left Upper Trapezius 15 units of Botox at 3 sites        Right Splenius  - 5 units of Botox at 1 site/s  Left Splenius - 5 units of Botox at 1 site/s     Right Semispinalis  - 5 units of Botox at 1 site/s  Left Semispinalis - 5 units of Botox at 1 site/s    Left  Anterior Scalenes - 5 units of Botox at 1 site/s.                  Right Levator Scapula - 5 units of Botox at 1 site/s.     Left Levator Scapula -  5 units of Botox at 1 site/s.     2. FACIAL, JAW & SCALP MUSCLES: 135 UNITS BOTOX = TOTAL DOSE     Right masseter - 5 units of Botox at 1 site/s.  Left masseter - 5 units of Botox at 1 site/s.     Right Occipitalis (upper and lower) - 20 units of Botox at 3 site/s.  Left Occipitalis (upper and lower) - 20 units of Botox at 3 site/s.    Right Frontalis - 10 units of Botox at 2 site/s  Left Frontalis - 10 units of Botox at 2 site/s     Right Temporalis (upper and lower)  - 25 units of Botox at 5 site/s.  Left Temporalis (upper and lower) - 25 units of Botox at 5 site/s.    Right  - 5 units of Botox at 1 site/s.  Left  - 5 units of Botox at 1 site/s.     Procerus - 5 units of Botox at 1 site/s.      RESPONSE TO PROCEDURE  Jessica Manriquez tolerated the procedure well and there were no immediate complications. She was allowed to recover for an appropriate period of time and was discharged home in stable condition.    ASSESSMENT AND PLAN   Botulinum toxin injections: No changes made to Botox dose or distribution today. Patient will continue to monitor response and report at next appointment.   Referrals: None.   Follow up: Jessica Manriquez was rescheduled for the next series of injections in 12 weeks, at which time we will evaluate response to today's injections. she may call the clinic prior with any questions or concerns prior to the next appointment.       Again, thank you for allowing me to participate in the care of your patient.      Sincerely,    Aretha Pichardo MD

## 2023-08-14 DIAGNOSIS — G43.909 MIGRAINE WITHOUT STATUS MIGRAINOSUS, NOT INTRACTABLE, UNSPECIFIED MIGRAINE TYPE: ICD-10-CM

## 2023-08-14 RX ORDER — SUMATRIPTAN 50 MG/1
TABLET, FILM COATED ORAL
Qty: 18 TABLET | Refills: 3 | Status: SHIPPED | OUTPATIENT
Start: 2023-08-14 | End: 2024-01-15

## 2023-08-14 NOTE — TELEPHONE ENCOUNTER
"Requested Prescriptions   Pending Prescriptions Disp Refills    SUMAtriptan (IMITREX) 50 MG tablet [Pharmacy Med Name: SUMAtriptan Succinate 50 MG Oral Tablet] 18 tablet 0     Sig: TAKE 1 TABLET BY MOUTH AT ONSET OF HEADACHE FOR MIGRAINE MAY REPEAT DOSE IN 2 HOURS.  DO NOT EXCEED 200 MG IN 24 HOURS       Serotonin Agonists Failed - 8/14/2023 10:53 AM        Failed - Serotonin Agonist request needs review.     Please review patient's record. If patient has had 8 or more treatments in the past month, please forward to provider.          Passed - Blood pressure under 140/90 in past 12 months     BP Readings from Last 3 Encounters:   05/24/23 112/72   03/20/23 120/87   12/19/22 112/72                 Passed - Recent (12 mo) or future (30 days) visit within the authorizing provider's specialty     Patient has had an office visit with the authorizing provider or a provider within the authorizing providers department within the previous 12 mos or has a future within next 30 days. See \"Patient Info\" tab in inbasket, or \"Choose Columns\" in Meds & Orders section of the refill encounter.              Passed - Medication is active on med list        Passed - Patient is age 18 or older        Passed - No active pregnancy on record        Passed - No positive pregnancy test in past 12 months             "

## 2023-08-21 ENCOUNTER — THERAPY VISIT (OUTPATIENT)
Dept: PHYSICAL THERAPY | Facility: CLINIC | Age: 53
End: 2023-08-21
Attending: PEDIATRICS
Payer: COMMERCIAL

## 2023-08-21 DIAGNOSIS — M25.561 CHRONIC PAIN OF RIGHT KNEE: Primary | ICD-10-CM

## 2023-08-21 DIAGNOSIS — G89.29 CHRONIC PAIN OF RIGHT KNEE: Primary | ICD-10-CM

## 2023-08-21 PROCEDURE — 97110 THERAPEUTIC EXERCISES: CPT | Mod: GP | Performed by: PHYSICAL THERAPIST

## 2023-08-21 NOTE — PROGRESS NOTES
Discharge note    PLAN  Extending POC due to being unable to attend due to illness.    Beginning/End Dates of Progress Note Reporting Period:  06/08/23 to 08/21/2023    Referring Provider:  Myra Weiss     08/21/23 0500   Appointment Info   Signing clinician's name / credentials Lisset Germain, PT, DPT   Total/Authorized Visits 10 planned   Visits Used 6   Medical Diagnosis Chronic pain of right knee (M25.561, G89.29), Knee swelling (M25.469), Chronic patellofemoral pain of right knee (M25.561, G89.29), Iliotibial band syndrome of right side (M76.31)   PT Tx Diagnosis R knee pain   Progress Note/Certification   Onset of illness/injury or Date of Surgery 05/24/23  (order date)   Therapy Frequency 1x/wk   Predicted Duration 10 weeks   Progress Note Due Date 08/17/23   Progress Note Completed Date 06/08/23   GOALS   PT Goals 2;3;4   PT Goal 1   Goal Identifier STG   Goal Description Pt will have no pain with deep knee flexion in non-WBing position in 4 weeks to be able to sleep comfortably.   Goal Progress pt notes changing in her mechanics overall to reduce tension in the knees   Target Date 07/06/23   Date Met 07/10/23   PT Goal 2   Goal Identifier LTG   Goal Description Pt will have no pain (0/10) with deep squats in 10 weeks to resume gardening and exercises.   Target Date 08/17/23   Date Met 08/08/23   PT Goal 3   Goal Identifier LTG   Goal Description Pt will have 4/5 glute and hip abd strength to stabilize and support the area in 10 weeks.   Target Date 08/17/23   Date Met 08/21/23   PT Goal 4   Goal Identifier LTG   Goal Description Pt will be independent in home strengthening program for self management of Sx in 10 weeks.   Target Date 08/17/23   Subjective Report   Subjective Report R knee pain, IT band. poor glutes and abductor strength. Pt   Treatment Interventions (PT)   Interventions Therapeutic Procedure/Exercise   Therapeutic Procedure/Exercise   Therapeutic Procedures: strength, endurance, ROM,  "flexibillity minutes (84933) 25   Ther Proc 1 - Details squats- sitbacks, front squats, and sumo squats;  SL hip abd- 10x, b/l;  SL R IT band stretch- 30 sec holds; SLR; prone quad stretch; lateral step-ups on 6\" step- 2 x 10, b/l   Skilled Intervention used to strengthen and work on glute activation, review HEP   Patient Response/Progress improvement in strength, still some discomfort with the quad tendon   Education   Learner/Method Patient;Listening;Demonstration;Pictures/Video;No Barriers to Learning   Plan   Home program papers- SL IT band, hip extension, squats, 5 part butt burner, standing hip flexor stretch, SL hip abd   Updates to plan of care extended POC at this time for the pt due to being ill and unable to attend consecutively   Plan for next session possible d/c   Total Session Time   Timed Code Treatment Minutes 25   Total Treatment Time (sum of timed and untimed services) 25       "

## 2023-09-01 ENCOUNTER — PATIENT OUTREACH (OUTPATIENT)
Dept: CARE COORDINATION | Facility: CLINIC | Age: 53
End: 2023-09-01
Payer: COMMERCIAL

## 2023-09-18 ENCOUNTER — TRANSFERRED RECORDS (OUTPATIENT)
Dept: HEALTH INFORMATION MANAGEMENT | Facility: CLINIC | Age: 53
End: 2023-09-18
Payer: COMMERCIAL

## 2023-10-13 ENCOUNTER — HOSPITAL ENCOUNTER (OUTPATIENT)
Dept: MAMMOGRAPHY | Facility: CLINIC | Age: 53
Discharge: HOME | End: 2023-10-13
Attending: FAMILY MEDICINE | Admitting: FAMILY MEDICINE
Payer: COMMERCIAL

## 2023-10-13 DIAGNOSIS — Z12.31 VISIT FOR SCREENING MAMMOGRAM: ICD-10-CM

## 2023-10-13 PROCEDURE — 77063 BREAST TOMOSYNTHESIS BI: CPT

## 2023-10-26 DIAGNOSIS — G43.809 OTHER MIGRAINE WITHOUT STATUS MIGRAINOSUS, NOT INTRACTABLE: ICD-10-CM

## 2023-10-26 DIAGNOSIS — G43.839 INTRACTABLE MENSTRUAL MIGRAINE WITHOUT STATUS MIGRAINOSUS: ICD-10-CM

## 2023-10-27 NOTE — TELEPHONE ENCOUNTER
Pending Prescriptions:                       Disp   Refills    traMADol (ULTRAM) 50 MG tablet [Pharmacy M*40 tab*0        Sig: TAKE 1 TO 2 TABLETS BY MOUTH EVERY 6 HOURS AS NEEDED           FOR PAIN    hydrOXYzine (ATARAX) 25 MG tablet [Pharmac*60 tab*0        Sig: TAKE 1 TO 2 TABLETS BY MOUTH EVERY 6 HOURS AS NEEDED           FOR ITCHING OR ANXIETY    Routing refill request to provider for review/approval because:  Drug not on the [facility] refill protocol: tramadol    Antonia Solis RN  [facility]

## 2023-10-30 ENCOUNTER — TELEPHONE (OUTPATIENT)
Dept: FAMILY MEDICINE | Facility: CLINIC | Age: 53
End: 2023-10-30
Payer: COMMERCIAL

## 2023-10-30 RX ORDER — HYDROXYZINE HYDROCHLORIDE 25 MG/1
TABLET, FILM COATED ORAL
Qty: 60 TABLET | Refills: 0 | Status: SHIPPED | OUTPATIENT
Start: 2023-10-30 | End: 2024-04-03

## 2023-10-30 RX ORDER — TRAMADOL HYDROCHLORIDE 50 MG/1
50-100 TABLET ORAL EVERY 6 HOURS PRN
Qty: 40 TABLET | Refills: 0 | Status: SHIPPED | OUTPATIENT
Start: 2023-10-30

## 2023-10-30 NOTE — TELEPHONE ENCOUNTER
Per fax received from [facility] [place] - traMADol (ULTRAM) 50 MG tablet  is not covered by patient's Insurance Company  Dr. Loya - Please choose:  1.  Change medication that is not covered to a different medication and send new prescription to patient's pharmacy?  2.  Patient will need to pay for the non-covered medication out-of-pocket?   3.  Try for Prior Authorization with Insurance Company to get medication covered?        Key# [id number]

## 2023-10-31 NOTE — TELEPHONE ENCOUNTER
Prior Authorization Retail Medication Request    Medication/Dose: traMADol (ULTRAM) 50 MG tablet  ICD code (if different than what is on RX):    Previously Tried and Failed:    Rationale:  patient has allergy to acetaminophen    Insurance Name:  not provided  Insurance ID:  not provided  [facility] [id number]      Pharmacy Information (if different than what is on RX)  Name:    Phone:

## 2023-11-01 NOTE — TELEPHONE ENCOUNTER
Central Prior Authorization Team   Phone: [phone]    PA Initiation    Medication: traMADol (ULTRAM) 50 MG tablet  [facility] - Phone [phone] Fax [phone]  Pharmacy Filling the Rx: [facility] [address] [place] [address] - [facility] [address]  Filling Pharmacy Phone: [phone]  Filling Pharmacy Fax:    Start Date: 11/1/2023

## 2023-11-01 NOTE — TELEPHONE ENCOUNTER
Prior Authorization Approval    Authorization Effective Date: 11/1/2023  Authorization Expiration Date: 5/1/2024  Medication: traMADol (ULTRAM) 50 MG tablet  Approved Dose/Quantity:    Reference #:     Insurance Company: [employer] [place] - Phone [phone] Fax [phone]  Expected CoPay:       CoPay Card Available:      Foundation Assistance Needed:    Which Pharmacy is filling the prescription (Not needed for infusion/clinic administered): [facility] [address] [place] [address] - [facility] [address]  Pharmacy Notified:  Yes  Patient Notified:  Yes  **Instructed pharmacy to notify patient when script is ready to /ship.**

## 2023-11-20 ENCOUNTER — PATIENT OUTREACH (OUTPATIENT)
Dept: CARE COORDINATION | Facility: CLINIC | Age: 53
End: 2023-11-20
Payer: COMMERCIAL

## 2023-11-29 ENCOUNTER — OFFICE VISIT (OUTPATIENT)
Dept: PHYSICAL MEDICINE AND REHAB | Facility: CLINIC | Age: 53
End: 2023-11-29
Payer: COMMERCIAL

## 2023-11-29 VITALS
OXYGEN SATURATION: 98 % | RESPIRATION RATE: 16 BRPM | DIASTOLIC BLOOD PRESSURE: 69 MMHG | SYSTOLIC BLOOD PRESSURE: 104 MMHG | HEART RATE: 99 BPM

## 2023-11-29 DIAGNOSIS — G43.719 CHRONIC MIGRAINE WITHOUT AURA, INTRACTABLE, WITHOUT STATUS MIGRAINOSUS: Primary | ICD-10-CM

## 2023-11-29 PROCEDURE — 95874 GUIDE NERV DESTR NEEDLE EMG: CPT | Performed by: PHYSICAL MEDICINE & REHABILITATION

## 2023-11-29 PROCEDURE — 64615 CHEMODENERV MUSC MIGRAINE: CPT | Performed by: PHYSICAL MEDICINE & REHABILITATION

## 2023-11-29 RX ORDER — BUPIVACAINE HYDROCHLORIDE 5 MG/ML
4 INJECTION, SOLUTION EPIDURAL; INTRACAUDAL ONCE
Status: COMPLETED | OUTPATIENT
Start: 2023-11-29 | End: 2024-02-07

## 2023-11-29 NOTE — PROGRESS NOTES
[facility]     PM&R CLINIC NOTE  BOTULINUM TOXIN PROCEDURE      HPI  Chief Complaint   Patient presents with    Procedure     Jessica Manriquez is a 52 year old female with a history of intractable migraine headaches who presents to clinic for botulinum toxin injections for management of chronic migraine headaches.     SINCE LAST VISIT  Jessica Manriquez was last seen here in clinic on 8/9/2023, at which time she received 200 units of Botox. She is overdue on her Botox by six weeks, as it has been 16 weeks since her last Botox, whereas she usually gets it every 10 weeks. This has caused a significant increase in migraine headaches.     Patient denies new medical diagnoses, illnesses, hospitalizations, emergency room visits, and injuries since the previous injection with botulinum neurotoxin.    RESPONSE TO PREVIOUS TREATMENT    Side effects: No problems reported  Post-procedural headache: No.     1.  Headache frequency during this injection cycle: 2-5 migraine headache days per month, but over the last six weeks as she has been overdue on her Botox, she has had 22 migraine headache days total. This is compared to her baseline headache frequency of >15 headache days per month.     2.  Headache duration during this injection cycle:  Headache duration ranged from 5 hours to 3 days. Patient reports probably one episode of multiple day headaches during this injection cycle which happened more recently.     3.  Headache intensity during this injection cycle:    A.  4/10  =  Typical pain level.  B.  9/10  =  Worst pain level.  C.  0/10  =  Lowest pain level.    4.  Change in headache medication usage during this injection cycle:  (For Example:  Able to decrease use of oral pain medications.) No, she uses Imitrex and Tramadol for migraine rescue medications.     5.  ER Visits During This Injection Cycle: None.     6.  Functional Performance:  Change in ADL's, social interaction, days lost  from work, etc. Patient reports being able to more fully participate in social and family activities and responsibilities as headache symptoms have improved      PHYSICAL EXAM  VS: /69 (BP Location: Left arm, Patient Position: Sitting)   Pulse 99   Resp 16   SpO2 98%    GEN: Pleasant and cooperative, in no acute distress  HEENT: No facial asymmetry    ALLERGIES  Allergies   Allergen Reactions    Darvocet [Acetaminophen] Nausea and Vomiting    Erythromycin GI Disturbance    Flexeril [Cyclobenzaprine Hcl]      Lightheadedness, dizzy      Serotonin Reuptake Inhibitors (Ssris) Anxiety, Other (See Comments) and Palpitations       CURRENT MEDICATIONS    Current Outpatient Medications:     ALPRAZolam (XANAX) 0.5 MG tablet, Take 1 tablet (0.5 mg) by mouth every 6 hours as needed for anxiety, Disp: 40 tablet, Rfl: 0    amitriptyline (ELAVIL) 10 MG tablet, Take 1 tablet (10 mg) by mouth At Bedtime, Disp: 1 tablet, Rfl: 0    aspirin-acetaminophen-caffeine (EXCEDRIN MIGRAINE) 250-250-65 MG per tablet, Take 1 tablet by mouth every 6 hours as needed for pain., Disp: 30 tablet, Rfl: 0    diazepam (VALIUM) 10 MG tablet, Place 1 tab vaginally for muscle spasms, Disp: 30 tablet, Rfl: 3    ferrous sulfate (FEROSUL) 325 (65 Fe) MG tablet, Take 325 mg by mouth daily (with breakfast), Disp: , Rfl:     gabapentin (NEURONTIN) 300 MG capsule, Take 1 capsule (300 mg) by mouth At Bedtime, Disp: 90 capsule, Rfl: 3    hydrOXYzine (ATARAX) 25 MG tablet, TAKE 1 TO 2 TABLETS BY MOUTH EVERY 6 HOURS AS NEEDED FOR ITCHING OR ANXIETY, Disp: 60 tablet, Rfl: 0    Lactobacillus (ACIDOPHILUS) 100 MG CAPS, Take 1 capsule by mouth daily, Disp: , Rfl:     Loperamide HCl (IMODIUM A-D PO), Take 1 tablet by mouth once as needed, Disp: , Rfl:     MULTI-VITAMIN OR TABS, 1 tablet by mouth DAILY, Disp: , Rfl:     OnabotulinumtoxinA (BOTOX IJ), Inject 200 Units as directed Total dose 200 units  Dose Administered:  175 units  Botox (Botulinum Toxin Type A)        2:1 Dilution (55 units unavoidable waste) Unavoidable waste 25 units  Diluent Used:  Preservative Free Normal Saline Total Volume of Diluent Used:  2.9 ml Lot # /C3 with Expiration Date:  2021 NDC #: Botox 100u ([address]-63), Disp: , Rfl:     ondansetron (ZOFRAN ODT) 4 MG ODT tab, Take 1-2 tablets (4-8 mg) by mouth every 8 hours as needed for nausea, Disp: 20 tablet, Rfl: 3    SUMAtriptan (IMITREX) 50 MG tablet, TAKE 1 TABLET BY MOUTH AT ONSET OF HEADACHE FOR MIGRAINE MAY REPEAT DOSE IN 2 HOURS.  DO NOT EXCEED 200 MG IN 24 HOURS, Disp: 18 tablet, Rfl: 3    traMADol (ULTRAM) 50 MG tablet, TAKE 1 TO 2 TABLETS BY MOUTH EVERY 6 HOURS AS NEEDED FOR PAIN, Disp: 40 tablet, Rfl: 0    valACYclovir (VALTREX) 500 MG tablet, Take 1 tablet (500 mg) by mouth daily, Disp: 90 tablet, Rfl: 3    vitamin (B COMPLEX-C) tablet, Take 1 tablet by mouth daily, Disp: , Rfl:     VITAMIN C 500 MG OR TABS, 1 tablet daily, Disp: , Rfl:     VITAMIN D, CHOLECALCIFEROL, PO, Take 1,000 Units by mouth daily, Disp: , Rfl:     Vitamin Mixture (VITAMIN E COMPLETE PO), , Disp: , Rfl:     zolpidem (AMBIEN) 5 MG tablet, Take 1 tablet (5 mg) by mouth nightly as needed for sleep, Disp: 90 tablet, Rfl: 1    Current Facility-Administered Medications:     botulinum toxin type A (BOTOX) 100 units injection 200 Units, 200 Units, Intramuscular, Q10 Weeks, Standal, Aretha Hahn MD, 200 Units at 23 1753       BOTULINUM NEUROTOXIN INJECTION PROCEDURES    VERIFICATION OF PATIENT IDENTIFICATION AND PROCEDURE     Initials   Patient Name SES   Patient  SES   Procedure Verified by: SES     Prior to the start of the procedure and with procedural staff participation, I verbally confirmed the patient s identity using two indicators, relevant allergies, that the procedure was appropriate and matched the consent or emergent situation, and that the correct equipment/implants were available. Immediately prior to starting the procedure I conducted the  Time Out with the procedural staff and re-confirmed the patient s name, procedure, and site/side. (The Joint Commission universal protocol was followed.)  Yes    Sedation (Moderate or Deep): None    ABOVE ASSESSMENTS PERFORMED BY    Aretha Pichardo MD      INDICATIONS FOR PROCEDURES  Jessica Manriquez is a 52 year old patient with intractable pain secondary to the diagnosis of chronic migraine headaches. Her baseline symptoms have been recalcitrant to oral medications and conservative therapy.  She is here today for reinjection with Botox.    GOAL OF PROCEDURE  The goal of this procedure is to decrease pain.      TOTAL DOSE ADMINISTERED  Dose Administered:  200 units  Botox (Botulinum Toxin Type A)       2:1 Dilution   Unavoidable Drug Waste: No  Diluent Used:  0.5% bupivacaine  Total Volume of Diluent Used:  4 ml  NDC #: Botox 100u ([address]-89)      CONSENT  The risks, benefits, and treatment options were discussed with Jessica Manriquez and she agreed to proceed.    Written consent was obtained by  [facility] [place] .     EQUIPMENT USED  Needle-25mm stimulating/recording  Needle-30 gauge  EMG/NCS Machine    SKIN PREPARATION  Skin preparation was performed using an alcohol wipe.    GUIDANCE DESCRIPTION  Electro-myographic guidance was necessary throughout the neck portion of the procedure to accurately identify all areas of spastic muscles while avoiding injection of non-spastic muscles, neighboring nerves and nearby vascular structures.       AREA/MUSCLE INJECTED:  200 UNITS BOTOX = TOTAL DOSE, 2:1 DILUTION    1. SHOULDER GIRDLE & NECK MUSCLES: 65 UNITS BOTOX = TOTAL DOSE                               Right Upper Trapezius 15 units of Botox at 3 sites               Left Upper Trapezius 15 units of Botox at 3 sites        Right Splenius  - 5 units of Botox at 1 site/s  Left Splenius - 5 units of Botox at 1 site/s     Right Semispinalis  - 5 units of Botox at 1 site/s  Left Semispinalis - 5 units of Botox at 1 site/s    Left  Anterior Scalenes - 5 units of Botox at 1 site/s.                  Right Levator Scapula - 5 units of Botox at 1 site/s.     Left Levator Scapula -  5 units of Botox at 1 site/s.     2. FACIAL, JAW & SCALP MUSCLES: 135 UNITS BOTOX = TOTAL DOSE     Right masseter - 5 units of Botox at 1 site/s.  Left masseter - 5 units of Botox at 1 site/s.     Right Occipitalis (upper and lower) - 20 units of Botox at 3 site/s.  Left Occipitalis (upper and lower) - 20 units of Botox at 3 site/s.    Right Frontalis - 10 units of Botox at 2 site/s  Left Frontalis - 10 units of Botox at 2 site/s     Right Temporalis (upper and lower)  - 25 units of Botox at 5 site/s.  Left Temporalis (upper and lower) - 25 units of Botox at 5 site/s.    Right  - 5 units of Botox at 1 site/s.  Left  - 5 units of Botox at 1 site/s.     Procerus - 5 units of Botox at 1 site/s.      RESPONSE TO PROCEDURE  Jessica Manriquez tolerated the procedure well and there were no immediate complications. She was allowed to recover for an appropriate period of time and was discharged home in stable condition.    ASSESSMENT AND PLAN   Botulinum toxin injections: No changes made to Botox dose or distribution today. Patient will continue to monitor response and report at next appointment.   Referrals: None.   Follow up: Jessica Manriquez was rescheduled for the next series of injections in 12 weeks, at which time we will evaluate response to today's injections. she may call the clinic prior with any questions or concerns prior to the next appointment.

## 2023-11-29 NOTE — LETTER
11/29/2023       RE: Jessica Manriquez  [address]  [place] [address]     Dear Colleague,    Thank you for referring your patient, Jessica Manriquez, to the [facility]IN[facility] [place] [facility]. Please see a copy of my visit note below.      [facility]     PM&R CLINIC NOTE  BOTULINUM TOXIN PROCEDURE      HPI  Chief Complaint   Patient presents with    Procedure     Jessica Manriquez is a 52 year old female with a history of intractable migraine headaches who presents to clinic for botulinum toxin injections for management of chronic migraine headaches.     SINCE LAST VISIT  Jessica Manriquez was last seen here in clinic on 8/9/2023, at which time she received 200 units of Botox. She is overdue on her Botox by six weeks, as it has been 16 weeks since her last Botox, whereas she usually gets it every 10 weeks. This has caused a significant increase in migraine headaches.     Patient denies new medical diagnoses, illnesses, hospitalizations, emergency room visits, and injuries since the previous injection with botulinum neurotoxin.    RESPONSE TO PREVIOUS TREATMENT    Side effects: No problems reported  Post-procedural headache: No.     1.  Headache frequency during this injection cycle: 2-5 migraine headache days per month, but over the last six weeks as she has been overdue on her Botox, she has had 22 migraine headache days total. This is compared to her baseline headache frequency of >15 headache days per month.     2.  Headache duration during this injection cycle:  Headache duration ranged from 5 hours to 3 days. Patient reports probably one episode of multiple day headaches during this injection cycle which happened more recently.     3.  Headache intensity during this injection cycle:    A.  4/10  =  Typical pain level.  B.  9/10  =  Worst pain level.  C.  0/10  =   Lowest pain level.    4.  Change in headache medication usage during this injection cycle:  (For Example:  Able to decrease use of oral pain medications.) No, she uses Imitrex and Tramadol for migraine rescue medications.     5.  ER Visits During This Injection Cycle: None.     6.  Functional Performance:  Change in ADL's, social interaction, days lost from work, etc. Patient reports being able to more fully participate in social and family activities and responsibilities as headache symptoms have improved      PHYSICAL EXAM  VS: /69 (BP Location: Left arm, Patient Position: Sitting)   Pulse 99   Resp 16   SpO2 98%    GEN: Pleasant and cooperative, in no acute distress  HEENT: No facial asymmetry    ALLERGIES  Allergies   Allergen Reactions    Darvocet [Acetaminophen] Nausea and Vomiting    Erythromycin GI Disturbance    Flexeril [Cyclobenzaprine Hcl]      Lightheadedness, dizzy      Serotonin Reuptake Inhibitors (Ssris) Anxiety, Other (See Comments) and Palpitations       CURRENT MEDICATIONS    Current Outpatient Medications:     ALPRAZolam (XANAX) 0.5 MG tablet, Take 1 tablet (0.5 mg) by mouth every 6 hours as needed for anxiety, Disp: 40 tablet, Rfl: 0    amitriptyline (ELAVIL) 10 MG tablet, Take 1 tablet (10 mg) by mouth At Bedtime, Disp: 1 tablet, Rfl: 0    aspirin-acetaminophen-caffeine (EXCEDRIN MIGRAINE) 250-250-65 MG per tablet, Take 1 tablet by mouth every 6 hours as needed for pain., Disp: 30 tablet, Rfl: 0    diazepam (VALIUM) 10 MG tablet, Place 1 tab vaginally for muscle spasms, Disp: 30 tablet, Rfl: 3    ferrous sulfate (FEROSUL) 325 (65 Fe) MG tablet, Take 325 mg by mouth daily (with breakfast), Disp: , Rfl:     gabapentin (NEURONTIN) 300 MG capsule, Take 1 capsule (300 mg) by mouth At Bedtime, Disp: 90 capsule, Rfl: 3    hydrOXYzine (ATARAX) 25 MG tablet, TAKE 1 TO 2 TABLETS BY MOUTH EVERY 6 HOURS AS NEEDED FOR ITCHING OR ANXIETY, Disp: 60 tablet, Rfl: 0    Lactobacillus (ACIDOPHILUS) 100  MG CAPS, Take 1 capsule by mouth daily, Disp: , Rfl:     Loperamide HCl (IMODIUM A-D PO), Take 1 tablet by mouth once as needed, Disp: , Rfl:     MULTI-VITAMIN OR TABS, 1 tablet by mouth DAILY, Disp: , Rfl:     OnabotulinumtoxinA (BOTOX IJ), Inject 200 Units as directed Total dose 200 units  Dose Administered:  175 units  Botox (Botulinum Toxin Type A)       2:1 Dilution (55 units unavoidable waste) Unavoidable waste 25 units  Diluent Used:  Preservative Free Normal Saline Total Volume of Diluent Used:  2.9 ml Lot # /C3 with Expiration Date:  2021 NDC #: Botox 100u ([address]-69), Disp: , Rfl:     ondansetron (ZOFRAN ODT) 4 MG ODT tab, Take 1-2 tablets (4-8 mg) by mouth every 8 hours as needed for nausea, Disp: 20 tablet, Rfl: 3    SUMAtriptan (IMITREX) 50 MG tablet, TAKE 1 TABLET BY MOUTH AT ONSET OF HEADACHE FOR MIGRAINE MAY REPEAT DOSE IN 2 HOURS.  DO NOT EXCEED 200 MG IN 24 HOURS, Disp: 18 tablet, Rfl: 3    traMADol (ULTRAM) 50 MG tablet, TAKE 1 TO 2 TABLETS BY MOUTH EVERY 6 HOURS AS NEEDED FOR PAIN, Disp: 40 tablet, Rfl: 0    valACYclovir (VALTREX) 500 MG tablet, Take 1 tablet (500 mg) by mouth daily, Disp: 90 tablet, Rfl: 3    vitamin (B COMPLEX-C) tablet, Take 1 tablet by mouth daily, Disp: , Rfl:     VITAMIN C 500 MG OR TABS, 1 tablet daily, Disp: , Rfl:     VITAMIN D, CHOLECALCIFEROL, PO, Take 1,000 Units by mouth daily, Disp: , Rfl:     Vitamin Mixture (VITAMIN E COMPLETE PO), , Disp: , Rfl:     zolpidem (AMBIEN) 5 MG tablet, Take 1 tablet (5 mg) by mouth nightly as needed for sleep, Disp: 90 tablet, Rfl: 1    Current Facility-Administered Medications:     botulinum toxin type A (BOTOX) 100 units injection 200 Units, 200 Units, Intramuscular, Q10 Weeks, Standal, Aretha Hahn MD, 200 Units at 23 1753       BOTULINUM NEUROTOXIN INJECTION PROCEDURES    VERIFICATION OF PATIENT IDENTIFICATION AND PROCEDURE     Initials   Patient Name SES   Patient  SES   Procedure Verified by: SES     Prior  to the start of the procedure and with procedural staff participation, I verbally confirmed the patient s identity using two indicators, relevant allergies, that the procedure was appropriate and matched the consent or emergent situation, and that the correct equipment/implants were available. Immediately prior to starting the procedure I conducted the Time Out with the procedural staff and re-confirmed the patient s name, procedure, and site/side. (The Joint Commission universal protocol was followed.)  Yes    Sedation (Moderate or Deep): None    ABOVE ASSESSMENTS PERFORMED BY    Aretha Pichardo MD      INDICATIONS FOR PROCEDURES  Jessica Manriquez is a 52 year old patient with intractable pain secondary to the diagnosis of chronic migraine headaches. Her baseline symptoms have been recalcitrant to oral medications and conservative therapy.  She is here today for reinjection with Botox.    GOAL OF PROCEDURE  The goal of this procedure is to decrease pain.      TOTAL DOSE ADMINISTERED  Dose Administered:  200 units  Botox (Botulinum Toxin Type A)       2:1 Dilution   Unavoidable Drug Waste: No  Diluent Used:  0.5% bupivacaine  Total Volume of Diluent Used:  4 ml  NDC #: Botox 100u ([address]-25)      CONSENT  The risks, benefits, and treatment options were discussed with Jessica Manriquez and she agreed to proceed.    Written consent was obtained by  [facility] .     EQUIPMENT USED  Needle-25mm stimulating/recording  Needle-30 gauge  EMG/NCS Machine    SKIN PREPARATION  Skin preparation was performed using an alcohol wipe.    GUIDANCE DESCRIPTION  Electro-myographic guidance was necessary throughout the neck portion of the procedure to accurately identify all areas of spastic muscles while avoiding injection of non-spastic muscles, neighboring nerves and nearby vascular structures.       AREA/MUSCLE INJECTED:  200 UNITS BOTOX = TOTAL DOSE, 2:1 DILUTION    1. SHOULDER GIRDLE & NECK MUSCLES: 65 UNITS BOTOX = TOTAL DOSE                                Right Upper Trapezius 15 units of Botox at 3 sites               Left Upper Trapezius 15 units of Botox at 3 sites     Right Splenius  - 5 units of Botox at 1 site/s  Left Splenius - 5 units of Botox at 1 site/s     Right Semispinalis  - 5 units of Botox at 1 site/s  Left Semispinalis - 5 units of Botox at 1 site/s    Left Anterior Scalenes - 5 units of Botox at 1 site/s.                  Right Levator Scapula - 5 units of Botox at 1 site/s.     Left Levator Scapula -  5 units of Botox at 1 site/s.     2. FACIAL, JAW & SCALP MUSCLES: 135 UNITS BOTOX = TOTAL DOSE     Right masseter - 5 units of Botox at 1 site/s.  Left masseter - 5 units of Botox at 1 site/s.     Right Occipitalis (upper and lower) - 20 units of Botox at 3 site/s.  Left Occipitalis (upper and lower) - 20 units of Botox at 3 site/s.    Right Frontalis - 10 units of Botox at 2 site/s  Left Frontalis - 10 units of Botox at 2 site/s     Right Temporalis (upper and lower)  - 25 units of Botox at 5 site/s.  Left Temporalis (upper and lower) - 25 units of Botox at 5 site/s.    Right  - 5 units of Botox at 1 site/s.  Left  - 5 units of Botox at 1 site/s.     Procerus - 5 units of Botox at 1 site/s.    RESPONSE TO PROCEDURE  Jessica Manriquez tolerated the procedure well and there were no immediate complications. She was allowed to recover for an appropriate period of time and was discharged home in stable condition.    ASSESSMENT AND PLAN   Botulinum toxin injections: No changes made to Botox dose or distribution today. Patient will continue to monitor response and report at next appointment.   Referrals: None.   Follow up: Jessica Manriquez was rescheduled for the next series of injections in 12 weeks, at which time we will evaluate response to today's injections. she may call the clinic prior with any questions or concerns prior to the next appointment.     Again, thank you for allowing me to participate in the care  of your patient.      Sincerely,    Aretha Pichardo MD

## 2023-12-04 ENCOUNTER — PATIENT OUTREACH (OUTPATIENT)
Dept: CARE COORDINATION | Facility: CLINIC | Age: 53
End: 2023-12-04
Payer: COMMERCIAL

## 2023-12-04 DIAGNOSIS — G43.719 CHRONIC MIGRAINE WITHOUT AURA, INTRACTABLE, WITHOUT STATUS MIGRAINOSUS: Primary | ICD-10-CM

## 2024-01-15 ENCOUNTER — LAB (OUTPATIENT)
Dept: LAB | Facility: CLINIC | Age: 54
End: 2024-01-15
Payer: COMMERCIAL

## 2024-01-15 ENCOUNTER — OFFICE VISIT (OUTPATIENT)
Dept: FAMILY MEDICINE | Facility: CLINIC | Age: 54
End: 2024-01-15
Payer: COMMERCIAL

## 2024-01-15 VITALS
WEIGHT: 143.19 LBS | HEART RATE: 97 BPM | RESPIRATION RATE: 20 BRPM | BODY MASS INDEX: 20.05 KG/M2 | DIASTOLIC BLOOD PRESSURE: 80 MMHG | TEMPERATURE: 97.8 F | OXYGEN SATURATION: 99 % | SYSTOLIC BLOOD PRESSURE: 122 MMHG | HEIGHT: 71 IN

## 2024-01-15 DIAGNOSIS — M62.838 MUSCLE SPASM: ICD-10-CM

## 2024-01-15 DIAGNOSIS — B00.9 HSV (HERPES SIMPLEX VIRUS) INFECTION: ICD-10-CM

## 2024-01-15 DIAGNOSIS — R61 NIGHT SWEATS: ICD-10-CM

## 2024-01-15 DIAGNOSIS — R10.2 PELVIC PAIN IN FEMALE: ICD-10-CM

## 2024-01-15 DIAGNOSIS — G43.909 MIGRAINE WITHOUT STATUS MIGRAINOSUS, NOT INTRACTABLE, UNSPECIFIED MIGRAINE TYPE: ICD-10-CM

## 2024-01-15 DIAGNOSIS — Z00.00 ROUTINE GENERAL MEDICAL EXAMINATION AT A HEALTH CARE FACILITY: Primary | ICD-10-CM

## 2024-01-15 DIAGNOSIS — E78.5 HYPERLIPIDEMIA LDL GOAL <160: ICD-10-CM

## 2024-01-15 DIAGNOSIS — R87.610 ASCUS OF CERVIX WITH NEGATIVE HIGH RISK HPV: ICD-10-CM

## 2024-01-15 DIAGNOSIS — Z80.3 FAMILY HISTORY OF MALIGNANT NEOPLASM OF BREAST: ICD-10-CM

## 2024-01-15 DIAGNOSIS — G25.81 RESTLESS LEGS SYNDROME (RLS): ICD-10-CM

## 2024-01-15 DIAGNOSIS — Z00.00 ROUTINE GENERAL MEDICAL EXAMINATION AT A HEALTH CARE FACILITY: ICD-10-CM

## 2024-01-15 DIAGNOSIS — Z12.4 SCREENING FOR CERVICAL CANCER: ICD-10-CM

## 2024-01-15 DIAGNOSIS — Z11.51 SCREENING FOR HUMAN PAPILLOMAVIRUS: ICD-10-CM

## 2024-01-15 LAB
ANION GAP SERPL CALCULATED.3IONS-SCNC: 14 MMOL/L (ref 7–15)
BUN SERPL-MCNC: 14.4 MG/DL (ref 6–20)
CALCIUM SERPL-MCNC: 10 MG/DL (ref 8.6–10)
CHLORIDE SERPL-SCNC: 101 MMOL/L (ref 98–107)
CHOLEST SERPL-MCNC: 275 MG/DL
CREAT SERPL-MCNC: 0.92 MG/DL (ref 0.51–0.95)
DEPRECATED HCO3 PLAS-SCNC: 25 MMOL/L (ref 22–29)
EGFRCR SERPLBLD CKD-EPI 2021: 74 ML/MIN/1.73M2
FASTING STATUS PATIENT QL REPORTED: NO
FERRITIN SERPL-MCNC: 55 NG/ML (ref 11–328)
GLUCOSE SERPL-MCNC: 91 MG/DL (ref 70–99)
HDLC SERPL-MCNC: 71 MG/DL
LDLC SERPL CALC-MCNC: 169 MG/DL
NONHDLC SERPL-MCNC: 204 MG/DL
POTASSIUM SERPL-SCNC: 4 MMOL/L (ref 3.4–5.3)
SODIUM SERPL-SCNC: 140 MMOL/L (ref 135–145)
TRIGL SERPL-MCNC: 173 MG/DL

## 2024-01-15 PROCEDURE — 82728 ASSAY OF FERRITIN: CPT

## 2024-01-15 PROCEDURE — 87624 HPV HI-RISK TYP POOLED RSLT: CPT | Performed by: FAMILY MEDICINE

## 2024-01-15 PROCEDURE — 80048 BASIC METABOLIC PNL TOTAL CA: CPT

## 2024-01-15 PROCEDURE — 36415 COLL VENOUS BLD VENIPUNCTURE: CPT

## 2024-01-15 PROCEDURE — G0145 SCR C/V CYTO,THINLAYER,RESCR: HCPCS | Performed by: FAMILY MEDICINE

## 2024-01-15 PROCEDURE — 80061 LIPID PANEL: CPT

## 2024-01-15 PROCEDURE — 99213 OFFICE O/P EST LOW 20 MIN: CPT | Mod: 25 | Performed by: FAMILY MEDICINE

## 2024-01-15 PROCEDURE — 99396 PREV VISIT EST AGE 40-64: CPT | Mod: 25 | Performed by: FAMILY MEDICINE

## 2024-01-15 RX ORDER — VALACYCLOVIR HYDROCHLORIDE 500 MG/1
500 TABLET, FILM COATED ORAL DAILY
Qty: 30 TABLET | Refills: 3 | Status: SHIPPED | OUTPATIENT
Start: 2024-01-15

## 2024-01-15 RX ORDER — GABAPENTIN 300 MG/1
300 CAPSULE ORAL AT BEDTIME
Qty: 90 CAPSULE | Refills: 3 | Status: SHIPPED | OUTPATIENT
Start: 2024-01-15 | End: 2024-04-15

## 2024-01-15 RX ORDER — SUMATRIPTAN 50 MG/1
TABLET, FILM COATED ORAL
Qty: 36 TABLET | Refills: 4 | Status: SHIPPED | OUTPATIENT
Start: 2024-01-15

## 2024-01-15 RX ORDER — DIAZEPAM 10 MG
5-10 TABLET ORAL
Qty: 30 TABLET | Refills: 1 | Status: SHIPPED | OUTPATIENT
Start: 2024-01-15

## 2024-01-15 ASSESSMENT — ENCOUNTER SYMPTOMS
CHILLS: 0
HEADACHES: 1
SHORTNESS OF BREATH: 0
PARESTHESIAS: 0
JOINT SWELLING: 0
NERVOUS/ANXIOUS: 0
ARTHRALGIAS: 0
DIZZINESS: 0
COUGH: 0
HEMATOCHEZIA: 0
WEAKNESS: 0
EYE PAIN: 0
SORE THROAT: 0
FEVER: 0
NAUSEA: 0
FREQUENCY: 0
MYALGIAS: 0
HEARTBURN: 0
HEMATURIA: 0
DYSURIA: 0
ABDOMINAL PAIN: 0
BREAST MASS: 0
DIARRHEA: 0
CONSTIPATION: 0
PALPITATIONS: 0

## 2024-01-15 ASSESSMENT — PAIN SCALES - GENERAL: PAINLEVEL: NO PAIN (0)

## 2024-01-15 NOTE — PROGRESS NOTES
SUBJECTIVE:   Jessica is a 53 year old, presenting for the following:  Physical        1/15/2024     2:57 PM   Additional Questions   Roomed by Amparo caballero CMA   Accompanied by Self       Healthy Habits:     Getting at least 3 servings of Calcium per day:  Yes    Bi-annual eye exam:  Yes    Dental care twice a year:  Yes    Sleep apnea or symptoms of sleep apnea:  None    Diet:  Vegetarian/vegan    Frequency of exercise:  1 day/week    Duration of exercise:  Less than 15 minutes    Taking medications regularly:  Yes    Medication side effects:  Not applicable    Additional concerns today:  No      Today's PHQ-2 Score:       1/15/2024    10:54 AM   PHQ-2 ( 1999 [employer])   Q1: Little interest or pleasure in doing things 0   Q2: Feeling down, depressed or hopeless 0   PHQ-2 Score 0   Q1: Little interest or pleasure in doing things Not at all   Q2: Feeling down, depressed or hopeless Not at all   PHQ-2 Score 0           Social History     Tobacco Use    Smoking status: Never    Smokeless tobacco: Never   Substance Use Topics    Alcohol use: No             1/15/2024    10:54 AM   Alcohol Use   Prescreen: >3 drinks/day or >7 drinks/week? Not Applicable     Reviewed orders with patient.  Reviewed health maintenance and updated orders accordingly - Yes  Lab work is in process    Breast Cancer Screening:    FHS-7:       9/3/2022    11:12 AM 12/19/2022    11:00 AM 10/13/2023    12:39 PM 10/13/2023    12:43 PM 1/15/2024    10:55 AM   Breast CA Risk Assessment (FHS-7)   Did any of your first-degree relatives have breast or ovarian cancer? Yes Yes Yes  Yes   Did any of your relatives have bilateral breast cancer? Yes No No  Yes   Did any man in your family have breast cancer? No No No  No   Did any woman in your family have breast and ovarian cancer? No No No  No   Did any woman in your family have breast cancer before age 50 y? Yes Yes Yes Yes Yes   Do you have 2 or more relatives with breast and/or ovarian cancer? Unknown Yes Yes   Yes   Do you have 2 or more relatives with breast and/or bowel cancer? No Yes Yes  Yes       Mammogram and MRI breast every 6 months due to high risk family history.   Pertinent mammograms are reviewed under the imaging tab.    History of abnormal Pap smear: YES - updated in Problem List and Health Maintenance accordingly      Latest Ref Rng & Units 1/26/2021     3:19 PM 1/26/2021     2:00 PM 12/31/2018     1:05 PM   PAP / HPV   PAP (Historical)  NIL   ASC-US    HPV 16 DNA NEG^Negative  Negative     HPV 18 DNA NEG^Negative  Negative     Other HR HPV NEG^Negative  Negative       Reviewed and updated as needed this visit by clinical staff   Tobacco  Allergies  Meds              Reviewed and updated as needed this visit by Provider                     Review of Systems   Constitutional:  Negative for chills and fever.   HENT:  Negative for congestion, ear pain, hearing loss and sore throat.    Eyes:  Negative for pain and visual disturbance.   Respiratory:  Negative for cough and shortness of breath.    Cardiovascular:  Negative for chest pain, palpitations and peripheral edema.   Gastrointestinal:  Negative for abdominal pain, constipation, diarrhea, heartburn, hematochezia and nausea.   Breasts:  Negative for tenderness, breast mass and discharge.   Genitourinary:  Positive for pelvic pain. Negative for dysuria, frequency, genital sores, hematuria, urgency, vaginal bleeding and vaginal discharge.   Musculoskeletal:  Negative for arthralgias, joint swelling and myalgias.   Skin:  Negative for rash.   Neurological:  Positive for headaches. Negative for dizziness, weakness and paresthesias.   Psychiatric/Behavioral:  Negative for mood changes. The patient is not nervous/anxious.      Using clonazepam more than I think is prudent, taking orally rather than vaginally when she wakes due to a migraine and can't get back to sleep.  Was filling 30 almost monthly for a while - PDMP reviewed with patient.      OBJECTIVE:  "  /80   Pulse 97   Temp 97.8  F (36.6  C) (Tympanic)   Resp 20   Ht 1.803 m (5' 11\")   Wt 64.9 kg (143 lb 3 oz)   LMP 10/18/2023 (Approximate)   SpO2 99%   BMI 19.97 kg/m    Physical Exam  GENERAL: healthy, alert and no distress  NECK: no adenopathy, no asymmetry, masses, or scars and thyroid normal to palpation  RESP: lungs clear to auscultation - no rales, rhonchi or wheezes  CV: regular rate and rhythm, normal S1 S2, no S3 or S4, no murmur, click or rub, no peripheral edema and peripheral pulses strong  ABDOMEN: soft, nontender, no hepatosplenomegaly, no masses and bowel sounds normal   (female): normal female external genitalia, normal urethral meatus, vaginal mucosa, normal cervix/adnexa/uterus without masses or discharge, pap obtained  MS: no gross musculoskeletal defects noted, no edema  NEURO: Normal strength and tone, mentation intact and speech normal  PSYCH: mentation appears normal, affect normal/bright    Diagnostic Test Results:  Labs reviewed in Epic    ASSESSMENT/PLAN:   (Z00.00) Routine general medical examination at a health care facility  (primary encounter diagnosis)  Comment:    Plan: Lipid panel reflex to direct LDL Non-fasting,         Basic metabolic panel  (Ca, Cl, CO2, Creat,         Gluc, K, Na, BUN)             (Z12.4) Screening for cervical cancer  Comment:    Plan: PAP screen with HPV - recommended age 30 - 65         years             (Z11.51) Screening for human papillomavirus  Comment:    Plan: PAP screen with HPV - recommended age 30 - 65         years             (R87.610) ASCUS of cervix with negative high risk HPV  Comment:    Plan:      (R61) Night sweats  Comment: this keeps symptoms under control  Plan: gabapentin (NEURONTIN) 300 MG capsule             (M62.838) Muscle spasm  Comment:    Plan: gets botox for headaches    (G43.909) Migraine without status migrainosus, not intractable, unspecified migraine type  Comment: does get botox every 10 weeks also  Plan: " SUMAtriptan (IMITREX) 50 MG tablet             (B00.9) HSV (herpes simplex virus) infection  Comment:    Plan: valACYclovir (VALTREX) 500 MG tablet         Uses about one time a week to keep outbreaks at bay    (R10.2) Pelvic pain in female  Comment:  started by GYN, using a bit more than is reasonable and taking orally to help with sleep/migraines  Plan: diazepam (VALIUM) 10 MG tablet            (G25.81) Restless legs syndrome (RLS)  Comment:    Plan: Ferritin         Resume iron    (Z80.3) Family history of malignant neoplasm of breast  Comment:  needs breast MRI and mammogram every 6 months  Plan: MR Breast Bilateral w/o & w Contrast             Patient has been advised of split billing requirements and indicates understanding: Yes      COUNSELING:  Reviewed preventive health counseling, as reflected in patient instructions       Regular exercise       Healthy diet/nutrition        She reports that she has never smoked. She has never used smokeless tobacco.        Su Loya MD  [facility]

## 2024-01-16 PROBLEM — E78.5 HYPERLIPIDEMIA LDL GOAL <160: Status: ACTIVE | Noted: 2024-01-16

## 2024-01-18 LAB
BKR LAB AP GYN ADEQUACY: NORMAL
BKR LAB AP GYN INTERPRETATION: NORMAL
BKR LAB AP HPV REFLEX: NORMAL
BKR LAB AP PREVIOUS ABNL DX: NORMAL
BKR LAB AP PREVIOUS ABNORMAL: NORMAL
PATH REPORT.COMMENTS IMP SPEC: NORMAL
PATH REPORT.COMMENTS IMP SPEC: NORMAL
PATH REPORT.RELEVANT HX SPEC: NORMAL

## 2024-01-19 LAB
HUMAN PAPILLOMA VIRUS 16 DNA: NEGATIVE
HUMAN PAPILLOMA VIRUS 18 DNA: NEGATIVE
HUMAN PAPILLOMA VIRUS FINAL DIAGNOSIS: NORMAL
HUMAN PAPILLOMA VIRUS OTHER HR: NEGATIVE

## 2024-01-24 ENCOUNTER — TELEPHONE (OUTPATIENT)
Dept: PHYSICAL MEDICINE AND REHAB | Facility: CLINIC | Age: 54
End: 2024-01-24
Payer: COMMERCIAL

## 2024-02-05 NOTE — PROGRESS NOTES
[facility]     PM&R CLINIC NOTE  BOTULINUM TOXIN PROCEDURE      HPI  Chief Complaint   Patient presents with    RECHECK     Botox injections confirmed with patient     Jessica Manriquez is a 53 year old female with a history of intractable migraine headaches who presents to clinic for botulinum toxin injections for management of chronic migraine headaches.     SINCE LAST VISIT  Jessica Manriquez was last seen here in clinic on 11/29/2023, at which time she received 200 units of Botox.     Patient denies new medical diagnoses, illnesses, hospitalizations, emergency room visits, and injuries since the previous injection with botulinum neurotoxin.    RESPONSE TO PREVIOUS TREATMENT  Side effects:  ** Bruises at some places and injection site lumps which both resolved within few days  Post-procedural headache: Yes, that evening.  This does not happen often.    1.  Headache frequency during this injection cycle: 2-5 migraine headache days per month, but in January she unfortunately had 8 migraine headaches as Botox was wearing off this is compared to her baseline headache frequency of >15 headache days per month.      2.  Headache duration during this injection cycle:  Headache duration ranged from 5 hours to 3 days.  For headaches January was 3 days long. Patient reports probably one episode of multiple day headaches during this injection cycle which happened more recently.     3.  Headache intensity during this injection cycle:    A.  4/10  =  Typical pain level.  B.  9/10  =  Worst pain level.  C.  0/10  =  Lowest pain level.    4.  Change in headache medication usage during this injection cycle: Has tried several preventative medications and is currently on gabapentin 300 at nighttime.  This was initially started for night sweats but the hope was that it will help with her migraine headaches as well.  She has not noticed any improvement.  For rescue medication she is on combination  of Imitrex, Excedrin and tramadol.     5.  ER Visits During This Injection Cycle: None.     6.  Functional Performance:  Change in ADL's, social interaction, days lost from work, etc. Patient reports being able to more fully participate in social and family activities and responsibilities as headache symptoms have improved she works from home so generally is able to modify her schedule and rest to avoid worsening headaches when she has a migraine.  She has been overall a lot more functional since she was started on the injections.      PHYSICAL EXAM  VS: /75   Pulse 88   LMP 10/18/2023 (Approximate)   SpO2 99%    GEN: Pleasant and cooperative, in no acute distress  HEENT: No facial asymmetry    ALLERGIES  Allergies   Allergen Reactions    Darvocet [Acetaminophen] Nausea and Vomiting    Erythromycin GI Disturbance    Flexeril [Cyclobenzaprine Hcl]      Lightheadedness, dizzy      Serotonin Reuptake Inhibitors (Ssris) Anxiety, Other (See Comments) and Palpitations       CURRENT MEDICATIONS    Current Outpatient Medications:     ALPRAZolam (XANAX) 0.5 MG tablet, Take 1 tablet (0.5 mg) by mouth every 6 hours as needed for anxiety, Disp: 40 tablet, Rfl: 0    amitriptyline (ELAVIL) 10 MG tablet, Take 1 tablet (10 mg) by mouth At Bedtime, Disp: 1 tablet, Rfl: 0    aspirin-acetaminophen-caffeine (EXCEDRIN MIGRAINE) 250-250-65 MG per tablet, Take 1 tablet by mouth every 6 hours as needed for pain., Disp: 30 tablet, Rfl: 0    diazepam (VALIUM) 10 MG tablet, Take 0.5-1 tablets (5-10 mg) by mouth nightly as needed for muscle spasms or sleep, Disp: 30 tablet, Rfl: 1    ferrous sulfate (FEROSUL) 325 (65 Fe) MG tablet, Take 325 mg by mouth daily (with breakfast), Disp: , Rfl:     gabapentin (NEURONTIN) 300 MG capsule, Take 1 capsule (300 mg) by mouth at bedtime, Disp: 90 capsule, Rfl: 3    hydrOXYzine (ATARAX) 25 MG tablet, TAKE 1 TO 2 TABLETS BY MOUTH EVERY 6 HOURS AS NEEDED FOR ITCHING OR ANXIETY, Disp: 60 tablet, Rfl:  0    Lactobacillus (ACIDOPHILUS) 100 MG CAPS, Take 1 capsule by mouth daily, Disp: , Rfl:     Loperamide HCl (IMODIUM A-D PO), Take 1 tablet by mouth once as needed, Disp: , Rfl:     MULTI-VITAMIN OR TABS, 1 tablet by mouth DAILY, Disp: , Rfl:     OnabotulinumtoxinA (BOTOX IJ), Inject 200 Units as directed Total dose 200 units  Dose Administered:  175 units  Botox (Botulinum Toxin Type A)       2:1 Dilution (55 units unavoidable waste) Unavoidable waste 25 units  Diluent Used:  Preservative Free Normal Saline Total Volume of Diluent Used:  2.9 ml Lot # /[id number] with Expiration Date:  03/2021 NDC #: Botox 100u ([address]-54), Disp: , Rfl:     ondansetron (ZOFRAN ODT) 4 MG ODT tab, Take 1-2 tablets (4-8 mg) by mouth every 8 hours as needed for nausea, Disp: 20 tablet, Rfl: 3    SUMAtriptan (IMITREX) 50 MG tablet, TAKE 1-2  TABLET BY MOUTH AT ONSET OF HEADACHE FOR MIGRAINE MAY REPEAT DOSE IN 2 HOURS.  DO NOT EXCEED 200 MG IN 24 HOURS, Disp: 36 tablet, Rfl: 4    traMADol (ULTRAM) 50 MG tablet, TAKE 1 TO 2 TABLETS BY MOUTH EVERY 6 HOURS AS NEEDED FOR PAIN, Disp: 40 tablet, Rfl: 0    valACYclovir (VALTREX) 500 MG tablet, Take 1 tablet (500 mg) by mouth daily, Disp: 30 tablet, Rfl: 3    vitamin (B COMPLEX-C) tablet, Take 1 tablet by mouth daily, Disp: , Rfl:     VITAMIN C 500 MG OR TABS, 1 tablet daily, Disp: , Rfl:     VITAMIN D, CHOLECALCIFEROL, PO, Take 1,000 Units by mouth daily, Disp: , Rfl:     Vitamin Mixture (VITAMIN E COMPLETE PO), , Disp: , Rfl:     zolpidem (AMBIEN) 5 MG tablet, Take 1 tablet (5 mg) by mouth nightly as needed for sleep, Disp: 90 tablet, Rfl: 1    Current Facility-Administered Medications:     botulinum toxin type A (BOTOX) 100 units injection 200 Units, 200 Units, Intramuscular, Q10 Weeks, Bharti Daigle MD, 200 Units at 02/07/24 1552    botulinum toxin type A (BOTOX) 100 units injection 200 Units, 200 Units, Intramuscular, Q10 Weeks, Standal, Aretha Hahn MD, 200 Units at 08/09/23 5515        BOTULINUM NEUROTOXIN INJECTION PROCEDURES    VERIFICATION OF PATIENT IDENTIFICATION AND PROCEDURE     Initials   Patient Name PS   Patient  PS   Procedure Verified by: PS     Prior to the start of the procedure and with procedural staff participation, I verbally confirmed the patient s identity using two indicators, relevant allergies, that the procedure was appropriate and matched the consent or emergent situation, and that the correct equipment/implants were available. Immediately prior to starting the procedure I conducted the Time Out with the procedural staff and re-confirmed the patient s name, procedure, and site/side. (The Joint Commission universal protocol was followed.)  Yes    Sedation (Moderate or Deep): None    ABOVE ASSESSMENTS PERFORMED BY    Bharti Daigle MD      INDICATIONS FOR PROCEDURES  Jessica Manriquez is a 53 year old patient with intractable pain secondary to the diagnosis of chronic migraine headaches. Her baseline symptoms have been recalcitrant to oral medications and conservative therapy.  She is here today for reinjection with Botox.    GOAL OF PROCEDURE  The goal of this procedure is to decrease pain.      TOTAL DOSE ADMINISTERED  Dose Administered:  200 units  Botox (Botulinum Toxin Type A)       2:1 Dilution   Unavoidable Drug Waste: No  Diluent Used:  0.5% bupivacaine  Total Volume of Diluent Used:  4 ml  NDC #: Botox 100u ([address]-10)      CONSENT  The risks, benefits, and treatment options were discussed with Jessica Manriquez and she agreed to proceed.    Written consent was obtained by PS.     EQUIPMENT USED  Needle-25mm stimulating/recording  Needle-30 gauge  EMG/NCS Machine    SKIN PREPARATION  Skin preparation was performed using an alcohol wipe.    GUIDANCE DESCRIPTION  Electro-myographic guidance was necessary throughout the neck portion of the procedure to accurately identify all areas of spastic muscles while avoiding injection of non-spastic muscles, neighboring  nerves and nearby vascular structures.       AREA/MUSCLE INJECTED:  200 UNITS BOTOX = TOTAL DOSE, 2:1 DILUTION    1. SHOULDER GIRDLE & NECK MUSCLES: 65 UNITS BOTOX = TOTAL DOSE                               Right Upper Trapezius 15 units of Botox at 3 sites               Left Upper Trapezius 15 units of Botox at 3 sites        Right Splenius  - 5 units of Botox at 1 site/s  Left Splenius - 5 units of Botox at 1 site/s     Right Semispinalis  - 5 units of Botox at 1 site/s  Left Semispinalis - 5 units of Botox at 1 site/s    Left Anterior Scalenes - 5 units of Botox at 1 site/s.                  Right Levator Scapula - 5 units of Botox at 1 site/s.     Left Levator Scapula -  5 units of Botox at 1 site/s.     2. FACIAL, JAW & SCALP MUSCLES: 135 UNITS BOTOX = TOTAL DOSE     Right masseter - 5 units of Botox at 1 site/s.  Left masseter - 5 units of Botox at 1 site/s.     Right Occipitalis (upper and lower) - 20 units of Botox at 3 site/s.  Left Occipitalis (upper and lower) - 20 units of Botox at 3 site/s.    Right Frontalis - 10 units of Botox at 2 site/s  Left Frontalis - 10 units of Botox at 2 site/s     Right Temporalis (upper and lower)  - 25 units of Botox at 5 site/s.  Left Temporalis (upper and lower) - 25 units of Botox at 5 site/s.    Right  - 5 units of Botox at 1 site/s.  Left  - 5 units of Botox at 1 site/s.     Procerus - 5 units of Botox at 1 site/s.      RESPONSE TO PROCEDURE  Jessica Manriquez tolerated the procedure well and there were no immediate complications. She was allowed to recover for an appropriate period of time and was discharged home in stable condition.    ASSESSMENT AND PLAN   Botulinum toxin injections: No changes made to Botox dose or distribution today. Patient will continue to monitor response and report at next appointment.   Referrals: None.   Follow up: Jessica Manriquez was rescheduled for the next series of injections in 12 weeks, at which time we will evaluate  response to today's injections. she may call the clinic prior with any questions or concerns prior to the next appointment.     Bharti Daigle MD  Physical Medicine & Rehabilitation

## 2024-02-07 ENCOUNTER — OFFICE VISIT (OUTPATIENT)
Dept: PHYSICAL MEDICINE AND REHAB | Facility: CLINIC | Age: 54
End: 2024-02-07
Payer: COMMERCIAL

## 2024-02-07 VITALS — HEART RATE: 88 BPM | DIASTOLIC BLOOD PRESSURE: 75 MMHG | SYSTOLIC BLOOD PRESSURE: 109 MMHG | OXYGEN SATURATION: 99 %

## 2024-02-07 DIAGNOSIS — G43.719 CHRONIC MIGRAINE WITHOUT AURA, INTRACTABLE, WITHOUT STATUS MIGRAINOSUS: Primary | ICD-10-CM

## 2024-02-07 PROCEDURE — 95874 GUIDE NERV DESTR NEEDLE EMG: CPT | Performed by: PHYSICAL MEDICINE & REHABILITATION

## 2024-02-07 PROCEDURE — 64615 CHEMODENERV MUSC MIGRAINE: CPT | Performed by: PHYSICAL MEDICINE & REHABILITATION

## 2024-02-07 RX ORDER — BUPIVACAINE HYDROCHLORIDE 5 MG/ML
4 INJECTION, SOLUTION EPIDURAL; INTRACAUDAL ONCE
Status: COMPLETED | OUTPATIENT
Start: 2024-02-07 | End: 2024-06-26

## 2024-02-07 RX ADMIN — BUPIVACAINE HYDROCHLORIDE 4 ML: 5 INJECTION, SOLUTION EPIDURAL; INTRACAUDAL at 15:52

## 2024-02-07 ASSESSMENT — PAIN SCALES - GENERAL: PAINLEVEL: MILD PAIN (2)

## 2024-02-07 NOTE — NURSING NOTE
Chief Complaint   Patient presents with    RECHECK     Botox injections confirmed with patient     Kaley Bernard CMA at 3:49 PM on 2/7/2024.

## 2024-02-07 NOTE — LETTER
2/7/2024       RE: Jessica Manriquez  [address]  [facility] [address]       Dear Colleague,    Thank you for referring your patient, Jessica Manriquez, to the [facility] PHYSICAL MEDICINE AND REHABILITATION CLINIC [place] at [facility]. Please see a copy of my visit note below.      [facility]     PM&R CLINIC NOTE  BOTULINUM TOXIN PROCEDURE      HPI  Chief Complaint   Patient presents with    RECHECK     Botox injections confirmed with patient     Jessica Manriquez is a 53 year old female with a history of intractable migraine headaches who presents to clinic for botulinum toxin injections for management of chronic migraine headaches.     SINCE LAST VISIT  Jessica Manriquez was last seen here in clinic on 11/29/2023, at which time she received 200 units of Botox.     Patient denies new medical diagnoses, illnesses, hospitalizations, emergency room visits, and injuries since the previous injection with botulinum neurotoxin.    RESPONSE TO PREVIOUS TREATMENT  Side effects:  ** Bruises at some places and injection site lumps which both resolved within few days  Post-procedural headache: Yes, that evening.  This does not happen often.    1.  Headache frequency during this injection cycle: 2-5 migraine headache days per month, but in January she unfortunately had 8 migraine headaches as Botox was wearing off this is compared to her baseline headache frequency of >15 headache days per month.      2.  Headache duration during this injection cycle:  Headache duration ranged from 5 hours to 3 days.  For headaches January was 3 days long. Patient reports probably one episode of multiple day headaches during this injection cycle which happened more recently.     3.  Headache intensity during this injection cycle:    A.  4/10  =  Typical pain level.  B.  9/10  =  Worst pain level.  C.  0/10  =  Lowest pain level.    4.  Change  in headache medication usage during this injection cycle: Has tried several preventative medications and is currently on gabapentin 300 at nighttime.  This was initially started for night sweats but the hope was that it will help with her migraine headaches as well.  She has not noticed any improvement.  For rescue medication she is on combination of Imitrex, Excedrin and tramadol.     5.  ER Visits During This Injection Cycle: None.     6.  Functional Performance:  Change in ADL's, social interaction, days lost from work, etc. Patient reports being able to more fully participate in social and family activities and responsibilities as headache symptoms have improved she works from home so generally is able to modify her schedule and rest to avoid worsening headaches when she has a migraine.  She has been overall a lot more functional since she was started on the injections.      PHYSICAL EXAM  VS: /75   Pulse 88   LMP 10/18/2023 (Approximate)   SpO2 99%    GEN: Pleasant and cooperative, in no acute distress  HEENT: No facial asymmetry    ALLERGIES  Allergies   Allergen Reactions    Darvocet [Acetaminophen] Nausea and Vomiting    Erythromycin GI Disturbance    Flexeril [Cyclobenzaprine Hcl]      Lightheadedness, dizzy      Serotonin Reuptake Inhibitors (Ssris) Anxiety, Other (See Comments) and Palpitations       CURRENT MEDICATIONS    Current Outpatient Medications:     ALPRAZolam (XANAX) 0.5 MG tablet, Take 1 tablet (0.5 mg) by mouth every 6 hours as needed for anxiety, Disp: 40 tablet, Rfl: 0    amitriptyline (ELAVIL) 10 MG tablet, Take 1 tablet (10 mg) by mouth At Bedtime, Disp: 1 tablet, Rfl: 0    aspirin-acetaminophen-caffeine (EXCEDRIN MIGRAINE) 250-250-65 MG per tablet, Take 1 tablet by mouth every 6 hours as needed for pain., Disp: 30 tablet, Rfl: 0    diazepam (VALIUM) 10 MG tablet, Take 0.5-1 tablets (5-10 mg) by mouth nightly as needed for muscle spasms or sleep, Disp: 30 tablet, Rfl: 1    ferrous  sulfate (FEROSUL) 325 (65 Fe) MG tablet, Take 325 mg by mouth daily (with breakfast), Disp: , Rfl:     gabapentin (NEURONTIN) 300 MG capsule, Take 1 capsule (300 mg) by mouth at bedtime, Disp: 90 capsule, Rfl: 3    hydrOXYzine (ATARAX) 25 MG tablet, TAKE 1 TO 2 TABLETS BY MOUTH EVERY 6 HOURS AS NEEDED FOR ITCHING OR ANXIETY, Disp: 60 tablet, Rfl: 0    Lactobacillus (ACIDOPHILUS) 100 MG CAPS, Take 1 capsule by mouth daily, Disp: , Rfl:     Loperamide HCl (IMODIUM A-D PO), Take 1 tablet by mouth once as needed, Disp: , Rfl:     MULTI-VITAMIN OR TABS, 1 tablet by mouth DAILY, Disp: , Rfl:     OnabotulinumtoxinA (BOTOX IJ), Inject 200 Units as directed Total dose 200 units  Dose Administered:  175 units  Botox (Botulinum Toxin Type A)       2:1 Dilution (55 units unavoidable waste) Unavoidable waste 25 units  Diluent Used:  Preservative Free Normal Saline Total Volume of Diluent Used:  2.9 ml Lot # /C3 with Expiration Date:  03/2021 NDC #: Botox 100u ([address]-40), Disp: , Rfl:     ondansetron (ZOFRAN ODT) 4 MG ODT tab, Take 1-2 tablets (4-8 mg) by mouth every 8 hours as needed for nausea, Disp: 20 tablet, Rfl: 3    SUMAtriptan (IMITREX) 50 MG tablet, TAKE 1-2  TABLET BY MOUTH AT ONSET OF HEADACHE FOR MIGRAINE MAY REPEAT DOSE IN 2 HOURS.  DO NOT EXCEED 200 MG IN 24 HOURS, Disp: 36 tablet, Rfl: 4    traMADol (ULTRAM) 50 MG tablet, TAKE 1 TO 2 TABLETS BY MOUTH EVERY 6 HOURS AS NEEDED FOR PAIN, Disp: 40 tablet, Rfl: 0    valACYclovir (VALTREX) 500 MG tablet, Take 1 tablet (500 mg) by mouth daily, Disp: 30 tablet, Rfl: 3    vitamin (B COMPLEX-C) tablet, Take 1 tablet by mouth daily, Disp: , Rfl:     VITAMIN C 500 MG OR TABS, 1 tablet daily, Disp: , Rfl:     VITAMIN D, CHOLECALCIFEROL, PO, Take 1,000 Units by mouth daily, Disp: , Rfl:     Vitamin Mixture (VITAMIN E COMPLETE PO), , Disp: , Rfl:     zolpidem (AMBIEN) 5 MG tablet, Take 1 tablet (5 mg) by mouth nightly as needed for sleep, Disp: 90 tablet, Rfl:  1    Current Facility-Administered Medications:     botulinum toxin type A (BOTOX) 100 units injection 200 Units, 200 Units, Intramuscular, Q10 Weeks, Bharti Daigle MD, 200 Units at 24 1552    botulinum toxin type A (BOTOX) 100 units injection 200 Units, 200 Units, Intramuscular, Q10 Weeks, Aretha Pichardo MD, 200 Units at 23 1753       BOTULINUM NEUROTOXIN INJECTION PROCEDURES    VERIFICATION OF PATIENT IDENTIFICATION AND PROCEDURE     Initials   Patient Name PS   Patient  PS   Procedure Verified by: PS     Prior to the start of the procedure and with procedural staff participation, I verbally confirmed the patient s identity using two indicators, relevant allergies, that the procedure was appropriate and matched the consent or emergent situation, and that the correct equipment/implants were available. Immediately prior to starting the procedure I conducted the Time Out with the procedural staff and re-confirmed the patient s name, procedure, and site/side. (The Joint Commission universal protocol was followed.)  Yes    Sedation (Moderate or Deep): None    ABOVE ASSESSMENTS PERFORMED BY    Bharti Daigle MD      INDICATIONS FOR PROCEDURES  Jessica aMnriquez is a 53 year old patient with intractable pain secondary to the diagnosis of chronic migraine headaches. Her baseline symptoms have been recalcitrant to oral medications and conservative therapy.  She is here today for reinjection with Botox.    GOAL OF PROCEDURE  The goal of this procedure is to decrease pain.      TOTAL DOSE ADMINISTERED  Dose Administered:  200 units  Botox (Botulinum Toxin Type A)       2:1 Dilution   Unavoidable Drug Waste: No  Diluent Used:  0.5% bupivacaine  Total Volume of Diluent Used:  4 ml  NDC #: Botox 100u ([address]-74)      CONSENT  The risks, benefits, and treatment options were discussed with Jessica Manriquez and she agreed to proceed.    Written consent was obtained by PS.     EQUIPMENT USED  Needle-25mm  stimulating/recording  Needle-30 gauge  EMG/NCS Machine    SKIN PREPARATION  Skin preparation was performed using an alcohol wipe.    GUIDANCE DESCRIPTION  Electro-myographic guidance was necessary throughout the neck portion of the procedure to accurately identify all areas of spastic muscles while avoiding injection of non-spastic muscles, neighboring nerves and nearby vascular structures.       AREA/MUSCLE INJECTED:  200 UNITS BOTOX = TOTAL DOSE, 2:1 DILUTION    1. SHOULDER GIRDLE & NECK MUSCLES: 65 UNITS BOTOX = TOTAL DOSE                               Right Upper Trapezius 15 units of Botox at 3 sites               Left Upper Trapezius 15 units of Botox at 3 sites        Right Splenius  - 5 units of Botox at 1 site/s  Left Splenius - 5 units of Botox at 1 site/s     Right Semispinalis  - 5 units of Botox at 1 site/s  Left Semispinalis - 5 units of Botox at 1 site/s    Left Anterior Scalenes - 5 units of Botox at 1 site/s.                  Right Levator Scapula - 5 units of Botox at 1 site/s.     Left Levator Scapula -  5 units of Botox at 1 site/s.     2. FACIAL, JAW & SCALP MUSCLES: 135 UNITS BOTOX = TOTAL DOSE     Right masseter - 5 units of Botox at 1 site/s.  Left masseter - 5 units of Botox at 1 site/s.     Right Occipitalis (upper and lower) - 20 units of Botox at 3 site/s.  Left Occipitalis (upper and lower) - 20 units of Botox at 3 site/s.    Right Frontalis - 10 units of Botox at 2 site/s  Left Frontalis - 10 units of Botox at 2 site/s     Right Temporalis (upper and lower)  - 25 units of Botox at 5 site/s.  Left Temporalis (upper and lower) - 25 units of Botox at 5 site/s.    Right  - 5 units of Botox at 1 site/s.  Left  - 5 units of Botox at 1 site/s.     Procerus - 5 units of Botox at 1 site/s.      RESPONSE TO PROCEDURE  Jessica Manriquez tolerated the procedure well and there were no immediate complications. She was allowed to recover for an appropriate period of time and was  discharged home in stable condition.    ASSESSMENT AND PLAN   Botulinum toxin injections: No changes made to Botox dose or distribution today. Patient will continue to monitor response and report at next appointment.   Referrals: None.   Follow up: Jessica Manriquez was rescheduled for the next series of injections in 12 weeks, at which time we will evaluate response to today's injections. she may call the clinic prior with any questions or concerns prior to the next appointment.       Again, thank you for allowing me to participate in the care of your patient.      Sincerely,    Bharti Daigle MD

## 2024-02-22 DIAGNOSIS — G43.719 CHRONIC MIGRAINE WITHOUT AURA, INTRACTABLE, WITHOUT STATUS MIGRAINOSUS: Primary | ICD-10-CM

## 2024-04-03 ENCOUNTER — MYC MEDICAL ADVICE (OUTPATIENT)
Dept: FAMILY MEDICINE | Facility: CLINIC | Age: 54
End: 2024-04-03
Payer: COMMERCIAL

## 2024-04-03 DIAGNOSIS — G43.809 OTHER MIGRAINE WITHOUT STATUS MIGRAINOSUS, NOT INTRACTABLE: ICD-10-CM

## 2024-04-03 DIAGNOSIS — G25.81 RESTLESS LEGS SYNDROME (RLS): Primary | ICD-10-CM

## 2024-04-03 RX ORDER — HYDROXYZINE HYDROCHLORIDE 25 MG/1
25-50 TABLET, FILM COATED ORAL
Qty: 60 TABLET | Refills: 0 | Status: SHIPPED | OUTPATIENT
Start: 2024-04-03

## 2024-04-08 ENCOUNTER — OFFICE VISIT (OUTPATIENT)
Dept: DERMATOLOGY | Facility: CLINIC | Age: 54
End: 2024-04-08
Payer: COMMERCIAL

## 2024-04-08 ENCOUNTER — MYC MEDICAL ADVICE (OUTPATIENT)
Dept: FAMILY MEDICINE | Facility: CLINIC | Age: 54
End: 2024-04-08

## 2024-04-08 DIAGNOSIS — D23.9 DERMAL NEVUS: Primary | ICD-10-CM

## 2024-04-08 DIAGNOSIS — Z80.3 FAMILY HISTORY OF MALIGNANT NEOPLASM OF BREAST: Primary | ICD-10-CM

## 2024-04-08 PROCEDURE — 99212 OFFICE O/P EST SF 10 MIN: CPT | Performed by: DERMATOLOGY

## 2024-04-08 NOTE — LETTER
4/8/2024         RE: Jessica Manriquez  [address]  [facility] [address]        Dear Colleague,    Thank you for referring your patient, Jessica Manriquez, to the [facility]. Please see a copy of my visit note below.    Jessica Manriquez is an extremely pleasant 53 year old year old female patient here today for mole on lip.  Growing/  Patient has no other skin complaints today.  Remainder of the HPI, Meds, PMH, Allergies, FH, and SH was reviewed in chart.      Past Medical History:   Diagnosis Date     Cervicalgia     MRI x2 canal stenosis, disk hernieations, degenrative changes Last MRI 2004 at 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     Selective IgA immunodeficiency (H)     recurrent respir infections     Temporomandibular joint disorders, unspecified     Seen at [facility], uses splint, PT , acupuncture       Past Surgical History:   Procedure Laterality Date     HC ENLARGE BREAST WITH IMPLANT  01/01/2000     HC REVISE BREAST RECONSTRUCTION  05/01/2003     LEEP TX, CERVICAL  11/01/2001    SALOMÓN 3, yearly paps until 2021     LIGATN/STRIP LONG & SHORT SAPHEN  11/01/2004    Bilateral vein stripping     TUBAL LIGATION  10/30/2009    filshie clips        Family History   Problem Relation Age of Onset     Arthritis Mother      Respiratory Mother         asthma     Allergies Mother      Cancer Mother      Blood Disease Mother         leukemia     Breast Cancer Mother         age 79     Depression Mother      Anxiety Disorder Mother      Asthma Mother      Obesity Mother      Breast Cancer Maternal Grandmother         age 60 (she lived to 98)     Heart Disease Maternal Grandmother      Coronary Artery Disease Maternal  Grandmother      Mental Illness Maternal Grandmother         Depression     Cerebrovascular Disease Paternal Grandmother      Cancer Paternal Grandfather         ?biliary     Alcohol/Drug Paternal Grandfather      Other Cancer Paternal Grandfather         gall bladder  age 55?     Substance Abuse Paternal Grandfather      Allergies Sister      Breast Cancer Sister         age 53     Hyperlipidemia Father      Breast Cancer Cousin         Bilateral mastectomy age 46 approx     Other Cancer Cousin         Uterine discovered after gave birth (smoker)     Other Cancer Other         Bladder and mesothelioma (exposure during [place] and smoker)     Other Cancer Other         Esophageal cancer (smoker and work environ. exposure)     Other Cancer Other         Bladder (smoker)     Substance Abuse Other         drugs and alcohol       Social History     Socioeconomic History     Marital status: Single     Spouse name: Not on file     Number of children: 0     Years of education: Not on file     Highest education level: Not on file   Occupational History     Occupation: Health      Employer: [employer]     Occupation: Author of book   Tobacco Use     Smoking status: Never     Smokeless tobacco: Never   Vaping Use     Vaping Use: Never used   Substance and Sexual Activity     Alcohol use: No     Drug use: No     Sexual activity: Not Currently     Partners: Male     Birth control/protection: Condom, Female Surgical     Comment: tubal   Other Topics Concern     Parent/sibling w/ CABG, MI or angioplasty before 65F 55M? No   Social History Narrative     Not on file     Social Determinants of Health     Financial Resource Strain: Low Risk  (1/15/2024)    Financial Resource Strain      Within the past 12 months, have you or your family members you live with been unable to get utilities (heat, electricity) when it was really needed?: No   Food Insecurity: Low Risk  (1/15/2024)    Food Insecurity      Within the  past 12 months, did you worry that your food would run out before you got money to buy more?: No      Within the past 12 months, did the food you bought just not last and you didn t have money to get more?: No   Transportation Needs: Low Risk  (1/15/2024)    Transportation Needs      Within the past 12 months, has lack of transportation kept you from medical appointments, getting your medicines, non-medical meetings or appointments, work, or from getting things that you need?: No   Physical Activity: Not on file   Stress: Not on file   Social Connections: Not on file   Interpersonal Safety: Low Risk  (1/15/2024)    Interpersonal Safety      Do you feel physically and emotionally safe where you currently live?: Yes      Within the past 12 months, have you been hit, slapped, kicked or otherwise physically hurt by someone?: No      Within the past 12 months, have you been humiliated or emotionally abused in other ways by your partner or ex-partner?: No   Housing Stability: Low Risk  (1/15/2024)    Housing Stability      Do you have housing? : Yes      Are you worried about losing your housing?: No       Outpatient Encounter Medications as of 4/8/2024   Medication Sig Dispense Refill     ALPRAZolam (XANAX) 0.5 MG tablet Take 1 tablet (0.5 mg) by mouth every 6 hours as needed for anxiety 40 tablet 0     amitriptyline (ELAVIL) 10 MG tablet Take 1 tablet (10 mg) by mouth At Bedtime 1 tablet 0     aspirin-acetaminophen-caffeine (EXCEDRIN MIGRAINE) 250-250-65 MG per tablet Take 1 tablet by mouth every 6 hours as needed for pain. 30 tablet 0     diazepam (VALIUM) 10 MG tablet Take 0.5-1 tablets (5-10 mg) by mouth nightly as needed for muscle spasms or sleep 30 tablet 1     ferrous sulfate (FEROSUL) 325 (65 Fe) MG tablet Take 325 mg by mouth daily (with breakfast)       gabapentin (NEURONTIN) 300 MG capsule Take 1 capsule (300 mg) by mouth at bedtime 90 capsule 3     hydrOXYzine HCl (ATARAX) 25 MG tablet Take 1-2 tablets  (25-50 mg) by mouth nightly as needed for other (insomnia) 60 tablet 0     Lactobacillus (ACIDOPHILUS) 100 MG CAPS Take 1 capsule by mouth daily       Loperamide HCl (IMODIUM A-D PO) Take 1 tablet by mouth once as needed       MULTI-VITAMIN OR TABS 1 tablet by mouth DAILY       OnabotulinumtoxinA (BOTOX IJ) Inject 200 Units as directed Total dose 200 units   Dose Administered:  175 units  Botox (Botulinum Toxin Type A)       2:1 Dilution (55 units unavoidable waste)  Unavoidable waste 25 units   Diluent Used:  Preservative Free Normal Saline  Total Volume of Diluent Used:  2.9 ml  Lot # /C3 with Expiration Date:  03/2021  NDC #: Botox 100u ([address]-06)       ondansetron (ZOFRAN ODT) 4 MG ODT tab Take 1-2 tablets (4-8 mg) by mouth every 8 hours as needed for nausea 20 tablet 3     SUMAtriptan (IMITREX) 50 MG tablet TAKE 1-2  TABLET BY MOUTH AT ONSET OF HEADACHE FOR MIGRAINE MAY REPEAT DOSE IN 2 HOURS.  DO NOT EXCEED 200 MG IN 24 HOURS 36 tablet 4     traMADol (ULTRAM) 50 MG tablet TAKE 1 TO 2 TABLETS BY MOUTH EVERY 6 HOURS AS NEEDED FOR PAIN 40 tablet 0     valACYclovir (VALTREX) 500 MG tablet Take 1 tablet (500 mg) by mouth daily 30 tablet 3     vitamin (B COMPLEX-C) tablet Take 1 tablet by mouth daily       VITAMIN C 500 MG OR TABS 1 tablet daily       VITAMIN D, CHOLECALCIFEROL, PO Take 1,000 Units by mouth daily       Vitamin Mixture (VITAMIN E COMPLETE PO)        zolpidem (AMBIEN) 5 MG tablet Take 1 tablet (5 mg) by mouth nightly as needed for sleep 90 tablet 1     Facility-Administered Encounter Medications as of 4/8/2024   Medication Dose Route Frequency Provider Last Rate Last Admin     botulinum toxin type A (BOTOX) 100 units injection 200 Units  200 Units Intramuscular Q10 Weeks Aretha Pichardo MD         botulinum toxin type A (BOTOX) 100 units injection 200 Units  200 Units Intramuscular Q10 Weeks Bharti Daigle MD   200 Units at 02/07/24 1552     bupivacaine (MARCAINE) 0.5% preservative  free injection  4 mL Other Once Bharti Daigle MD                 O:   NAD, WDWN, Alert & Oriented, Mood & Affect wnl, Vitals stable   General appearance normal   Vitals stable   Alert, oriented and in no acute distress     Brown papule on lip       Eyes: Conjunctivae/lids:Normal     ENT: Lips, buccal mucosa, tongue: normal    MSK:Normal    Cardiovascular: peripheral edema none    Pulm: Breathing Normal    Neuro/Psych: Orientation:Alert and Orientedx3 ; Mood/Affect:normal       A/P:  Benign nevus   Shave v excision discussed with patient   Schedule excision   Scar discussed with patient   It was a pleasure speaking to Jessica Manriquez today.  Previous clinic notes and pertinent laboratory tests were reviewed prior to Jessica Manriquez's visit.  Nature and genetics of benign skin lesions dicussed with patient.      Again, thank you for allowing me to participate in the care of your patient.        Sincerely,        Sage Davis MD

## 2024-04-08 NOTE — PROGRESS NOTES
Jessica Manriquez is an extremely pleasant 53 year old year old female patient here today for mole on lip.  Growing/  Patient has no other skin complaints today.  Remainder of the HPI, Meds, PMH, Allergies, FH, and SH was reviewed in chart.      Past Medical History:   Diagnosis Date    Cervicalgia     MRI x2 canal stenosis, disk hernieations, degenrative changes Last MRI  at [facility] pain clinic    Dysplasia of cervix, unspecified 2001    SALOMÓN 3, treated with LEEP    Endometriosis, site unspecified     Doing ok on BCP's has not had laparoscopy    Other anxiety states     has used Ambien and Xanax prn    Pure hypercholesterolemia     Scapulocostal syndrome 2009    Selective IgA immunodeficiency (H)     recurrent respir infections    Temporomandibular joint disorders, unspecified     Seen at [facility], uses splint, PT , acupuncture       Past Surgical History:   Procedure Laterality Date    HC ENLARGE BREAST WITH IMPLANT  2000    HC REVISE BREAST RECONSTRUCTION  2003    LEEP TX, CERVICAL  2001    SALOMÓN 3, yearly paps until     LIGATN/STRIP LONG & SHORT SAPHEN  2004    Bilateral vein stripping    TUBAL LIGATION  10/30/2009    filshie clips        Family History   Problem Relation Age of Onset    Arthritis Mother     Respiratory Mother         asthma    Allergies Mother     Cancer Mother     Blood Disease Mother         leukemia    Breast Cancer Mother         age 79    Depression Mother     Anxiety Disorder Mother     Asthma Mother     Obesity Mother     Breast Cancer Maternal Grandmother         age 60 (she lived to 98)    Heart Disease Maternal Grandmother     Coronary Artery Disease Maternal Grandmother     Mental Illness Maternal Grandmother         Depression    Cerebrovascular Disease Paternal Grandmother     Cancer Paternal Grandfather         ?biliary    Alcohol/Drug Paternal Grandfather     Other Cancer Paternal Grandfather         gall bladder  age 55?    Substance  Abuse Paternal Grandfather     Allergies Sister     Breast Cancer Sister         age 53    Hyperlipidemia Father     Breast Cancer Cousin         Bilateral mastectomy age 46 approx    Other Cancer Cousin         Uterine discovered after gave birth (smoker)    Other Cancer Other         Bladder and mesothelioma (exposure during [place] and smoker)    Other Cancer Other         Esophageal cancer (smoker and work environ. exposure)    Other Cancer Other         Bladder (smoker)    Substance Abuse Other         drugs and alcohol       Social History     Socioeconomic History    Marital status: Single     Spouse name: Not on file    Number of children: 0    Years of education: Not on file    Highest education level: Not on file   Occupational History    Occupation: Health      Employer: [employer]    Occupation: Author of book   Tobacco Use    Smoking status: Never    Smokeless tobacco: Never   Vaping Use    Vaping Use: Never used   Substance and Sexual Activity    Alcohol use: No    Drug use: No    Sexual activity: Not Currently     Partners: Male     Birth control/protection: Condom, Female Surgical     Comment: tubal   Other Topics Concern    Parent/sibling w/ CABG, MI or angioplasty before 65F 55M? No   Social History Narrative    Not on file     Social Determinants of Health     Financial Resource Strain: Low Risk  (1/15/2024)    Financial Resource Strain     Within the past 12 months, have you or your family members you live with been unable to get utilities (heat, electricity) when it was really needed?: No   Food Insecurity: Low Risk  (1/15/2024)    Food Insecurity     Within the past 12 months, did you worry that your food would run out before you got money to buy more?: No     Within the past 12 months, did the food you bought just not last and you didn t have money to get more?: No   Transportation Needs: Low Risk  (1/15/2024)    Transportation Needs     Within the past 12 months, has lack of  transportation kept you from medical appointments, getting your medicines, non-medical meetings or appointments, work, or from getting things that you need?: No   Physical Activity: Not on file   Stress: Not on file   Social Connections: Not on file   Interpersonal Safety: Low Risk  (1/15/2024)    Interpersonal Safety     Do you feel physically and emotionally safe where you currently live?: Yes     Within the past 12 months, have you been hit, slapped, kicked or otherwise physically hurt by someone?: No     Within the past 12 months, have you been humiliated or emotionally abused in other ways by your partner or ex-partner?: No   Housing Stability: Low Risk  (1/15/2024)    Housing Stability     Do you have housing? : Yes     Are you worried about losing your housing?: No       Outpatient Encounter Medications as of 4/8/2024   Medication Sig Dispense Refill    ALPRAZolam (XANAX) 0.5 MG tablet Take 1 tablet (0.5 mg) by mouth every 6 hours as needed for anxiety 40 tablet 0    amitriptyline (ELAVIL) 10 MG tablet Take 1 tablet (10 mg) by mouth At Bedtime 1 tablet 0    aspirin-acetaminophen-caffeine (EXCEDRIN MIGRAINE) 250-250-65 MG per tablet Take 1 tablet by mouth every 6 hours as needed for pain. 30 tablet 0    diazepam (VALIUM) 10 MG tablet Take 0.5-1 tablets (5-10 mg) by mouth nightly as needed for muscle spasms or sleep 30 tablet 1    ferrous sulfate (FEROSUL) 325 (65 Fe) MG tablet Take 325 mg by mouth daily (with breakfast)      gabapentin (NEURONTIN) 300 MG capsule Take 1 capsule (300 mg) by mouth at bedtime 90 capsule 3    hydrOXYzine HCl (ATARAX) 25 MG tablet Take 1-2 tablets (25-50 mg) by mouth nightly as needed for other (insomnia) 60 tablet 0    Lactobacillus (ACIDOPHILUS) 100 MG CAPS Take 1 capsule by mouth daily      Loperamide HCl (IMODIUM A-D PO) Take 1 tablet by mouth once as needed      MULTI-VITAMIN OR TABS 1 tablet by mouth DAILY      OnabotulinumtoxinA (BOTOX IJ) Inject 200 Units as directed Total  dose 200 units   Dose Administered:  175 units  Botox (Botulinum Toxin Type A)       2:1 Dilution (55 units unavoidable waste)  Unavoidable waste 25 units   Diluent Used:  Preservative Free Normal Saline  Total Volume of Diluent Used:  2.9 ml  Lot # /C3 with Expiration Date:  03/2021  NDC #: Botox 100u ([address]-17)      ondansetron (ZOFRAN ODT) 4 MG ODT tab Take 1-2 tablets (4-8 mg) by mouth every 8 hours as needed for nausea 20 tablet 3    SUMAtriptan (IMITREX) 50 MG tablet TAKE 1-2  TABLET BY MOUTH AT ONSET OF HEADACHE FOR MIGRAINE MAY REPEAT DOSE IN 2 HOURS.  DO NOT EXCEED 200 MG IN 24 HOURS 36 tablet 4    traMADol (ULTRAM) 50 MG tablet TAKE 1 TO 2 TABLETS BY MOUTH EVERY 6 HOURS AS NEEDED FOR PAIN 40 tablet 0    valACYclovir (VALTREX) 500 MG tablet Take 1 tablet (500 mg) by mouth daily 30 tablet 3    vitamin (B COMPLEX-C) tablet Take 1 tablet by mouth daily      VITAMIN C 500 MG OR TABS 1 tablet daily      VITAMIN D, CHOLECALCIFEROL, PO Take 1,000 Units by mouth daily      Vitamin Mixture (VITAMIN E COMPLETE PO)       zolpidem (AMBIEN) 5 MG tablet Take 1 tablet (5 mg) by mouth nightly as needed for sleep 90 tablet 1     Facility-Administered Encounter Medications as of 4/8/2024   Medication Dose Route Frequency Provider Last Rate Last Admin    botulinum toxin type A (BOTOX) 100 units injection 200 Units  200 Units Intramuscular Q10 Weeks Aretha Pichardo MD        botulinum toxin type A (BOTOX) 100 units injection 200 Units  200 Units Intramuscular Q10 Weeks Bharti Daigle MD   200 Units at 02/07/24 1552    bupivacaine (MARCAINE) 0.5% preservative free injection  4 mL Other Once Bharti Daigle MD                 O:   NAD, WDWN, Alert & Oriented, Mood & Affect wnl, Vitals stable   General appearance normal   Vitals stable   Alert, oriented and in no acute distress     Brown papule on lip       Eyes: Conjunctivae/lids:Normal     ENT: Lips, buccal mucosa, tongue:  normal    MSK:Normal    Cardiovascular: peripheral edema none    Pulm: Breathing Normal    Neuro/Psych: Orientation:Alert and Orientedx3 ; Mood/Affect:normal       A/P:  Benign nevus   Shave v excision discussed with patient   Schedule excision   Scar discussed with patient   It was a pleasure speaking to Jessica Manriquez today.  Previous clinic notes and pertinent laboratory tests were reviewed prior to Jessica Manriquez's visit.  Nature and genetics of benign skin lesions dicussed with patient.

## 2024-04-08 NOTE — TELEPHONE ENCOUNTER
See my chart message    Patient request for letter of necessity for MRI    Estefani Bernard RN on 4/8/2024 at 11:57 AM

## 2024-04-10 ENCOUNTER — TRANSFERRED RECORDS (OUTPATIENT)
Dept: HEALTH INFORMATION MANAGEMENT | Facility: CLINIC | Age: 54
End: 2024-04-10
Payer: COMMERCIAL

## 2024-04-13 ENCOUNTER — MYC MEDICAL ADVICE (OUTPATIENT)
Dept: FAMILY MEDICINE | Facility: CLINIC | Age: 54
End: 2024-04-13
Payer: COMMERCIAL

## 2024-04-13 DIAGNOSIS — R61 NIGHT SWEATS: ICD-10-CM

## 2024-04-15 ENCOUNTER — TELEPHONE (OUTPATIENT)
Dept: PHYSICAL MEDICINE AND REHAB | Facility: CLINIC | Age: 54
End: 2024-04-15
Payer: COMMERCIAL

## 2024-04-15 RX ORDER — GABAPENTIN 300 MG/1
300 CAPSULE ORAL AT BEDTIME
Qty: 90 CAPSULE | Refills: 3 | Status: SHIPPED | OUTPATIENT
Start: 2024-04-15

## 2024-04-17 ENCOUNTER — OFFICE VISIT (OUTPATIENT)
Dept: PHYSICAL MEDICINE AND REHAB | Facility: CLINIC | Age: 54
End: 2024-04-17
Payer: COMMERCIAL

## 2024-04-17 VITALS
OXYGEN SATURATION: 96 % | HEART RATE: 92 BPM | RESPIRATION RATE: 16 BRPM | SYSTOLIC BLOOD PRESSURE: 90 MMHG | DIASTOLIC BLOOD PRESSURE: 63 MMHG

## 2024-04-17 DIAGNOSIS — G43.719 CHRONIC MIGRAINE WITHOUT AURA, INTRACTABLE, WITHOUT STATUS MIGRAINOSUS: Primary | ICD-10-CM

## 2024-04-17 PROCEDURE — 95874 GUIDE NERV DESTR NEEDLE EMG: CPT | Performed by: PHYSICAL MEDICINE & REHABILITATION

## 2024-04-17 PROCEDURE — 64615 CHEMODENERV MUSC MIGRAINE: CPT | Performed by: PHYSICAL MEDICINE & REHABILITATION

## 2024-04-17 RX ORDER — BUPIVACAINE HYDROCHLORIDE 5 MG/ML
4 INJECTION, SOLUTION EPIDURAL; INTRACAUDAL ONCE
Status: COMPLETED | OUTPATIENT
Start: 2024-04-17 | End: 2024-04-17

## 2024-04-17 RX ADMIN — BUPIVACAINE HYDROCHLORIDE 20 MG: 5 INJECTION, SOLUTION EPIDURAL; INTRACAUDAL at 10:23

## 2024-04-17 ASSESSMENT — PAIN SCALES - GENERAL: PAINLEVEL: NO PAIN (0)

## 2024-04-17 NOTE — LETTER
4/17/2024       RE: Jessica Manriquez  [address]  [place] [id number]       Dear Colleague,    Thank you for referring your patient, Jessica Manriquez, to the [facility] [place] at [facility]. Please see a copy of my visit note below.      [facility]     PM&R CLINIC NOTE  BOTULINUM TOXIN PROCEDURE      HPI  Chief Complaint   Patient presents with    Procedure     Botox injections     Jessica Manriquez is a 53 year old female with a history of intractable migraine headaches who presents to clinic for botulinum toxin injections for management of chronic migraine headaches.     SINCE LAST VISIT  Jessica Manriquez was last seen here in clinic on 2/7/2024, at which time she received 200 units of Botox.     Patient reports the following new medical problems since last visit: She has a partial rotator cuff tear on the right side, and received a steroid injection a week ago.     RESPONSE TO PREVIOUS TREATMENT  Side effects:  Mild tenderness and bumps in the frontalis following the injections, which resolved within a week.  Post-procedural headache: No.     1.  Headache frequency during this injection cycle: In February she had 3 migraine headaches, In March she had 5 migraine headaches, and so far in April she has had one migraine headache. This is compared to her baseline headache frequency of >15 headache days per month.      2.  Headache duration during this injection cycle:  Headache duration was 8 hours on average. Patient reports probably one episode of multiple day headaches during this injection cycle, but this does not happen very often.     3.  Headache intensity during this injection cycle:    A.  4/10  =  Typical pain level.  B.  9/10  =  Worst pain level.  C.  0/10  =  Lowest pain level.    4.  Change in headache medication usage during this injection cycle: Has  tried several preventative medications and is currently on gabapentin 300 mg at nighttime, which has not made a difference in her migraines but did help with her night sweats. For rescue medication she is on combination of Imitrex, Excedrin and tramadol.     5.  ER Visits During This Injection Cycle: None.     6.  Functional Performance:  Change in ADL's, social interaction, days lost from work, etc. Patient reports being able to more fully participate in social and family activities and responsibilities as headache symptoms have improved she works from home so generally is able to modify her schedule and rest to avoid worsening headaches when she has a migraine.  She has been overall a lot more functional since she was started on the injections.      PHYSICAL EXAM  VS: BP 90/63   Pulse 92   Resp 16   SpO2 96%    GEN: Pleasant and cooperative, in no acute distress  HEENT: No facial asymmetry    ALLERGIES  Allergies   Allergen Reactions    Darvocet [Acetaminophen] Nausea and Vomiting    Erythromycin GI Disturbance    Flexeril [Cyclobenzaprine Hcl]      Lightheadedness, dizzy      Serotonin Reuptake Inhibitors (Ssris) Anxiety, Other (See Comments) and Palpitations       CURRENT MEDICATIONS    Current Outpatient Medications:     amitriptyline (ELAVIL) 10 MG tablet, Take 1 tablet (10 mg) by mouth At Bedtime, Disp: 1 tablet, Rfl: 0    aspirin-acetaminophen-caffeine (EXCEDRIN MIGRAINE) 250-250-65 MG per tablet, Take 1 tablet by mouth every 6 hours as needed for pain., Disp: 30 tablet, Rfl: 0    diazepam (VALIUM) 10 MG tablet, Take 0.5-1 tablets (5-10 mg) by mouth nightly as needed for muscle spasms or sleep, Disp: 30 tablet, Rfl: 1    ferrous sulfate (FEROSUL) 325 (65 Fe) MG tablet, Take 325 mg by mouth daily (with breakfast), Disp: , Rfl:     gabapentin (NEURONTIN) 300 MG capsule, Take 1 capsule (300 mg) by mouth at bedtime, Disp: 90 capsule, Rfl: 3    hydrOXYzine HCl (ATARAX) 25 MG tablet, Take 1-2 tablets (25-50 mg)  by mouth nightly as needed for other (insomnia), Disp: 60 tablet, Rfl: 0    Lactobacillus (ACIDOPHILUS) 100 MG CAPS, Take 1 capsule by mouth daily, Disp: , Rfl:     Loperamide HCl (IMODIUM A-D PO), Take 1 tablet by mouth once as needed, Disp: , Rfl:     MULTI-VITAMIN OR TABS, 1 tablet by mouth DAILY, Disp: , Rfl:     OnabotulinumtoxinA (BOTOX IJ), Inject 200 Units as directed Total dose 200 units  Dose Administered:  175 units  Botox (Botulinum Toxin Type A)       2:1 Dilution (55 units unavoidable waste) Unavoidable waste 25 units  Diluent Used:  Preservative Free Normal Saline Total Volume of Diluent Used:  2.9 ml Lot # /C3 with Expiration Date:  03/2021 NDC #: Botox 100u ([address]-28), Disp: , Rfl:     ondansetron (ZOFRAN ODT) 4 MG ODT tab, Take 1-2 tablets (4-8 mg) by mouth every 8 hours as needed for nausea, Disp: 20 tablet, Rfl: 3    SUMAtriptan (IMITREX) 50 MG tablet, TAKE 1-2  TABLET BY MOUTH AT ONSET OF HEADACHE FOR MIGRAINE MAY REPEAT DOSE IN 2 HOURS.  DO NOT EXCEED 200 MG IN 24 HOURS, Disp: 36 tablet, Rfl: 4    traMADol (ULTRAM) 50 MG tablet, TAKE 1 TO 2 TABLETS BY MOUTH EVERY 6 HOURS AS NEEDED FOR PAIN, Disp: 40 tablet, Rfl: 0    valACYclovir (VALTREX) 500 MG tablet, Take 1 tablet (500 mg) by mouth daily, Disp: 30 tablet, Rfl: 3    vitamin (B COMPLEX-C) tablet, Take 1 tablet by mouth daily, Disp: , Rfl:     VITAMIN C 500 MG OR TABS, 1 tablet daily, Disp: , Rfl:     VITAMIN D, CHOLECALCIFEROL, PO, Take 1,000 Units by mouth daily, Disp: , Rfl:     Vitamin Mixture (VITAMIN E COMPLETE PO), , Disp: , Rfl:     ALPRAZolam (XANAX) 0.5 MG tablet, Take 1 tablet (0.5 mg) by mouth every 6 hours as needed for anxiety (Patient not taking: Reported on 4/17/2024), Disp: 40 tablet, Rfl: 0    zolpidem (AMBIEN) 5 MG tablet, Take 1 tablet (5 mg) by mouth nightly as needed for sleep (Patient not taking: Reported on 4/17/2024), Disp: 90 tablet, Rfl: 1    Current Facility-Administered Medications:     botulinum toxin  type A (BOTOX) 100 units injection 200 Units, 200 Units, Intramuscular, Q10 Weeks, Aretha Pichardo MD    botulinum toxin type A (BOTOX) 100 units injection 200 Units, 200 Units, Intramuscular, Q10 Weeks, Bharti Daigle MD, 200 Units at 24 1552    bupivacaine (MARCAINE) 0.5% preservative free injection, 4 mL, Other, Once, Bharti Daigle MD       BOTULINUM NEUROTOXIN INJECTION PROCEDURES    VERIFICATION OF PATIENT IDENTIFICATION AND PROCEDURE     Initials   Patient Name SES   Patient  SES   Procedure Verified by: SES     Prior to the start of the procedure and with procedural staff participation, I verbally confirmed the patient s identity using two indicators, relevant allergies, that the procedure was appropriate and matched the consent or emergent situation, and that the correct equipment/implants were available. Immediately prior to starting the procedure I conducted the Time Out with the procedural staff and re-confirmed the patient s name, procedure, and site/side. (The Joint Commission universal protocol was followed.)  Yes    Sedation (Moderate or Deep): None    ABOVE ASSESSMENTS PERFORMED BY    Aretha Pichardo MD      INDICATIONS FOR PROCEDURES  Jessica Manriquez is a 53 year old patient with intractable pain secondary to the diagnosis of chronic migraine headaches. Her baseline symptoms have been recalcitrant to oral medications and conservative therapy.  She is here today for reinjection with Botox.    GOAL OF PROCEDURE  The goal of this procedure is to decrease pain.      TOTAL DOSE ADMINISTERED  Dose Administered:  200 units  Botox (Botulinum Toxin Type A)       2:1 Dilution   Unavoidable Drug Waste: No  Diluent Used:  0.5% bupivacaine  Total Volume of Diluent Used:  4 ml  NDC #: Botox 100u ([address]-29)      CONSENT  The risks, benefits, and treatment options were discussed with Jessica Manriquez and she agreed to proceed.    Written consent was obtained by [facility].     EQUIPMENT  USED  Needle-25mm stimulating/recording  Needle-30 gauge  EMG/NCS Machine    SKIN PREPARATION  Skin preparation was performed using an alcohol wipe.    GUIDANCE DESCRIPTION  Electro-myographic guidance was necessary throughout the neck portion of the procedure to accurately identify all areas of spastic muscles while avoiding injection of non-spastic muscles, neighboring nerves and nearby vascular structures.       AREA/MUSCLE INJECTED:  200 UNITS BOTOX = TOTAL DOSE, 2:1 DILUTION    1. SHOULDER GIRDLE & NECK MUSCLES: 65 UNITS BOTOX = TOTAL DOSE                               Right Upper Trapezius 15 units of Botox at 3 sites               Left Upper Trapezius 15 units of Botox at 3 sites        Right Splenius  - 5 units of Botox at 1 site/s  Left Splenius - 5 units of Botox at 1 site/s     Right Semispinalis  - 5 units of Botox at 1 site/s  Left Semispinalis - 5 units of Botox at 1 site/s    Left Anterior Scalenes - 5 units of Botox at 1 site/s.                  Right Levator Scapula - 5 units of Botox at 1 site/s.     Left Levator Scapula -  5 units of Botox at 1 site/s.     2. FACIAL, JAW & SCALP MUSCLES: 135 UNITS BOTOX = TOTAL DOSE     Right Masseter - 5 units of Botox at 1 site/s.  Left Masseter - 5 units of Botox at 1 site/s.     Right Occipitalis (upper and lower) - 20 units of Botox at 3 site/s.  Left Occipitalis (upper and lower) - 20 units of Botox at 3 site/s.    Right Frontalis - 10 units of Botox at 2 site/s  Left Frontalis - 10 units of Botox at 2 site/s     Right Temporalis (upper and lower)  - 25 units of Botox at 5 site/s.  Left Temporalis (upper and lower) - 25 units of Botox at 5 site/s.    Right  - 5 units of Botox at 1 site/s.  Left  - 5 units of Botox at 1 site/s.     Procerus - 5 units of Botox at 1 site/s.      RESPONSE TO PROCEDURE  Jessica Manriquez tolerated the procedure well and there were no immediate complications. She was allowed to recover for an appropriate period  of time and was discharged home in stable condition.    ASSESSMENT AND PLAN   Botulinum toxin injections: No changes made to Botox dose or distribution today. Patient will continue to monitor response and report at next appointment.   Referrals: None.   Follow up: Jessica Manriquez was rescheduled for the next series of injections in 12 weeks, at which time we will evaluate response to today's injections. she may call the clinic prior with any questions or concerns prior to the next appointment.       Again, thank you for allowing me to participate in the care of your patient.      Sincerely,    Aretha Pichardo MD

## 2024-04-17 NOTE — PROGRESS NOTES
[facility]     PM&R CLINIC NOTE  BOTULINUM TOXIN PROCEDURE      HPI  Chief Complaint   Patient presents with    Procedure     Botox injections     Jessica Manriquez is a 53 year old female with a history of intractable migraine headaches who presents to clinic for botulinum toxin injections for management of chronic migraine headaches.     SINCE LAST VISIT  Jessica Manriquez was last seen here in clinic on 2/7/2024, at which time she received 200 units of Botox.     Patient reports the following new medical problems since last visit: She has a partial rotator cuff tear on the right side, and received a steroid injection a week ago.     RESPONSE TO PREVIOUS TREATMENT  Side effects:  Mild tenderness and bumps in the frontalis following the injections, which resolved within a week.  Post-procedural headache: No.     1.  Headache frequency during this injection cycle: In February she had 3 migraine headaches, In March she had 5 migraine headaches, and so far in April she has had one migraine headache. This is compared to her baseline headache frequency of >15 headache days per month.      2.  Headache duration during this injection cycle:  Headache duration was 8 hours on average. Patient reports probably one episode of multiple day headaches during this injection cycle, but this does not happen very often.     3.  Headache intensity during this injection cycle:    A.  4/10  =  Typical pain level.  B.  9/10  =  Worst pain level.  C.  0/10  =  Lowest pain level.    4.  Change in headache medication usage during this injection cycle: Has tried several preventative medications and is currently on gabapentin 300 mg at nighttime, which has not made a difference in her migraines but did help with her night sweats. For rescue medication she is on combination of Imitrex, Excedrin and tramadol.     5.  ER Visits During This Injection Cycle: None.     6.  Functional Performance:  Change in  ADL's, social interaction, days lost from work, etc. Patient reports being able to more fully participate in social and family activities and responsibilities as headache symptoms have improved she works from home so generally is able to modify her schedule and rest to avoid worsening headaches when she has a migraine.  She has been overall a lot more functional since she was started on the injections.      PHYSICAL EXAM  VS: BP 90/63   Pulse 92   Resp 16   SpO2 96%    GEN: Pleasant and cooperative, in no acute distress  HEENT: No facial asymmetry    ALLERGIES  Allergies   Allergen Reactions    Darvocet [Acetaminophen] Nausea and Vomiting    Erythromycin GI Disturbance    Flexeril [Cyclobenzaprine Hcl]      Lightheadedness, dizzy      Serotonin Reuptake Inhibitors (Ssris) Anxiety, Other (See Comments) and Palpitations       CURRENT MEDICATIONS    Current Outpatient Medications:     amitriptyline (ELAVIL) 10 MG tablet, Take 1 tablet (10 mg) by mouth At Bedtime, Disp: 1 tablet, Rfl: 0    aspirin-acetaminophen-caffeine (EXCEDRIN MIGRAINE) 250-250-65 MG per tablet, Take 1 tablet by mouth every 6 hours as needed for pain., Disp: 30 tablet, Rfl: 0    diazepam (VALIUM) 10 MG tablet, Take 0.5-1 tablets (5-10 mg) by mouth nightly as needed for muscle spasms or sleep, Disp: 30 tablet, Rfl: 1    ferrous sulfate (FEROSUL) 325 (65 Fe) MG tablet, Take 325 mg by mouth daily (with breakfast), Disp: , Rfl:     gabapentin (NEURONTIN) 300 MG capsule, Take 1 capsule (300 mg) by mouth at bedtime, Disp: 90 capsule, Rfl: 3    hydrOXYzine HCl (ATARAX) 25 MG tablet, Take 1-2 tablets (25-50 mg) by mouth nightly as needed for other (insomnia), Disp: 60 tablet, Rfl: 0    Lactobacillus (ACIDOPHILUS) 100 MG CAPS, Take 1 capsule by mouth daily, Disp: , Rfl:     Loperamide HCl (IMODIUM A-D PO), Take 1 tablet by mouth once as needed, Disp: , Rfl:     MULTI-VITAMIN OR TABS, 1 tablet by mouth DAILY, Disp: , Rfl:     OnabotulinumtoxinA (BOTOX IJ),  Inject 200 Units as directed Total dose 200 units  Dose Administered:  175 units  Botox (Botulinum Toxin Type A)       2:1 Dilution (55 units unavoidable waste) Unavoidable waste 25 units  Diluent Used:  Preservative Free Normal Saline Total Volume of Diluent Used:  2.9 ml Lot # /C3 with Expiration Date:  03/2021 NDC #: Botox 100u ([address]-97), Disp: , Rfl:     ondansetron (ZOFRAN ODT) 4 MG ODT tab, Take 1-2 tablets (4-8 mg) by mouth every 8 hours as needed for nausea, Disp: 20 tablet, Rfl: 3    SUMAtriptan (IMITREX) 50 MG tablet, TAKE 1-2  TABLET BY MOUTH AT ONSET OF HEADACHE FOR MIGRAINE MAY REPEAT DOSE IN 2 HOURS.  DO NOT EXCEED 200 MG IN 24 HOURS, Disp: 36 tablet, Rfl: 4    traMADol (ULTRAM) 50 MG tablet, TAKE 1 TO 2 TABLETS BY MOUTH EVERY 6 HOURS AS NEEDED FOR PAIN, Disp: 40 tablet, Rfl: 0    valACYclovir (VALTREX) 500 MG tablet, Take 1 tablet (500 mg) by mouth daily, Disp: 30 tablet, Rfl: 3    vitamin (B COMPLEX-C) tablet, Take 1 tablet by mouth daily, Disp: , Rfl:     VITAMIN C 500 MG OR TABS, 1 tablet daily, Disp: , Rfl:     VITAMIN D, CHOLECALCIFEROL, PO, Take 1,000 Units by mouth daily, Disp: , Rfl:     Vitamin Mixture (VITAMIN E COMPLETE PO), , Disp: , Rfl:     ALPRAZolam (XANAX) 0.5 MG tablet, Take 1 tablet (0.5 mg) by mouth every 6 hours as needed for anxiety (Patient not taking: Reported on 4/17/2024), Disp: 40 tablet, Rfl: 0    zolpidem (AMBIEN) 5 MG tablet, Take 1 tablet (5 mg) by mouth nightly as needed for sleep (Patient not taking: Reported on 4/17/2024), Disp: 90 tablet, Rfl: 1    Current Facility-Administered Medications:     botulinum toxin type A (BOTOX) 100 units injection 200 Units, 200 Units, Intramuscular, Q10 Weeks, Aretha Pichardo MD    botulinum toxin type A (BOTOX) 100 units injection 200 Units, 200 Units, Intramuscular, Q10 Weeks, Bharti Daigle MD, 200 Units at 02/07/24 1552    bupivacaine (MARCAINE) 0.5% preservative free injection, 4 mL, Other, Once, Pilo,  MD Bharti       BOTULINUM NEUROTOXIN INJECTION PROCEDURES    VERIFICATION OF PATIENT IDENTIFICATION AND PROCEDURE     Initials   Patient Name SES   Patient  SES   Procedure Verified by: BEATA     Prior to the start of the procedure and with procedural staff participation, I verbally confirmed the patient s identity using two indicators, relevant allergies, that the procedure was appropriate and matched the consent or emergent situation, and that the correct equipment/implants were available. Immediately prior to starting the procedure I conducted the Time Out with the procedural staff and re-confirmed the patient s name, procedure, and site/side. (The Joint Commission universal protocol was followed.)  Yes    Sedation (Moderate or Deep): None    ABOVE ASSESSMENTS PERFORMED BY    Aretha Pichardo MD      INDICATIONS FOR PROCEDURES  Jessica Manriquez is a 53 year old patient with intractable pain secondary to the diagnosis of chronic migraine headaches. Her baseline symptoms have been recalcitrant to oral medications and conservative therapy.  She is here today for reinjection with Botox.    GOAL OF PROCEDURE  The goal of this procedure is to decrease pain.      TOTAL DOSE ADMINISTERED  Dose Administered:  200 units  Botox (Botulinum Toxin Type A)       2:1 Dilution   Unavoidable Drug Waste: No  Diluent Used:  0.5% bupivacaine  Total Volume of Diluent Used:  4 ml  NDC #: Botox 100u ([address]-63)      CONSENT  The risks, benefits, and treatment options were discussed with Jessica Manriquez and she agreed to proceed.    Written consent was obtained by [facility].     EQUIPMENT USED  Needle-25mm stimulating/recording  Needle-30 gauge  EMG/NCS Machine    SKIN PREPARATION  Skin preparation was performed using an alcohol wipe.    GUIDANCE DESCRIPTION  Electro-myographic guidance was necessary throughout the neck portion of the procedure to accurately identify all areas of spastic muscles while avoiding injection of non-spastic  muscles, neighboring nerves and nearby vascular structures.       AREA/MUSCLE INJECTED:  200 UNITS BOTOX = TOTAL DOSE, 2:1 DILUTION    1. SHOULDER GIRDLE & NECK MUSCLES: 65 UNITS BOTOX = TOTAL DOSE                               Right Upper Trapezius 15 units of Botox at 3 sites               Left Upper Trapezius 15 units of Botox at 3 sites        Right Splenius  - 5 units of Botox at 1 site/s  Left Splenius - 5 units of Botox at 1 site/s     Right Semispinalis  - 5 units of Botox at 1 site/s  Left Semispinalis - 5 units of Botox at 1 site/s    Left Anterior Scalenes - 5 units of Botox at 1 site/s.                  Right Levator Scapula - 5 units of Botox at 1 site/s.     Left Levator Scapula -  5 units of Botox at 1 site/s.     2. FACIAL, JAW & SCALP MUSCLES: 135 UNITS BOTOX = TOTAL DOSE     Right Masseter - 5 units of Botox at 1 site/s.  Left Masseter - 5 units of Botox at 1 site/s.     Right Occipitalis (upper and lower) - 20 units of Botox at 3 site/s.  Left Occipitalis (upper and lower) - 20 units of Botox at 3 site/s.    Right Frontalis - 10 units of Botox at 2 site/s  Left Frontalis - 10 units of Botox at 2 site/s     Right Temporalis (upper and lower)  - 25 units of Botox at 5 site/s.  Left Temporalis (upper and lower) - 25 units of Botox at 5 site/s.    Right  - 5 units of Botox at 1 site/s.  Left  - 5 units of Botox at 1 site/s.     Procerus - 5 units of Botox at 1 site/s.      RESPONSE TO PROCEDURE  Jessica Manriquez tolerated the procedure well and there were no immediate complications. She was allowed to recover for an appropriate period of time and was discharged home in stable condition.    ASSESSMENT AND PLAN   Botulinum toxin injections: No changes made to Botox dose or distribution today. Patient will continue to monitor response and report at next appointment.   Referrals: None.   Follow up: Jessica Manriquez was rescheduled for the next series of injections in 12 weeks, at which  time we will evaluate response to today's injections. she may call the clinic prior with any questions or concerns prior to the next appointment.

## 2024-04-17 NOTE — NURSING NOTE
"Chief Complaint   Patient presents with    Procedure     Botox injections   Vital signs:      BP: 90/63 Pulse: 92   Resp: 16 SpO2: 96 %          Estimated body mass index is 19.97 kg/m  as calculated from the following:    Height as of 1/15/24: 1.803 m (5' 11\").    Weight as of 1/15/24: 64.9 kg (143 lb 3 oz).      LUZ OrrT  "

## 2024-05-24 ENCOUNTER — HOSPITAL ENCOUNTER (OUTPATIENT)
Dept: MRI IMAGING | Facility: CLINIC | Age: 54
Discharge: HOME | End: 2024-05-24
Attending: FAMILY MEDICINE | Admitting: FAMILY MEDICINE
Payer: COMMERCIAL

## 2024-05-24 DIAGNOSIS — Z80.3 FAMILY HISTORY OF MALIGNANT NEOPLASM OF BREAST: ICD-10-CM

## 2024-05-24 PROCEDURE — 77049 MRI BREAST C-+ W/CAD BI: CPT

## 2024-05-24 PROCEDURE — 255N000002 HC RX 255 OP 636: Performed by: FAMILY MEDICINE

## 2024-05-24 PROCEDURE — A9585 GADOBUTROL INJECTION: HCPCS | Performed by: FAMILY MEDICINE

## 2024-05-24 RX ORDER — GADOBUTROL 604.72 MG/ML
6.5 INJECTION INTRAVENOUS ONCE
Status: COMPLETED | OUTPATIENT
Start: 2024-05-24 | End: 2024-05-24

## 2024-05-24 RX ADMIN — GADOBUTROL 6.5 ML: 604.72 INJECTION INTRAVENOUS at 13:06

## 2024-06-24 NOTE — PROGRESS NOTES
[facility]     PM&R CLINIC NOTE  BOTULINUM TOXIN PROCEDURE      HPI  No chief complaint on file.    Jessica Manriquez is a 53 year old female with a history of intractable migraine headaches who presents to clinic for botulinum toxin injections for management of chronic migraine headaches.     SINCE LAST VISIT  Jessica Manriquez was last seen here in clinic on 4/17/2024, at which time she received 200 units of Botox.     She states she continues to get frontal headaches that turn global, with tight neck and shoulder muscles, left worse than right. Her migraines are worse with noise and storms/changing weather. She has been trying to improve her jaw clenching by being more cognizant about it.     Patient reports the following new medical problems since last visit: She has a partial rotator cuff tear on the right side, and continues to receive intermittent steroid injections which help. Feels she is starting menopause.       RESPONSE TO PREVIOUS TREATMENT  Side effects: none.   Post-procedural headache: No.     1.  Headache frequency during this injection cycle: She states she gets 4-5 migraines a month. This is compared to her baseline headache frequency of >15 headache days per month.      2.  Headache duration during this injection cycle:  Headache duration was 8 hours on average. Patient reports probably one episode of multiple day headaches during this injection cycle, but this does not happen very often.     3.  Headache intensity during this injection cycle:    A.  4/10  =  Typical pain level.  B.  9/10  =  Worst pain level.  C.  0/10  =  Lowest pain level.    4.  Change in headache medication usage during this injection cycle: Has tried several preventative medications and is currently on gabapentin 300 mg at nighttime, which has not made a difference in her migraines but did help with her night sweats. For rescue medication she is on combination of Imitrex, Excedrin and  tramadol.     5.  ER Visits During This Injection Cycle: None.     6.  Functional Performance:  Change in ADL's, social interaction, days lost from work, etc. Patient reports being able to more fully participate in social and family activities and responsibilities as headache symptoms have improved she works from home so generally is able to modify her schedule and rest to avoid worsening headaches when she has a migraine.  She has been overall a lot more functional since she was started on the injections.      PHYSICAL EXAM  VS: There were no vitals taken for this visit.   GEN: Pleasant and cooperative, in no acute distress  HEENT: No facial asymmetry, no significant head rotation or sidebending, tight paravertebral muscles, levator, trapezius muscles bilaterally     ALLERGIES  Allergies   Allergen Reactions    Darvocet [Acetaminophen] Nausea and Vomiting    Erythromycin GI Disturbance    Flexeril [Cyclobenzaprine Hcl]      Lightheadedness, dizzy      Serotonin Reuptake Inhibitors (Ssris) Anxiety, Other (See Comments) and Palpitations       CURRENT MEDICATIONS    Current Outpatient Medications:     ALPRAZolam (XANAX) 0.5 MG tablet, Take 1 tablet (0.5 mg) by mouth every 6 hours as needed for anxiety (Patient not taking: Reported on 4/17/2024), Disp: 40 tablet, Rfl: 0    amitriptyline (ELAVIL) 10 MG tablet, Take 1 tablet (10 mg) by mouth At Bedtime, Disp: 1 tablet, Rfl: 0    aspirin-acetaminophen-caffeine (EXCEDRIN MIGRAINE) 250-250-65 MG per tablet, Take 1 tablet by mouth every 6 hours as needed for pain., Disp: 30 tablet, Rfl: 0    diazepam (VALIUM) 10 MG tablet, Take 0.5-1 tablets (5-10 mg) by mouth nightly as needed for muscle spasms or sleep, Disp: 30 tablet, Rfl: 1    ferrous sulfate (FEROSUL) 325 (65 Fe) MG tablet, Take 325 mg by mouth daily (with breakfast), Disp: , Rfl:     gabapentin (NEURONTIN) 300 MG capsule, Take 1 capsule (300 mg) by mouth at bedtime, Disp: 90 capsule, Rfl: 3    hydrOXYzine HCl (ATARAX)  25 MG tablet, Take 1-2 tablets (25-50 mg) by mouth nightly as needed for other (insomnia), Disp: 60 tablet, Rfl: 0    Lactobacillus (ACIDOPHILUS) 100 MG CAPS, Take 1 capsule by mouth daily, Disp: , Rfl:     Loperamide HCl (IMODIUM A-D PO), Take 1 tablet by mouth once as needed, Disp: , Rfl:     MULTI-VITAMIN OR TABS, 1 tablet by mouth DAILY, Disp: , Rfl:     OnabotulinumtoxinA (BOTOX IJ), Inject 200 Units as directed Total dose 200 units  Dose Administered:  175 units  Botox (Botulinum Toxin Type A)       2:1 Dilution (55 units unavoidable waste) Unavoidable waste 25 units  Diluent Used:  Preservative Free Normal Saline Total Volume of Diluent Used:  2.9 ml Lot # /C3 with Expiration Date:  03/2021 NDC #: Botox 100u ([address]-44), Disp: , Rfl:     ondansetron (ZOFRAN ODT) 4 MG ODT tab, Take 1-2 tablets (4-8 mg) by mouth every 8 hours as needed for nausea, Disp: 20 tablet, Rfl: 3    SUMAtriptan (IMITREX) 50 MG tablet, TAKE 1-2  TABLET BY MOUTH AT ONSET OF HEADACHE FOR MIGRAINE MAY REPEAT DOSE IN 2 HOURS.  DO NOT EXCEED 200 MG IN 24 HOURS, Disp: 36 tablet, Rfl: 4    traMADol (ULTRAM) 50 MG tablet, TAKE 1 TO 2 TABLETS BY MOUTH EVERY 6 HOURS AS NEEDED FOR PAIN, Disp: 40 tablet, Rfl: 0    valACYclovir (VALTREX) 500 MG tablet, Take 1 tablet (500 mg) by mouth daily, Disp: 30 tablet, Rfl: 3    vitamin (B COMPLEX-C) tablet, Take 1 tablet by mouth daily, Disp: , Rfl:     VITAMIN C 500 MG OR TABS, 1 tablet daily, Disp: , Rfl:     VITAMIN D, CHOLECALCIFEROL, PO, Take 1,000 Units by mouth daily, Disp: , Rfl:     Vitamin Mixture (VITAMIN E COMPLETE PO), , Disp: , Rfl:     zolpidem (AMBIEN) 5 MG tablet, Take 1 tablet (5 mg) by mouth nightly as needed for sleep (Patient not taking: Reported on 4/17/2024), Disp: 90 tablet, Rfl: 1    Current Facility-Administered Medications:     botulinum toxin type A (BOTOX) 100 units injection 200 Units, 200 Units, Intramuscular, Q10 Weeks, Standal, Aretha Hahn MD, 200 Units at 04/17/24  1024    botulinum toxin type A (BOTOX) 100 units injection 200 Units, 200 Units, Intramuscular, Q10 Weeks, Bharti Daigle MD, 200 Units at 24 1552    bupivacaine (MARCAINE) 0.5% preservative free injection, 4 mL, Other, Once, Bharti Daigle MD       BOTULINUM NEUROTOXIN INJECTION PROCEDURES    VERIFICATION OF PATIENT IDENTIFICATION AND PROCEDURE     Initials   Patient Name SES   Patient  SES   Procedure Verified by: BEATA     Prior to the start of the procedure and with procedural staff participation, I verbally confirmed the patient s identity using two indicators, relevant allergies, that the procedure was appropriate and matched the consent or emergent situation, and that the correct equipment/implants were available. Immediately prior to starting the procedure I conducted the Time Out with the procedural staff and re-confirmed the patient s name, procedure, and site/side. (The Joint Commission universal protocol was followed.)  Yes    Sedation (Moderate or Deep): None    ABOVE ASSESSMENTS PERFORMED BY    Richard Weinberg DO (PM&R PGY-3 Resident)  The attending provider was present for the entire procedure documented below.     Aretha Pichardo MD      INDICATIONS FOR PROCEDURES  Jessica Manriquez is a 53 year old patient with intractable pain secondary to the diagnosis of chronic migraine headaches. Her baseline symptoms have been recalcitrant to oral medications and conservative therapy.  She is here today for reinjection with Botox.    GOAL OF PROCEDURE  The goal of this procedure is to decrease pain.      TOTAL DOSE ADMINISTERED  Dose Administered:  200 units  Botox (Botulinum Toxin Type A)       2:1 Dilution   Unavoidable Drug Waste: No  Diluent Used:  0.5% bupivacaine  Total Volume of Diluent Used:  4 ml  NDC #: Botox 100u ([address]-59)      CONSENT  The risks, benefits, and treatment options were discussed with Jessica Manriquez and she agreed to proceed.    Written consent was obtained by BE[facility].      EQUIPMENT USED  Needle-25mm stimulating/recording  Needle-30 gauge  EMG/NCS Machine    SKIN PREPARATION  Skin preparation was performed using an alcohol wipe.    GUIDANCE DESCRIPTION  Electro-myographic guidance was necessary throughout the neck portion of the procedure to accurately identify all areas of spastic muscles while avoiding injection of non-spastic muscles, neighboring nerves and nearby vascular structures.       AREA/MUSCLE INJECTED:  200 UNITS BOTOX = TOTAL DOSE, 2:1 DILUTION    1. SHOULDER GIRDLE & NECK MUSCLES: 65 UNITS BOTOX = TOTAL DOSE                               Right Upper Trapezius 15 units of Botox at 3 sites               Left Upper Trapezius 15 units of Botox at 3 sites     Right Splenius  - 5 units of Botox at 1 site/s  Left Splenius - 5 units of Botox at 1 site/s     Right Semispinalis  - 5 units of Botox at 1 site/s  Left Semispinalis - 5 units of Botox at 1 site/s    Left Anterior Scalenes - 5 units of Botox at 1 site/s.                  Right Levator Scapula - 5 units of Botox at 1 site/s.     Left Levator Scapula -  5 units of Botox at 1 site/s.     2. FACIAL, JAW & SCALP MUSCLES: 135 UNITS BOTOX = TOTAL DOSE     Right Masseter - 5 units of Botox at 1 site/s.  Left Masseter - 5 units of Botox at 1 site/s.     Right Occipitalis (upper and lower) - 20 units of Botox at 3 site/s.  Left Occipitalis (upper and lower) - 20 units of Botox at 3 site/s.    Right Frontalis - 10 units of Botox at 2 site/s  Left Frontalis - 10 units of Botox at 2 site/s     Right Temporalis (upper and lower)  - 25 units of Botox at 5 site/s.  Left Temporalis (upper and lower) - 25 units of Botox at 5 site/s.    Right  - 5 units of Botox at 1 site/s.  Left  - 5 units of Botox at 1 site/s.     Procerus - 5 units of Botox at 1 site/s.    RESPONSE TO PROCEDURE  Jessica Manriquez tolerated the procedure well and there were no immediate complications. She was allowed to recover for an appropriate  period of time and was discharged home in stable condition.    ASSESSMENT AND PLAN   Botulinum toxin injections: No changes made to Botox dose or distribution today. Patient will continue to monitor response and report at next appointment.   Referrals: None.   Follow up: Jessica Manriquez was rescheduled for the next series of injections in 12 weeks, at which time we will evaluate response to today's injections. she may call the clinic prior with any questions or concerns prior to the next appointment.       I, Aretha Pichardo MD, saw this patient with resident, Dr. Weinberg, and agree with the findings and plan of care as documented in this note. I personally reviewed the chart (vitals signs, medications, labs and imaging). My key findings and key management decisions made are reflected in this note.  I was present for the entire procedure listed above.      Aretha Pichardo MD

## 2024-06-26 ENCOUNTER — OFFICE VISIT (OUTPATIENT)
Dept: PHYSICAL MEDICINE AND REHAB | Facility: CLINIC | Age: 54
End: 2024-06-26
Payer: COMMERCIAL

## 2024-06-26 DIAGNOSIS — G43.719 CHRONIC MIGRAINE WITHOUT AURA, INTRACTABLE, WITHOUT STATUS MIGRAINOSUS: Primary | ICD-10-CM

## 2024-06-26 PROCEDURE — 64615 CHEMODENERV MUSC MIGRAINE: CPT | Mod: GC | Performed by: PHYSICAL MEDICINE & REHABILITATION

## 2024-06-26 PROCEDURE — 95874 GUIDE NERV DESTR NEEDLE EMG: CPT | Mod: GC | Performed by: PHYSICAL MEDICINE & REHABILITATION

## 2024-06-26 RX ADMIN — BUPIVACAINE HYDROCHLORIDE 4 ML: 5 INJECTION, SOLUTION EPIDURAL; INTRACAUDAL at 15:35

## 2024-06-26 NOTE — LETTER
6/26/2024       RE: Jessica Manriquez  [address]  [facility] [address]     Dear Colleague,    Thank you for referring your patient, Jessica Manriquez, to the [facility] PHYS[facility]IN[facility] [place] at [facility]. Please see a copy of my visit note below.      [facility]     PM&R CLINIC NOTE  BOTULINUM TOXIN PROCEDURE      HPI  No chief complaint on file.    Jessica Manriquez is a 53 year old female with a history of intractable migraine headaches who presents to clinic for botulinum toxin injections for management of chronic migraine headaches.     SINCE LAST VISIT  Jessica Manriquez was last seen here in clinic on 4/17/2024, at which time she received 200 units of Botox.     She states she continues to get frontal headaches that turn global, with tight neck and shoulder muscles, left worse than right. Her migraines are worse with noise and storms/changing weather. She has been trying to improve her jaw clenching by being more cognizant about it.     Patient reports the following new medical problems since last visit: She has a partial rotator cuff tear on the right side, and continues to receive intermittent steroid injections which help. Feels she is starting menopause.       RESPONSE TO PREVIOUS TREATMENT  Side effects: none.   Post-procedural headache: No.     1.  Headache frequency during this injection cycle: She states she gets 4-5 migraines a month. This is compared to her baseline headache frequency of >15 headache days per month.      2.  Headache duration during this injection cycle:  Headache duration was 8 hours on average. Patient reports probably one episode of multiple day headaches during this injection cycle, but this does not happen very often.     3.  Headache intensity during this injection cycle:    A.  4/10  =  Typical pain level.  B.  9/10  =  Worst pain level.  C.   0/10  =  Lowest pain level.    4.  Change in headache medication usage during this injection cycle: Has tried several preventative medications and is currently on gabapentin 300 mg at nighttime, which has not made a difference in her migraines but did help with her night sweats. For rescue medication she is on combination of Imitrex, Excedrin and tramadol.     5.  ER Visits During This Injection Cycle: None.     6.  Functional Performance:  Change in ADL's, social interaction, days lost from work, etc. Patient reports being able to more fully participate in social and family activities and responsibilities as headache symptoms have improved she works from home so generally is able to modify her schedule and rest to avoid worsening headaches when she has a migraine.  She has been overall a lot more functional since she was started on the injections.      PHYSICAL EXAM  VS: There were no vitals taken for this visit.   GEN: Pleasant and cooperative, in no acute distress  HEENT: No facial asymmetry, no significant head rotation or sidebending, tight paravertebral muscles, levator, trapezius muscles bilaterally     ALLERGIES  Allergies   Allergen Reactions     Darvocet [Acetaminophen] Nausea and Vomiting     Erythromycin GI Disturbance     Flexeril [Cyclobenzaprine Hcl]      Lightheadedness, dizzy       Serotonin Reuptake Inhibitors (Ssris) Anxiety, Other (See Comments) and Palpitations       CURRENT MEDICATIONS    Current Outpatient Medications:      ALPRAZolam (XANAX) 0.5 MG tablet, Take 1 tablet (0.5 mg) by mouth every 6 hours as needed for anxiety (Patient not taking: Reported on 4/17/2024), Disp: 40 tablet, Rfl: 0     amitriptyline (ELAVIL) 10 MG tablet, Take 1 tablet (10 mg) by mouth At Bedtime, Disp: 1 tablet, Rfl: 0     aspirin-acetaminophen-caffeine (EXCEDRIN MIGRAINE) 250-250-65 MG per tablet, Take 1 tablet by mouth every 6 hours as needed for pain., Disp: 30 tablet, Rfl: 0     diazepam (VALIUM) 10 MG tablet,  Take 0.5-1 tablets (5-10 mg) by mouth nightly as needed for muscle spasms or sleep, Disp: 30 tablet, Rfl: 1     ferrous sulfate (FEROSUL) 325 (65 Fe) MG tablet, Take 325 mg by mouth daily (with breakfast), Disp: , Rfl:      gabapentin (NEURONTIN) 300 MG capsule, Take 1 capsule (300 mg) by mouth at bedtime, Disp: 90 capsule, Rfl: 3     hydrOXYzine HCl (ATARAX) 25 MG tablet, Take 1-2 tablets (25-50 mg) by mouth nightly as needed for other (insomnia), Disp: 60 tablet, Rfl: 0     Lactobacillus (ACIDOPHILUS) 100 MG CAPS, Take 1 capsule by mouth daily, Disp: , Rfl:      Loperamide HCl (IMODIUM A-D PO), Take 1 tablet by mouth once as needed, Disp: , Rfl:      MULTI-VITAMIN OR TABS, 1 tablet by mouth DAILY, Disp: , Rfl:      OnabotulinumtoxinA (BOTOX IJ), Inject 200 Units as directed Total dose 200 units  Dose Administered:  175 units  Botox (Botulinum Toxin Type A)       2:1 Dilution (55 units unavoidable waste) Unavoidable waste 25 units  Diluent Used:  Preservative Free Normal Saline Total Volume of Diluent Used:  2.9 ml Lot # /C3 with Expiration Date:  03/2021 NDC #: Botox 100u ([address]-01), Disp: , Rfl:      ondansetron (ZOFRAN ODT) 4 MG ODT tab, Take 1-2 tablets (4-8 mg) by mouth every 8 hours as needed for nausea, Disp: 20 tablet, Rfl: 3     SUMAtriptan (IMITREX) 50 MG tablet, TAKE 1-2  TABLET BY MOUTH AT ONSET OF HEADACHE FOR MIGRAINE MAY REPEAT DOSE IN 2 HOURS.  DO NOT EXCEED 200 MG IN 24 HOURS, Disp: 36 tablet, Rfl: 4     traMADol (ULTRAM) 50 MG tablet, TAKE 1 TO 2 TABLETS BY MOUTH EVERY 6 HOURS AS NEEDED FOR PAIN, Disp: 40 tablet, Rfl: 0     valACYclovir (VALTREX) 500 MG tablet, Take 1 tablet (500 mg) by mouth daily, Disp: 30 tablet, Rfl: 3     vitamin (B COMPLEX-C) tablet, Take 1 tablet by mouth daily, Disp: , Rfl:      VITAMIN C 500 MG OR TABS, 1 tablet daily, Disp: , Rfl:      VITAMIN D, CHOLECALCIFEROL, PO, Take 1,000 Units by mouth daily, Disp: , Rfl:      Vitamin Mixture (VITAMIN E COMPLETE PO), ,  Disp: , Rfl:      zolpidem (AMBIEN) 5 MG tablet, Take 1 tablet (5 mg) by mouth nightly as needed for sleep (Patient not taking: Reported on 2024), Disp: 90 tablet, Rfl: 1    Current Facility-Administered Medications:      botulinum toxin type A (BOTOX) 100 units injection 200 Units, 200 Units, Intramuscular, Q10 Weeks, Aretha Pichardo MD, 200 Units at 24 1024     botulinum toxin type A (BOTOX) 100 units injection 200 Units, 200 Units, Intramuscular, Q10 Weeks, Bharti Daigle MD, 200 Units at 24 1552     bupivacaine (MARCAINE) 0.5% preservative free injection, 4 mL, Other, Once, Bharti Daigle MD       BOTULINUM NEUROTOXIN INJECTION PROCEDURES    VERIFICATION OF PATIENT IDENTIFICATION AND PROCEDURE     Initials   Patient Name SES   Patient  SES   Procedure Verified by: SES     Prior to the start of the procedure and with procedural staff participation, I verbally confirmed the patient s identity using two indicators, relevant allergies, that the procedure was appropriate and matched the consent or emergent situation, and that the correct equipment/implants were available. Immediately prior to starting the procedure I conducted the Time Out with the procedural staff and re-confirmed the patient s name, procedure, and site/side. (The Joint Commission universal protocol was followed.)  Yes    Sedation (Moderate or Deep): None    ABOVE ASSESSMENTS PERFORMED BY    Richard Weinberg DO (PM&R PGY-3 Resident)  The attending provider was present for the entire procedure documented below.     Aretha Pichardo MD      INDICATIONS FOR PROCEDURES  Jessica Manriquez is a 53 year old patient with intractable pain secondary to the diagnosis of chronic migraine headaches. Her baseline symptoms have been recalcitrant to oral medications and conservative therapy.  She is here today for reinjection with Botox.    GOAL OF PROCEDURE  The goal of this procedure is to decrease pain.      TOTAL DOSE  ADMINISTERED  Dose Administered:  200 units  Botox (Botulinum Toxin Type A)       2:1 Dilution   Unavoidable Drug Waste: No  Diluent Used:  0.5% bupivacaine  Total Volume of Diluent Used:  4 ml  NDC #: Botox 100u ([address]-78)      CONSENT  The risks, benefits, and treatment options were discussed with Jessica Gillilandguillermina and she agreed to proceed.    Written consent was obtained by [facility].     EQUIPMENT USED  Needle-25mm stimulating/recording  Needle-30 gauge  EMG/NCS Machine    SKIN PREPARATION  Skin preparation was performed using an alcohol wipe.    GUIDANCE DESCRIPTION  Electro-myographic guidance was necessary throughout the neck portion of the procedure to accurately identify all areas of spastic muscles while avoiding injection of non-spastic muscles, neighboring nerves and nearby vascular structures.       AREA/MUSCLE INJECTED:  200 UNITS BOTOX = TOTAL DOSE, 2:1 DILUTION    1. SHOULDER GIRDLE & NECK MUSCLES: 65 UNITS BOTOX = TOTAL DOSE                               Right Upper Trapezius 15 units of Botox at 3 sites               Left Upper Trapezius 15 units of Botox at 3 sites     Right Splenius  - 5 units of Botox at 1 site/s  Left Splenius - 5 units of Botox at 1 site/s     Right Semispinalis  - 5 units of Botox at 1 site/s  Left Semispinalis - 5 units of Botox at 1 site/s    Left Anterior Scalenes - 5 units of Botox at 1 site/s.                  Right Levator Scapula - 5 units of Botox at 1 site/s.     Left Levator Scapula -  5 units of Botox at 1 site/s.     2. FACIAL, JAW & SCALP MUSCLES: 135 UNITS BOTOX = TOTAL DOSE     Right Masseter - 5 units of Botox at 1 site/s.  Left Masseter - 5 units of Botox at 1 site/s.     Right Occipitalis (upper and lower) - 20 units of Botox at 3 site/s.  Left Occipitalis (upper and lower) - 20 units of Botox at 3 site/s.    Right Frontalis - 10 units of Botox at 2 site/s  Left Frontalis - 10 units of Botox at 2 site/s     Right Temporalis (upper and lower)  - 25 units of  Botox at 5 site/s.  Left Temporalis (upper and lower) - 25 units of Botox at 5 site/s.    Right  - 5 units of Botox at 1 site/s.  Left  - 5 units of Botox at 1 site/s.     Procerus - 5 units of Botox at 1 site/s.    RESPONSE TO PROCEDURE  Jessica Manriquez tolerated the procedure well and there were no immediate complications. She was allowed to recover for an appropriate period of time and was discharged home in stable condition.    ASSESSMENT AND PLAN   Botulinum toxin injections: No changes made to Botox dose or distribution today. Patient will continue to monitor response and report at next appointment.   Referrals: None.   Follow up: Jessica Manriquez was rescheduled for the next series of injections in 12 weeks, at which time we will evaluate response to today's injections. she may call the clinic prior with any questions or concerns prior to the next appointment.       Again, thank you for allowing me to participate in the care of your patient.      Sincerely,    Aretha Pichardo MD

## 2024-08-15 NOTE — PROGRESS NOTES
Thank you for choosing our Emergency Department for your care.  It is our privilege to care for you in your time of need.  In the next several days, you may receive a survey via email or mailed to your home about your experience with our team.  We would greatly appreciate you taking a few minutes to complete the survey, as we use this information to learn what we have done well and what we could be doing better. Thank you for trusting us with your care!    Below you will find a list of your tests from today's visit.   Labs  No results found for this or any previous visit (from the past 12 hour(s)).    Radiologic Studies  Vascular duplex lower extremity venous right    (Results Pending)     ------------------------------------------------------------------------------------------------------------  The evaluation and treatment you received in the Emergency Department were for an urgent problem. It is important that you follow-up with a doctor, nurse practitioner, or physician assistant to:  (1) confirm your diagnosis,  (2) re-evaluation of changes in your illness and treatment, and (3) for ongoing care. Please take your discharge instructions with you when you go to your follow-up appointment.     If you have any problem arranging a follow-up appointment, contact us!  If your symptoms become worse or you do not improve as expected, please return to us. We are available 24 hours a day.     If a prescription has been provided, please fill it as soon as possible to prevent a delay in treatment. If you have any questions or reservations about taking the medication due to side effects or interactions with other medications, please call your primary care provider or contact us directly.  Again, THANK YOU for choosing us to care for YOU!       BOTULINUM TOXIN PROCEDURE - HEADACHE - NOTE    Chief Complaint   Patient presents with     New Patient     UMP NEW BOTOX       /68 (BP Location: Left arm, Patient Position: Chair, Cuff Size: Adult Regular)  Pulse 85  Temp 97.8  F (36.6  C) (Oral)  SpO2 100%  Breastfeeding? No       Current Outpatient Prescriptions:      amitriptyline (ELAVIL) 10 MG tablet, Take 1 tablet (10 mg) by mouth At Bedtime Take up to 5 tabs at bedtime prn insomnia., Disp: 100 tablet, Rfl: 3     aspirin-acetaminophen-caffeine (EXCEDRIN MIGRAINE) 250-250-65 MG per tablet, Take 1 tablet by mouth every 6 hours as needed for pain., Disp: 30 tablet, Rfl: 0     fluconazole (DIFLUCAN) 150 MG tablet, Take 1 po as needed for yeast infection, Disp: 4 tablet, Rfl: 0     HYDROcodone-acetaminophen (NORCO) 5-325 MG per tablet, Take 1 tablet by mouth as needed, Disp: , Rfl:      Lactobacillus (ACIDOPHILUS) 100 MG CAPS, Take 1 capsule by mouth daily, Disp: , Rfl:      Loperamide HCl (IMODIUM A-D PO), Take 1 tablet by mouth once as needed, Disp: , Rfl:      MULTI-VITAMIN OR TABS, 1 tablet by mouth DAILY, Disp: , Rfl:      OnabotulinumtoxinA (BOTOX IJ), Inject 150 Units into the muscle Lot # /C3  Exp: 10/2020, Disp: , Rfl:      OnabotulinumtoxinA (BOTOX IJ), Inject 150 Units as directed once Lot# /C3, Exp 06/2020, Disp: , Rfl:      OnabotulinumtoxinA (BOTOX IJ), Inject 150 Units into the muscle once /C3, Disp: , Rfl:      OnabotulinumtoxinA (BOTOX IJ), Inject 145 Units as directed Lot # /C3 with Expiration Date:  01/2020 NDC #: Botox 100u ([address]-22), Disp: , Rfl:      OnabotulinumtoxinA (BOTOX IJ), Inject 145 Units as directed Lot # /C3 with Expiration Date:  10/2019 NDC #: Botox 100u ([address]-64), Disp: , Rfl:      OnabotulinumtoxinA (BOTOX IJ), Inject 145 Units as directed Lot # /C3 with Expiration Date:  08/2019 NDC #: Botox 100u ([address]-09), Disp: , Rfl:      traMADol (ULTRAM) 50 MG tablet, Take 1-2  tablets ( mg) by mouth every 6 hours as needed for pain, Disp: 40 tablet, Rfl: 2     VITAMIN C 500 MG OR TABS, 1 tablet daily, Disp: , Rfl:      VITAMIN D, CHOLECALCIFEROL, PO, Take 1,000 Units by mouth daily, Disp: , Rfl:      zolpidem (AMBIEN) 5 MG tablet, Take 1 tablet (5 mg) by mouth nightly as needed for sleep, Disp: 90 tablet, Rfl: 1     ALPRAZolam (XANAX) 0.5 MG tablet, Take 1 tablet (0.5 mg) by mouth every 6 hours as needed for anxiety (Patient not taking: Reported on 6/22/2018), Disp: 40 tablet, Rfl: 0     diazepam (VALIUM) 10 MG tablet, Place 1 tab vaginally for muscle spasms (Patient not taking: Reported on 6/22/2018), Disp: 30 tablet, Rfl: 3     hydrOXYzine (ATARAX) 25 MG tablet, Take 1-2 tablets (25-50 mg) by mouth every 6 hours as needed for itching (Patient not taking: Reported on 6/22/2018), Disp: 60 tablet, Rfl: 1     ibuprofen (ADVIL,MOTRIN) 600 MG tablet, Take 1 tablet (600 mg) by mouth every 6 hours as needed for moderate pain (Patient not taking: Reported on 6/22/2018), Disp: 60 tablet, Rfl: 0     ondansetron (ZOFRAN ODT) 4 MG disintegrating tablet, Take 1-2 tablets (4-8 mg) by mouth every 8 hours as needed for nausea (Patient not taking: Reported on 6/22/2018), Disp: 20 tablet, Rfl: 3     penciclovir (DENAVIR) 1 % cream, Apply topically every 2 hours (while awake) (Patient not taking: Reported on 6/22/2018), Disp: 1.5 g, Rfl: 3     SUMAtriptan (IMITREX) 20 MG/ACT nasal spray, Spray 1 spray (20 mg) in nostril as needed for migraine May repeat dose in 2 hours.  Do not exceed 40 mg in 24 hours (Patient not taking: Reported on 6/22/2018), Disp: 1 Inhaler, Rfl: 1     SUMAtriptan (IMITREX) 50 MG tablet, Take 1 tablet (50 mg) by mouth at onset of headache for migraine May repeat dose in 2 hours.  Do not exceed 200 mg in 24 hours (Patient not taking: Reported on 6/22/2018), Disp: 18 tablet, Rfl: 1     valACYclovir (VALTREX) 500 MG tablet, Take 1 tablet (500 mg) by mouth daily (Patient not taking:  Reported on 6/22/2018), Disp: 90 tablet, Rfl: 3     Allergies   Allergen Reactions     Darvocet [Acetaminophen] Nausea and Vomiting     Erythromycin GI Disturbance     Flexeril [Cyclobenzaprine Hcl]      Lightheadedness, dizzy          PHYSICAL EXAM:    Denies headache today.  Last Migraine Headache 6/20, rated 6/10, took Imitrex and Excedrin Migraine and eventually took Vicodin.    HPI:    Patient denies new medical diagnoses, illnesses, hospitalizations, emergency room visits, and injuries since the previous injection with botulinum neurotoxin.    We reviewed the recommended safety guidelines for  Botox from any vaccine injection, such as the seasonal flu vaccine, by a minimum of 10-14 days with Jessica Manriquez. She acknowledged understanding.    Describes the headaches start globally or in the back of the neck. If possible can deviate to the left.   Light is least problematic, noises and smells are triggering factors.       RESPONSE TO PREVIOUS TREATMENT:  Change in headache pattern following last series of injections with 140 units of  Botox on 6/13/2018 in [place] [facility]   She was originally a patient of Dr Jacobson and moved to Dr Carmichael and due to insurance had to move to the Neurology clinic in [place].     No problems reported    1.  Headache frequency during this injection cycle:  she had 12 headaches in the month of August. -6 headache  This is compared to her baseline headache frequency of 24 headache days per month.     2.  Headache duration during this injection cycle:  Headache duration ranged from 5  hours to 48 hours.     3.  Headache intensity during this injection cycle:    A.  4/10  =  Typical pain level.  B.  8/10  =  Worst pain level.  C.  0/10  =  Lowest pain level.    4.  Change in headache medication usage during this injection cycle:  (For Example:  Able to decrease use of oral pain medications.) No change in use of medication.  Tries to use least amount of  Imitrex as possible.  Tramadol and Vicodin are other options.     5.  ER Visits During This Injection Cycle:  0    6.  Functional Performance:  Change in ADL's, social interaction, days lost from work, etc. In the last month, she has had to cancel her activities due to the headaches.     BOTULINUM NEUROTOXIN INJECTION PROCEDURES:      VERIFICATION OF PATIENT IDENTIFICATION AND PROCEDURE     Initials   Patient Name pag   Patient  pag   Procedure Verified by: pag     Prior to the start of the procedure and with procedural staff participation, I verbally confirmed the patient s identity using two indicators, relevant allergies, that the procedure was appropriate and matched the consent or emergent situation, and that the correct equipment/implants were available. Immediately prior to starting the procedure I conducted the Time Out with the procedural staff and re-confirmed the patient s name, procedure, and site/side. (The Joint Commission universal protocol was followed.)  Yes    Sedation (Moderate or Deep): None    Above assessments performed by:  Poonam Shoemaker RN Care Coordinator  Flori Trujillo MD, A       INDICATIONS FOR PROCEDURES:  Jessica Manriquez is a 46 year old patient with chronic migraine headaches associated with cervicogenic  components.     Her baseline symptoms have been recalcitrant to oral medications and conservative therapy.  She is here today for reinjection with Botox.    GOAL OF PROCEDURE:  The goal of this procedure is to decrease pain  and enhance functional independence.    TOTAL DOSE ADMINISTERED:  Total dose 200 units   Dose Administered:  175 units  Botox (Botulinum Toxin Type A)       2:1 Dilution (55 units unavoidable waste)  Unavoidable waste 25 units   Diluent Used:  Preservative Free Normal Saline  Total Volume of Diluent Used:  2.9 ml  Lot # /C3 with Expiration Date:  2021  NDC #: Botox 100u ([address]-98)    Medication guide was offered to patient and was  declined.    CONSENT:  The risks, benefits, and treatment options were discussed with Jessica Manriquez and she agreed to proceed.    Written consent was obtained by [facility].     EQUIPMENT USED:  Needle-25mm stimulating/recording  Needle-30 gauge  EMG/NCS Machine    SKIN PREPARATION:  Skin preparation was performed using an alcohol wipe.    GUIDANCE DESCRIPTION:  Electro-myographic guidance was necessary throughout the posterior neck and jaw portion to accurately identify all areas of spastic muscles while avoiding injection of non-spastic muscles, neighboring nerves and nearby vascular structures.     AREA/MUSCLE INJECTED:      1 & 2. SHOULDER GIRDLE & NECK MUSCLES: 55 units Botox = Total Dose, 2:1 Dilution        Right Upper Trapezius 15 units of Botox at 3 sites    Left Upper Trapezius 15 units of Botox at 3 sites     Right Cervical Paraspinal (superior cervical) - 10 units of Botox at 2 site/s  Left Cervical Paraspinal (superior cervical) - 10 units of Botox at 2 site/s                  Left levator (at neck) - 5 units of Botox at 1 site        3. HEAD, JAW & SCALP MUSCLES: 120 units Botox = Total Dose, 2:1 Dilution     Left masseter - 5 units of Botox at 1 site     Right Occipitalis - 15 units of Botox at 3 site/s.  Left Occipitalis - 15 units of Botox at 3 site/s.     Right Frontalis - 10 units of Botox at 2 site/s  Left Frontalis - 10 units of Botox at 2 site/s      Right Temporalis - 15 units of Botox at 3 site/s.  Left Temporalis - 30 units of Botox at 6 site/s.     Right  - 5 units of Botox at 1 site/s.  Left  - 5 units of Botox at 1 site/s.     Procerus - 10 units of Botox at 1 site/s.      RESPONSE TO PROCEDURE:  Jessica Manriquez tolerated the procedure well and there were no immediate complications.   She was allowed to recover for an appropriate period of time and was discharged home in stable condition.    FOLLOW UP:  Jessica Manriquez was asked to follow up by phone in 7-14 days with Poonam  Sahara RN, Care Coordinator, to report her response to this series of injections.  Based on the patient's previous response to this therapy, Jessica Manriquez was rescheduled for the next series of injections in 12 weeks.    PLAN (Medication Changes, Therapy Orders, Work or Disability Issues, etc.): Patient will continue to monitor response to today's injections.

## 2024-09-04 ENCOUNTER — OFFICE VISIT (OUTPATIENT)
Dept: PHYSICAL MEDICINE AND REHAB | Facility: CLINIC | Age: 54
End: 2024-09-04
Payer: COMMERCIAL

## 2024-09-04 VITALS
OXYGEN SATURATION: 99 % | DIASTOLIC BLOOD PRESSURE: 76 MMHG | RESPIRATION RATE: 16 BRPM | HEART RATE: 113 BPM | SYSTOLIC BLOOD PRESSURE: 103 MMHG

## 2024-09-04 DIAGNOSIS — G43.719 CHRONIC MIGRAINE WITHOUT AURA, INTRACTABLE, WITHOUT STATUS MIGRAINOSUS: Primary | ICD-10-CM

## 2024-09-04 PROCEDURE — 95874 GUIDE NERV DESTR NEEDLE EMG: CPT | Performed by: PHYSICAL MEDICINE & REHABILITATION

## 2024-09-04 PROCEDURE — 64615 CHEMODENERV MUSC MIGRAINE: CPT | Mod: GC | Performed by: PHYSICAL MEDICINE & REHABILITATION

## 2024-09-04 RX ORDER — BUPIVACAINE HYDROCHLORIDE 5 MG/ML
4 INJECTION, SOLUTION EPIDURAL; INTRACAUDAL ONCE
Status: COMPLETED | OUTPATIENT
Start: 2024-09-04 | End: 2024-09-04

## 2024-09-04 RX ADMIN — BUPIVACAINE HYDROCHLORIDE 20 MG: 5 INJECTION, SOLUTION EPIDURAL; INTRACAUDAL at 15:02

## 2024-09-04 ASSESSMENT — PAIN SCALES - GENERAL: PAINLEVEL: MILD PAIN (2)

## 2024-09-04 NOTE — LETTER
9/4/2024       RE: Jessica Manriquez  [address]  [facility] [address]     Dear Colleague,    Thank you for referring your patient, Jessica Manriquez, to the [facility] [place] at [facility]. Please see a copy of my visit note below.      [facility]     PM&R CLINIC NOTE  BOTULINUM TOXIN PROCEDURE      HPI  Chief Complaint   Patient presents with     Procedure     botox     Jessica Manriquez is a 53 year old female with a history of intractable migraine headaches who presents to clinic for botulinum toxin injections for management of chronic migraine headaches.     SINCE LAST VISIT  Jessica Manriquez was last seen here in clinic on 6/26/2024, at which time she received 200 units of Botox.     Patient denies new medical diagnoses, illnesses, hospitalizations, emergency room visits, and injuries since the previous injection with botulinum neurotoxin.     RESPONSE TO PREVIOUS TREATMENT  Side effects: none.   Post-procedural headache: No.     1.  Headache frequency during this injection cycle: She states she gets 4-5 migraines a month. This is compared to her baseline headache frequency of >15 headache days per month.      2.  Headache duration during this injection cycle:  Headache duration was 8 hours on average. Patient reports probably one episode of multiple day headaches during this injection cycle, but this does not happen very often.     3.  Headache intensity during this injection cycle:    A.  4/10  =  Typical pain level.  B.  9/10  =  Worst pain level.  C.  0/10  =  Lowest pain level.    4.  Change in headache medication usage during this injection cycle: Has tried several preventative medications and is currently on gabapentin 300 mg at nighttime, which has not made a difference in her migraines but did help with her night sweats. For rescue medication she is on  combination of Imitrex, Excedrin and tramadol.     5.  ER Visits During This Injection Cycle: None.     6.  Functional Performance:  Change in ADL's, social interaction, days lost from work, etc. Patient reports being able to more fully participate in social and family activities and responsibilities as headache symptoms have improved she works from home so generally is able to modify her schedule and rest to avoid worsening headaches when she has a migraine.  She has been overall a lot more functional since she was started on the injections.      PHYSICAL EXAM  VS: /76   Pulse 113   Resp 16   SpO2 99%    GEN: Pleasant and cooperative, in no acute distress  HEENT: No facial asymmetry, no significant head rotation or sidebending, tight paravertebral muscles, levator, trapezius muscles bilaterally     ALLERGIES  Allergies   Allergen Reactions     Darvocet [Acetaminophen] Nausea and Vomiting     Erythromycin GI Disturbance     Flexeril [Cyclobenzaprine Hcl]      Lightheadedness, dizzy       Serotonin Reuptake Inhibitors (Ssris) Anxiety, Other (See Comments) and Palpitations       CURRENT MEDICATIONS    Current Outpatient Medications:      amitriptyline (ELAVIL) 10 MG tablet, Take 1 tablet (10 mg) by mouth At Bedtime, Disp: 1 tablet, Rfl: 0     aspirin-acetaminophen-caffeine (EXCEDRIN MIGRAINE) 250-250-65 MG per tablet, Take 1 tablet by mouth every 6 hours as needed for pain., Disp: 30 tablet, Rfl: 0     diazepam (VALIUM) 10 MG tablet, Take 0.5-1 tablets (5-10 mg) by mouth nightly as needed for muscle spasms or sleep, Disp: 30 tablet, Rfl: 1     ferrous sulfate (FEROSUL) 325 (65 Fe) MG tablet, Take 325 mg by mouth daily (with breakfast), Disp: , Rfl:      gabapentin (NEURONTIN) 300 MG capsule, Take 1 capsule (300 mg) by mouth at bedtime, Disp: 90 capsule, Rfl: 3     hydrOXYzine HCl (ATARAX) 25 MG tablet, Take 1-2 tablets (25-50 mg) by mouth nightly as needed for other (insomnia), Disp: 60 tablet, Rfl: 0      Lactobacillus (ACIDOPHILUS) 100 MG CAPS, Take 1 capsule by mouth daily, Disp: , Rfl:      Loperamide HCl (IMODIUM A-D PO), Take 1 tablet by mouth once as needed, Disp: , Rfl:      MULTI-VITAMIN OR TABS, 1 tablet by mouth DAILY, Disp: , Rfl:      OnabotulinumtoxinA (BOTOX IJ), Inject 200 Units as directed Total dose 200 units  Dose Administered:  175 units  Botox (Botulinum Toxin Type A)       2:1 Dilution (55 units unavoidable waste) Unavoidable waste 25 units  Diluent Used:  Preservative Free Normal Saline Total Volume of Diluent Used:  2.9 ml Lot # /C3 with Expiration Date:  03/2021 NDC #: Botox 100u ([address]-99), Disp: , Rfl:      ondansetron (ZOFRAN ODT) 4 MG ODT tab, Take 1-2 tablets (4-8 mg) by mouth every 8 hours as needed for nausea, Disp: 20 tablet, Rfl: 3     SUMAtriptan (IMITREX) 50 MG tablet, TAKE 1-2  TABLET BY MOUTH AT ONSET OF HEADACHE FOR MIGRAINE MAY REPEAT DOSE IN 2 HOURS.  DO NOT EXCEED 200 MG IN 24 HOURS, Disp: 36 tablet, Rfl: 4     traMADol (ULTRAM) 50 MG tablet, TAKE 1 TO 2 TABLETS BY MOUTH EVERY 6 HOURS AS NEEDED FOR PAIN, Disp: 40 tablet, Rfl: 0     valACYclovir (VALTREX) 500 MG tablet, Take 1 tablet (500 mg) by mouth daily, Disp: 30 tablet, Rfl: 3     vitamin (B COMPLEX-C) tablet, Take 1 tablet by mouth daily, Disp: , Rfl:      VITAMIN C 500 MG OR TABS, 1 tablet daily, Disp: , Rfl:      VITAMIN D, CHOLECALCIFEROL, PO, Take 1,000 Units by mouth daily, Disp: , Rfl:      Vitamin Mixture (VITAMIN E COMPLETE PO), , Disp: , Rfl:      ALPRAZolam (XANAX) 0.5 MG tablet, Take 1 tablet (0.5 mg) by mouth every 6 hours as needed for anxiety (Patient not taking: Reported on 4/17/2024), Disp: 40 tablet, Rfl: 0     zolpidem (AMBIEN) 5 MG tablet, Take 1 tablet (5 mg) by mouth nightly as needed for sleep (Patient not taking: Reported on 4/17/2024), Disp: 90 tablet, Rfl: 1    Current Facility-Administered Medications:      botulinum toxin type A (BOTOX) 100 units injection 200 Units, 200 Units,  Intramuscular, Q10 Weeks, Aretha Pichardo MD, 200 Units at 24 1535     botulinum toxin type A (BOTOX) 100 units injection 200 Units, 200 Units, Intramuscular, Q10 Weeks, Bharti Daigle MD, 200 Units at 24 1552       BOTULINUM NEUROTOXIN INJECTION PROCEDURES    VERIFICATION OF PATIENT IDENTIFICATION AND PROCEDURE     Initials   Patient Name SES   Patient  SES   Procedure Verified by: BEATA     Prior to the start of the procedure and with procedural staff participation, I verbally confirmed the patient s identity using two indicators, relevant allergies, that the procedure was appropriate and matched the consent or emergent situation, and that the correct equipment/implants were available. Immediately prior to starting the procedure I conducted the Time Out with the procedural staff and re-confirmed the patient s name, procedure, and site/side. (The Joint Commission universal protocol was followed.)  Yes    Sedation (Moderate or Deep): None    ABOVE ASSESSMENTS PERFORMED BY    Richard Weinberg DO (PM&R PGY-3 Resident)  The attending provider was present for the entire procedure documented below.     Aretha Pichardo MD      INDICATIONS FOR PROCEDURES  Jessica Manriquez is a 53 year old patient with intractable pain secondary to the diagnosis of chronic migraine headaches. Her baseline symptoms have been recalcitrant to oral medications and conservative therapy.  She is here today for reinjection with Botox.    GOAL OF PROCEDURE  The goal of this procedure is to decrease pain.      TOTAL DOSE ADMINISTERED  Dose Administered:  200 units  Botox (Botulinum Toxin Type A)       2:1 Dilution   Unavoidable Drug Waste: No  Diluent Used:  0.5% bupivacaine  Total Volume of Diluent Used:  4 ml  NDC #: Botox 100u ([address]-07)      CONSENT  The risks, benefits, and treatment options were discussed with Jessica Manriquez and she agreed to proceed.    Written consent was obtained by [facility].     EQUIPMENT  USED  Needle-25mm stimulating/recording  Needle-30 gauge  EMG/NCS Machine    SKIN PREPARATION  Skin preparation was performed using an alcohol wipe.    GUIDANCE DESCRIPTION  Electro-myographic guidance was necessary throughout the neck portion of the procedure to accurately identify all areas of spastic muscles while avoiding injection of non-spastic muscles, neighboring nerves and nearby vascular structures.       AREA/MUSCLE INJECTED:  200 UNITS BOTOX = TOTAL DOSE, 2:1 DILUTION    1. SHOULDER GIRDLE & NECK MUSCLES: 65 UNITS BOTOX = TOTAL DOSE                               Right Upper Trapezius 15 units of Botox at 3 sites               Left Upper Trapezius 15 units of Botox at 3 sites     Right Splenius  - 5 units of Botox at 1 site/s  Left Splenius - 5 units of Botox at 1 site/s     Right Semispinalis  - 5 units of Botox at 1 site/s  Left Semispinalis - 5 units of Botox at 1 site/s    Left Anterior Scalenes - 5 units of Botox at 1 site/s.                  Right Levator Scapula - 5 units of Botox at 1 site/s.     Left Levator Scapula -  5 units of Botox at 1 site/s.     2. FACIAL, JAW & SCALP MUSCLES: 135 UNITS BOTOX = TOTAL DOSE     Right Masseter - 5 units of Botox at 1 site/s.  Left Masseter - 5 units of Botox at 1 site/s.     Right Occipitalis (upper and lower) - 20 units of Botox at 3 site/s.  Left Occipitalis (upper and lower) - 20 units of Botox at 3 site/s.    Right Frontalis - 10 units of Botox at 2 site/s  Left Frontalis - 10 units of Botox at 2 site/s     Right Temporalis (upper and lower)  - 25 units of Botox at 5 site/s.  Left Temporalis (upper and lower) - 25 units of Botox at 5 site/s.    Right  - 5 units of Botox at 1 site/s.  Left  - 5 units of Botox at 1 site/s.     Procerus - 5 units of Botox at 1 site/s.      RESPONSE TO PROCEDURE  Jessica Manriquez tolerated the procedure well and there were no immediate complications. She was allowed to recover for an appropriate period of  time and was discharged home in stable condition.    ASSESSMENT AND PLAN   Botulinum toxin injections: No changes made to Botox dose or distribution today. Patient will continue to monitor response and report at next appointment.   Referrals: None.   Follow up: Jessica Manriquez was rescheduled for the next series of injections in 12 weeks, at which time we will evaluate response to today's injections. she may call the clinic prior with any questions or concerns prior to the next appointment.       I, Aretha Pichardo MD, saw this patient with resident, Dr. Weinberg, and agree with the findings and plan of care as documented in this note. I personally reviewed the chart (vitals signs, medications, labs and imaging). My key findings and key management decisions made are reflected in this note.  I was present for the entire procedure listed above.      Aretha Pichardo MD         Again, thank you for allowing me to participate in the care of your patient.      Sincerely,    Aretha Pichardo MD

## 2024-09-04 NOTE — PROGRESS NOTES
[facility]     PM&R CLINIC NOTE  BOTULINUM TOXIN PROCEDURE      HPI  Chief Complaint   Patient presents with    Procedure     botox     Jessica Manriquez is a 53 year old female with a history of intractable migraine headaches who presents to clinic for botulinum toxin injections for management of chronic migraine headaches.     SINCE LAST VISIT  Jessica Manriquez was last seen here in clinic on 6/26/2024, at which time she received 200 units of Botox.     Patient denies new medical diagnoses, illnesses, hospitalizations, emergency room visits, and injuries since the previous injection with botulinum neurotoxin.     RESPONSE TO PREVIOUS TREATMENT  Side effects: none.   Post-procedural headache: No.     1.  Headache frequency during this injection cycle: She states she gets 4-5 migraines a month. This is compared to her baseline headache frequency of >15 headache days per month.      2.  Headache duration during this injection cycle:  Headache duration was 8 hours on average. Patient reports probably one episode of multiple day headaches during this injection cycle, but this does not happen very often.     3.  Headache intensity during this injection cycle:    A.  4/10  =  Typical pain level.  B.  9/10  =  Worst pain level.  C.  0/10  =  Lowest pain level.    4.  Change in headache medication usage during this injection cycle: Has tried several preventative medications and is currently on gabapentin 300 mg at nighttime, which has not made a difference in her migraines but did help with her night sweats. For rescue medication she is on combination of Imitrex, Excedrin and tramadol.     5.  ER Visits During This Injection Cycle: None.     6.  Functional Performance:  Change in ADL's, social interaction, days lost from work, etc. Patient reports being able to more fully participate in social and family activities and responsibilities as headache symptoms have improved she works from  home so generally is able to modify her schedule and rest to avoid worsening headaches when she has a migraine.  She has been overall a lot more functional since she was started on the injections.      PHYSICAL EXAM  VS: /76   Pulse 113   Resp 16   SpO2 99%    GEN: Pleasant and cooperative, in no acute distress  HEENT: No facial asymmetry, no significant head rotation or sidebending, tight paravertebral muscles, levator, trapezius muscles bilaterally     ALLERGIES  Allergies   Allergen Reactions    Darvocet [Acetaminophen] Nausea and Vomiting    Erythromycin GI Disturbance    Flexeril [Cyclobenzaprine Hcl]      Lightheadedness, dizzy      Serotonin Reuptake Inhibitors (Ssris) Anxiety, Other (See Comments) and Palpitations       CURRENT MEDICATIONS    Current Outpatient Medications:     amitriptyline (ELAVIL) 10 MG tablet, Take 1 tablet (10 mg) by mouth At Bedtime, Disp: 1 tablet, Rfl: 0    aspirin-acetaminophen-caffeine (EXCEDRIN MIGRAINE) 250-250-65 MG per tablet, Take 1 tablet by mouth every 6 hours as needed for pain., Disp: 30 tablet, Rfl: 0    diazepam (VALIUM) 10 MG tablet, Take 0.5-1 tablets (5-10 mg) by mouth nightly as needed for muscle spasms or sleep, Disp: 30 tablet, Rfl: 1    ferrous sulfate (FEROSUL) 325 (65 Fe) MG tablet, Take 325 mg by mouth daily (with breakfast), Disp: , Rfl:     gabapentin (NEURONTIN) 300 MG capsule, Take 1 capsule (300 mg) by mouth at bedtime, Disp: 90 capsule, Rfl: 3    hydrOXYzine HCl (ATARAX) 25 MG tablet, Take 1-2 tablets (25-50 mg) by mouth nightly as needed for other (insomnia), Disp: 60 tablet, Rfl: 0    Lactobacillus (ACIDOPHILUS) 100 MG CAPS, Take 1 capsule by mouth daily, Disp: , Rfl:     Loperamide HCl (IMODIUM A-D PO), Take 1 tablet by mouth once as needed, Disp: , Rfl:     MULTI-VITAMIN OR TABS, 1 tablet by mouth DAILY, Disp: , Rfl:     OnabotulinumtoxinA (BOTOX IJ), Inject 200 Units as directed Total dose 200 units  Dose Administered:  175 units  Botox  (Botulinum Toxin Type A)       2:1 Dilution (55 units unavoidable waste) Unavoidable waste 25 units  Diluent Used:  Preservative Free Normal Saline Total Volume of Diluent Used:  2.9 ml Lot # /C3 with Expiration Date:  2021 NDC #: Botox 100u ([address]-10), Disp: , Rfl:     ondansetron (ZOFRAN ODT) 4 MG ODT tab, Take 1-2 tablets (4-8 mg) by mouth every 8 hours as needed for nausea, Disp: 20 tablet, Rfl: 3    SUMAtriptan (IMITREX) 50 MG tablet, TAKE 1-2  TABLET BY MOUTH AT ONSET OF HEADACHE FOR MIGRAINE MAY REPEAT DOSE IN 2 HOURS.  DO NOT EXCEED 200 MG IN 24 HOURS, Disp: 36 tablet, Rfl: 4    traMADol (ULTRAM) 50 MG tablet, TAKE 1 TO 2 TABLETS BY MOUTH EVERY 6 HOURS AS NEEDED FOR PAIN, Disp: 40 tablet, Rfl: 0    valACYclovir (VALTREX) 500 MG tablet, Take 1 tablet (500 mg) by mouth daily, Disp: 30 tablet, Rfl: 3    vitamin (B COMPLEX-C) tablet, Take 1 tablet by mouth daily, Disp: , Rfl:     VITAMIN C 500 MG OR TABS, 1 tablet daily, Disp: , Rfl:     VITAMIN D, CHOLECALCIFEROL, PO, Take 1,000 Units by mouth daily, Disp: , Rfl:     Vitamin Mixture (VITAMIN E COMPLETE PO), , Disp: , Rfl:     ALPRAZolam (XANAX) 0.5 MG tablet, Take 1 tablet (0.5 mg) by mouth every 6 hours as needed for anxiety (Patient not taking: Reported on 2024), Disp: 40 tablet, Rfl: 0    zolpidem (AMBIEN) 5 MG tablet, Take 1 tablet (5 mg) by mouth nightly as needed for sleep (Patient not taking: Reported on 2024), Disp: 90 tablet, Rfl: 1    Current Facility-Administered Medications:     botulinum toxin type A (BOTOX) 100 units injection 200 Units, 200 Units, Intramuscular, Q10 Weeks, StandAretha goodrich MD, 200 Units at 24 1535    botulinum toxin type A (BOTOX) 100 units injection 200 Units, 200 Units, Intramuscular, Q10 Weeks, Bharit Daigle MD, 200 Units at 24 1552       BOTULINUM NEUROTOXIN INJECTION PROCEDURES    VERIFICATION OF PATIENT IDENTIFICATION AND PROCEDURE     Initials   Patient Name SES   Patient   BEATA   Procedure Verified by: BEATA     Prior to the start of the procedure and with procedural staff participation, I verbally confirmed the patient s identity using two indicators, relevant allergies, that the procedure was appropriate and matched the consent or emergent situation, and that the correct equipment/implants were available. Immediately prior to starting the procedure I conducted the Time Out with the procedural staff and re-confirmed the patient s name, procedure, and site/side. (The Joint Commission universal protocol was followed.)  Yes    Sedation (Moderate or Deep): None    ABOVE ASSESSMENTS PERFORMED BY    Richard Weinberg DO (PM&R PGY-3 Resident)  The attending provider was present for the entire procedure documented below.     Aretha Pichardo MD      INDICATIONS FOR PROCEDURES  Jessica Manriquez is a 53 year old patient with intractable pain secondary to the diagnosis of chronic migraine headaches. Her baseline symptoms have been recalcitrant to oral medications and conservative therapy.  She is here today for reinjection with Botox.    GOAL OF PROCEDURE  The goal of this procedure is to decrease pain.      TOTAL DOSE ADMINISTERED  Dose Administered:  200 units  Botox (Botulinum Toxin Type A)       2:1 Dilution   Unavoidable Drug Waste: No  Diluent Used:  0.5% bupivacaine  Total Volume of Diluent Used:  4 ml  NDC #: Botox 100u ([address]-92)      CONSENT  The risks, benefits, and treatment options were discussed with Jessica Manriquez and she agreed to proceed.    Written consent was obtained by [facility].     EQUIPMENT USED  Needle-25mm stimulating/recording  Needle-30 gauge  EMG/NCS Machine    SKIN PREPARATION  Skin preparation was performed using an alcohol wipe.    GUIDANCE DESCRIPTION  Electro-myographic guidance was necessary throughout the neck portion of the procedure to accurately identify all areas of spastic muscles while avoiding injection of non-spastic muscles, neighboring nerves and nearby  vascular structures.       AREA/MUSCLE INJECTED:  200 UNITS BOTOX = TOTAL DOSE, 2:1 DILUTION    1. SHOULDER GIRDLE & NECK MUSCLES: 65 UNITS BOTOX = TOTAL DOSE                               Right Upper Trapezius 15 units of Botox at 3 sites               Left Upper Trapezius 15 units of Botox at 3 sites     Right Splenius  - 5 units of Botox at 1 site/s  Left Splenius - 5 units of Botox at 1 site/s     Right Semispinalis  - 5 units of Botox at 1 site/s  Left Semispinalis - 5 units of Botox at 1 site/s    Left Anterior Scalenes - 5 units of Botox at 1 site/s.                  Right Levator Scapula - 5 units of Botox at 1 site/s.     Left Levator Scapula -  5 units of Botox at 1 site/s.     2. FACIAL, JAW & SCALP MUSCLES: 135 UNITS BOTOX = TOTAL DOSE     Right Masseter - 5 units of Botox at 1 site/s.  Left Masseter - 5 units of Botox at 1 site/s.     Right Occipitalis (upper and lower) - 20 units of Botox at 3 site/s.  Left Occipitalis (upper and lower) - 20 units of Botox at 3 site/s.    Right Frontalis - 10 units of Botox at 2 site/s  Left Frontalis - 10 units of Botox at 2 site/s     Right Temporalis (upper and lower)  - 25 units of Botox at 5 site/s.  Left Temporalis (upper and lower) - 25 units of Botox at 5 site/s.    Right  - 5 units of Botox at 1 site/s.  Left  - 5 units of Botox at 1 site/s.     Procerus - 5 units of Botox at 1 site/s.      RESPONSE TO PROCEDURE  Jessica Manriquez tolerated the procedure well and there were no immediate complications. She was allowed to recover for an appropriate period of time and was discharged home in stable condition.    ASSESSMENT AND PLAN   Botulinum toxin injections: No changes made to Botox dose or distribution today. Patient will continue to monitor response and report at next appointment.   Referrals: None.   Follow up: Jessica Manriquez was rescheduled for the next series of injections in 12 weeks, at which time we will evaluate response to today's  injections. she may call the clinic prior with any questions or concerns prior to the next appointment.       I, Aretha Pichardo MD, saw this patient with resident, Dr. Weinberg, and agree with the findings and plan of care as documented in this note. I personally reviewed the chart (vitals signs, medications, labs and imaging). My key findings and key management decisions made are reflected in this note.  I was present for the entire procedure listed above.      Aretha Pichardo MD

## 2024-09-08 ENCOUNTER — MYC MEDICAL ADVICE (OUTPATIENT)
Dept: FAMILY MEDICINE | Facility: CLINIC | Age: 54
End: 2024-09-08
Payer: COMMERCIAL

## 2024-09-09 NOTE — TELEPHONE ENCOUNTER
Left message for patient to call back. Requested patient to recheck BP prior to calling back. Need to triage her symptoms, what is meant by 12 lead? COVID testing needs to have appointment.      YASSINE Napier

## 2024-09-09 NOTE — TELEPHONE ENCOUNTER
Received call back from Patient.  Patient states that there ius nothing wrong with her B/P.  States that her heart rate has been bouncing around while this cold has been going on.  Patient had a guest last Monday and the next day he tested positive for covid.  Patient developed symptoms Thursday evening.  Covid test at that time was negative.  Tested friday evening and was negative.  Tested Sunday morning and was negative.  Had sore throat that lasted 2 days.  Has non productive cough that is just starting to be productive.  Had head congestion that is moving into her chest.  Has body aches  Has history of migraines, unable to tell if she has a headache.  Has not checked temp.  Had chills on Friday.  Denies loss of taste or smell.  Coughing up yellow brownish phlegm.  Appointment scheduled with Dr Loya on 9/10/24 at 1140.  Oscar Noyola RN

## 2024-09-13 ENCOUNTER — PATIENT OUTREACH (OUTPATIENT)
Dept: CARE COORDINATION | Facility: CLINIC | Age: 54
End: 2024-09-13
Payer: COMMERCIAL

## 2024-10-11 ENCOUNTER — PATIENT OUTREACH (OUTPATIENT)
Dept: CARE COORDINATION | Facility: CLINIC | Age: 54
End: 2024-10-11
Payer: COMMERCIAL

## 2024-10-16 ENCOUNTER — TRANSFERRED RECORDS (OUTPATIENT)
Dept: HEALTH INFORMATION MANAGEMENT | Facility: CLINIC | Age: 54
End: 2024-10-16
Payer: COMMERCIAL

## 2024-10-28 NOTE — PROGRESS NOTES
[facility]     PM&R CLINIC NOTE  BOTULINUM TOXIN PROCEDURE      HPI  No chief complaint on file.    Jessica Manriquez is a 53 year old female with a history of intractable migraine headaches who presents to clinic for botulinum toxin injections for management of chronic migraine headaches.     SINCE LAST VISIT  Jessica Manriquez was last seen here in clinic on 9/4/2024, at which time she received 200 units of Botox. She is here today for repeat Botox injections for management of her chronic migraine headaches. She has coverage for Botox injections every 10 weeks.      Patient denies new medical diagnoses, illnesses, hospitalizations, emergency room visits, and injuries since the previous injection with botulinum neurotoxin.     RESPONSE TO PREVIOUS TREATMENT  Side effects: None.   Post-procedural headache: No.     1.  Headache frequency during this injection cycle: She reports that this was an exceptionally good round, with no migraine headaches for 6 weeks, followed by 5 migraine headaches in the month of October, and only one so far in November. This is compared to her baseline headache frequency of >15 headache days per month.      2.  Headache duration during this injection cycle:  Headache duration was 8 hours on average. Patient reports probably one episode of multiple day headaches during this injection cycle, but this does not happen very often.     3.  Headache intensity during this injection cycle:    A.  4/10  =  Typical pain level.  B.  9/10  =  Worst pain level.  C.  0/10  =  Lowest pain level.    4.  Change in headache medication usage during this injection cycle: Has tried several preventative medications and is currently on gabapentin 300 mg at nighttime, which has not made a difference in her migraines but did help with her night sweats. For rescue medication she is on combination of Imitrex, Excedrin and tramadol.     5.  ER Visits During This Injection Cycle:  None.     6.  Functional Performance:  Change in ADL's, social interaction, days lost from work, etc. Patient reports being able to more fully participate in social and family activities and responsibilities as headache symptoms have improved she works from home so generally is able to modify her schedule and rest to avoid worsening headaches when she has a migraine.  She has been overall a lot more functional since she was started on the injections.      PHYSICAL EXAM  VS: There were no vitals taken for this visit.   GEN: Pleasant and cooperative, in no acute distress  HEENT: No facial asymmetry, no significant head rotation or sidebending, tight paravertebral muscles, levator, trapezius muscles bilaterally     ALLERGIES  Allergies   Allergen Reactions    Darvocet [Acetaminophen] Nausea and Vomiting    Erythromycin GI Disturbance    Flexeril [Cyclobenzaprine Hcl]      Lightheadedness, dizzy      Serotonin Reuptake Inhibitors (Ssris) Anxiety, Other (See Comments) and Palpitations       CURRENT MEDICATIONS    Current Outpatient Medications:     ALPRAZolam (XANAX) 0.5 MG tablet, Take 1 tablet (0.5 mg) by mouth every 6 hours as needed for anxiety (Patient not taking: Reported on 4/17/2024), Disp: 40 tablet, Rfl: 0    amitriptyline (ELAVIL) 10 MG tablet, Take 1 tablet (10 mg) by mouth At Bedtime, Disp: 1 tablet, Rfl: 0    aspirin-acetaminophen-caffeine (EXCEDRIN MIGRAINE) 250-250-65 MG per tablet, Take 1 tablet by mouth every 6 hours as needed for pain., Disp: 30 tablet, Rfl: 0    diazepam (VALIUM) 10 MG tablet, Take 0.5-1 tablets (5-10 mg) by mouth nightly as needed for muscle spasms or sleep, Disp: 30 tablet, Rfl: 1    ferrous sulfate (FEROSUL) 325 (65 Fe) MG tablet, Take 325 mg by mouth daily (with breakfast), Disp: , Rfl:     gabapentin (NEURONTIN) 300 MG capsule, Take 1 capsule (300 mg) by mouth at bedtime, Disp: 90 capsule, Rfl: 3    hydrOXYzine HCl (ATARAX) 25 MG tablet, Take 1-2 tablets (25-50 mg) by mouth  nightly as needed for other (insomnia), Disp: 60 tablet, Rfl: 0    Lactobacillus (ACIDOPHILUS) 100 MG CAPS, Take 1 capsule by mouth daily, Disp: , Rfl:     Loperamide HCl (IMODIUM A-D PO), Take 1 tablet by mouth once as needed, Disp: , Rfl:     MULTI-VITAMIN OR TABS, 1 tablet by mouth DAILY, Disp: , Rfl:     OnabotulinumtoxinA (BOTOX IJ), Inject 200 Units as directed Total dose 200 units  Dose Administered:  175 units  Botox (Botulinum Toxin Type A)       2:1 Dilution (55 units unavoidable waste) Unavoidable waste 25 units  Diluent Used:  Preservative Free Normal Saline Total Volume of Diluent Used:  2.9 ml Lot # /C3 with Expiration Date:  03/2021 NDC #: Botox 100u ([address]-15), Disp: , Rfl:     ondansetron (ZOFRAN ODT) 4 MG ODT tab, Take 1-2 tablets (4-8 mg) by mouth every 8 hours as needed for nausea, Disp: 20 tablet, Rfl: 3    SUMAtriptan (IMITREX) 50 MG tablet, TAKE 1-2  TABLET BY MOUTH AT ONSET OF HEADACHE FOR MIGRAINE MAY REPEAT DOSE IN 2 HOURS.  DO NOT EXCEED 200 MG IN 24 HOURS, Disp: 36 tablet, Rfl: 4    traMADol (ULTRAM) 50 MG tablet, TAKE 1 TO 2 TABLETS BY MOUTH EVERY 6 HOURS AS NEEDED FOR PAIN, Disp: 40 tablet, Rfl: 0    valACYclovir (VALTREX) 500 MG tablet, Take 1 tablet (500 mg) by mouth daily, Disp: 30 tablet, Rfl: 3    vitamin (B COMPLEX-C) tablet, Take 1 tablet by mouth daily, Disp: , Rfl:     VITAMIN C 500 MG OR TABS, 1 tablet daily, Disp: , Rfl:     VITAMIN D, CHOLECALCIFEROL, PO, Take 1,000 Units by mouth daily, Disp: , Rfl:     Vitamin Mixture (VITAMIN E COMPLETE PO), , Disp: , Rfl:     zolpidem (AMBIEN) 5 MG tablet, Take 1 tablet (5 mg) by mouth nightly as needed for sleep (Patient not taking: Reported on 4/17/2024), Disp: 90 tablet, Rfl: 1    Current Facility-Administered Medications:     botulinum toxin type A (BOTOX) 100 units injection 200 Units, 200 Units, Intramuscular, Q10 Weeks, Standal, Aretha Hahn MD, 200 Units at 09/04/24 7699    botulinum toxin type A (BOTOX) 100 units  injection 200 Units, 200 Units, Intramuscular, Q10 Weeks, Bharti Daigle MD, 200 Units at 24 1552       BOTULINUM NEUROTOXIN INJECTION PROCEDURES    VERIFICATION OF PATIENT IDENTIFICATION AND PROCEDURE     Initials   Patient Name SES   Patient  SES   Procedure Verified by: BEATA     Prior to the start of the procedure and with procedural staff participation, I verbally confirmed the patient s identity using two indicators, relevant allergies, that the procedure was appropriate and matched the consent or emergent situation, and that the correct equipment/implants were available. Immediately prior to starting the procedure I conducted the Time Out with the procedural staff and re-confirmed the patient s name, procedure, and site/side. (The Joint Commission universal protocol was followed.)  Yes    Sedation (Moderate or Deep): None    ABOVE ASSESSMENTS PERFORMED BY    Aretha Pichardo MD       INDICATIONS FOR PROCEDURES  Jessica Manriquez is a 53 year old patient with intractable pain secondary to the diagnosis of chronic migraine headaches. Her baseline symptoms have been recalcitrant to oral medications and conservative therapy.  She is here today for reinjection with Botox.    GOAL OF PROCEDURE  The goal of this procedure is to decrease pain.      TOTAL DOSE ADMINISTERED  Dose Administered:  200 units  Botox (Botulinum Toxin Type A)       2:1 Dilution   Unavoidable Drug Waste: No  Diluent Used:  0.5% bupivacaine  Total Volume of Diluent Used:  4 ml  NDC #: Botox 100u ([address]-49)      CONSENT  The risks, benefits, and treatment options were discussed with Jessica Manriquez and she agreed to proceed.    Written consent was obtained by BEATA.     EQUIPMENT USED  Needle-25mm stimulating/recording  Needle-30 gauge  EMG/NCS Machine    SKIN PREPARATION  Skin preparation was performed using an alcohol wipe.    GUIDANCE DESCRIPTION  Electro-myographic guidance was necessary throughout the neck portion of the  procedure to accurately identify all areas of spastic muscles while avoiding injection of non-spastic muscles, neighboring nerves and nearby vascular structures.       AREA/MUSCLE INJECTED:  200 UNITS BOTOX = TOTAL DOSE, 2:1 DILUTION    1. SHOULDER GIRDLE & NECK MUSCLES: 65 UNITS BOTOX = TOTAL DOSE                               Right Upper Trapezius 15 units of Botox at 3 sites               Left Upper Trapezius 15 units of Botox at 3 sites     Right Splenius  - 5 units of Botox at 1 site/s  Left Splenius - 5 units of Botox at 1 site/s     Right Semispinalis  - 5 units of Botox at 1 site/s  Left Semispinalis - 5 units of Botox at 1 site/s    Left Anterior Scalenes - 5 units of Botox at 1 site/s.                  Right Levator Scapula - 5 units of Botox at 1 site/s.     Left Levator Scapula -  5 units of Botox at 1 site/s.     2. FACIAL, JAW & SCALP MUSCLES: 135 UNITS BOTOX = TOTAL DOSE     Right Masseter - 5 units of Botox at 1 site/s.  Left Masseter - 5 units of Botox at 1 site/s.     Right Occipitalis (upper and lower) - 20 units of Botox at 3 site/s.  Left Occipitalis (upper and lower) - 20 units of Botox at 3 site/s.    Right Frontalis - 10 units of Botox at 2 site/s  Left Frontalis - 10 units of Botox at 2 site/s     Right Temporalis (upper and lower)  - 25 units of Botox at 5 site/s.  Left Temporalis (upper and lower) - 25 units of Botox at 5 site/s.    Right  - 5 units of Botox at 1 site/s.  Left  - 5 units of Botox at 1 site/s.     Procerus - 5 units of Botox at 1 site/s.      RESPONSE TO PROCEDURE  Jessica Manriquez tolerated the procedure well and there were no immediate complications. She was allowed to recover for an appropriate period of time and was discharged home in stable condition.    ASSESSMENT AND PLAN   Botulinum toxin injections: No changes made to Botox dose or distribution today. Patient will continue to monitor response and report at next appointment.   Referrals: None.    Medications: No changes.   Follow up: Jessica Manriquez was rescheduled for the next series of injections in 12 weeks, at which time we will evaluate response to today's injections. she may call the clinic prior with any questions or concerns prior to the next appointment.     A resident physician prepared the note for today's encounter, but did not participate in the visit.    Aretha Pichardo MD

## 2024-11-12 ENCOUNTER — HOSPITAL ENCOUNTER (OUTPATIENT)
Dept: MAMMOGRAPHY | Facility: CLINIC | Age: 54
Discharge: HOME | End: 2024-11-12
Admitting: FAMILY MEDICINE
Payer: COMMERCIAL

## 2024-11-12 DIAGNOSIS — Z12.31 VISIT FOR SCREENING MAMMOGRAM: ICD-10-CM

## 2024-11-12 PROCEDURE — 77067 SCR MAMMO BI INCL CAD: CPT

## 2024-11-13 ENCOUNTER — OFFICE VISIT (OUTPATIENT)
Dept: PHYSICAL MEDICINE AND REHAB | Facility: CLINIC | Age: 54
End: 2024-11-13
Payer: COMMERCIAL

## 2024-11-13 VITALS — DIASTOLIC BLOOD PRESSURE: 60 MMHG | HEART RATE: 98 BPM | OXYGEN SATURATION: 99 % | SYSTOLIC BLOOD PRESSURE: 103 MMHG

## 2024-11-13 DIAGNOSIS — G43.719 CHRONIC MIGRAINE WITHOUT AURA, INTRACTABLE, WITHOUT STATUS MIGRAINOSUS: Primary | ICD-10-CM

## 2024-11-13 PROCEDURE — 95874 GUIDE NERV DESTR NEEDLE EMG: CPT | Performed by: PHYSICAL MEDICINE & REHABILITATION

## 2024-11-13 PROCEDURE — 64615 CHEMODENERV MUSC MIGRAINE: CPT | Performed by: PHYSICAL MEDICINE & REHABILITATION

## 2024-11-13 RX ORDER — BUPIVACAINE HYDROCHLORIDE 5 MG/ML
4 INJECTION, SOLUTION EPIDURAL; INTRACAUDAL ONCE
Status: COMPLETED | OUTPATIENT
Start: 2024-11-13 | End: 2024-11-13

## 2024-11-13 RX ADMIN — BUPIVACAINE HYDROCHLORIDE 20 MG: 5 INJECTION, SOLUTION EPIDURAL; INTRACAUDAL at 17:02

## 2024-11-13 NOTE — LETTER
11/13/2024       RE: Jessica Manriquez  [address]  [place] [address]     Dear Colleague,    Thank you for referring your patient, Jessica Manriquez, to the [facility] [place] at [facility]. Please see a copy of my visit note below.      [facility]     PM&R CLINIC NOTE  BOTULINUM TOXIN PROCEDURE      HPI  No chief complaint on file.    Jessica Manriquez is a 53 year old female with a history of intractable migraine headaches who presents to clinic for botulinum toxin injections for management of chronic migraine headaches.     SINCE LAST VISIT  Jessica Manriquez was last seen here in clinic on 9/4/2024, at which time she received 200 units of Botox. She is here today for repeat Botox injections for management of her chronic migraine headaches. She has coverage for Botox injections every 10 weeks.      Patient denies new medical diagnoses, illnesses, hospitalizations, emergency room visits, and injuries since the previous injection with botulinum neurotoxin.     RESPONSE TO PREVIOUS TREATMENT  Side effects: None.   Post-procedural headache: No.     1.  Headache frequency during this injection cycle: She reports that this was an exceptionally good round, with no migraine headaches for 6 weeks, followed by 5 migraine headaches in the month of October, and only one so far in November. This is compared to her baseline headache frequency of >15 headache days per month.      2.  Headache duration during this injection cycle:  Headache duration was 8 hours on average. Patient reports probably one episode of multiple day headaches during this injection cycle, but this does not happen very often.     3.  Headache intensity during this injection cycle:    A.  4/10  =  Typical pain level.  B.  9/10  =  Worst pain level.  C.  0/10  =  Lowest pain level.    4.  Change in headache  medication usage during this injection cycle: Has tried several preventative medications and is currently on gabapentin 300 mg at nighttime, which has not made a difference in her migraines but did help with her night sweats. For rescue medication she is on combination of Imitrex, Excedrin and tramadol.     5.  ER Visits During This Injection Cycle: None.     6.  Functional Performance:  Change in ADL's, social interaction, days lost from work, etc. Patient reports being able to more fully participate in social and family activities and responsibilities as headache symptoms have improved she works from home so generally is able to modify her schedule and rest to avoid worsening headaches when she has a migraine.  She has been overall a lot more functional since she was started on the injections.      PHYSICAL EXAM  VS: There were no vitals taken for this visit.   GEN: Pleasant and cooperative, in no acute distress  HEENT: No facial asymmetry, no significant head rotation or sidebending, tight paravertebral muscles, levator, trapezius muscles bilaterally     ALLERGIES  Allergies   Allergen Reactions     Darvocet [Acetaminophen] Nausea and Vomiting     Erythromycin GI Disturbance     Flexeril [Cyclobenzaprine Hcl]      Lightheadedness, dizzy       Serotonin Reuptake Inhibitors (Ssris) Anxiety, Other (See Comments) and Palpitations       CURRENT MEDICATIONS    Current Outpatient Medications:      ALPRAZolam (XANAX) 0.5 MG tablet, Take 1 tablet (0.5 mg) by mouth every 6 hours as needed for anxiety (Patient not taking: Reported on 4/17/2024), Disp: 40 tablet, Rfl: 0     amitriptyline (ELAVIL) 10 MG tablet, Take 1 tablet (10 mg) by mouth At Bedtime, Disp: 1 tablet, Rfl: 0     aspirin-acetaminophen-caffeine (EXCEDRIN MIGRAINE) 250-250-65 MG per tablet, Take 1 tablet by mouth every 6 hours as needed for pain., Disp: 30 tablet, Rfl: 0     diazepam (VALIUM) 10 MG tablet, Take 0.5-1 tablets (5-10 mg) by mouth nightly as needed  for muscle spasms or sleep, Disp: 30 tablet, Rfl: 1     ferrous sulfate (FEROSUL) 325 (65 Fe) MG tablet, Take 325 mg by mouth daily (with breakfast), Disp: , Rfl:      gabapentin (NEURONTIN) 300 MG capsule, Take 1 capsule (300 mg) by mouth at bedtime, Disp: 90 capsule, Rfl: 3     hydrOXYzine HCl (ATARAX) 25 MG tablet, Take 1-2 tablets (25-50 mg) by mouth nightly as needed for other (insomnia), Disp: 60 tablet, Rfl: 0     Lactobacillus (ACIDOPHILUS) 100 MG CAPS, Take 1 capsule by mouth daily, Disp: , Rfl:      Loperamide HCl (IMODIUM A-D PO), Take 1 tablet by mouth once as needed, Disp: , Rfl:      MULTI-VITAMIN OR TABS, 1 tablet by mouth DAILY, Disp: , Rfl:      OnabotulinumtoxinA (BOTOX IJ), Inject 200 Units as directed Total dose 200 units  Dose Administered:  175 units  Botox (Botulinum Toxin Type A)       2:1 Dilution (55 units unavoidable waste) Unavoidable waste 25 units  Diluent Used:  Preservative Free Normal Saline Total Volume of Diluent Used:  2.9 ml Lot # /C3 with Expiration Date:  03/2021 NDC #: Botox 100u ([address]-01), Disp: , Rfl:      ondansetron (ZOFRAN ODT) 4 MG ODT tab, Take 1-2 tablets (4-8 mg) by mouth every 8 hours as needed for nausea, Disp: 20 tablet, Rfl: 3     SUMAtriptan (IMITREX) 50 MG tablet, TAKE 1-2  TABLET BY MOUTH AT ONSET OF HEADACHE FOR MIGRAINE MAY REPEAT DOSE IN 2 HOURS.  DO NOT EXCEED 200 MG IN 24 HOURS, Disp: 36 tablet, Rfl: 4     traMADol (ULTRAM) 50 MG tablet, TAKE 1 TO 2 TABLETS BY MOUTH EVERY 6 HOURS AS NEEDED FOR PAIN, Disp: 40 tablet, Rfl: 0     valACYclovir (VALTREX) 500 MG tablet, Take 1 tablet (500 mg) by mouth daily, Disp: 30 tablet, Rfl: 3     vitamin (B COMPLEX-C) tablet, Take 1 tablet by mouth daily, Disp: , Rfl:      VITAMIN C 500 MG OR TABS, 1 tablet daily, Disp: , Rfl:      VITAMIN D, CHOLECALCIFEROL, PO, Take 1,000 Units by mouth daily, Disp: , Rfl:      Vitamin Mixture (VITAMIN E COMPLETE PO), , Disp: , Rfl:      zolpidem (AMBIEN) 5 MG tablet, Take 1  tablet (5 mg) by mouth nightly as needed for sleep (Patient not taking: Reported on 2024), Disp: 90 tablet, Rfl: 1    Current Facility-Administered Medications:      botulinum toxin type A (BOTOX) 100 units injection 200 Units, 200 Units, Intramuscular, Q10 Weeks, Aretha Pichardo MD, 200 Units at 24 1449     botulinum toxin type A (BOTOX) 100 units injection 200 Units, 200 Units, Intramuscular, Q10 Weeks, Bharti Daigle MD, 200 Units at 24 1552       BOTULINUM NEUROTOXIN INJECTION PROCEDURES    VERIFICATION OF PATIENT IDENTIFICATION AND PROCEDURE     Initials   Patient Name SES   Patient  SES   Procedure Verified by: SES     Prior to the start of the procedure and with procedural staff participation, I verbally confirmed the patient s identity using two indicators, relevant allergies, that the procedure was appropriate and matched the consent or emergent situation, and that the correct equipment/implants were available. Immediately prior to starting the procedure I conducted the Time Out with the procedural staff and re-confirmed the patient s name, procedure, and site/side. (The Joint Commission universal protocol was followed.)  Yes    Sedation (Moderate or Deep): None    ABOVE ASSESSMENTS PERFORMED BY    Aretha Pichardo MD       INDICATIONS FOR PROCEDURES  Jessica Manriquez is a 53 year old patient with intractable pain secondary to the diagnosis of chronic migraine headaches. Her baseline symptoms have been recalcitrant to oral medications and conservative therapy.  She is here today for reinjection with Botox.    GOAL OF PROCEDURE  The goal of this procedure is to decrease pain.      TOTAL DOSE ADMINISTERED  Dose Administered:  200 units  Botox (Botulinum Toxin Type A)       2:1 Dilution   Unavoidable Drug Waste: No  Diluent Used:  0.5% bupivacaine  Total Volume of Diluent Used:  4 ml  NDC #: Botox 100u ([address]-40)      CONSENT  The risks, benefits, and treatment options  were discussed with Jessica Manriquez and she agreed to proceed.    Written consent was obtained by [facility]     EQUIPMENT USED  Needle-25mm stimulating/recording  Needle-30 gauge  EMG/NCS Machine    SKIN PREPARATION  Skin preparation was performed using an alcohol wipe.    GUIDANCE DESCRIPTION  Electro-myographic guidance was necessary throughout the neck portion of the procedure to accurately identify all areas of spastic muscles while avoiding injection of non-spastic muscles, neighboring nerves and nearby vascular structures.       AREA/MUSCLE INJECTED:  200 UNITS BOTOX = TOTAL DOSE, 2:1 DILUTION    1. SHOULDER GIRDLE & NECK MUSCLES: 65 UNITS BOTOX = TOTAL DOSE                               Right Upper Trapezius 15 units of Botox at 3 sites               Left Upper Trapezius 15 units of Botox at 3 sites     Right Splenius  - 5 units of Botox at 1 site/s  Left Splenius - 5 units of Botox at 1 site/s     Right Semispinalis  - 5 units of Botox at 1 site/s  Left Semispinalis - 5 units of Botox at 1 site/s    Left Anterior Scalenes - 5 units of Botox at 1 site/s.                  Right Levator Scapula - 5 units of Botox at 1 site/s.     Left Levator Scapula -  5 units of Botox at 1 site/s.     2. FACIAL, JAW & SCALP MUSCLES: 135 UNITS BOTOX = TOTAL DOSE     Right Masseter - 5 units of Botox at 1 site/s.  Left Masseter - 5 units of Botox at 1 site/s.     Right Occipitalis (upper and lower) - 20 units of Botox at 3 site/s.  Left Occipitalis (upper and lower) - 20 units of Botox at 3 site/s.    Right Frontalis - 10 units of Botox at 2 site/s  Left Frontalis - 10 units of Botox at 2 site/s     Right Temporalis (upper and lower)  - 25 units of Botox at 5 site/s.  Left Temporalis (upper and lower) - 25 units of Botox at 5 site/s.    Right  - 5 units of Botox at 1 site/s.  Left  - 5 units of Botox at 1 site/s.     Procerus - 5 units of Botox at 1 site/s.      RESPONSE TO PROCEDURE  Jessica Manriquez tolerated  the procedure well and there were no immediate complications. She was allowed to recover for an appropriate period of time and was discharged home in stable condition.    ASSESSMENT AND PLAN   Botulinum toxin injections: No changes made to Botox dose or distribution today. Patient will continue to monitor response and report at next appointment.   Referrals: None.   Medications: No changes.   Follow up: Jessica Manriquez was rescheduled for the next series of injections in 12 weeks, at which time we will evaluate response to today's injections. she may call the clinic prior with any questions or concerns prior to the next appointment.     A resident physician prepared the note for today's encounter, but did not participate in the visit.    Aretha Pichardo MD        Again, thank you for allowing me to participate in the care of your patient.      Sincerely,    Aretha Pichardo MD

## 2025-01-15 ENCOUNTER — OFFICE VISIT (OUTPATIENT)
Dept: FAMILY MEDICINE | Facility: CLINIC | Age: 55
End: 2025-01-15
Attending: FAMILY MEDICINE
Payer: COMMERCIAL

## 2025-01-15 VITALS
OXYGEN SATURATION: 98 % | SYSTOLIC BLOOD PRESSURE: 98 MMHG | RESPIRATION RATE: 20 BRPM | HEIGHT: 71 IN | TEMPERATURE: 98.9 F | DIASTOLIC BLOOD PRESSURE: 62 MMHG | WEIGHT: 151.8 LBS | HEART RATE: 71 BPM | BODY MASS INDEX: 21.25 KG/M2

## 2025-01-15 DIAGNOSIS — R61 NIGHT SWEATS: ICD-10-CM

## 2025-01-15 DIAGNOSIS — B00.9 HSV (HERPES SIMPLEX VIRUS) INFECTION: ICD-10-CM

## 2025-01-15 DIAGNOSIS — G43.839 INTRACTABLE MENSTRUAL MIGRAINE WITHOUT STATUS MIGRAINOSUS: ICD-10-CM

## 2025-01-15 DIAGNOSIS — G43.809 OTHER MIGRAINE WITHOUT STATUS MIGRAINOSUS, NOT INTRACTABLE: ICD-10-CM

## 2025-01-15 DIAGNOSIS — G25.81 RESTLESS LEGS SYNDROME (RLS): ICD-10-CM

## 2025-01-15 DIAGNOSIS — G43.909 MIGRAINE WITHOUT STATUS MIGRAINOSUS, NOT INTRACTABLE, UNSPECIFIED MIGRAINE TYPE: ICD-10-CM

## 2025-01-15 DIAGNOSIS — Z00.00 ROUTINE GENERAL MEDICAL EXAMINATION AT A HEALTH CARE FACILITY: Primary | ICD-10-CM

## 2025-01-15 DIAGNOSIS — E78.5 HYPERLIPIDEMIA LDL GOAL <160: ICD-10-CM

## 2025-01-15 DIAGNOSIS — R10.2 PELVIC PAIN IN FEMALE: ICD-10-CM

## 2025-01-15 PROCEDURE — 99396 PREV VISIT EST AGE 40-64: CPT | Mod: 25 | Performed by: FAMILY MEDICINE

## 2025-01-15 PROCEDURE — 90677 PCV20 VACCINE IM: CPT | Performed by: FAMILY MEDICINE

## 2025-01-15 PROCEDURE — 99214 OFFICE O/P EST MOD 30 MIN: CPT | Mod: 25 | Performed by: FAMILY MEDICINE

## 2025-01-15 PROCEDURE — 90471 IMMUNIZATION ADMIN: CPT | Performed by: FAMILY MEDICINE

## 2025-01-15 RX ORDER — HYDROXYZINE HYDROCHLORIDE 25 MG/1
25-50 TABLET, FILM COATED ORAL
Qty: 60 TABLET | Refills: 5 | Status: SHIPPED | OUTPATIENT
Start: 2025-01-15

## 2025-01-15 RX ORDER — DIAZEPAM 10 MG/1
5-10 TABLET ORAL
Qty: 30 TABLET | Refills: 1 | Status: SHIPPED | OUTPATIENT
Start: 2025-01-15

## 2025-01-15 RX ORDER — GABAPENTIN 300 MG/1
300 CAPSULE ORAL AT BEDTIME
Qty: 90 CAPSULE | Refills: 3 | Status: SHIPPED | OUTPATIENT
Start: 2025-01-15

## 2025-01-15 RX ORDER — SUMATRIPTAN 50 MG/1
TABLET, FILM COATED ORAL
Qty: 36 TABLET | Refills: 4 | Status: SHIPPED | OUTPATIENT
Start: 2025-01-15

## 2025-01-15 RX ORDER — ONDANSETRON 4 MG/1
4-8 TABLET, ORALLY DISINTEGRATING ORAL EVERY 8 HOURS PRN
Qty: 20 TABLET | Refills: 3 | Status: SHIPPED | OUTPATIENT
Start: 2025-01-15

## 2025-01-15 RX ORDER — TRAMADOL HYDROCHLORIDE 50 MG/1
50-100 TABLET ORAL EVERY 6 HOURS PRN
Qty: 40 TABLET | Refills: 0 | Status: SHIPPED | OUTPATIENT
Start: 2025-01-15

## 2025-01-15 SDOH — HEALTH STABILITY: PHYSICAL HEALTH: ON AVERAGE, HOW MANY MINUTES DO YOU ENGAGE IN EXERCISE AT THIS LEVEL?: 30 MIN

## 2025-01-15 SDOH — HEALTH STABILITY: PHYSICAL HEALTH: ON AVERAGE, HOW MANY DAYS PER WEEK DO YOU ENGAGE IN MODERATE TO STRENUOUS EXERCISE (LIKE A BRISK WALK)?: 1 DAY

## 2025-01-15 ASSESSMENT — SOCIAL DETERMINANTS OF HEALTH (SDOH): HOW OFTEN DO YOU GET TOGETHER WITH FRIENDS OR RELATIVES?: ONCE A WEEK

## 2025-01-15 ASSESSMENT — PAIN SCALES - GENERAL: PAINLEVEL_OUTOF10: MILD PAIN (2)

## 2025-01-15 NOTE — PROGRESS NOTES
Preventive Care Visit  [facility]  Su Loya MD, Family Medicine  Alexis 15, 2025      Assessment & Plan     Routine general medical examination at a health care facility       Pelvic pain in female  Uses very infrequently  - diazepam (VALIUM) 10 MG tablet; Take 0.5-1 tablets (5-10 mg) by mouth nightly as needed for muscle spasms or sleep.    Night sweats  The gabapentin does help with the night sweats.   Last period just over a year ago  - gabapentin (NEURONTIN) 300 MG capsule; Take 1 capsule (300 mg) by mouth at bedtime.    Migraine without status migrainosus, not intractable, unspecified migraine type   Still getting botox injections every 10 wekes  - SUMAtriptan (IMITREX) 50 MG tablet; TAKE 1-2  TABLET BY MOUTH AT ONSET OF HEADACHE FOR MIGRAINE MAY REPEAT DOSE IN 2 HOURS.  DO NOT EXCEED 200 MG IN 24 HOURS    Intractable menstrual migraine without status migrainosus  Continue botox  Tramadol use is very infrequent- last filled >1 year ago  - traMADol (ULTRAM) 50 MG tablet; Take 1-2 tablets ( mg) by mouth every 6 hours as needed for pain.    Other migraine without status migrainosus, not intractable     - hydrOXYzine HCl (ATARAX) 25 MG tablet; Take 1-2 tablets (25-50 mg) by mouth nightly as needed for other or itching (insomnia).  - ondansetron (ZOFRAN ODT) 4 MG ODT tab; Take 1-2 tablets (4-8 mg) by mouth every 8 hours as needed for nausea.    HSV (herpes simplex virus) infection  Uses one valtrex about once a week.     Hyperlipidemia LDL goal <160  Needs to have fasting lipids done, will schedule this  - Lipid panel reflex to direct LDL Fasting; Future    Restless legs syndrome (RLS)  H/o very low ferritin  Will have this rechecked  - Ferritin; Future    Patient has been advised of split billing requirements and indicates understanding: Yes        Counseling  Appropriate preventive services were addressed with this patient via screening, questionnaire, or discussion as  appropriate for fall prevention, nutrition, physical activity, Tobacco-use cessation, social engagement, weight loss and cognition.  Checklist reviewing preventive services available has been given to the patient.  Reviewed patient's diet, addressing concerns and/or questions.   She is at risk for lack of exercise and has been provided with information to increase physical activity for the benefit of her well-being.   She is at risk for psychosocial distress and has been provided with information to reduce risk.           Danya Lopez is a 54 year old, presenting for the following:  Physical, Migraine Mgmt (Renew meds/ needs Imitrex  sent to [facility] per patient ), Refill Request (Gabapentin ), and Imm/Inj (Prenar 20)        1/15/2025     3:05 PM   Additional Questions   Roomed by Florin AHUMADA CMA   Accompanied by self          HPI       Migraine   Since your last clinic visit, how have your headaches changed?  No change  How often are you getting headaches or migraines? 7-8 per month    Are you able to do normal daily activities when you have a migraine? Yes  Are you taking rescue/relief medications? (Select all that apply) sumatriptan (Imitrex)  How helpful is your rescue/relief medication?  I get some relief  Are you taking any medications to prevent migraines? (Select all that apply)  No  In the past 4 weeks, how often have you gone to urgent care or the emergency room because of your headaches?  0  Medication Followup of Gabapentin   Taking Medication as prescribed: yes  Side Effects:  None  Medication Helping Symptoms:  yes    Health Care Directive  Patient does not have a Health Care Directive:       1/15/2025   General Health   How would you rate your overall physical health? Good   Feel stress (tense, anxious, or unable to sleep) To some extent   (!) STRESS CONCERN      1/15/2025   Nutrition   Three or more servings of calcium each day? Yes   Diet: Vegetarian/vegan   How many servings of  fruit and vegetables per day? (!) 2-3   How many sweetened beverages each day? 0-1         1/15/2025   Exercise   Days per week of moderate/strenous exercise 1 day   Average minutes spent exercising at this level 30 min   (!) EXERCISE CONCERN      1/15/2025   Social Factors   Frequency of gathering with friends or relatives Once a week   Worry food won't last until get money to buy more No   Food not last or not have enough money for food? No   Do you have housing? (Housing is defined as stable permanent housing and does not include staying ouside in a car, in a tent, in an abandoned building, in an overnight shelter, or couch-surfing.) Yes   Are you worried about losing your housing? No   Lack of transportation? No   Unable to get utilities (heat,electricity)? No         1/15/2025   Fall Risk   Fallen 2 or more times in the past year? No   Trouble with walking or balance? No          1/15/2025   Dental   Dentist two times every year? Yes         1/15/2025   TB Screening   Were you born outside of the [place]? No         Today's PHQ-2 Score:       1/15/2025     2:09 PM   PHQ-2 ( 1999 [employer])   Q1: Little interest or pleasure in doing things 0   Q2: Feeling down, depressed or hopeless 0   PHQ-2 Score 0    Q1: Little interest or pleasure in doing things Not at all   Q2: Feeling down, depressed or hopeless Not at all   PHQ-2 Score 0       Patient-reported           1/15/2025   Substance Use   Alcohol more than 3/day or more than 7/wk Not Applicable   Do you use any other substances recreationally? No     Social History     Tobacco Use    Smoking status: Never    Smokeless tobacco: Never   Vaping Use    Vaping status: Never Used   Substance Use Topics    Alcohol use: No    Drug use: No           11/12/2024   LAST FHS-7 RESULTS   1st degree relative breast or ovarian cancer Yes   Any relative bilateral breast cancer No   Any male have breast cancer No   Any ONE woman have BOTH breast AND ovarian cancer No   Any woman with  "breast cancer before 50yrs Yes   2 or more relatives with breast AND/OR ovarian cancer Yes   2 or more relatives with breast AND/OR bowel cancer Yes        Mammogram Screening - Mammogram every 1-2 years updated in Health Maintenance based on mutual decision making        1/15/2025   STI Screening   New sexual partner(s) since last STI/HIV test? No     History of abnormal Pap smear: No - age 21 - 65 - Patient with other risk factor requiring more frequent screening - see link Cervical Cytology Screening Guidelines        Latest Ref Rng & Units 1/15/2024     3:23 PM 1/26/2021     3:19 PM 1/26/2021     2:00 PM   PAP / HPV   PAP  Negative for Intraepithelial Lesion or Malignancy (NILM)      PAP (Historical)   NIL     HPV 16 DNA Negative Negative   Negative    HPV 18 DNA Negative Negative   Negative    Other HR HPV Negative Negative   Negative      ASCVD Risk   The 10-year ASCVD risk score (Kriss JOHNSON, et al., 2019) is: 1.3%    Values used to calculate the score:      Age: 54 years      Sex: Female      Is Non- : No      Diabetic: No      Tobacco smoker: No      Systolic Blood Pressure: 98 mmHg      Is BP treated: No      HDL Cholesterol: 71 mg/dL      Total Cholesterol: 275 mg/dL           Reviewed and updated as needed this visit by Provider                          Review of Systems  Constitutional, HEENT, cardiovascular, pulmonary, gi and gu systems are negative, except as otherwise noted.     Objective    Exam  BP 98/62 (BP Location: Right arm, Patient Position: Sitting, Cuff Size: Adult Regular)   Pulse 71   Temp 98.9  F (37.2  C) (Tympanic)   Resp 20   Ht 1.797 m (5' 10.75\")   Wt 68.9 kg (151 lb 12.8 oz)   LMP 10/18/2023 (Approximate)   SpO2 98%   BMI 21.32 kg/m     Estimated body mass index is 21.32 kg/m  as calculated from the following:    Height as of this encounter: 1.797 m (5' 10.75\").    Weight as of this encounter: 68.9 kg (151 lb 12.8 oz).    Physical " Exam  GENERAL: alert and no distress  NECK: no adenopathy, no asymmetry, masses, or scars  RESP: lungs clear to auscultation - no rales, rhonchi or wheezes  BREAST: normal without masses, tenderness or nipple discharge and no palpable axillary masses or adenopathy  CV: regular rate and rhythm, normal S1 S2, no S3 or S4, no murmur, click or rub, no peripheral edema  ABDOMEN: soft, nontender, no hepatosplenomegaly, no masses and bowel sounds normal  MS: no gross musculoskeletal defects noted, no edema  NEURO: Normal strength and tone, mentation intact and speech normal  PSYCH: mentation appears normal, affect normal/bright        Signed Electronically by: Su Loya MD

## 2025-01-15 NOTE — NURSING NOTE
Prior to immunization administration, verified patients identity using patient s name and date of birth. Please see Immunization Activity for additional information.     Screening Questionnaire for Adult Immunization    Are you sick today?   No   Do you have allergies to medications, food, a vaccine component or latex?   Yes   Have you ever had a serious reaction after receiving a vaccination?   No   Do you have a long-term health problem with heart, lung, kidney, or metabolic disease (e.g., diabetes), asthma, a blood disorder, no spleen, complement component deficiency, a cochlear implant, or a spinal fluid leak?  Are you on long-term aspirin therapy?   No   Do you have cancer, leukemia, HIV/AIDS, or any other immune system problem?   No   Do you have a parent, brother, or sister with an immune system problem?   No   In the past 3 months, have you taken medications that affect  your immune system, such as prednisone, other steroids, or anticancer drugs; drugs for the treatment of rheumatoid arthritis, Crohn s disease, or psoriasis; or have you had radiation treatments?   No   Have you had a seizure, or a brain or other nervous system problem?   No   During the past year, have you received a transfusion of blood or blood    products, or been given immune (gamma) globulin or antiviral drug?   No   For women: Are you pregnant or is there a chance you could become       pregnant during the next month?   No   Have you received any vaccinations in the past 4 weeks?   No     Immunization questionnaire was positive for at least one answer.  Aok per guidlines.      Patient instructed to remain in clinic for 15 minutes afterwards, and to report any adverse reactions.     Screening performed by Florin Douglass CMA on 1/15/2025 at 3:38 PM.

## 2025-01-22 ENCOUNTER — TRANSFERRED RECORDS (OUTPATIENT)
Dept: HEALTH INFORMATION MANAGEMENT | Facility: CLINIC | Age: 55
End: 2025-01-22

## 2025-01-23 ENCOUNTER — TELEPHONE (OUTPATIENT)
Dept: FAMILY MEDICINE | Facility: CLINIC | Age: 55
End: 2025-01-23
Payer: COMMERCIAL

## 2025-01-23 NOTE — TELEPHONE ENCOUNTER
Care team - please call pharmacy.      Patient has used both medications (Tramadol and diazepam)  for many years and uses them both very infrequently and not together.     The full #40 Tramadol prescription should be filled, please.     No Narcan is necessary.    Su Loya M.D.

## 2025-01-23 NOTE — TELEPHONE ENCOUNTER
Spoke with pharmacist.    Unable to fill the full amount due to insurance.    Pharmacist states this may be due to the patient has not filled Tramadol in 1 year according to the [place] .     Gregory NESS RN  [facility]

## 2025-01-23 NOTE — TELEPHONE ENCOUNTER
Pharmacy calling to make Dr Loya aware of Tramadol issue. There records flag Jessica as being opioid naive and as a result, insurance  will not approve more than 5 tablets per day of Tramadol. And no more than 7 days worth to begin with so max of 35.     Also on diazepam which flags a potential drug interaction recommending  a narcan RX as needed.   Care Team to call pharmacy after Dr Loya reviews message and approves of the prescriptions.   Estefani HELM RN

## 2025-01-23 NOTE — TELEPHONE ENCOUNTER
Pt calling back.  She will talk to the pharmacy and let us know if she needs any else.    Michelle Kebede on 1/23/2025 at 4:27 PM

## 2025-01-29 ENCOUNTER — OFFICE VISIT (OUTPATIENT)
Dept: PHYSICAL MEDICINE AND REHAB | Facility: CLINIC | Age: 55
End: 2025-01-29
Payer: COMMERCIAL

## 2025-01-29 DIAGNOSIS — G43.719 CHRONIC MIGRAINE WITHOUT AURA, INTRACTABLE, WITHOUT STATUS MIGRAINOSUS: Primary | ICD-10-CM

## 2025-01-29 PROCEDURE — 95874 GUIDE NERV DESTR NEEDLE EMG: CPT | Performed by: PHYSICAL MEDICINE & REHABILITATION

## 2025-01-29 PROCEDURE — 64615 CHEMODENERV MUSC MIGRAINE: CPT | Performed by: PHYSICAL MEDICINE & REHABILITATION

## 2025-01-29 RX ORDER — BUPIVACAINE HYDROCHLORIDE 5 MG/ML
4 INJECTION, SOLUTION EPIDURAL; INTRACAUDAL ONCE
Status: COMPLETED | OUTPATIENT
Start: 2025-01-29 | End: 2025-01-29

## 2025-01-29 RX ADMIN — BUPIVACAINE HYDROCHLORIDE 20 MG: 5 INJECTION, SOLUTION EPIDURAL; INTRACAUDAL at 15:33

## 2025-01-29 NOTE — PROGRESS NOTES
[facility]     PM&R CLINIC NOTE  BOTULINUM TOXIN PROCEDURE      HPI  No chief complaint on file.    Jessica Manriquez is a 54 year old female with a history of intractable migraine headaches who presents to clinic for botulinum toxin injections for management of chronic migraine headaches.     SINCE LAST VISIT  Jessica Manriquez was last seen here in clinic on 11/13/2024, at which time she received 200 units of Botox. She is here today for repeat Botox injections for management of her chronic migraine headaches.     Patient denies new medical diagnoses, illnesses, hospitalizations, emergency room visits, and injuries since the previous injection with botulinum neurotoxin.     RESPONSE TO PREVIOUS TREATMENT  Side effects: None.   Post-procedural headache: No.     1.  Headache frequency during this injection cycle: She reports about 5-8 migraine headache days per month, with a slight increase over the last few weeks as the Botox has been wearing off. This is compared to her baseline headache frequency of >15 headache days per month.      2.  Headache duration during this injection cycle:  Headache duration was 8 hours on average. Patient reports probably one episode of multiple day headaches during this injection cycle, but this does not happen very often.     3.  Headache intensity during this injection cycle:    A.  4/10  =  Typical pain level.  B.  9/10  =  Worst pain level.  C.  0/10  =  Lowest pain level.    4.  Change in headache medication usage during this injection cycle: Has tried several preventative medications and is currently on gabapentin 300 mg at nighttime, which has not made a difference in her migraines but did help with her night sweats. For rescue medication she is on combination of Imitrex, Excedrin and tramadol.     5.  ER Visits During This Injection Cycle: None.     6.  Functional Performance:  Change in ADL's, social interaction, days lost from work, etc.  Patient reports being able to more fully participate in social and family activities and responsibilities as headache symptoms have improved she works from home so generally is able to modify her schedule and rest to avoid worsening headaches when she has a migraine.  She has been overall a lot more functional since she was started on the injections.      PHYSICAL EXAM  VS: LMP 10/18/2023 (Approximate)    GEN: Pleasant and cooperative, in no acute distress  HEENT: No facial asymmetry, no significant head rotation or sidebending, tight paravertebral muscles, levator, trapezius muscles bilaterally     ALLERGIES  Allergies   Allergen Reactions    Darvocet [Acetaminophen] Nausea and Vomiting    Erythromycin GI Disturbance    Flexeril [Cyclobenzaprine Hcl]      Lightheadedness, dizzy      Serotonin Reuptake Inhibitors (Ssris) Anxiety, Other (See Comments) and Palpitations       CURRENT MEDICATIONS    Current Outpatient Medications:     aspirin-acetaminophen-caffeine (EXCEDRIN MIGRAINE) 250-250-65 MG per tablet, Take 1 tablet by mouth every 6 hours as needed for pain., Disp: 30 tablet, Rfl: 0    diazepam (VALIUM) 10 MG tablet, Take 0.5-1 tablets (5-10 mg) by mouth nightly as needed for muscle spasms or sleep., Disp: 30 tablet, Rfl: 1    ferrous sulfate (FEROSUL) 325 (65 Fe) MG tablet, Take 325 mg by mouth daily (with breakfast), Disp: , Rfl:     gabapentin (NEURONTIN) 300 MG capsule, Take 1 capsule (300 mg) by mouth at bedtime., Disp: 90 capsule, Rfl: 3    hydrOXYzine HCl (ATARAX) 25 MG tablet, Take 1-2 tablets (25-50 mg) by mouth nightly as needed for other or itching (insomnia)., Disp: 60 tablet, Rfl: 5    Lactobacillus (ACIDOPHILUS) 100 MG CAPS, Take 1 capsule by mouth daily, Disp: , Rfl:     Loperamide HCl (IMODIUM A-D PO), Take 1 tablet by mouth once as needed, Disp: , Rfl:     MULTI-VITAMIN OR TABS, 1 tablet by mouth DAILY, Disp: , Rfl:     OnabotulinumtoxinA (BOTOX IJ), Inject 200 Units as directed Total dose 200  units  Dose Administered:  175 units  Botox (Botulinum Toxin Type A)       2:1 Dilution (55 units unavoidable waste) Unavoidable waste 25 units  Diluent Used:  Preservative Free Normal Saline Total Volume of Diluent Used:  2.9 ml Lot # /C3 with Expiration Date:  2021 NDC #: Botox 100u ([address]-92), Disp: , Rfl:     ondansetron (ZOFRAN ODT) 4 MG ODT tab, Take 1-2 tablets (4-8 mg) by mouth every 8 hours as needed for nausea., Disp: 20 tablet, Rfl: 3    SUMAtriptan (IMITREX) 50 MG tablet, TAKE 1-2  TABLET BY MOUTH AT ONSET OF HEADACHE FOR MIGRAINE MAY REPEAT DOSE IN 2 HOURS.  DO NOT EXCEED 200 MG IN 24 HOURS, Disp: 36 tablet, Rfl: 4    traMADol (ULTRAM) 50 MG tablet, Take 1-2 tablets ( mg) by mouth every 6 hours as needed for pain. Max 5 pills/day, Disp: 40 tablet, Rfl: 0    valACYclovir (VALTREX) 500 MG tablet, Take 1 tablet (500 mg) by mouth daily (Patient taking differently: Take 500 mg by mouth daily. Patient takes one tablet weekly), Disp: 30 tablet, Rfl: 3    vitamin (B COMPLEX-C) tablet, Take 1 tablet by mouth daily, Disp: , Rfl:     VITAMIN C 500 MG OR TABS, 1 tablet daily, Disp: , Rfl:     VITAMIN D, CHOLECALCIFEROL, PO, Take 1,000 Units by mouth daily, Disp: , Rfl:     Vitamin Mixture (VITAMIN E COMPLETE PO), , Disp: , Rfl:     Current Facility-Administered Medications:     botulinum toxin type A (BOTOX) 100 units injection 200 Units, 200 Units, Intramuscular, Q10 Weeks, Standal, Aretha Hahn MD, 200 Units at 24 1413    bupivacaine (MARCAINE) 0.5% preservative free injection, 4 mL, Intradermal, Once,        BOTULINUM NEUROTOXIN INJECTION PROCEDURES    VERIFICATION OF PATIENT IDENTIFICATION AND PROCEDURE     Initials   Patient Name SES   Patient  SES   Procedure Verified by: SES     Prior to the start of the procedure and with procedural staff participation, I verbally confirmed the patient s identity using two indicators, relevant allergies, that the procedure was appropriate and  matched the consent or emergent situation, and that the correct equipment/implants were available. Immediately prior to starting the procedure I conducted the Time Out with the procedural staff and re-confirmed the patient s name, procedure, and site/side. (The Joint Commission universal protocol was followed.)  Yes    Sedation (Moderate or Deep): None    ABOVE ASSESSMENTS PERFORMED BY    Aretha Pichardo MD       INDICATIONS FOR PROCEDURES  Jessica Manriquez is a 54 year old patient with intractable pain secondary to the diagnosis of chronic migraine headaches. Her baseline symptoms have been recalcitrant to oral medications and conservative therapy.  She is here today for reinjection with Botox.    GOAL OF PROCEDURE  The goal of this procedure is to decrease pain.      TOTAL DOSE ADMINISTERED  Dose Administered:  200 units  Botox (Botulinum Toxin Type A)       2:1 Dilution   Unavoidable Drug Waste: No  Diluent Used:  0.5% bupivacaine  Total Volume of Diluent Used:  4 ml  NDC #: Botox 100u ([address]-77)      CONSENT  The risks, benefits, and treatment options were discussed with Jessica Manriquez and she agreed to proceed.    Written consent was obtained by [facility].     EQUIPMENT USED  Needle-25mm stimulating/recording  Needle-30 gauge  EMG/NCS Machine    SKIN PREPARATION  Skin preparation was performed using an alcohol wipe.    GUIDANCE DESCRIPTION  Electro-myographic guidance was necessary throughout the neck portion of the procedure to accurately identify all areas of spastic muscles while avoiding injection of non-spastic muscles, neighboring nerves and nearby vascular structures.       AREA/MUSCLE INJECTED:  200 UNITS BOTOX = TOTAL DOSE, 2:1 DILUTION    1. SHOULDER GIRDLE & NECK MUSCLES: 65 UNITS BOTOX = TOTAL DOSE                               Right Upper Trapezius 15 units of Botox at 3 sites               Left Upper Trapezius 15 units of Botox at 3 sites     Right Splenius  - 5 units of Botox at 1  site/s  Left Splenius - 5 units of Botox at 1 site/s     Right Semispinalis  - 5 units of Botox at 1 site/s  Left Semispinalis - 5 units of Botox at 1 site/s    Left Anterior Scalenes - 5 units of Botox at 1 site/s.                  Right Levator Scapula - 5 units of Botox at 1 site/s.     Left Levator Scapula -  5 units of Botox at 1 site/s.     2. FACIAL, JAW & SCALP MUSCLES: 135 UNITS BOTOX = TOTAL DOSE     Right Masseter - 5 units of Botox at 1 site/s.  Left Masseter - 5 units of Botox at 1 site/s.     Right Occipitalis (upper and lower) - 20 units of Botox at 3 site/s.  Left Occipitalis (upper and lower) - 20 units of Botox at 3 site/s.    Right Frontalis - 10 units of Botox at 2 site/s  Left Frontalis - 10 units of Botox at 2 site/s     Right Temporalis (upper and lower)  - 25 units of Botox at 5 site/s.  Left Temporalis (upper and lower) - 25 units of Botox at 5 site/s.    Right  - 5 units of Botox at 1 site/s.  Left  - 5 units of Botox at 1 site/s.     Procerus - 5 units of Botox at 1 site/s.      RESPONSE TO PROCEDURE  Jessica Manriquez tolerated the procedure well and there were no immediate complications. She was allowed to recover for an appropriate period of time and was discharged home in stable condition.    ASSESSMENT AND PLAN   Botulinum toxin injections: No changes made to Botox dose or distribution today. Patient will continue to monitor response and report at next appointment.   Referrals: None.   Medications: No changes.   Follow up: Jessica Manriquez was rescheduled for the next series of injections in 12 weeks, at which time we will evaluate response to today's injections. she may call the clinic prior with any questions or concerns prior to the next appointment.       Aretha Pichardo MD

## 2025-01-29 NOTE — LETTER
1/29/2025       RE: Jessica Manriquez  [address]  [facility] [address]     Dear Colleague,    Thank you for referring your patient, Jessica Manriquez, to the [facility] PHYS[facility]ICINE AND REHABILITATION CLINIC [place] [facility]. Please see a copy of my visit note below.      [facility]     PM&R CLINIC NOTE  BOTULINUM TOXIN PROCEDURE      HPI  No chief complaint on file.    Jessica Manriquez is a 54 year old female with a history of intractable migraine headaches who presents to clinic for botulinum toxin injections for management of chronic migraine headaches.     SINCE LAST VISIT  Jessica Manriquez was last seen here in clinic on 11/13/2024, at which time she received 200 units of Botox. She is here today for repeat Botox injections for management of her chronic migraine headaches.     Patient denies new medical diagnoses, illnesses, hospitalizations, emergency room visits, and injuries since the previous injection with botulinum neurotoxin.     RESPONSE TO PREVIOUS TREATMENT  Side effects: None.   Post-procedural headache: No.     1.  Headache frequency during this injection cycle: She reports about 5-8 migraine headache days per month, with a slight increase over the last few weeks as the Botox has been wearing off. This is compared to her baseline headache frequency of >15 headache days per month.      2.  Headache duration during this injection cycle:  Headache duration was 8 hours on average. Patient reports probably one episode of multiple day headaches during this injection cycle, but this does not happen very often.     3.  Headache intensity during this injection cycle:    A.  4/10  =  Typical pain level.  B.  9/10  =  Worst pain level.  C.  0/10  =  Lowest pain level.    4.  Change in headache medication usage during this injection cycle: Has tried several preventative medications and is currently  on gabapentin 300 mg at nighttime, which has not made a difference in her migraines but did help with her night sweats. For rescue medication she is on combination of Imitrex, Excedrin and tramadol.     5.  ER Visits During This Injection Cycle: None.     6.  Functional Performance:  Change in ADL's, social interaction, days lost from work, etc. Patient reports being able to more fully participate in social and family activities and responsibilities as headache symptoms have improved she works from home so generally is able to modify her schedule and rest to avoid worsening headaches when she has a migraine.  She has been overall a lot more functional since she was started on the injections.      PHYSICAL EXAM  VS: LMP 10/18/2023 (Approximate)    GEN: Pleasant and cooperative, in no acute distress  HEENT: No facial asymmetry, no significant head rotation or sidebending, tight paravertebral muscles, levator, trapezius muscles bilaterally     ALLERGIES  Allergies   Allergen Reactions     Darvocet [Acetaminophen] Nausea and Vomiting     Erythromycin GI Disturbance     Flexeril [Cyclobenzaprine Hcl]      Lightheadedness, dizzy       Serotonin Reuptake Inhibitors (Ssris) Anxiety, Other (See Comments) and Palpitations       CURRENT MEDICATIONS    Current Outpatient Medications:      aspirin-acetaminophen-caffeine (EXCEDRIN MIGRAINE) 250-250-65 MG per tablet, Take 1 tablet by mouth every 6 hours as needed for pain., Disp: 30 tablet, Rfl: 0     diazepam (VALIUM) 10 MG tablet, Take 0.5-1 tablets (5-10 mg) by mouth nightly as needed for muscle spasms or sleep., Disp: 30 tablet, Rfl: 1     ferrous sulfate (FEROSUL) 325 (65 Fe) MG tablet, Take 325 mg by mouth daily (with breakfast), Disp: , Rfl:      gabapentin (NEURONTIN) 300 MG capsule, Take 1 capsule (300 mg) by mouth at bedtime., Disp: 90 capsule, Rfl: 3     hydrOXYzine HCl (ATARAX) 25 MG tablet, Take 1-2 tablets (25-50 mg) by mouth nightly as needed for other or itching  (insomnia)., Disp: 60 tablet, Rfl: 5     Lactobacillus (ACIDOPHILUS) 100 MG CAPS, Take 1 capsule by mouth daily, Disp: , Rfl:      Loperamide HCl (IMODIUM A-D PO), Take 1 tablet by mouth once as needed, Disp: , Rfl:      MULTI-VITAMIN OR TABS, 1 tablet by mouth DAILY, Disp: , Rfl:      OnabotulinumtoxinA (BOTOX IJ), Inject 200 Units as directed Total dose 200 units  Dose Administered:  175 units  Botox (Botulinum Toxin Type A)       2:1 Dilution (55 units unavoidable waste) Unavoidable waste 25 units  Diluent Used:  Preservative Free Normal Saline Total Volume of Diluent Used:  2.9 ml Lot # /C3 with Expiration Date:  03/2021 NDC #: Botox 100u ([address]-02), Disp: , Rfl:      ondansetron (ZOFRAN ODT) 4 MG ODT tab, Take 1-2 tablets (4-8 mg) by mouth every 8 hours as needed for nausea., Disp: 20 tablet, Rfl: 3     SUMAtriptan (IMITREX) 50 MG tablet, TAKE 1-2  TABLET BY MOUTH AT ONSET OF HEADACHE FOR MIGRAINE MAY REPEAT DOSE IN 2 HOURS.  DO NOT EXCEED 200 MG IN 24 HOURS, Disp: 36 tablet, Rfl: 4     traMADol (ULTRAM) 50 MG tablet, Take 1-2 tablets ( mg) by mouth every 6 hours as needed for pain. Max 5 pills/day, Disp: 40 tablet, Rfl: 0     valACYclovir (VALTREX) 500 MG tablet, Take 1 tablet (500 mg) by mouth daily (Patient taking differently: Take 500 mg by mouth daily. Patient takes one tablet weekly), Disp: 30 tablet, Rfl: 3     vitamin (B COMPLEX-C) tablet, Take 1 tablet by mouth daily, Disp: , Rfl:      VITAMIN C 500 MG OR TABS, 1 tablet daily, Disp: , Rfl:      VITAMIN D, CHOLECALCIFEROL, PO, Take 1,000 Units by mouth daily, Disp: , Rfl:      Vitamin Mixture (VITAMIN E COMPLETE PO), , Disp: , Rfl:     Current Facility-Administered Medications:      botulinum toxin type A (BOTOX) 100 units injection 200 Units, 200 Units, Intramuscular, Q10 Weeks, Standal, Aretha Hahn MD, 200 Units at 11/13/24 1413     bupivacaine (MARCAINE) 0.5% preservative free injection, 4 mL, Intradermal, Once,        BOTULINUM  NEUROTOXIN INJECTION PROCEDURES    VERIFICATION OF PATIENT IDENTIFICATION AND PROCEDURE     Initials   Patient Name SES   Patient  SES   Procedure Verified by: BEATA     Prior to the start of the procedure and with procedural staff participation, I verbally confirmed the patient s identity using two indicators, relevant allergies, that the procedure was appropriate and matched the consent or emergent situation, and that the correct equipment/implants were available. Immediately prior to starting the procedure I conducted the Time Out with the procedural staff and re-confirmed the patient s name, procedure, and site/side. (The Joint Commission universal protocol was followed.)  Yes    Sedation (Moderate or Deep): None    ABOVE ASSESSMENTS PERFORMED BY    Aretha Pichardo MD       INDICATIONS FOR PROCEDURES  Jessica Manriquez is a 54 year old patient with intractable pain secondary to the diagnosis of chronic migraine headaches. Her baseline symptoms have been recalcitrant to oral medications and conservative therapy.  She is here today for reinjection with Botox.    GOAL OF PROCEDURE  The goal of this procedure is to decrease pain.      TOTAL DOSE ADMINISTERED  Dose Administered:  200 units  Botox (Botulinum Toxin Type A)       2:1 Dilution   Unavoidable Drug Waste: No  Diluent Used:  0.5% bupivacaine  Total Volume of Diluent Used:  4 ml  NDC #: Botox 100u ([address]-66)      CONSENT  The risks, benefits, and treatment options were discussed with Jessica Manriquez and she agreed to proceed.    Written consent was obtained by [facility].     EQUIPMENT USED  Needle-25mm stimulating/recording  Needle-30 gauge  EMG/NCS Machine    SKIN PREPARATION  Skin preparation was performed using an alcohol wipe.    GUIDANCE DESCRIPTION  Electro-myographic guidance was necessary throughout the neck portion of the procedure to accurately identify all areas of spastic muscles while avoiding injection of non-spastic muscles, neighboring  nerves and nearby vascular structures.       AREA/MUSCLE INJECTED:  200 UNITS BOTOX = TOTAL DOSE, 2:1 DILUTION    1. SHOULDER GIRDLE & NECK MUSCLES: 65 UNITS BOTOX = TOTAL DOSE                               Right Upper Trapezius 15 units of Botox at 3 sites               Left Upper Trapezius 15 units of Botox at 3 sites     Right Splenius  - 5 units of Botox at 1 site/s  Left Splenius - 5 units of Botox at 1 site/s     Right Semispinalis  - 5 units of Botox at 1 site/s  Left Semispinalis - 5 units of Botox at 1 site/s    Left Anterior Scalenes - 5 units of Botox at 1 site/s.                  Right Levator Scapula - 5 units of Botox at 1 site/s.     Left Levator Scapula -  5 units of Botox at 1 site/s.     2. FACIAL, JAW & SCALP MUSCLES: 135 UNITS BOTOX = TOTAL DOSE     Right Masseter - 5 units of Botox at 1 site/s.  Left Masseter - 5 units of Botox at 1 site/s.     Right Occipitalis (upper and lower) - 20 units of Botox at 3 site/s.  Left Occipitalis (upper and lower) - 20 units of Botox at 3 site/s.    Right Frontalis - 10 units of Botox at 2 site/s  Left Frontalis - 10 units of Botox at 2 site/s     Right Temporalis (upper and lower)  - 25 units of Botox at 5 site/s.  Left Temporalis (upper and lower) - 25 units of Botox at 5 site/s.    Right  - 5 units of Botox at 1 site/s.  Left  - 5 units of Botox at 1 site/s.     Procerus - 5 units of Botox at 1 site/s.      RESPONSE TO PROCEDURE  Jessica Manriquez tolerated the procedure well and there were no immediate complications. She was allowed to recover for an appropriate period of time and was discharged home in stable condition.    ASSESSMENT AND PLAN   Botulinum toxin injections: No changes made to Botox dose or distribution today. Patient will continue to monitor response and report at next appointment.   Referrals: None.   Medications: No changes.   Follow up: Jessica Manriquez was rescheduled for the next series of injections in 12 weeks, at  which time we will evaluate response to today's injections. she may call the clinic prior with any questions or concerns prior to the next appointment.       Aretah Pichardo MD        Again, thank you for allowing me to participate in the care of your patient.      Sincerely,    Aretha Pichardo MD

## 2025-03-03 ENCOUNTER — HOSPITAL ENCOUNTER (EMERGENCY)
Facility: CLINIC | Age: 55
Discharge: HOME | End: 2025-03-03
Attending: PHYSICIAN ASSISTANT
Payer: COMMERCIAL

## 2025-03-03 VITALS
HEART RATE: 105 BPM | OXYGEN SATURATION: 99 % | SYSTOLIC BLOOD PRESSURE: 128 MMHG | WEIGHT: 154.2 LBS | DIASTOLIC BLOOD PRESSURE: 85 MMHG | RESPIRATION RATE: 16 BRPM | BODY MASS INDEX: 21.66 KG/M2 | TEMPERATURE: 97.4 F
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
## 2025-03-03 DIAGNOSIS — Z20.3 NEED FOR POST EXPOSURE PROPHYLAXIS FOR RABIES: ICD-10-CM

## 2025-03-03 PROCEDURE — 90715 TDAP VACCINE 7 YRS/> IM: CPT | Performed by: PHYSICIAN ASSISTANT

## 2025-03-03 PROCEDURE — 99214 OFFICE O/P EST MOD 30 MIN: CPT | Performed by: PHYSICIAN ASSISTANT

## 2025-03-03 PROCEDURE — 90471 IMMUNIZATION ADMIN: CPT | Performed by: PHYSICIAN ASSISTANT

## 2025-03-03 PROCEDURE — 90472 IMMUNIZATION ADMIN EACH ADD: CPT | Performed by: PHYSICIAN ASSISTANT

## 2025-03-03 PROCEDURE — 96372 THER/PROPH/DIAG INJ SC/IM: CPT | Performed by: PHYSICIAN ASSISTANT

## 2025-03-03 PROCEDURE — G0463 HOSPITAL OUTPT CLINIC VISIT: HCPCS | Mod: 25 | Performed by: PHYSICIAN ASSISTANT

## 2025-03-03 PROCEDURE — 90675 RABIES VACCINE IM: CPT | Performed by: PHYSICIAN ASSISTANT

## 2025-03-03 PROCEDURE — 90375 RABIES IG IM/SC: CPT | Performed by: PHYSICIAN ASSISTANT

## 2025-03-03 PROCEDURE — 250N000011 HC RX IP 250 OP 636: Performed by: PHYSICIAN ASSISTANT

## 2025-03-03 RX ORDER — FLUCONAZOLE 150 MG/1
TABLET ORAL
Qty: 2 TABLET | Refills: 0 | Status: SHIPPED | OUTPATIENT
Start: 2025-03-03 | End: 2025-03-06

## 2025-03-03 RX ADMIN — CLOSTRIDIUM TETANI TOXOID ANTIGEN (FORMALDEHYDE INACTIVATED), CORYNEBACTERIUM DIPHTHERIAE TOXOID ANTIGEN (FORMALDEHYDE INACTIVATED), BORDETELLA PERTUSSIS TOXOID ANTIGEN (GLUTARALDEHYDE INACTIVATED), BORDETELLA PERTUSSIS FILAMENTOUS HEMAGGLUTININ ANTIGEN (FORMALDEHYDE INACTIVATED), BORDETELLA PERTUSSIS PERTACTIN ANTIGEN, AND BORDETELLA PERTUSSIS FIMBRIAE 2/3 ANTIGEN 0.5 ML: 5; 2; 2.5; 5; 3; 5 INJECTION, SUSPENSION INTRAMUSCULAR at 14:51

## 2025-03-03 RX ADMIN — RABIES IMMUNE GLOBULIN (HUMAN) 1500 UNITS: 300 INJECTION, SOLUTION INFILTRATION; INTRAMUSCULAR at 14:49

## 2025-03-03 RX ADMIN — RABIES VACCINE 1 ML: KIT at 14:50

## 2025-03-03 ASSESSMENT — COLUMBIA-SUICIDE SEVERITY RATING SCALE - C-SSRS
6. HAVE YOU EVER DONE ANYTHING, STARTED TO DO ANYTHING, OR PREPARED TO DO ANYTHING TO END YOUR LIFE?: NO
1. IN THE PAST MONTH, HAVE YOU WISHED YOU WERE DEAD OR WISHED YOU COULD GO TO SLEEP AND NOT WAKE UP?: NO
2. HAVE YOU ACTUALLY HAD ANY THOUGHTS OF KILLING YOURSELF IN THE PAST MONTH?: NO

## 2025-03-03 ASSESSMENT — ACTIVITIES OF DAILY LIVING (ADL): ADLS_ACUITY_SCORE: 41

## 2025-03-03 NOTE — ED PROVIDER NOTES
History   No chief complaint on file.    HPI  Jessica Manriquez is a 54 year old right-hand-dominant female who presents to urgent care with concern over cat bite to her right long finger which occurred earlier today.  Patient reports that she was attempting to feed a feral cat that had been near her yard when animal bit her finger.  It is Back into the woods.  She was not able to capture it.  She has a laceration to the pad of the finger.  She denies any concerns for foreign body sensation.  No significant pain in the area.  No distal numbness or paresthesias.  Her tetanus vaccine needs to be updated.  She has not been previously vaccinated for rabies.    Allergies:  Allergies   Allergen Reactions    Darvocet [Acetaminophen] Nausea and Vomiting    Erythromycin GI Disturbance    Flexeril [Cyclobenzaprine Hcl]      Lightheadedness, dizzy      Serotonin Reuptake Inhibitors (Ssris) Anxiety, Other (See Comments) and Palpitations       Problem List:    Patient Active Problem List    Diagnosis Date Noted    Hyperlipidemia LDL goal <160 01/16/2024     Priority: Medium    Chronic pain of right knee 06/08/2023     Priority: Medium    Pelvic pain in female 08/03/2015     Priority: Medium    ASCUS of cervix with negative high risk HPV 03/05/2014     Priority: Medium     11/2001 LEEP  2004 NIL Pap  1/2005 NIL Pap  12/2005 NIL Pap  2006 NIL Pap  2007 NIL Pap, Neg HPV  2008 NIL Pap  2010 NIL Pap  2011 NIL Pap  2013 NIL Pap  1/29/14 NIL, Neg HPV. Plan cotesting in 1 year. If neg then cotest in 3 yrs  2/2/15 NIL, Neg HPV. Plan cotest in 3 years.  12/31/18 ASCUS Pap, Neg HPV with endometrial cells. Plan cotest in 3 years.   1/26/21 NIL Pap, Neg HPV. Plan cotest in 3 years.   1/15/24 NIL pap, neg HPV. Plan: cotest in 3 years                                                                                                                                                                                                                                                                                                                                                                                                                                                                                                                                                                                                                                                                                                                                                                                                                                                                                                                                                                                                                                                                                                                                                                                                                                                                                                                                                                                                                                                                                                                                                                                                                                                                                                                                                                                                                                                                                                                                         Unexplained endometrial cells on cervical Pap smear 02/07/2014     Priority: Medium     Cervix stenotic due to nulliparous and h/o LEEP--sono ordered  Consider HSC D & C with Misoprostol preprime    If normal sono, then Pap 1 year      Insomnia 09/14/2010     Priority: Medium    Migraine headache  2009     Priority: Medium     (Problem list name updated by automated process. Provider to review and confirm.)          Past Medical History:    Past Medical History:   Diagnosis Date    Cervicalgia     Dysplasia of cervix, unspecified 2001    Endometriosis, site unspecified     Other anxiety states     Pure hypercholesterolemia     Scapulocostal syndrome 2009    Selective IgA immunodeficiency (H)     Temporomandibular joint disorders, unspecified        Past Surgical History:    Past Surgical History:   Procedure Laterality Date    HC ENLARGE BREAST WITH IMPLANT  2000    HC REVISE BREAST RECONSTRUCTION  2003    LEEP TX, CERVICAL  2001    SALOMÓN 3, yearly paps until     LIGATN/STRIP LONG & SHORT SAPHEN  2004    Bilateral vein stripping    TUBAL LIGATION  10/30/2009    filshie clips       Family History:    Family History   Problem Relation Age of Onset    Arthritis Mother     Respiratory Mother         asthma    Allergies Mother     Cancer Mother     Blood Disease Mother         leukemia    Breast Cancer Mother         age 79    Depression Mother     Anxiety Disorder Mother     Asthma Mother     Obesity Mother     Breast Cancer Maternal Grandmother         age 60 (she lived to )    Heart Disease Maternal Grandmother     Coronary Artery Disease Maternal Grandmother     Mental Illness Maternal Grandmother         Depression    Cerebrovascular Disease Paternal Grandmother     Cancer Paternal Grandfather         ?biliary    Alcohol/Drug Paternal Grandfather     Other Cancer Paternal Grandfather         gall bladder  age 55?    Substance Abuse Paternal Grandfather     Allergies Sister     Breast Cancer Sister         age 53    Hyperlipidemia Father     Breast Cancer Cousin         Bilateral mastectomy age 46 approx    Other Cancer Cousin         Uterine discovered after gave birth (smoker)    Other Cancer Other         Bladder and mesothelioma (exposure during [place] and  smoker)    Other Cancer Other         Esophageal cancer (smoker and work environ. exposure)    Other Cancer Other         Bladder (smoker)    Substance Abuse Other         drugs and alcohol       Social History:  Marital Status:  Single [1]  Social History     Tobacco Use    Smoking status: Never    Smokeless tobacco: Never   Vaping Use    Vaping status: Never Used   Substance Use Topics    Alcohol use: No    Drug use: No        Medications:    aspirin-acetaminophen-caffeine (EXCEDRIN MIGRAINE) 250-250-65 MG per tablet  diazepam (VALIUM) 10 MG tablet  ferrous sulfate (FEROSUL) 325 (65 Fe) MG tablet  gabapentin (NEURONTIN) 300 MG capsule  hydrOXYzine HCl (ATARAX) 25 MG tablet  Lactobacillus (ACIDOPHILUS) 100 MG CAPS  Loperamide HCl (IMODIUM A-D PO)  MULTI-VITAMIN OR TABS  OnabotulinumtoxinA (BOTOX IJ)  ondansetron (ZOFRAN ODT) 4 MG ODT tab  SUMAtriptan (IMITREX) 50 MG tablet  traMADol (ULTRAM) 50 MG tablet  valACYclovir (VALTREX) 500 MG tablet  vitamin (B COMPLEX-C) tablet  VITAMIN C 500 MG OR TABS  VITAMIN D, CHOLECALCIFEROL, PO  Vitamin Mixture (VITAMIN E COMPLETE PO)      Review of Systems  CONSTITUTIONAL:NEGATIVE for fever, chills, change in weight  INTEGUMENTARY/SKIN: POSITIVE for cat bite right long finger   RESP:NEGATIVE for significant cough or SOB  MUSCULOSKELETAL: POSITIVE  for cat bite, right hand  and NEGATIVE for concerning new onset arthralgias or myalgias   NEURO:NEGATIVE for numbness or paresthesias   Physical Exam   BP: 128/85  Pulse: 105  Temp: 97.4  F (36.3  C)  Resp: 16  Weight: 69.9 kg (154 lb 3.2 oz)  SpO2: 99 %  Physical Exam  Constitutional:       General: She is not in acute distress.     Appearance: She is not ill-appearing or toxic-appearing.   HENT:      Head: Normocephalic and atraumatic.   Cardiovascular:      Pulses:           Radial pulses are 2+ on the right side.   Musculoskeletal:      Right hand: Laceration and tenderness present. No swelling, deformity or bony tenderness. Normal  range of motion. Normal strength. Normal sensation. There is no disruption of two-point discrimination. Normal capillary refill. Normal pulse.   Skin:     General: Skin is warm and dry.      Findings: Abrasion and laceration present. No bruising, ecchymosis or rash.      Comments: 3-4 mm laceration to pad of right long finger   Neurological:      Mental Status: She is alert and oriented to person, place, and time.      Sensory: No sensory deficit.       ED Course        Procedures       Critical Care time:  none  None     No results found for any visits on 03/03/25.    Medications   rabies immune globulin 300 units/mL (HYPERAB) injection 1,500 Units (has no administration in time range)   rabies vaccine, PCEC (chick embryo derived) (RABAVERT) injection 1 mL (has no administration in time range)   Tdap (tetanus-diphtheria-acell pertussis) (ADACEL) injection 0.5 mL (has no administration in time range)     Assessments & Plan (with Medical Decision Making)     I have reviewed the nursing notes.  I have reviewed the findings, diagnosis, plan and need for follow up with the patient.     Discharge Medication List as of 3/3/2025  2:51 PM        START taking these medications    Details   amoxicillin-clavulanate (AUGMENTIN) 875-125 MG tablet Take 1 tablet by mouth 2 times daily for 5 days., Disp-10 tablet, R-0, E-Prescribe             Final diagnoses:   Cat bite, initial encounter   Need for post exposure prophylaxis for rabies     54-year-old female presents to urgent care with concern over cat bite to her right long finger that occurred after she attempted to feed a feral cat earlier today.  Physical exam findings significant for 3 to 4 mm laceration to the pad of the finger.  Wound was extensively cleaned at home.  No signs of cellulitis at this time however did discuss for/benefits of empiric antibiotics given propensity for infection from cat bite patient agreed to proceed.  Also discussed for/benefits of rabies  vaccination as bite occurred from a feral animal and patient agreed to proceed.  She tolerated 0.5 mL of immunoglobulin into the wound, remainder was infiltrated into lateral thigh.  First dose of rabies vaccine and tetanus vaccine were administrated into the deltoids.  She was discharged home stable with instructions to ADS return for rabies vaccine on days 3, 7, 14.  Wound care instructions, signs of infection, worrisome reasons to return to ER/UC sooner discussed.    Disclaimer: This note consists of symbols derived from keyboarding, dictation, and/or voice recognition software. As a result, there may be errors in the script that have gone undetected.  Please consider this when interpreting information found in the chart.    3/3/2025   [facility] EMERGENCY DEPT       Antonia Thompson PA-C  03/05/25 [id number]

## 2025-03-06 ENCOUNTER — IMMUNIZATION (OUTPATIENT)
Dept: PEDIATRICS | Facility: CLINIC | Age: 55
End: 2025-03-06
Attending: PHYSICIAN ASSISTANT
Payer: COMMERCIAL

## 2025-03-06 NOTE — PROGRESS NOTES
Prior to immunization administration, verified patients identity using patient s name and date of birth. Please see Immunization Activity for additional information.     Screening Questionnaire for Adult Immunization    Are you sick today?   No   Do you have allergies to medications, food, a vaccine component or latex?   No   Have you ever had a serious reaction after receiving a vaccination?   No   Do you have a long-term health problem with heart, lung, kidney, or metabolic disease (e.g., diabetes), asthma, a blood disorder, no spleen, complement component deficiency, a cochlear implant, or a spinal fluid leak?  Are you on long-term aspirin therapy?   No   Do you have cancer, leukemia, HIV/AIDS, or any other immune system problem?   No   Do you have a parent, brother, or sister with an immune system problem?   No   In the past 3 months, have you taken medications that affect  your immune system, such as prednisone, other steroids, or anticancer drugs; drugs for the treatment of rheumatoid arthritis, Crohn s disease, or psoriasis; or have you had radiation treatments?   No   Have you had a seizure, or a brain or other nervous system problem?   No   During the past year, have you received a transfusion of blood or blood    products, or been given immune (gamma) globulin or antiviral drug?   No   For women: Are you pregnant or is there a chance you could become       pregnant during the next month?   No   Have you received any vaccinations in the past 4 weeks?   No     Immunization questionnaire answers were all negative.      Patient instructed to remain in clinic for 15 minutes afterwards, and to report any adverse reactions.     Screening performed by Tawanna Rojo MA on 3/6/2025 at 4:17 PM.  Patient Name: Jessica Manriquez  Ordering Provider: Antonia Thompson  Doses Given: 2  Doses Outstandin  Date Seen in ED: 3/6/2025  Date Follow-Up Needed: 3/10/2025

## 2025-03-10 ENCOUNTER — IMMUNIZATION (OUTPATIENT)
Dept: PEDIATRICS | Facility: CLINIC | Age: 55
End: 2025-03-10
Attending: PHYSICIAN ASSISTANT
Payer: COMMERCIAL

## 2025-03-10 VITALS — TEMPERATURE: 98.5 F

## 2025-03-10 PROCEDURE — 99207 PR NO CHARGE NURSE ONLY: CPT

## 2025-03-10 NOTE — PROGRESS NOTES
Prior to immunization administration, verified patients identity using patient s name and date of birth. Please see Immunization Activity for additional information.     Screening Questionnaire for Adult Immunization    Are you sick today?   No   Do you have allergies to medications, food, a vaccine component or latex?   No   Have you ever had a serious reaction after receiving a vaccination?   No   Do you have a long-term health problem with heart, lung, kidney, or metabolic disease (e.g., diabetes), asthma, a blood disorder, no spleen, complement component deficiency, a cochlear implant, or a spinal fluid leak?  Are you on long-term aspirin therapy?   No   Do you have cancer, leukemia, HIV/AIDS, or any other immune system problem?   No   Do you have a parent, brother, or sister with an immune system problem?   No   In the past 3 months, have you taken medications that affect  your immune system, such as prednisone, other steroids, or anticancer drugs; drugs for the treatment of rheumatoid arthritis, Crohn s disease, or psoriasis; or have you had radiation treatments?   No   Have you had a seizure, or a brain or other nervous system problem?   No   During the past year, have you received a transfusion of blood or blood    products, or been given immune (gamma) globulin or antiviral drug?   No   For women: Are you pregnant or is there a chance you could become       pregnant during the next month?   No   Have you received any vaccinations in the past 4 weeks?   No     Immunization questionnaire answers were all negative.      Patient instructed to remain in clinic for 15 minutes afterwards, and to report any adverse reactions.     Screening performed by Osiris Gregory on 3/10/2025 at 4:04 PM.

## 2025-03-17 ENCOUNTER — IMMUNIZATION (OUTPATIENT)
Dept: PEDIATRICS | Facility: CLINIC | Age: 55
End: 2025-03-17
Attending: PHYSICIAN ASSISTANT
Payer: COMMERCIAL

## 2025-03-17 PROCEDURE — 90471 IMMUNIZATION ADMIN: CPT

## 2025-03-17 PROCEDURE — 90675 RABIES VACCINE IM: CPT

## 2025-03-17 NOTE — PROGRESS NOTES
Immunizations Administered       Name Date Dose VIS Date Route    Rabies Vaccine (Imovax) 3/17/25  4:17 PM 1 mL 06/02/2022, Given Today Intramuscular          Vannessa MARRUFO LPN on 3/17/2025 at 4:21 PM

## 2025-04-09 ENCOUNTER — OFFICE VISIT (OUTPATIENT)
Dept: PHYSICAL MEDICINE AND REHAB | Facility: CLINIC | Age: 55
End: 2025-04-09
Payer: COMMERCIAL

## 2025-04-09 DIAGNOSIS — G43.719 CHRONIC MIGRAINE WITHOUT AURA, INTRACTABLE, WITHOUT STATUS MIGRAINOSUS: Primary | ICD-10-CM

## 2025-04-09 PROCEDURE — 95874 GUIDE NERV DESTR NEEDLE EMG: CPT | Performed by: PHYSICAL MEDICINE & REHABILITATION

## 2025-04-09 PROCEDURE — 64615 CHEMODENERV MUSC MIGRAINE: CPT | Performed by: PHYSICAL MEDICINE & REHABILITATION

## 2025-04-09 RX ORDER — BUPIVACAINE HYDROCHLORIDE 5 MG/ML
4 INJECTION, SOLUTION EPIDURAL; INTRACAUDAL; PERINEURAL ONCE
Status: COMPLETED | OUTPATIENT
Start: 2025-04-09 | End: 2025-04-09

## 2025-04-09 RX ADMIN — BUPIVACAINE HYDROCHLORIDE 20 MG: 5 INJECTION, SOLUTION EPIDURAL; INTRACAUDAL; PERINEURAL at 14:43

## 2025-04-09 NOTE — PROGRESS NOTES
[facility]     PM&R CLINIC NOTE  BOTULINUM TOXIN PROCEDURE      HPI  No chief complaint on file.    Jessica Manriquez is a 54 year old female with a history of intractable migraine headaches who presents to clinic for botulinum toxin injections for management of chronic migraine headaches.     SINCE LAST VISIT  Jessica Manriquez was last seen here in clinic on 1/29/2025, at which time she received 200 units of Botox. She reports     Patient denies new medical diagnoses, illnesses, hospitalizations, emergency room visits, and injuries since the previous injection with botulinum neurotoxin.     RESPONSE TO PREVIOUS TREATMENT  Side effects: None.   Post-procedural headache: No.     1.  Headache frequency during this injection cycle: She reports about 5-7 migraine headache days per month, with a slight increase over the last few weeks as the Botox has been wearing off. March was a little bit heavier in headaches due to stress. This is compared to her baseline headache frequency of >15 headache days per month.      2.  Headache duration during this injection cycle:  Headache duration was 8 hours on average. Patient reports likely one episode of multiple day headaches during this injection cycle, but this does not happen very often.     3.  Headache intensity during this injection cycle:    A.  4/10  =  Typical pain level.  B.  9/10  =  Worst pain level - this only happened 2x this injection cycle.  C.  0/10  =  Lowest pain level.    4.  Change in headache medication usage during this injection cycle: Has tried several preventative medications and is currently on gabapentin 300 mg at nighttime, which has not made a difference in her migraines but did help with her night sweats. For rescue medication she is on combination of Imitrex, Excedrin and tramadol.     5.  ER Visits During This Injection Cycle: None.     6.  Functional Performance:  Change in ADL's, social interaction, days lost  from work, etc. Patient reports being able to more fully participate in social and family activities and responsibilities as headache symptoms have improved she works from home so generally is able to modify her schedule and rest to avoid worsening headaches when she has a migraine.  She has been overall a lot more functional since she was started on the injections.      PHYSICAL EXAM  VS: LMP 10/18/2023 (Approximate)    GEN: Pleasant and cooperative, in no acute distress  HEENT: No facial asymmetry, no significant head rotation or sidebending, tight paravertebral muscles, levator, trapezius muscles bilaterally     ALLERGIES  Allergies   Allergen Reactions    Darvocet [Acetaminophen] Nausea and Vomiting    Erythromycin GI Disturbance    Flexeril [Cyclobenzaprine Hcl]      Lightheadedness, dizzy      Serotonin Reuptake Inhibitors (Ssris) Anxiety, Other (See Comments) and Palpitations       CURRENT MEDICATIONS    Current Outpatient Medications:     aspirin-acetaminophen-caffeine (EXCEDRIN MIGRAINE) 250-250-65 MG per tablet, Take 1 tablet by mouth every 6 hours as needed for pain., Disp: 30 tablet, Rfl: 0    diazepam (VALIUM) 10 MG tablet, Take 0.5-1 tablets (5-10 mg) by mouth nightly as needed for muscle spasms or sleep., Disp: 30 tablet, Rfl: 1    ferrous sulfate (FEROSUL) 325 (65 Fe) MG tablet, Take 325 mg by mouth daily (with breakfast), Disp: , Rfl:     gabapentin (NEURONTIN) 300 MG capsule, Take 1 capsule (300 mg) by mouth at bedtime., Disp: 90 capsule, Rfl: 3    hydrOXYzine HCl (ATARAX) 25 MG tablet, Take 1-2 tablets (25-50 mg) by mouth nightly as needed for other or itching (insomnia)., Disp: 60 tablet, Rfl: 5    Lactobacillus (ACIDOPHILUS) 100 MG CAPS, Take 1 capsule by mouth daily, Disp: , Rfl:     Loperamide HCl (IMODIUM A-D PO), Take 1 tablet by mouth once as needed, Disp: , Rfl:     MULTI-VITAMIN OR TABS, 1 tablet by mouth DAILY, Disp: , Rfl:     OnabotulinumtoxinA (BOTOX IJ), Inject 200 Units as directed  Total dose 200 units  Dose Administered:  175 units  Botox (Botulinum Toxin Type A)       2:1 Dilution (55 units unavoidable waste) Unavoidable waste 25 units  Diluent Used:  Preservative Free Normal Saline Total Volume of Diluent Used:  2.9 ml Lot # /C3 with Expiration Date:  2021 NDC #: Botox 100u ([address]-03), Disp: , Rfl:     ondansetron (ZOFRAN ODT) 4 MG ODT tab, Take 1-2 tablets (4-8 mg) by mouth every 8 hours as needed for nausea., Disp: 20 tablet, Rfl: 3    SUMAtriptan (IMITREX) 50 MG tablet, TAKE 1-2  TABLET BY MOUTH AT ONSET OF HEADACHE FOR MIGRAINE MAY REPEAT DOSE IN 2 HOURS.  DO NOT EXCEED 200 MG IN 24 HOURS, Disp: 36 tablet, Rfl: 4    traMADol (ULTRAM) 50 MG tablet, Take 1-2 tablets ( mg) by mouth every 6 hours as needed for pain. Max 5 pills/day, Disp: 40 tablet, Rfl: 0    valACYclovir (VALTREX) 500 MG tablet, Take 1 tablet (500 mg) by mouth daily (Patient taking differently: Take 500 mg by mouth daily. Patient takes one tablet weekly), Disp: 30 tablet, Rfl: 3    vitamin (B COMPLEX-C) tablet, Take 1 tablet by mouth daily, Disp: , Rfl:     VITAMIN C 500 MG OR TABS, 1 tablet daily, Disp: , Rfl:     VITAMIN D, CHOLECALCIFEROL, PO, Take 1,000 Units by mouth daily, Disp: , Rfl:     Vitamin Mixture (VITAMIN E COMPLETE PO), , Disp: , Rfl:     Current Facility-Administered Medications:     botulinum toxin type A (BOTOX) 100 units injection 200 Units, 200 Units, Intramuscular, Q10 Weeks, Standal, Aretha Hahn MD       BOTULINUM NEUROTOXIN INJECTION PROCEDURES    VERIFICATION OF PATIENT IDENTIFICATION AND PROCEDURE     Initials   Patient Name SES   Patient  SES   Procedure Verified by: SES     Prior to the start of the procedure and with procedural staff participation, I verbally confirmed the patient s identity using two indicators, relevant allergies, that the procedure was appropriate and matched the consent or emergent situation, and that the correct equipment/implants were available.  Immediately prior to starting the procedure I conducted the Time Out with the procedural staff and re-confirmed the patient s name, procedure, and site/side. (The Joint Commission universal protocol was followed.)  Yes    Sedation (Moderate or Deep): None    ABOVE ASSESSMENTS PERFORMED BY    Aretha Pichardo MD       INDICATIONS FOR PROCEDURES  Jessica Manriquez is a 54 year old patient with intractable pain secondary to the diagnosis of chronic migraine headaches. Her baseline symptoms have been recalcitrant to oral medications and conservative therapy.  She is here today for reinjection with Botox.    GOAL OF PROCEDURE  The goal of this procedure is to decrease pain.      TOTAL DOSE ADMINISTERED  Dose Administered:  200 units  Botox (Botulinum Toxin Type A)       2:1 Dilution   Unavoidable Drug Waste: No  Diluent Used:  0.5% bupivacaine  Total Volume of Diluent Used:  4 ml  NDC #: Botox 100u ([address]-70)      CONSENT  The risks, benefits, and treatment options were discussed with Jessica Manriquez and she agreed to proceed.    Written consent was obtained by [facility].     EQUIPMENT USED  Needle-25mm stimulating/recording  Needle-30 gauge  EMG/NCS Machine    SKIN PREPARATION  Skin preparation was performed using an alcohol wipe.    GUIDANCE DESCRIPTION  Electro-myographic guidance was necessary throughout the neck portion of the procedure to accurately identify all areas of spastic muscles while avoiding injection of non-spastic muscles, neighboring nerves and nearby vascular structures.       AREA/MUSCLE INJECTED:  200 UNITS BOTOX = TOTAL DOSE, 2:1 DILUTION    1. SHOULDER GIRDLE & NECK MUSCLES: 65 UNITS BOTOX = TOTAL DOSE                               Right Upper Trapezius 15 units of Botox at 3 sites               Left Upper Trapezius 15 units of Botox at 3 sites     Right Splenius  - 5 units of Botox at 1 site/s  Left Splenius - 5 units of Botox at 1 site/s     Right Semispinalis  - 5 units of Botox at 1  site/s  Left Semispinalis - 5 units of Botox at 1 site/s    Left Anterior Scalenes - 5 units of Botox at 1 site/s.                  Right Levator Scapula - 5 units of Botox at 1 site/s.     Left Levator Scapula -  5 units of Botox at 1 site/s.     2. FACIAL, JAW & SCALP MUSCLES: 135 UNITS BOTOX = TOTAL DOSE     Right Masseter - 5 units of Botox at 1 site/s.  Left Masseter - 5 units of Botox at 1 site/s.     Right Occipitalis (upper and lower) - 20 units of Botox at 3 site/s.  Left Occipitalis (upper and lower) - 20 units of Botox at 3 site/s.    Right Frontalis - 10 units of Botox at 2 site/s  Left Frontalis - 10 units of Botox at 2 site/s     Right Temporalis (upper and lower)  - 25 units of Botox at 5 site/s.  Left Temporalis (upper and lower) - 25 units of Botox at 5 site/s.    Right  - 5 units of Botox at 1 site/s.  Left  - 5 units of Botox at 1 site/s.     Procerus - 5 units of Botox at 1 site/s.      RESPONSE TO PROCEDURE  Jessica Manriquez tolerated the procedure well and there were no immediate complications. She was allowed to recover for an appropriate period of time and was discharged home in stable condition.    ASSESSMENT AND PLAN   Botulinum toxin injections: No changes made to Botox dose or distribution today. Patient will continue to monitor response and report at next appointment.   Referrals: None.   Medications: No changes.   Follow up: Jessica Manriquez was rescheduled for the next series of injections in 12 weeks, at which time we will evaluate response to today's injections. she may call the clinic prior with any questions or concerns prior to the next appointment.       Aretha Pichardo MD

## 2025-04-09 NOTE — LETTER
4/9/2025       RE: Jessica Manriquez  [address]  [place] [address]     Dear Colleague,    Thank you for referring your patient, Jessica Manriquez, to the [facility] [place] [facility]. Please see a copy of my visit note below.      [facility]     PM&R CLINIC NOTE  BOTULINUM TOXIN PROCEDURE      HPI  No chief complaint on file.    Jessica Manriquez is a 54 year old female with a history of intractable migraine headaches who presents to clinic for botulinum toxin injections for management of chronic migraine headaches.     SINCE LAST VISIT  Jessica Manriquez was last seen here in clinic on 1/29/2025, at which time she received 200 units of Botox. She reports     Patient denies new medical diagnoses, illnesses, hospitalizations, emergency room visits, and injuries since the previous injection with botulinum neurotoxin.     RESPONSE TO PREVIOUS TREATMENT  Side effects: None.   Post-procedural headache: No.     1.  Headache frequency during this injection cycle: She reports about 5-7 migraine headache days per month, with a slight increase over the last few weeks as the Botox has been wearing off. March was a little bit heavier in headaches due to stress. This is compared to her baseline headache frequency of >15 headache days per month.      2.  Headache duration during this injection cycle:  Headache duration was 8 hours on average. Patient reports likely one episode of multiple day headaches during this injection cycle, but this does not happen very often.     3.  Headache intensity during this injection cycle:    A.  4/10  =  Typical pain level.  B.  9/10  =  Worst pain level - this only happened 2x this injection cycle.  C.  0/10  =  Lowest pain level.    4.  Change in headache medication usage during this injection cycle: Has tried several preventative medications  and is currently on gabapentin 300 mg at nighttime, which has not made a difference in her migraines but did help with her night sweats. For rescue medication she is on combination of Imitrex, Excedrin and tramadol.     5.  ER Visits During This Injection Cycle: None.     6.  Functional Performance:  Change in ADL's, social interaction, days lost from work, etc. Patient reports being able to more fully participate in social and family activities and responsibilities as headache symptoms have improved she works from home so generally is able to modify her schedule and rest to avoid worsening headaches when she has a migraine.  She has been overall a lot more functional since she was started on the injections.      PHYSICAL EXAM  VS: LMP 10/18/2023 (Approximate)    GEN: Pleasant and cooperative, in no acute distress  HEENT: No facial asymmetry, no significant head rotation or sidebending, tight paravertebral muscles, levator, trapezius muscles bilaterally     ALLERGIES  Allergies   Allergen Reactions     Darvocet [Acetaminophen] Nausea and Vomiting     Erythromycin GI Disturbance     Flexeril [Cyclobenzaprine Hcl]      Lightheadedness, dizzy       Serotonin Reuptake Inhibitors (Ssris) Anxiety, Other (See Comments) and Palpitations       CURRENT MEDICATIONS    Current Outpatient Medications:      aspirin-acetaminophen-caffeine (EXCEDRIN MIGRAINE) 250-250-65 MG per tablet, Take 1 tablet by mouth every 6 hours as needed for pain., Disp: 30 tablet, Rfl: 0     diazepam (VALIUM) 10 MG tablet, Take 0.5-1 tablets (5-10 mg) by mouth nightly as needed for muscle spasms or sleep., Disp: 30 tablet, Rfl: 1     ferrous sulfate (FEROSUL) 325 (65 Fe) MG tablet, Take 325 mg by mouth daily (with breakfast), Disp: , Rfl:      gabapentin (NEURONTIN) 300 MG capsule, Take 1 capsule (300 mg) by mouth at bedtime., Disp: 90 capsule, Rfl: 3     hydrOXYzine HCl (ATARAX) 25 MG tablet, Take 1-2 tablets (25-50 mg) by mouth nightly as needed for  other or itching (insomnia)., Disp: 60 tablet, Rfl: 5     Lactobacillus (ACIDOPHILUS) 100 MG CAPS, Take 1 capsule by mouth daily, Disp: , Rfl:      Loperamide HCl (IMODIUM A-D PO), Take 1 tablet by mouth once as needed, Disp: , Rfl:      MULTI-VITAMIN OR TABS, 1 tablet by mouth DAILY, Disp: , Rfl:      OnabotulinumtoxinA (BOTOX IJ), Inject 200 Units as directed Total dose 200 units  Dose Administered:  175 units  Botox (Botulinum Toxin Type A)       2:1 Dilution (55 units unavoidable waste) Unavoidable waste 25 units  Diluent Used:  Preservative Free Normal Saline Total Volume of Diluent Used:  2.9 ml Lot # /C3 with Expiration Date:  03/2021 NDC #: Botox 100u ([address]-39), Disp: , Rfl:      ondansetron (ZOFRAN ODT) 4 MG ODT tab, Take 1-2 tablets (4-8 mg) by mouth every 8 hours as needed for nausea., Disp: 20 tablet, Rfl: 3     SUMAtriptan (IMITREX) 50 MG tablet, TAKE 1-2  TABLET BY MOUTH AT ONSET OF HEADACHE FOR MIGRAINE MAY REPEAT DOSE IN 2 HOURS.  DO NOT EXCEED 200 MG IN 24 HOURS, Disp: 36 tablet, Rfl: 4     traMADol (ULTRAM) 50 MG tablet, Take 1-2 tablets ( mg) by mouth every 6 hours as needed for pain. Max 5 pills/day, Disp: 40 tablet, Rfl: 0     valACYclovir (VALTREX) 500 MG tablet, Take 1 tablet (500 mg) by mouth daily (Patient taking differently: Take 500 mg by mouth daily. Patient takes one tablet weekly), Disp: 30 tablet, Rfl: 3     vitamin (B COMPLEX-C) tablet, Take 1 tablet by mouth daily, Disp: , Rfl:      VITAMIN C 500 MG OR TABS, 1 tablet daily, Disp: , Rfl:      VITAMIN D, CHOLECALCIFEROL, PO, Take 1,000 Units by mouth daily, Disp: , Rfl:      Vitamin Mixture (VITAMIN E COMPLETE PO), , Disp: , Rfl:     Current Facility-Administered Medications:      botulinum toxin type A (BOTOX) 100 units injection 200 Units, 200 Units, Intramuscular, Q10 Weeks, Standal, Aretha Hahn MD       BOTULINUM NEUROTOXIN INJECTION PROCEDURES    VERIFICATION OF PATIENT IDENTIFICATION AND PROCEDURE      Initials   Patient Name SES   Patient  SES   Procedure Verified by: BEATA     Prior to the start of the procedure and with procedural staff participation, I verbally confirmed the patient s identity using two indicators, relevant allergies, that the procedure was appropriate and matched the consent or emergent situation, and that the correct equipment/implants were available. Immediately prior to starting the procedure I conducted the Time Out with the procedural staff and re-confirmed the patient s name, procedure, and site/side. (The Joint Commission universal protocol was followed.)  Yes    Sedation (Moderate or Deep): None    ABOVE ASSESSMENTS PERFORMED BY    Aretha Pichardo MD       INDICATIONS FOR PROCEDURES  Jessica Manriquez is a 54 year old patient with intractable pain secondary to the diagnosis of chronic migraine headaches. Her baseline symptoms have been recalcitrant to oral medications and conservative therapy.  She is here today for reinjection with Botox.    GOAL OF PROCEDURE  The goal of this procedure is to decrease pain.      TOTAL DOSE ADMINISTERED  Dose Administered:  200 units  Botox (Botulinum Toxin Type A)       2:1 Dilution   Unavoidable Drug Waste: No  Diluent Used:  0.5% bupivacaine  Total Volume of Diluent Used:  4 ml  NDC #: Botox 100u ([address]-37)      CONSENT  The risks, benefits, and treatment options were discussed with Jessica Manriquez and she agreed to proceed.    Written consent was obtained by [facility].     EQUIPMENT USED  Needle-25mm stimulating/recording  Needle-30 gauge  EMG/NCS Machine    SKIN PREPARATION  Skin preparation was performed using an alcohol wipe.    GUIDANCE DESCRIPTION  Electro-myographic guidance was necessary throughout the neck portion of the procedure to accurately identify all areas of spastic muscles while avoiding injection of non-spastic muscles, neighboring nerves and nearby vascular structures.       AREA/MUSCLE INJECTED:  200 UNITS BOTOX = TOTAL  DOSE, 2:1 DILUTION    1. SHOULDER GIRDLE & NECK MUSCLES: 65 UNITS BOTOX = TOTAL DOSE                               Right Upper Trapezius 15 units of Botox at 3 sites               Left Upper Trapezius 15 units of Botox at 3 sites     Right Splenius  - 5 units of Botox at 1 site/s  Left Splenius - 5 units of Botox at 1 site/s     Right Semispinalis  - 5 units of Botox at 1 site/s  Left Semispinalis - 5 units of Botox at 1 site/s    Left Anterior Scalenes - 5 units of Botox at 1 site/s.                  Right Levator Scapula - 5 units of Botox at 1 site/s.     Left Levator Scapula -  5 units of Botox at 1 site/s.     2. FACIAL, JAW & SCALP MUSCLES: 135 UNITS BOTOX = TOTAL DOSE     Right Masseter - 5 units of Botox at 1 site/s.  Left Masseter - 5 units of Botox at 1 site/s.     Right Occipitalis (upper and lower) - 20 units of Botox at 3 site/s.  Left Occipitalis (upper and lower) - 20 units of Botox at 3 site/s.    Right Frontalis - 10 units of Botox at 2 site/s  Left Frontalis - 10 units of Botox at 2 site/s     Right Temporalis (upper and lower)  - 25 units of Botox at 5 site/s.  Left Temporalis (upper and lower) - 25 units of Botox at 5 site/s.    Right  - 5 units of Botox at 1 site/s.  Left  - 5 units of Botox at 1 site/s.     Procerus - 5 units of Botox at 1 site/s.      RESPONSE TO PROCEDURE  Jessica Manriquez tolerated the procedure well and there were no immediate complications. She was allowed to recover for an appropriate period of time and was discharged home in stable condition.    ASSESSMENT AND PLAN   Botulinum toxin injections: No changes made to Botox dose or distribution today. Patient will continue to monitor response and report at next appointment.   Referrals: None.   Medications: No changes.   Follow up: Jessica Manriquez was rescheduled for the next series of injections in 12 weeks, at which time we will evaluate response to today's injections. she may call the clinic prior with  any questions or concerns prior to the next appointment.       Aretha Pichardo MD        Again, thank you for allowing me to participate in the care of your patient.      Sincerely,    Aretha Pichardo MD

## 2025-06-05 ENCOUNTER — MYC MEDICAL ADVICE (OUTPATIENT)
Dept: FAMILY MEDICINE | Facility: CLINIC | Age: 55
End: 2025-06-05
Payer: COMMERCIAL

## 2025-06-05 DIAGNOSIS — Z80.3 FAMILY HISTORY OF MALIGNANT NEOPLASM OF BREAST: Primary | ICD-10-CM

## 2025-06-05 DIAGNOSIS — Z91.89 AT HIGH RISK FOR BREAST CANCER: ICD-10-CM

## 2025-06-05 NOTE — TELEPHONE ENCOUNTER
Dr. Loya,    Please see request and advise.    Last MRI breast completed on 5/24/24.    Referral pended for consideration.    Thank you  Gregory NESS RN  [facility]

## 2025-06-18 ENCOUNTER — OFFICE VISIT (OUTPATIENT)
Dept: PHYSICAL MEDICINE AND REHAB | Facility: CLINIC | Age: 55
End: 2025-06-18
Payer: COMMERCIAL

## 2025-06-18 DIAGNOSIS — G43.719 CHRONIC MIGRAINE WITHOUT AURA, INTRACTABLE, WITHOUT STATUS MIGRAINOSUS: Primary | ICD-10-CM

## 2025-06-18 PROCEDURE — 64615 CHEMODENERV MUSC MIGRAINE: CPT | Performed by: PHYSICAL MEDICINE & REHABILITATION

## 2025-06-18 PROCEDURE — 95874 GUIDE NERV DESTR NEEDLE EMG: CPT | Performed by: PHYSICAL MEDICINE & REHABILITATION

## 2025-06-18 RX ORDER — BUPIVACAINE HYDROCHLORIDE 2.5 MG/ML
4 INJECTION, SOLUTION EPIDURAL; INFILTRATION; INTRACAUDAL; PERINEURAL ONCE
Status: COMPLETED | OUTPATIENT
Start: 2025-06-18 | End: 2025-06-18

## 2025-06-18 RX ADMIN — BUPIVACAINE HYDROCHLORIDE 10 MG: 2.5 INJECTION, SOLUTION EPIDURAL; INFILTRATION; INTRACAUDAL; PERINEURAL at 15:42

## 2025-06-18 NOTE — LETTER
6/18/2025       RE: Jessica Manriquez  [address]  [facility] [address]     Dear Colleague,    Thank you for referring your patient, Jessica Manriquez, to the [facility] PHYSICAL MEDICINE AND [facility]HABILITATION CLINIC [place] at [facility]. Please see a copy of my visit note below.      [facility]     PM&R CLINIC NOTE  BOTULINUM TOXIN PROCEDURE      HPI  No chief complaint on file.    Jessica Manriquez is a 54 year old female with a history of intractable migraine headaches who presents to clinic for botulinum toxin injections for management of chronic migraine headaches.     SINCE LAST VISIT  Jessica Manriquez was last seen here in clinic on 4/9/2025, at which time she received 200 units of Botox. She reports     Patient denies new medical diagnoses, illnesses, hospitalizations, emergency room visits, and injuries since the previous injection with botulinum neurotoxin.    RESPONSE TO PREVIOUS TREATMENT  Side effects: None.   Post-procedural headache: No.     1.  Headache frequency during this injection cycle: She reports about 5-6 migraine headache days per month, with a slight increase over the last few weeks as the Botox has been wearing off. May was a little bit heavier in headaches due to stress. This is compared to her baseline headache frequency of >15 headache days per month.      2.  Headache duration during this injection cycle:  Headache duration was 8 hours on average. Patient reports likely one episode of multiple day headaches during this injection cycle, but this does not happen very often.     3.  Headache intensity during this injection cycle:    A.  4/10  =  Typical pain level.  B.  7/10  =  Worst pain level.  C.  0/10  =  Lowest pain level.    4.  Change in headache medication usage during this injection cycle: She has tried several preventative medications and is currently on gabapentin 300 mg at  nighttime. For rescue medication she is on combination of Imitrex, Excedrin and tramadol. She only had twice this entire injection cycle where she had to take tramadol and Imitrex, which is what she does for the more severe headaches.     5.  ER Visits During This Injection Cycle: None.     6.  Functional Performance:  Change in ADL's, social interaction, days lost from work, etc. Patient reports being able to more fully participate in social and family activities and responsibilities as headache symptoms have improved she works from home so generally is able to modify her schedule and rest to avoid worsening headaches when she has a migraine.  She has been overall a lot more functional since she was started on the injections.      PHYSICAL EXAM  VS: LMP 10/18/2023 (Approximate)    GEN: Pleasant and cooperative, in no acute distress  HEENT: No facial asymmetry, no significant head rotation or sidebending, tight paravertebral muscles, levator, trapezius muscles bilaterally     ALLERGIES  Allergies   Allergen Reactions     Darvocet [Acetaminophen] Nausea and Vomiting     Erythromycin GI Disturbance     Flexeril [Cyclobenzaprine Hcl]      Lightheadedness, dizzy       Serotonin Reuptake Inhibitors (Ssris) Anxiety, Other (See Comments) and Palpitations       CURRENT MEDICATIONS    Current Outpatient Medications:      aspirin-acetaminophen-caffeine (EXCEDRIN MIGRAINE) 250-250-65 MG per tablet, Take 1 tablet by mouth every 6 hours as needed for pain., Disp: 30 tablet, Rfl: 0     diazepam (VALIUM) 10 MG tablet, Take 0.5-1 tablets (5-10 mg) by mouth nightly as needed for muscle spasms or sleep., Disp: 30 tablet, Rfl: 1     ferrous sulfate (FEROSUL) 325 (65 Fe) MG tablet, Take 325 mg by mouth daily (with breakfast), Disp: , Rfl:      gabapentin (NEURONTIN) 300 MG capsule, Take 1 capsule (300 mg) by mouth at bedtime., Disp: 90 capsule, Rfl: 3     hydrOXYzine HCl (ATARAX) 25 MG tablet, Take 1-2 tablets (25-50 mg) by mouth  nightly as needed for other or itching (insomnia)., Disp: 60 tablet, Rfl: 5     Lactobacillus (ACIDOPHILUS) 100 MG CAPS, Take 1 capsule by mouth daily, Disp: , Rfl:      Loperamide HCl (IMODIUM A-D PO), Take 1 tablet by mouth once as needed, Disp: , Rfl:      MULTI-VITAMIN OR TABS, 1 tablet by mouth DAILY, Disp: , Rfl:      OnabotulinumtoxinA (BOTOX IJ), Inject 200 Units as directed Total dose 200 units  Dose Administered:  175 units  Botox (Botulinum Toxin Type A)       2:1 Dilution (55 units unavoidable waste) Unavoidable waste 25 units  Diluent Used:  Preservative Free Normal Saline Total Volume of Diluent Used:  2.9 ml Lot # /C3 with Expiration Date:  03/2021 NDC #: Botox 100u ([address]-80), Disp: , Rfl:      ondansetron (ZOFRAN ODT) 4 MG ODT tab, Take 1-2 tablets (4-8 mg) by mouth every 8 hours as needed for nausea., Disp: 20 tablet, Rfl: 3     SUMAtriptan (IMITREX) 50 MG tablet, TAKE 1-2  TABLET BY MOUTH AT ONSET OF HEADACHE FOR MIGRAINE MAY REPEAT DOSE IN 2 HOURS.  DO NOT EXCEED 200 MG IN 24 HOURS, Disp: 36 tablet, Rfl: 4     traMADol (ULTRAM) 50 MG tablet, Take 1-2 tablets ( mg) by mouth every 6 hours as needed for pain. Max 5 pills/day, Disp: 40 tablet, Rfl: 0     valACYclovir (VALTREX) 500 MG tablet, Take 1 tablet (500 mg) by mouth daily (Patient taking differently: Take 500 mg by mouth daily. Patient takes one tablet weekly), Disp: 30 tablet, Rfl: 3     vitamin (B COMPLEX-C) tablet, Take 1 tablet by mouth daily, Disp: , Rfl:      VITAMIN C 500 MG OR TABS, 1 tablet daily, Disp: , Rfl:      VITAMIN D, CHOLECALCIFEROL, PO, Take 1,000 Units by mouth daily, Disp: , Rfl:      Vitamin Mixture (VITAMIN E COMPLETE PO), , Disp: , Rfl:     Current Facility-Administered Medications:      botulinum toxin type A (BOTOX) 100 units injection 200 Units, 200 Units, Intramuscular, Q10 Weeks, Standal, Aretha Hahn MD, 200 Units at 04/09/25 1444     bupivacaine (MARCAINE) 0.25% preservative free injection, 4  mL, Intradermal, Once,        BOTULINUM NEUROTOXIN INJECTION PROCEDURES    VERIFICATION OF PATIENT IDENTIFICATION AND PROCEDURE     Initials   Patient Name SES   Patient  SES   Procedure Verified by: BEATA     Prior to the start of the procedure and with procedural staff participation, I verbally confirmed the patient s identity using two indicators, relevant allergies, that the procedure was appropriate and matched the consent or emergent situation, and that the correct equipment/implants were available. Immediately prior to starting the procedure I conducted the Time Out with the procedural staff and re-confirmed the patient s name, procedure, and site/side. (The Joint Commission universal protocol was followed.)  Yes    Sedation (Moderate or Deep): None    ABOVE ASSESSMENTS PERFORMED BY    Aretha Pichardo MD       INDICATIONS FOR PROCEDURES  Jessica Manriquez is a 54 year old patient with intractable pain secondary to the diagnosis of chronic migraine headaches. Her baseline symptoms have been recalcitrant to oral medications and conservative therapy.  She is here today for reinjection with Botox.    GOAL OF PROCEDURE  The goal of this procedure is to decrease pain.      TOTAL DOSE ADMINISTERED  Dose Administered:  200 units  Botox (Botulinum Toxin Type A)       2:1 Dilution   Unavoidable Drug Waste: No  Diluent Used:  0.5% bupivacaine  Total Volume of Diluent Used:  4 ml  NDC #: Botox 100u ([address]-70)      CONSENT  The risks, benefits, and treatment options were discussed with Jessica Manriquez and she agreed to proceed.    Written consent was obtained by [facility].     EQUIPMENT USED  Needle-25mm stimulating/recording  Needle-30 gauge  EMG/NCS Machine    SKIN PREPARATION  Skin preparation was performed using an alcohol wipe.    GUIDANCE DESCRIPTION  Electro-myographic guidance was necessary throughout the neck portion of the procedure to accurately identify all areas of spastic muscles while avoiding injection  of non-spastic muscles, neighboring nerves and nearby vascular structures.       AREA/MUSCLE INJECTED:  200 UNITS BOTOX = TOTAL DOSE, 2:1 DILUTION    1. SHOULDER GIRDLE & NECK MUSCLES: 65 UNITS BOTOX = TOTAL DOSE                               Right Upper Trapezius 15 units of Botox at 3 sites               Left Upper Trapezius 15 units of Botox at 3 sites     Right Splenius  - 5 units of Botox at 1 site/s  Left Splenius - 5 units of Botox at 1 site/s     Right Semispinalis  - 5 units of Botox at 1 site/s  Left Semispinalis - 5 units of Botox at 1 site/s    Left Anterior Scalenes - 5 units of Botox at 1 site/s.                  Right Levator Scapula - 5 units of Botox at 1 site/s.     Left Levator Scapula -  5 units of Botox at 1 site/s.     2. FACIAL, JAW & SCALP MUSCLES: 135 UNITS BOTOX = TOTAL DOSE     Right Masseter - 5 units of Botox at 1 site/s.  Left Masseter - 5 units of Botox at 1 site/s.     Right Occipitalis (upper and lower) - 20 units of Botox at 3 site/s.  Left Occipitalis (upper and lower) - 20 units of Botox at 3 site/s.    Right Frontalis - 10 units of Botox at 2 site/s  Left Frontalis - 10 units of Botox at 2 site/s     Right Temporalis (upper and lower)  - 25 units of Botox at 5 site/s.  Left Temporalis (upper and lower) - 25 units of Botox at 5 site/s.    Right  - 5 units of Botox at 1 site/s.  Left  - 5 units of Botox at 1 site/s.     Procerus - 5 units of Botox at 1 site/s.      RESPONSE TO PROCEDURE  Jessica Manriquez tolerated the procedure well and there were no immediate complications. She was allowed to recover for an appropriate period of time and was discharged home in stable condition.    ASSESSMENT AND PLAN   Botulinum toxin injections: No changes made to Botox dose or distribution today. Patient will continue to monitor response and report at next appointment.   Referrals: None.   Medications: No changes.   Follow up: Jessica Manriquez was rescheduled for the next  series of injections in 12 weeks, at which time we will evaluate response to today's injections. she may call the clinic prior with any questions or concerns prior to the next appointment.       Aretha Pichardo MD        Again, thank you for allowing me to participate in the care of your patient.      Sincerely,    Aretha Pichardo MD

## 2025-06-18 NOTE — PROGRESS NOTES
[facility]     PM&R CLINIC NOTE  BOTULINUM TOXIN PROCEDURE      HPI  No chief complaint on file.    Jessica Manriquez is a 54 year old female with a history of intractable migraine headaches who presents to clinic for botulinum toxin injections for management of chronic migraine headaches.     SINCE LAST VISIT  Jessica Manriquez was last seen here in clinic on 4/9/2025, at which time she received 200 units of Botox. She reports     Patient denies new medical diagnoses, illnesses, hospitalizations, emergency room visits, and injuries since the previous injection with botulinum neurotoxin.    RESPONSE TO PREVIOUS TREATMENT  Side effects: None.   Post-procedural headache: No.     1.  Headache frequency during this injection cycle: She reports about 5-6 migraine headache days per month, with a slight increase over the last few weeks as the Botox has been wearing off. May was a little bit heavier in headaches due to stress. This is compared to her baseline headache frequency of >15 headache days per month.      2.  Headache duration during this injection cycle:  Headache duration was 8 hours on average. Patient reports likely one episode of multiple day headaches during this injection cycle, but this does not happen very often.     3.  Headache intensity during this injection cycle:    A.  4/10  =  Typical pain level.  B.  7/10  =  Worst pain level.  C.  0/10  =  Lowest pain level.    4.  Change in headache medication usage during this injection cycle: She has tried several preventative medications and is currently on gabapentin 300 mg at nighttime. For rescue medication she is on combination of Imitrex, Excedrin and tramadol. She only had twice this entire injection cycle where she had to take tramadol and Imitrex, which is what she does for the more severe headaches.     5.  ER Visits During This Injection Cycle: None.     6.  Functional Performance:  Change in ADL's, social  interaction, days lost from work, etc. Patient reports being able to more fully participate in social and family activities and responsibilities as headache symptoms have improved she works from home so generally is able to modify her schedule and rest to avoid worsening headaches when she has a migraine.  She has been overall a lot more functional since she was started on the injections.      PHYSICAL EXAM  VS: LMP 10/18/2023 (Approximate)    GEN: Pleasant and cooperative, in no acute distress  HEENT: No facial asymmetry, no significant head rotation or sidebending, tight paravertebral muscles, levator, trapezius muscles bilaterally     ALLERGIES  Allergies   Allergen Reactions    Darvocet [Acetaminophen] Nausea and Vomiting    Erythromycin GI Disturbance    Flexeril [Cyclobenzaprine Hcl]      Lightheadedness, dizzy      Serotonin Reuptake Inhibitors (Ssris) Anxiety, Other (See Comments) and Palpitations       CURRENT MEDICATIONS    Current Outpatient Medications:     aspirin-acetaminophen-caffeine (EXCEDRIN MIGRAINE) 250-250-65 MG per tablet, Take 1 tablet by mouth every 6 hours as needed for pain., Disp: 30 tablet, Rfl: 0    diazepam (VALIUM) 10 MG tablet, Take 0.5-1 tablets (5-10 mg) by mouth nightly as needed for muscle spasms or sleep., Disp: 30 tablet, Rfl: 1    ferrous sulfate (FEROSUL) 325 (65 Fe) MG tablet, Take 325 mg by mouth daily (with breakfast), Disp: , Rfl:     gabapentin (NEURONTIN) 300 MG capsule, Take 1 capsule (300 mg) by mouth at bedtime., Disp: 90 capsule, Rfl: 3    hydrOXYzine HCl (ATARAX) 25 MG tablet, Take 1-2 tablets (25-50 mg) by mouth nightly as needed for other or itching (insomnia)., Disp: 60 tablet, Rfl: 5    Lactobacillus (ACIDOPHILUS) 100 MG CAPS, Take 1 capsule by mouth daily, Disp: , Rfl:     Loperamide HCl (IMODIUM A-D PO), Take 1 tablet by mouth once as needed, Disp: , Rfl:     MULTI-VITAMIN OR TABS, 1 tablet by mouth DAILY, Disp: , Rfl:     OnabotulinumtoxinA (BOTOX IJ), Inject  200 Units as directed Total dose 200 units  Dose Administered:  175 units  Botox (Botulinum Toxin Type A)       2:1 Dilution (55 units unavoidable waste) Unavoidable waste 25 units  Diluent Used:  Preservative Free Normal Saline Total Volume of Diluent Used:  2.9 ml Lot # /C3 with Expiration Date:  2021 NDC #: Botox 100u ([address]-99), Disp: , Rfl:     ondansetron (ZOFRAN ODT) 4 MG ODT tab, Take 1-2 tablets (4-8 mg) by mouth every 8 hours as needed for nausea., Disp: 20 tablet, Rfl: 3    SUMAtriptan (IMITREX) 50 MG tablet, TAKE 1-2  TABLET BY MOUTH AT ONSET OF HEADACHE FOR MIGRAINE MAY REPEAT DOSE IN 2 HOURS.  DO NOT EXCEED 200 MG IN 24 HOURS, Disp: 36 tablet, Rfl: 4    traMADol (ULTRAM) 50 MG tablet, Take 1-2 tablets ( mg) by mouth every 6 hours as needed for pain. Max 5 pills/day, Disp: 40 tablet, Rfl: 0    valACYclovir (VALTREX) 500 MG tablet, Take 1 tablet (500 mg) by mouth daily (Patient taking differently: Take 500 mg by mouth daily. Patient takes one tablet weekly), Disp: 30 tablet, Rfl: 3    vitamin (B COMPLEX-C) tablet, Take 1 tablet by mouth daily, Disp: , Rfl:     VITAMIN C 500 MG OR TABS, 1 tablet daily, Disp: , Rfl:     VITAMIN D, CHOLECALCIFEROL, PO, Take 1,000 Units by mouth daily, Disp: , Rfl:     Vitamin Mixture (VITAMIN E COMPLETE PO), , Disp: , Rfl:     Current Facility-Administered Medications:     botulinum toxin type A (BOTOX) 100 units injection 200 Units, 200 Units, Intramuscular, Q10 Weeks, Standal, Aretha Hahn MD, 200 Units at 25 1444    bupivacaine (MARCAINE) 0.25% preservative free injection, 4 mL, Intradermal, Once,        BOTULINUM NEUROTOXIN INJECTION PROCEDURES    VERIFICATION OF PATIENT IDENTIFICATION AND PROCEDURE     Initials   Patient Name SES   Patient  SES   Procedure Verified by: SES     Prior to the start of the procedure and with procedural staff participation, I verbally confirmed the patient s identity using two indicators, relevant allergies,  that the procedure was appropriate and matched the consent or emergent situation, and that the correct equipment/implants were available. Immediately prior to starting the procedure I conducted the Time Out with the procedural staff and re-confirmed the patient s name, procedure, and site/side. (The Joint Commission universal protocol was followed.)  Yes    Sedation (Moderate or Deep): None    ABOVE ASSESSMENTS PERFORMED BY    Aretha Pichardo MD       INDICATIONS FOR PROCEDURES  Jessica Manriquez is a 54 year old patient with intractable pain secondary to the diagnosis of chronic migraine headaches. Her baseline symptoms have been recalcitrant to oral medications and conservative therapy.  She is here today for reinjection with Botox.    GOAL OF PROCEDURE  The goal of this procedure is to decrease pain.      TOTAL DOSE ADMINISTERED  Dose Administered:  200 units  Botox (Botulinum Toxin Type A)       2:1 Dilution   Unavoidable Drug Waste: No  Diluent Used:  0.5% bupivacaine  Total Volume of Diluent Used:  4 ml  NDC #: Botox 100u ([address]-91)      CONSENT  The risks, benefits, and treatment options were discussed with Jessica Manriquez and she agreed to proceed.    Written consent was obtained by [facility].     EQUIPMENT USED  Needle-25mm stimulating/recording  Needle-30 gauge  EMG/NCS Machine    SKIN PREPARATION  Skin preparation was performed using an alcohol wipe.    GUIDANCE DESCRIPTION  Electro-myographic guidance was necessary throughout the neck portion of the procedure to accurately identify all areas of spastic muscles while avoiding injection of non-spastic muscles, neighboring nerves and nearby vascular structures.       AREA/MUSCLE INJECTED:  200 UNITS BOTOX = TOTAL DOSE, 2:1 DILUTION    1. SHOULDER GIRDLE & NECK MUSCLES: 65 UNITS BOTOX = TOTAL DOSE                               Right Upper Trapezius 15 units of Botox at 3 sites               Left Upper Trapezius 15 units of Botox at 3 sites     Right  Splenius  - 5 units of Botox at 1 site/s  Left Splenius - 5 units of Botox at 1 site/s     Right Semispinalis  - 5 units of Botox at 1 site/s  Left Semispinalis - 5 units of Botox at 1 site/s    Left Anterior Scalenes - 5 units of Botox at 1 site/s.                  Right Levator Scapula - 5 units of Botox at 1 site/s.     Left Levator Scapula -  5 units of Botox at 1 site/s.     2. FACIAL, JAW & SCALP MUSCLES: 135 UNITS BOTOX = TOTAL DOSE     Right Masseter - 5 units of Botox at 1 site/s.  Left Masseter - 5 units of Botox at 1 site/s.     Right Occipitalis (upper and lower) - 20 units of Botox at 3 site/s.  Left Occipitalis (upper and lower) - 20 units of Botox at 3 site/s.    Right Frontalis - 10 units of Botox at 2 site/s  Left Frontalis - 10 units of Botox at 2 site/s     Right Temporalis (upper and lower)  - 25 units of Botox at 5 site/s.  Left Temporalis (upper and lower) - 25 units of Botox at 5 site/s.    Right  - 5 units of Botox at 1 site/s.  Left  - 5 units of Botox at 1 site/s.     Procerus - 5 units of Botox at 1 site/s.      RESPONSE TO PROCEDURE  Jessica Manriquez tolerated the procedure well and there were no immediate complications. She was allowed to recover for an appropriate period of time and was discharged home in stable condition.    ASSESSMENT AND PLAN   Botulinum toxin injections: No changes made to Botox dose or distribution today. Patient will continue to monitor response and report at next appointment.   Referrals: None.   Medications: No changes.   Follow up: Jessica Manriquez was rescheduled for the next series of injections in 12 weeks, at which time we will evaluate response to today's injections. she may call the clinic prior with any questions or concerns prior to the next appointment.       Aretha Pichardo MD

## 2025-06-23 ENCOUNTER — MYC MEDICAL ADVICE (OUTPATIENT)
Dept: FAMILY MEDICINE | Facility: CLINIC | Age: 55
End: 2025-06-23
Payer: COMMERCIAL

## 2025-06-23 DIAGNOSIS — R61 NIGHT SWEATS: ICD-10-CM

## 2025-06-23 RX ORDER — GABAPENTIN 300 MG/1
600 CAPSULE ORAL AT BEDTIME
Qty: 180 CAPSULE | Refills: 1 | Status: SHIPPED | OUTPATIENT
Start: 2025-06-23

## 2025-06-23 NOTE — TELEPHONE ENCOUNTER
Please see my chart message.     Patient requesting to increase Gabapentin dose at bedtime    gabapentin (NEURONTIN) 300 MG capsule Take 1 capsule (300 mg) by mouth at bedtime. 90 capsule 3 ordered 01/15/2025 -- -- -- --            Summary: Take 1 capsule (300 mg) by mouth at bedtime., Disp-90 capsule, R-3, E-Prescribe  Guidelines: Start Date & Time: 01/15/2025Pharmacy: [facility] [place], [place] - [facility] [place]note 3[facility] [address]: 01/15/2025 (O)Ordered On: 01/15/2025ReportAdh: Dx Associated: Long-term: Different Encounter: Dose, Route, Frequency: 300 mg, Oral, AT BEDTIME     Order Details: Take 1 capsule (300 mg) by mouth at bedtime. Dispense: 90 capsule, Refills: 3 ordered  Ordering Department:  FAMILY PRACTICE  Authorized By: Su Loya MD Denise B. Johnson RN on 6/23/2025 at 10:49 AM

## 2025-06-24 ENCOUNTER — HOSPITAL ENCOUNTER (OUTPATIENT)
Dept: MRI IMAGING | Facility: CLINIC | Age: 55
Discharge: HOME | End: 2025-06-24
Attending: FAMILY MEDICINE
Payer: COMMERCIAL

## 2025-06-24 DIAGNOSIS — Z91.89 AT HIGH RISK FOR BREAST CANCER: ICD-10-CM

## 2025-06-24 DIAGNOSIS — Z80.3 FAMILY HISTORY OF MALIGNANT NEOPLASM OF BREAST: ICD-10-CM

## 2025-06-24 PROCEDURE — 258N000003 HC RX IP 258 OP 636: Performed by: FAMILY MEDICINE

## 2025-06-24 PROCEDURE — 77049 MRI BREAST C-+ W/CAD BI: CPT

## 2025-06-24 PROCEDURE — 255N000002 HC RX 255 OP 636: Performed by: FAMILY MEDICINE

## 2025-06-24 PROCEDURE — A9585 GADOBUTROL INJECTION: HCPCS | Performed by: FAMILY MEDICINE

## 2025-06-24 RX ORDER — GADOBUTROL 604.72 MG/ML
7 INJECTION INTRAVENOUS ONCE
Status: COMPLETED | OUTPATIENT
Start: 2025-06-24 | End: 2025-06-24

## 2025-06-24 RX ADMIN — SODIUM CHLORIDE 50 ML: 9 INJECTION, SOLUTION INTRAVENOUS at 14:30

## 2025-06-24 RX ADMIN — GADOBUTROL 7 ML: 604.72 INJECTION INTRAVENOUS at 14:17

## 2025-06-25 ENCOUNTER — RESULTS FOLLOW-UP (OUTPATIENT)
Dept: MAMMOGRAPHY | Facility: CLINIC | Age: 55
End: 2025-06-25
Payer: COMMERCIAL

## 2025-08-27 ENCOUNTER — OFFICE VISIT (OUTPATIENT)
Dept: PHYSICAL MEDICINE AND REHAB | Facility: CLINIC | Age: 55
End: 2025-08-27
Payer: COMMERCIAL

## 2025-08-27 DIAGNOSIS — G43.719 CHRONIC MIGRAINE WITHOUT AURA, INTRACTABLE, WITHOUT STATUS MIGRAINOSUS: Primary | ICD-10-CM

## 2025-08-27 PROCEDURE — 95874 GUIDE NERV DESTR NEEDLE EMG: CPT | Performed by: PHYSICAL MEDICINE & REHABILITATION

## 2025-08-27 PROCEDURE — 64615 CHEMODENERV MUSC MIGRAINE: CPT | Performed by: PHYSICAL MEDICINE & REHABILITATION

## 2025-08-27 RX ORDER — BUPIVACAINE HYDROCHLORIDE 5 MG/ML
4 INJECTION, SOLUTION EPIDURAL; INTRACAUDAL; PERINEURAL ONCE
Status: COMPLETED | OUTPATIENT
Start: 2025-08-27 | End: 2025-08-27

## 2025-08-27 RX ADMIN — BUPIVACAINE HYDROCHLORIDE 20 MG: 5 INJECTION, SOLUTION EPIDURAL; INTRACAUDAL; PERINEURAL at 17:09

## (undated) RX ORDER — BUPIVACAINE HYDROCHLORIDE 5 MG/ML
INJECTION, SOLUTION EPIDURAL; INTRACAUDAL
Status: DISPENSED
Start: 2024-04-17

## (undated) RX ORDER — BUPIVACAINE HYDROCHLORIDE 5 MG/ML
INJECTION, SOLUTION EPIDURAL; INTRACAUDAL
Status: DISPENSED
Start: 2020-01-28

## (undated) RX ORDER — BUPIVACAINE HYDROCHLORIDE 5 MG/ML
INJECTION, SOLUTION EPIDURAL; INTRACAUDAL
Status: DISPENSED
Start: 2020-04-23

## (undated) RX ORDER — BUPIVACAINE HYDROCHLORIDE 5 MG/ML
INJECTION, SOLUTION EPIDURAL; INTRACAUDAL
Status: DISPENSED
Start: 2021-04-27

## (undated) RX ORDER — BUPIVACAINE HYDROCHLORIDE 5 MG/ML
INJECTION, SOLUTION EPIDURAL; INTRACAUDAL
Status: DISPENSED
Start: 2022-03-04

## (undated) RX ORDER — BUPIVACAINE HYDROCHLORIDE 5 MG/ML
INJECTION, SOLUTION EPIDURAL; INTRACAUDAL
Status: DISPENSED
Start: 2023-11-29

## (undated) RX ORDER — BUPIVACAINE HYDROCHLORIDE 5 MG/ML
INJECTION, SOLUTION EPIDURAL; INTRACAUDAL
Status: DISPENSED
Start: 2021-12-10

## (undated) RX ORDER — BUPIVACAINE HYDROCHLORIDE 5 MG/ML
INJECTION, SOLUTION EPIDURAL; INTRACAUDAL
Status: DISPENSED
Start: 2024-02-07

## (undated) RX ORDER — BUPIVACAINE HYDROCHLORIDE 5 MG/ML
INJECTION, SOLUTION EPIDURAL; INTRACAUDAL
Status: DISPENSED
Start: 2020-12-03

## (undated) RX ORDER — BUPIVACAINE HYDROCHLORIDE 5 MG/ML
INJECTION, SOLUTION EPIDURAL; INTRACAUDAL
Status: DISPENSED
Start: 2024-06-26

## (undated) RX ORDER — BUPIVACAINE HYDROCHLORIDE 5 MG/ML
INJECTION, SOLUTION EPIDURAL; INTRACAUDAL; PERINEURAL
Status: DISPENSED
Start: 2025-04-09

## (undated) RX ORDER — BUPIVACAINE HYDROCHLORIDE 5 MG/ML
INJECTION, SOLUTION EPIDURAL; INTRACAUDAL
Status: DISPENSED
Start: 2023-03-20

## (undated) RX ORDER — BUPIVACAINE HYDROCHLORIDE 5 MG/ML
INJECTION, SOLUTION EPIDURAL; INTRACAUDAL; PERINEURAL
Status: DISPENSED
Start: 2025-08-27

## (undated) RX ORDER — BUPIVACAINE HYDROCHLORIDE 5 MG/ML
INJECTION, SOLUTION EPIDURAL; INTRACAUDAL
Status: DISPENSED
Start: 2025-01-29

## (undated) RX ORDER — BUPIVACAINE HYDROCHLORIDE 5 MG/ML
INJECTION, SOLUTION EPIDURAL; INTRACAUDAL
Status: DISPENSED
Start: 2024-11-13

## (undated) RX ORDER — BUPIVACAINE HYDROCHLORIDE 5 MG/ML
INJECTION, SOLUTION EPIDURAL; INTRACAUDAL
Status: DISPENSED
Start: 2022-12-28

## (undated) RX ORDER — BUPIVACAINE HYDROCHLORIDE 5 MG/ML
INJECTION, SOLUTION EPIDURAL; INTRACAUDAL
Status: DISPENSED
Start: 2023-08-09

## (undated) RX ORDER — BUPIVACAINE HYDROCHLORIDE 5 MG/ML
INJECTION, SOLUTION EPIDURAL; INTRACAUDAL
Status: DISPENSED
Start: 2022-05-27

## (undated) RX ORDER — BUPIVACAINE HYDROCHLORIDE 5 MG/ML
INJECTION, SOLUTION EPIDURAL; INTRACAUDAL
Status: DISPENSED
Start: 2020-07-16

## (undated) RX ORDER — BUPIVACAINE HYDROCHLORIDE 5 MG/ML
INJECTION, SOLUTION EPIDURAL; INTRACAUDAL
Status: DISPENSED
Start: 2023-05-31

## (undated) RX ORDER — BUPIVACAINE HYDROCHLORIDE 5 MG/ML
INJECTION, SOLUTION EPIDURAL; INTRACAUDAL
Status: DISPENSED
Start: 2024-09-04

## (undated) RX ORDER — BUPIVACAINE HYDROCHLORIDE 5 MG/ML
INJECTION, SOLUTION EPIDURAL; INTRACAUDAL
Status: DISPENSED
Start: 2022-10-24

## (undated) RX ORDER — BUPIVACAINE HYDROCHLORIDE 5 MG/ML
INJECTION, SOLUTION EPIDURAL; INTRACAUDAL
Status: DISPENSED
Start: 2021-07-15